# Patient Record
Sex: FEMALE | Race: OTHER | NOT HISPANIC OR LATINO | URBAN - METROPOLITAN AREA
[De-identification: names, ages, dates, MRNs, and addresses within clinical notes are randomized per-mention and may not be internally consistent; named-entity substitution may affect disease eponyms.]

---

## 2017-02-12 ENCOUNTER — INPATIENT (INPATIENT)
Facility: HOSPITAL | Age: 77
LOS: 5 days | Discharge: HOME | DRG: 74 | End: 2017-02-18
Attending: GENERAL PRACTICE | Admitting: GENERAL PRACTICE
Payer: COMMERCIAL

## 2017-02-12 VITALS
DIASTOLIC BLOOD PRESSURE: 67 MMHG | OXYGEN SATURATION: 100 % | TEMPERATURE: 98 F | SYSTOLIC BLOOD PRESSURE: 132 MMHG | RESPIRATION RATE: 18 BRPM | HEART RATE: 86 BPM

## 2017-02-12 DIAGNOSIS — Z98.891 HISTORY OF UTERINE SCAR FROM PREVIOUS SURGERY: Chronic | ICD-10-CM

## 2017-02-12 DIAGNOSIS — R27.0 ATAXIA, UNSPECIFIED: ICD-10-CM

## 2017-02-12 LAB
ACETONE SERPL-MCNC: NEGATIVE — SIGNIFICANT CHANGE UP
ALBUMIN SERPL ELPH-MCNC: 4.2 G/DL — SIGNIFICANT CHANGE UP (ref 3.3–5)
ALP SERPL-CCNC: 120 U/L — SIGNIFICANT CHANGE UP (ref 40–120)
ALT FLD-CCNC: 29 U/L RC — SIGNIFICANT CHANGE UP (ref 10–45)
ANION GAP SERPL CALC-SCNC: 16 MMOL/L — SIGNIFICANT CHANGE UP (ref 5–17)
APPEARANCE UR: CLEAR — SIGNIFICANT CHANGE UP
AST SERPL-CCNC: 30 U/L — SIGNIFICANT CHANGE UP (ref 10–40)
BASOPHILS # BLD AUTO: 0 K/UL — SIGNIFICANT CHANGE UP (ref 0–0.2)
BASOPHILS NFR BLD AUTO: 0.5 % — SIGNIFICANT CHANGE UP (ref 0–2)
BILIRUB SERPL-MCNC: 0.2 MG/DL — SIGNIFICANT CHANGE UP (ref 0.2–1.2)
BILIRUB UR-MCNC: NEGATIVE — SIGNIFICANT CHANGE UP
BUN SERPL-MCNC: 39 MG/DL — HIGH (ref 7–23)
CALCIUM SERPL-MCNC: 10.1 MG/DL — SIGNIFICANT CHANGE UP (ref 8.4–10.5)
CHLORIDE SERPL-SCNC: 94 MMOL/L — LOW (ref 96–108)
CO2 SERPL-SCNC: 27 MMOL/L — SIGNIFICANT CHANGE UP (ref 22–31)
COLOR SPEC: YELLOW — SIGNIFICANT CHANGE UP
CREAT SERPL-MCNC: 1.37 MG/DL — HIGH (ref 0.5–1.3)
DIFF PNL FLD: NEGATIVE — SIGNIFICANT CHANGE UP
EOSINOPHIL # BLD AUTO: 0.1 K/UL — SIGNIFICANT CHANGE UP (ref 0–0.5)
EOSINOPHIL NFR BLD AUTO: 1.2 % — SIGNIFICANT CHANGE UP (ref 0–6)
GAS PNL BLDV: SIGNIFICANT CHANGE UP
GLUCOSE SERPL-MCNC: 349 MG/DL — HIGH (ref 70–99)
GLUCOSE UR QL: >1000
HCT VFR BLD CALC: 38.1 % — SIGNIFICANT CHANGE UP (ref 34.5–45)
HGB BLD-MCNC: 12.5 G/DL — SIGNIFICANT CHANGE UP (ref 11.5–15.5)
KETONES UR-MCNC: NEGATIVE — SIGNIFICANT CHANGE UP
LEUKOCYTE ESTERASE UR-ACNC: ABNORMAL
LYMPHOCYTES # BLD AUTO: 2.1 K/UL — SIGNIFICANT CHANGE UP (ref 1–3.3)
LYMPHOCYTES # BLD AUTO: 35.7 % — SIGNIFICANT CHANGE UP (ref 13–44)
MCHC RBC-ENTMCNC: 24.3 PG — LOW (ref 27–34)
MCHC RBC-ENTMCNC: 32.8 GM/DL — SIGNIFICANT CHANGE UP (ref 32–36)
MCV RBC AUTO: 74 FL — LOW (ref 80–100)
MONOCYTES # BLD AUTO: 0.3 K/UL — SIGNIFICANT CHANGE UP (ref 0–0.9)
MONOCYTES NFR BLD AUTO: 5.4 % — SIGNIFICANT CHANGE UP (ref 2–14)
NEUTROPHILS # BLD AUTO: 3.4 K/UL — SIGNIFICANT CHANGE UP (ref 1.8–7.4)
NEUTROPHILS NFR BLD AUTO: 57.3 % — SIGNIFICANT CHANGE UP (ref 43–77)
NITRITE UR-MCNC: NEGATIVE — SIGNIFICANT CHANGE UP
PH UR: 6.5 — SIGNIFICANT CHANGE UP (ref 4.8–8)
PLATELET # BLD AUTO: 169 K/UL — SIGNIFICANT CHANGE UP (ref 150–400)
POTASSIUM SERPL-MCNC: 4.5 MMOL/L — SIGNIFICANT CHANGE UP (ref 3.5–5.3)
POTASSIUM SERPL-SCNC: 4.5 MMOL/L — SIGNIFICANT CHANGE UP (ref 3.5–5.3)
PROT SERPL-MCNC: 7.3 G/DL — SIGNIFICANT CHANGE UP (ref 6–8.3)
PROT UR-MCNC: NEGATIVE — SIGNIFICANT CHANGE UP
RBC # BLD: 5.15 M/UL — SIGNIFICANT CHANGE UP (ref 3.8–5.2)
RBC # FLD: 12.7 % — SIGNIFICANT CHANGE UP (ref 10.3–14.5)
SODIUM SERPL-SCNC: 137 MMOL/L — SIGNIFICANT CHANGE UP (ref 135–145)
SP GR SPEC: 1.03 — HIGH (ref 1.01–1.02)
UROBILINOGEN FLD QL: NEGATIVE — SIGNIFICANT CHANGE UP
WBC # BLD: 6 K/UL — SIGNIFICANT CHANGE UP (ref 3.8–10.5)
WBC # FLD AUTO: 6 K/UL — SIGNIFICANT CHANGE UP (ref 3.8–10.5)

## 2017-02-12 PROCEDURE — 99285 EMERGENCY DEPT VISIT HI MDM: CPT | Mod: 25

## 2017-02-12 PROCEDURE — 70450 CT HEAD/BRAIN W/O DYE: CPT | Mod: 26

## 2017-02-12 PROCEDURE — 93010 ELECTROCARDIOGRAM REPORT: CPT

## 2017-02-12 PROCEDURE — 71010: CPT | Mod: 26

## 2017-02-12 RX ORDER — INSULIN LISPRO 100/ML
VIAL (ML) SUBCUTANEOUS
Qty: 0 | Refills: 0 | Status: DISCONTINUED | OUTPATIENT
Start: 2017-02-12 | End: 2017-02-18

## 2017-02-12 RX ORDER — SODIUM CHLORIDE 9 MG/ML
1000 INJECTION, SOLUTION INTRAVENOUS
Qty: 0 | Refills: 0 | Status: DISCONTINUED | OUTPATIENT
Start: 2017-02-12 | End: 2017-02-18

## 2017-02-12 RX ORDER — DEXTROSE 50 % IN WATER 50 %
25 SYRINGE (ML) INTRAVENOUS ONCE
Qty: 0 | Refills: 0 | Status: DISCONTINUED | OUTPATIENT
Start: 2017-02-12 | End: 2017-02-18

## 2017-02-12 RX ORDER — INSULIN LISPRO 100/ML
VIAL (ML) SUBCUTANEOUS AT BEDTIME
Qty: 0 | Refills: 0 | Status: DISCONTINUED | OUTPATIENT
Start: 2017-02-12 | End: 2017-02-18

## 2017-02-12 RX ORDER — INSULIN GLARGINE 100 [IU]/ML
20 INJECTION, SOLUTION SUBCUTANEOUS AT BEDTIME
Qty: 0 | Refills: 0 | Status: DISCONTINUED | OUTPATIENT
Start: 2017-02-12 | End: 2017-02-13

## 2017-02-12 RX ORDER — GLUCAGON INJECTION, SOLUTION 0.5 MG/.1ML
1 INJECTION, SOLUTION SUBCUTANEOUS ONCE
Qty: 0 | Refills: 0 | Status: DISCONTINUED | OUTPATIENT
Start: 2017-02-12 | End: 2017-02-18

## 2017-02-12 RX ORDER — ASPIRIN/CALCIUM CARB/MAGNESIUM 324 MG
81 TABLET ORAL DAILY
Qty: 0 | Refills: 0 | Status: DISCONTINUED | OUTPATIENT
Start: 2017-02-12 | End: 2017-02-18

## 2017-02-12 RX ORDER — DEXTROSE 50 % IN WATER 50 %
12.5 SYRINGE (ML) INTRAVENOUS ONCE
Qty: 0 | Refills: 0 | Status: DISCONTINUED | OUTPATIENT
Start: 2017-02-12 | End: 2017-02-18

## 2017-02-12 RX ORDER — ATORVASTATIN CALCIUM 80 MG/1
80 TABLET, FILM COATED ORAL AT BEDTIME
Qty: 0 | Refills: 0 | Status: DISCONTINUED | OUTPATIENT
Start: 2017-02-12 | End: 2017-02-18

## 2017-02-12 RX ORDER — DEXTROSE 50 % IN WATER 50 %
1 SYRINGE (ML) INTRAVENOUS ONCE
Qty: 0 | Refills: 0 | Status: DISCONTINUED | OUTPATIENT
Start: 2017-02-12 | End: 2017-02-18

## 2017-02-12 RX ADMIN — Medication 3: at 21:20

## 2017-02-12 RX ADMIN — ATORVASTATIN CALCIUM 80 MILLIGRAM(S): 80 TABLET, FILM COATED ORAL at 21:19

## 2017-02-12 RX ADMIN — INSULIN GLARGINE 20 UNIT(S): 100 INJECTION, SOLUTION SUBCUTANEOUS at 22:00

## 2017-02-12 NOTE — ED ADULT NURSE NOTE - OBJECTIVE STATEMENT
pt states new onset of unsteady gait and loss of balance, worsening for 3 days. pt states independent and lives alone. pt takes this morning attempted to go to bathroom and felt unsteady. Pt denies any worsening of her level of sight, headache, fever, chills, nausea, vomiting, falls, head trauma.

## 2017-02-12 NOTE — ED PROVIDER NOTE - OBJECTIVE STATEMENT
77 y/o F legally blind, DM, presenting with new onset of unsteady gait and loss of balance, worsening for 3 days. Pt states that prior to these symptoms starting, she was independent, living alone and taking the bus daily to do errands. She reports she woke up in the morning 3 days ago with loss of balance when trying to walk throughout her house, having the hold onto things as she walked so she would not fall. She states that her gait is not normal for her either. Pt states that she told her daughter about this today, after it did not resolve and has gotten worse 75 y/o F legally blind, DM, presenting with new onset of unsteady gait and loss of balance, worsening for 3 days. Pt states that prior to these symptoms starting, she was independent, living alone and taking the bus daily to do errands. She reports she woke up in the morning 3 days ago with loss of balance when trying to walk throughout her house, having the hold onto things as she walked so she would not fall. She states that her gait is not normal for her either. Pt states that she told her daughter about this today, after it did not resolve and has gotten worse. Pt denies any worsening of her level of sight, headache, fever, chills, nausea, vomiting, falls, head trauma.

## 2017-02-12 NOTE — PATIENT PROFILE ADULT. - VISION (WITH CORRECTIVE LENSES IF THE PATIENT USUALLY WEARS THEM):
Partially impaired: cannot see medication labels or newsprint, but can see obstacles in path, and the surrounding layout; can count fingers at arm's length/Legally blind, glasses

## 2017-02-12 NOTE — ED PROVIDER NOTE - DECREASED ABILITY TO STAND/WALK
OFF BALANCE/walking with unsteady gait and needing to hold on to things while walking. has not fallen.

## 2017-02-12 NOTE — ED PROVIDER NOTE - ATTENDING CONTRIBUTION TO CARE
increasing difficulty with gait over several days, encouraged to come to ED by family, patient states needs to  hold on to things to walk, able to ambulate independently but holds on to objects, normal rhomberg, 5/5 strength. Matt Ch MD (attending)

## 2017-02-12 NOTE — ED PROVIDER NOTE - MEDICAL DECISION MAKING DETAILS
77 y/o F pmhx DM, legally blind presenting with new onset of ataxia, unsteady gait and concern for worsening loss of balance for 3 days. PE remarkable for aforementioned, ataxic on exam. Will obtain CT head, labs, EKG, CXR, neuro consult and reassess.

## 2017-02-13 ENCOUNTER — TRANSCRIPTION ENCOUNTER (OUTPATIENT)
Age: 77
End: 2017-02-13

## 2017-02-13 DIAGNOSIS — R27.0 ATAXIA, UNSPECIFIED: ICD-10-CM

## 2017-02-13 DIAGNOSIS — H40.9 UNSPECIFIED GLAUCOMA: ICD-10-CM

## 2017-02-13 DIAGNOSIS — E11.3493 TYPE 2 DIABETES MELLITUS WITH SEVERE NONPROLIFERATIVE DIABETIC RETINOPATHY WITHOUT MACULAR EDEMA, BILATERAL: ICD-10-CM

## 2017-02-13 LAB
ALBUMIN SERPL ELPH-MCNC: 3.6 G/DL — SIGNIFICANT CHANGE UP (ref 3.3–5)
ALP SERPL-CCNC: 97 U/L — SIGNIFICANT CHANGE UP (ref 40–120)
ALT FLD-CCNC: 23 U/L — SIGNIFICANT CHANGE UP (ref 10–45)
ANION GAP SERPL CALC-SCNC: 14 MMOL/L — SIGNIFICANT CHANGE UP (ref 5–17)
ANISOCYTOSIS BLD QL: SLIGHT — SIGNIFICANT CHANGE UP
AST SERPL-CCNC: 31 U/L — SIGNIFICANT CHANGE UP (ref 10–40)
BASOPHILS # BLD AUTO: 0.05 K/UL — SIGNIFICANT CHANGE UP (ref 0–0.2)
BASOPHILS NFR BLD AUTO: 1 % — SIGNIFICANT CHANGE UP (ref 0–2)
BILIRUB SERPL-MCNC: 0.2 MG/DL — SIGNIFICANT CHANGE UP (ref 0.2–1.2)
BUN SERPL-MCNC: 31 MG/DL — HIGH (ref 7–23)
CALCIUM SERPL-MCNC: 9.6 MG/DL — SIGNIFICANT CHANGE UP (ref 8.4–10.5)
CHLORIDE SERPL-SCNC: 102 MMOL/L — SIGNIFICANT CHANGE UP (ref 96–108)
CHOLEST SERPL-MCNC: 143 MG/DL — SIGNIFICANT CHANGE UP (ref 10–199)
CO2 SERPL-SCNC: 24 MMOL/L — SIGNIFICANT CHANGE UP (ref 22–31)
CREAT SERPL-MCNC: 1.21 MG/DL — SIGNIFICANT CHANGE UP (ref 0.5–1.3)
CULTURE RESULTS: SIGNIFICANT CHANGE UP
EOSINOPHIL # BLD AUTO: 0 K/UL — SIGNIFICANT CHANGE UP (ref 0–0.5)
EOSINOPHIL NFR BLD AUTO: 0 % — SIGNIFICANT CHANGE UP (ref 0–6)
FOLATE SERPL-MCNC: >20 NG/ML — SIGNIFICANT CHANGE UP (ref 4.8–24.2)
GLUCOSE SERPL-MCNC: 154 MG/DL — HIGH (ref 70–99)
HCT VFR BLD CALC: 35.4 % — SIGNIFICANT CHANGE UP (ref 34.5–45)
HDLC SERPL-MCNC: 37 MG/DL — LOW (ref 40–125)
HGB BLD-MCNC: 11.3 G/DL — LOW (ref 11.5–15.5)
LACTATE SERPL-SCNC: 1.1 MMOL/L — SIGNIFICANT CHANGE UP (ref 0.7–2)
LIPID PNL WITH DIRECT LDL SERPL: 90 MG/DL — SIGNIFICANT CHANGE UP
LYMPHOCYTES # BLD AUTO: 2.82 K/UL — SIGNIFICANT CHANGE UP (ref 1–3.3)
LYMPHOCYTES # BLD AUTO: 53 % — HIGH (ref 13–44)
MAGNESIUM SERPL-MCNC: 2.3 MG/DL — SIGNIFICANT CHANGE UP (ref 1.6–2.6)
MANUAL SMEAR VERIFICATION: SIGNIFICANT CHANGE UP
MCHC RBC-ENTMCNC: 23.7 PG — LOW (ref 27–34)
MCHC RBC-ENTMCNC: 31.9 GM/DL — LOW (ref 32–36)
MCV RBC AUTO: 74.2 FL — LOW (ref 80–100)
MICROCYTES BLD QL: SLIGHT — SIGNIFICANT CHANGE UP
MONOCYTES # BLD AUTO: 0.21 K/UL — SIGNIFICANT CHANGE UP (ref 0–0.9)
MONOCYTES NFR BLD AUTO: 4 % — SIGNIFICANT CHANGE UP (ref 2–14)
NEUTROPHILS # BLD AUTO: 2.23 K/UL — SIGNIFICANT CHANGE UP (ref 1.8–7.4)
NEUTROPHILS NFR BLD AUTO: 42 % — LOW (ref 43–77)
PLAT MORPH BLD: NORMAL — SIGNIFICANT CHANGE UP
PLATELET # BLD AUTO: 180 K/UL — SIGNIFICANT CHANGE UP (ref 150–400)
POTASSIUM SERPL-MCNC: 4.4 MMOL/L — SIGNIFICANT CHANGE UP (ref 3.5–5.3)
POTASSIUM SERPL-SCNC: 4.4 MMOL/L — SIGNIFICANT CHANGE UP (ref 3.5–5.3)
PREALB SERPL-MCNC: 26 MG/DL — SIGNIFICANT CHANGE UP (ref 18–45)
PROT SERPL-MCNC: 6.5 G/DL — SIGNIFICANT CHANGE UP (ref 6–8.3)
RBC # BLD: 4.77 M/UL — SIGNIFICANT CHANGE UP (ref 3.8–5.2)
RBC # FLD: 13.7 % — SIGNIFICANT CHANGE UP (ref 10.3–14.5)
RBC BLD AUTO: ABNORMAL
SODIUM SERPL-SCNC: 140 MMOL/L — SIGNIFICANT CHANGE UP (ref 135–145)
SPECIMEN SOURCE: SIGNIFICANT CHANGE UP
T3 SERPL-MCNC: 88 NG/DL — SIGNIFICANT CHANGE UP (ref 80–200)
T4 AB SER-ACNC: 7.2 UG/DL — SIGNIFICANT CHANGE UP (ref 4.6–12)
TOTAL CHOLESTEROL/HDL RATIO MEASUREMENT: 3.9 RATIO — SIGNIFICANT CHANGE UP (ref 3.3–7.1)
TRIGL SERPL-MCNC: 78 MG/DL — SIGNIFICANT CHANGE UP (ref 10–149)
TSH SERPL-MCNC: 2.1 UIU/ML — SIGNIFICANT CHANGE UP (ref 0.27–4.2)
VIT B12 SERPL-MCNC: >2000 PG/ML — HIGH (ref 243–894)
WBC # BLD: 5.32 K/UL — SIGNIFICANT CHANGE UP (ref 3.8–10.5)
WBC # FLD AUTO: 5.32 K/UL — SIGNIFICANT CHANGE UP (ref 3.8–10.5)

## 2017-02-13 PROCEDURE — 99223 1ST HOSP IP/OBS HIGH 75: CPT | Mod: GC

## 2017-02-13 PROCEDURE — 99223 1ST HOSP IP/OBS HIGH 75: CPT

## 2017-02-13 PROCEDURE — 70547 MR ANGIOGRAPHY NECK W/O DYE: CPT | Mod: 26

## 2017-02-13 PROCEDURE — 70551 MRI BRAIN STEM W/O DYE: CPT | Mod: 26

## 2017-02-13 RX ORDER — LISINOPRIL 2.5 MG/1
2.5 TABLET ORAL DAILY
Qty: 0 | Refills: 0 | Status: DISCONTINUED | OUTPATIENT
Start: 2017-02-13 | End: 2017-02-18

## 2017-02-13 RX ORDER — LISINOPRIL 2.5 MG/1
10 TABLET ORAL DAILY
Qty: 0 | Refills: 0 | Status: DISCONTINUED | OUTPATIENT
Start: 2017-02-13 | End: 2017-02-13

## 2017-02-13 RX ORDER — INSULIN LISPRO 100/ML
2 VIAL (ML) SUBCUTANEOUS
Qty: 0 | Refills: 0 | Status: DISCONTINUED | OUTPATIENT
Start: 2017-02-13 | End: 2017-02-16

## 2017-02-13 RX ORDER — INSULIN GLARGINE 100 [IU]/ML
10 INJECTION, SOLUTION SUBCUTANEOUS AT BEDTIME
Qty: 0 | Refills: 0 | Status: DISCONTINUED | OUTPATIENT
Start: 2017-02-13 | End: 2017-02-14

## 2017-02-13 RX ORDER — BRIMONIDINE TARTRATE 2 MG/MG
1 SOLUTION/ DROPS OPHTHALMIC EVERY 12 HOURS
Qty: 0 | Refills: 0 | Status: DISCONTINUED | OUTPATIENT
Start: 2017-02-13 | End: 2017-02-18

## 2017-02-13 RX ORDER — SODIUM CHLORIDE 9 MG/ML
1000 INJECTION INTRAMUSCULAR; INTRAVENOUS; SUBCUTANEOUS
Qty: 0 | Refills: 0 | Status: DISCONTINUED | OUTPATIENT
Start: 2017-02-13 | End: 2017-02-18

## 2017-02-13 RX ORDER — TIMOLOL 0.5 %
1 DROPS OPHTHALMIC (EYE) EVERY 12 HOURS
Qty: 0 | Refills: 0 | Status: DISCONTINUED | OUTPATIENT
Start: 2017-02-13 | End: 2017-02-18

## 2017-02-13 RX ORDER — HEPARIN SODIUM 5000 [USP'U]/ML
5000 INJECTION INTRAVENOUS; SUBCUTANEOUS EVERY 8 HOURS
Qty: 0 | Refills: 0 | Status: DISCONTINUED | OUTPATIENT
Start: 2017-02-13 | End: 2017-02-18

## 2017-02-13 RX ADMIN — Medication 2 UNIT(S): at 18:11

## 2017-02-13 RX ADMIN — SODIUM CHLORIDE 65 MILLILITER(S): 9 INJECTION INTRAMUSCULAR; INTRAVENOUS; SUBCUTANEOUS at 05:22

## 2017-02-13 RX ADMIN — BRIMONIDINE TARTRATE 1 DROP(S): 2 SOLUTION/ DROPS OPHTHALMIC at 05:21

## 2017-02-13 RX ADMIN — LISINOPRIL 10 MILLIGRAM(S): 2.5 TABLET ORAL at 05:20

## 2017-02-13 RX ADMIN — SODIUM CHLORIDE 65 MILLILITER(S): 9 INJECTION INTRAMUSCULAR; INTRAVENOUS; SUBCUTANEOUS at 01:28

## 2017-02-13 RX ADMIN — HEPARIN SODIUM 5000 UNIT(S): 5000 INJECTION INTRAVENOUS; SUBCUTANEOUS at 05:20

## 2017-02-13 RX ADMIN — Medication 1 TABLET(S): at 11:47

## 2017-02-13 RX ADMIN — Medication 1 DROP(S): at 05:21

## 2017-02-13 RX ADMIN — HEPARIN SODIUM 5000 UNIT(S): 5000 INJECTION INTRAVENOUS; SUBCUTANEOUS at 22:27

## 2017-02-13 RX ADMIN — HEPARIN SODIUM 5000 UNIT(S): 5000 INJECTION INTRAVENOUS; SUBCUTANEOUS at 15:38

## 2017-02-13 RX ADMIN — Medication 3: at 11:47

## 2017-02-13 RX ADMIN — ATORVASTATIN CALCIUM 80 MILLIGRAM(S): 80 TABLET, FILM COATED ORAL at 22:27

## 2017-02-13 RX ADMIN — BRIMONIDINE TARTRATE 1 DROP(S): 2 SOLUTION/ DROPS OPHTHALMIC at 18:11

## 2017-02-13 RX ADMIN — INSULIN GLARGINE 10 UNIT(S): 100 INJECTION, SOLUTION SUBCUTANEOUS at 22:27

## 2017-02-13 RX ADMIN — Medication 81 MILLIGRAM(S): at 11:46

## 2017-02-13 RX ADMIN — Medication 1 DROP(S): at 18:10

## 2017-02-13 NOTE — DISCHARGE NOTE ADULT - CARE PROVIDER_API CALL
Nichelle Garcia), Internal Medicine  [address] [place] [address]  Phone: [phone]  Fax: [phone] Nichelle Garcia), Internal Medicine  [address] [place] [address]  Phone: [phone]  Fax: [phone]    Dr. Jakub Dunham,   [place], [place]  Phone: (   )    -  Fax: (   )    -

## 2017-02-13 NOTE — DISCHARGE NOTE ADULT - PLAN OF CARE
HA1C<7 HA1C 12.3  Keep appointment with diabetic educator Sonja Amin for March 23 at 10:30 am  and endocrinologist Dr Garcia on May 26 at 11:30 am. SAME phone number and address Ataxia will continue to resolve. 1. Maintain tight blood sugar control.    2. Follow up with PMD, Dr. Jakub Dunham in 1 week.   3. Check blood sugars daily in am. Dx: Autonomic dysfunction from Diabetes. HA1C 12.3  Goal: HA1C<7  Keep appointment with diabetic educator Sonja Amin for March 23 at 10:30 am  and endocrinologist Dr Garcia on May 26 at 11:30 am. SAME phone number and address

## 2017-02-13 NOTE — DISCHARGE NOTE ADULT - PROVIDER TOKENS
TOKOG:'[id number]' TOKEN:'[id number]',FREE:[LAST:[Dr. Jakub Dunham],PHONE:[(   )    -],FAX:[(   )    -],ADDRESS:[[place], [place]]]

## 2017-02-13 NOTE — DISCHARGE NOTE ADULT - MEDICATION SUMMARY - MEDICATIONS TO STOP TAKING
I will STOP taking the medications listed below when I get home from the hospital:    glipiZIDE 10 mg oral tablet, extended release  -- 1 tab(s) by mouth 2 times a day

## 2017-02-13 NOTE — PROVIDER CONTACT NOTE (OTHER) - ACTION/TREATMENT ORDERED:
NP Igbide aware of BP. pt now on bedrest. will try to repeat orthostatic BP in morning. NP Igbide aware of BP. no bolus to be ordered. pt now on bedrest. will try to repeat orthostatic BP in morning.

## 2017-02-13 NOTE — DISCHARGE NOTE ADULT - HOME CARE AGENCY
[facility] [phone].  Referral has been made for a nurse to visit the day after discharge home, physical therapy and social work to follow.  Nurse will assess for aide services. [facility] for diabetic education, social work assessement,  and evaluation for a home health aide

## 2017-02-13 NOTE — DISCHARGE NOTE ADULT - ADDITIONAL INSTRUCTIONS
Follow up with Dr. Jakub Dunham in 1 week.   [facility] in [facility] for further education re: diabetes. Call [phone] for an appointment.   Meals on Wheels to be arranged.    Diabetes Nurse in the community to follow up with you in approximately 1 week.

## 2017-02-13 NOTE — DISCHARGE NOTE ADULT - PATIENT PORTAL LINK FT
“You can access the FollowHealth Patient Portal, offered by [facility], by registering with the following website: [email or web address]”

## 2017-02-13 NOTE — PROVIDER CONTACT NOTE (OTHER) - ASSESSMENT
pt BP 98/67, HR 70 when admitted to unit. orthostatic BP and HR ordered. when tried to repeat BP for orthostatics pts BP was 85/53 electronically repeated manually, 90/68. pt asymptomatic, not in any distres at this time. safety maintained. call bell in reach. pt BP 98/67, HR 70 when admitted to unit. orthostatic BP and HR ordered. when tried to repeat BP for orthostatics pts BP was 85/53 electronically repeated manually, 90/68. pt on NS at 65cc/hr. pt asymptomatic, not in any distres at this time. safety maintained. call bell in reach.

## 2017-02-13 NOTE — DISCHARGE NOTE ADULT - MEDICATION SUMMARY - MEDICATIONS TO TAKE
I will START or STAY ON the medications listed below when I get home from the hospital:    aspirin 81 mg oral delayed release tablet  -- 1 tab(s) by mouth once a day MDD:1  -- Indication: For CAD    ramipril 1.25 mg oral capsule  -- 1 cap(s) by mouth once a day  -- Indication: For CAD    Levemir FlexPen 100 units/mL subcutaneous solution  -- 8 unit(s) subcutaneous once a day  -- Indication: For Diabetes Type 2    Januvia 50 mg oral tablet  -- 1 tab(s) by mouth once a day MDD:1  -- Do not drink alcoholic beverages when taking this medication.    -- Indication: For Diabetes Type 2    atorvastatin 80 mg oral tablet  -- 1 tab(s) by mouth once a day (at bedtime) MDD:1  -- Indication: For CAD    Combigan 0.2%-0.5% ophthalmic solution  -- 1 drop(s) in each eye every 12 hours  -- Indication: For Glaucoma of both eyes, unspecified glaucoma    multivitamin  -- 1 tab(s) by mouth once a day  -- Indication: For Supplement

## 2017-02-13 NOTE — DISCHARGE NOTE ADULT - HOSPITAL COURSE
To be done by Attending. 77 y/o woman with  pmhx of  DM, legally blind presenting with new onset of unsteady gait, and misbalance, for 3 days.   Pt had negative workup in ED and neuro consult requested and admitted for further workup.    Summery of hospital course:  Patient was seen and evaluated and followed by multiple specialists throughout the stay and treated medically with improvement.  Patient was otherwise stable and had no other complications.  Patient was discharged to home in stable condition to follow up with primary doctor as outpatient for follow up and further care.

## 2017-02-13 NOTE — DISCHARGE NOTE ADULT - VISION (WITH CORRECTIVE LENSES IF THE PATIENT USUALLY WEARS THEM):
Legally blind, glasses/Partially impaired: cannot see medication labels or newsprint, but can see obstacles in path, and the surrounding layout; can count fingers at arm's length

## 2017-02-13 NOTE — DISCHARGE NOTE ADULT - CARE PLAN
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 SAME phone number and address Principal Discharge DX:	Ataxia  Goal:	Ataxia will continue to resolve.  Instructions for follow-up, activity and diet:	1. Maintain tight blood sugar control.    2. Follow up with PMD, Dr. Jakub Dunham in 1 week.   3. Check blood sugars daily in am.  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  Instructions for follow-up, activity and diet:	HA1C 12.3  Goal: HA1C<7  Keep appointment with diabetic educator Sonja Amin for March 23 at 10:30 am  and endocrinologist Dr Garcia on May 26 at 11:30 am. SAME phone number and address

## 2017-02-13 NOTE — DISCHARGE NOTE ADULT - MEDICATION SUMMARY - MEDICATIONS TO CHANGE
I will SWITCH the dose or number of times a day I take the medications listed below when I get home from the hospital:    Levemir FlexPen 100 units/mL subcutaneous solution  -- 20 unit(s) subcutaneous once a day (at bedtime)    simvastatin 40 mg oral tablet  -- 1 tab(s) by mouth once a day

## 2017-02-13 NOTE — H&P ADULT. - ATTENDING COMMENTS
Medical Attending Initial / Admission note  -------------------------------------------------------------------------------    CHIEF COMPLAINT & HISTORY OF PRESENT ILLNESS:      Pt is a 75 y/o woman with  pmhx of  DM, legally blind presenting with new onset of unsteady gait, and misbalance, for 3 days.   Pt had negative workup in ED and neuro consult requested and admitted for further workup.    --------------------------------------------------------------------------------  PAST MEDICAL HISTORY:  DM  Glaucoma  cataracts  legally blind    --------------------------------------------------------------------------------  PAST SURGICAL HISTORY:  glaucoma and cataract surgeries      --------------------------------------------------------------------------------  FAMILY HISTORY:    Non contributory  --------------------------------------------------------------------------------  SOCIAL HISTORY:  Alcohol: None  Smoking: None    --------------------------------------------------------------------------------  ALLERGIES:    [As above, reviewed]    --------------------------------------------------------------------------------  MEDICATIONS:   [As above, reviewed]    --------------------------------------------------------------------------------  REVIEW OF SYSTEM:    GEN: no fever, no chills, no pain  RESP: no SOB, no cough, no sputum  CVS: no chest pain, no palpitations, no edema  GI: no abdominal pain, no nausea, no vomiting, no constipation, no diarrhea  : no dysurea, no frequency, no hematurea  Neuro: no headache, no dizziness at this time  PSYCH: no anxiety, no depression  Derm : no itching, no rash    --------------------------------------------------------------------------------  VITAL SIGNS:   [As above, reviewed]    --------------------------------------------------------------------------------  PHYSICAL EXAM:    GEN: A&O X 3 , NAD , comfortable  HEENT: NCAT, PERRL, MMM, hearing intact  Neck: supple , no JVD  CVS: S1S2 , regular , No M/R/G appreciated  PULM: CTA B/L,  no W/R/R appreciated  ABD.: soft. non tender, non distended,  bowel sounds present  Extrem: intact pulses , no edema   Derm: No rash , no ecchymoses, + dry  PSYCH : normal mood,  no delusion not anxious    --------------------------------------------------------------------------------    LAB AND IMAGING:   [As above, reviewed]    --------------------------------------------------------------------------------    ASSESSMENT AND PLAN:     [As above]      --------------------  Case discussed with Pt, Rn, NP    ___________________________  VIJAY Huitron D.O.  Pager: [phone]

## 2017-02-13 NOTE — H&P ADULT. - PROBLEM SELECTOR PROBLEM 2
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

## 2017-02-13 NOTE — DISCHARGE NOTE ADULT - CARE PROVIDERS DIRECT ADDRESSES
[email or web address],DirectAddress_Unknown,DirectAddress_Unknown

## 2017-02-14 LAB
ANION GAP SERPL CALC-SCNC: 12 MMOL/L — SIGNIFICANT CHANGE UP (ref 5–17)
BASOPHILS # BLD AUTO: 0.01 K/UL — SIGNIFICANT CHANGE UP (ref 0–0.2)
BASOPHILS NFR BLD AUTO: 0.2 % — SIGNIFICANT CHANGE UP (ref 0–2)
BUN SERPL-MCNC: 28 MG/DL — HIGH (ref 7–23)
CALCIUM SERPL-MCNC: 8.7 MG/DL — SIGNIFICANT CHANGE UP (ref 8.4–10.5)
CHLORIDE SERPL-SCNC: 106 MMOL/L — SIGNIFICANT CHANGE UP (ref 96–108)
CO2 SERPL-SCNC: 26 MMOL/L — SIGNIFICANT CHANGE UP (ref 22–31)
CREAT SERPL-MCNC: 0.94 MG/DL — SIGNIFICANT CHANGE UP (ref 0.5–1.3)
EOSINOPHIL # BLD AUTO: 0.11 K/UL — SIGNIFICANT CHANGE UP (ref 0–0.5)
EOSINOPHIL NFR BLD AUTO: 2.3 % — SIGNIFICANT CHANGE UP (ref 0–6)
GLUCOSE SERPL-MCNC: 74 MG/DL — SIGNIFICANT CHANGE UP (ref 70–99)
HCT VFR BLD CALC: 32.6 % — LOW (ref 34.5–45)
HGB BLD-MCNC: 10.2 G/DL — LOW (ref 11.5–15.5)
IMM GRANULOCYTES NFR BLD AUTO: 0.2 % — SIGNIFICANT CHANGE UP (ref 0–1.5)
LYMPHOCYTES # BLD AUTO: 2.49 K/UL — SIGNIFICANT CHANGE UP (ref 1–3.3)
LYMPHOCYTES # BLD AUTO: 52.9 % — HIGH (ref 13–44)
MCHC RBC-ENTMCNC: 23.3 PG — LOW (ref 27–34)
MCHC RBC-ENTMCNC: 31.3 GM/DL — LOW (ref 32–36)
MCV RBC AUTO: 74.4 FL — LOW (ref 80–100)
MONOCYTES # BLD AUTO: 0.28 K/UL — SIGNIFICANT CHANGE UP (ref 0–0.9)
MONOCYTES NFR BLD AUTO: 5.9 % — SIGNIFICANT CHANGE UP (ref 2–14)
NEUTROPHILS # BLD AUTO: 1.81 K/UL — SIGNIFICANT CHANGE UP (ref 1.8–7.4)
NEUTROPHILS NFR BLD AUTO: 38.5 % — LOW (ref 43–77)
PLATELET # BLD AUTO: 172 K/UL — SIGNIFICANT CHANGE UP (ref 150–400)
POTASSIUM SERPL-MCNC: 3.6 MMOL/L — SIGNIFICANT CHANGE UP (ref 3.5–5.3)
POTASSIUM SERPL-SCNC: 3.6 MMOL/L — SIGNIFICANT CHANGE UP (ref 3.5–5.3)
RBC # BLD: 4.38 M/UL — SIGNIFICANT CHANGE UP (ref 3.8–5.2)
RBC # FLD: 13.9 % — SIGNIFICANT CHANGE UP (ref 10.3–14.5)
SODIUM SERPL-SCNC: 144 MMOL/L — SIGNIFICANT CHANGE UP (ref 135–145)
WBC # BLD: 4.71 K/UL — SIGNIFICANT CHANGE UP (ref 3.8–10.5)
WBC # FLD AUTO: 4.71 K/UL — SIGNIFICANT CHANGE UP (ref 3.8–10.5)

## 2017-02-14 PROCEDURE — 99232 SBSQ HOSP IP/OBS MODERATE 35: CPT

## 2017-02-14 RX ORDER — INSULIN GLARGINE 100 [IU]/ML
5 INJECTION, SOLUTION SUBCUTANEOUS AT BEDTIME
Qty: 0 | Refills: 0 | Status: DISCONTINUED | OUTPATIENT
Start: 2017-02-14 | End: 2017-02-17

## 2017-02-14 RX ADMIN — HEPARIN SODIUM 5000 UNIT(S): 5000 INJECTION INTRAVENOUS; SUBCUTANEOUS at 06:08

## 2017-02-14 RX ADMIN — ATORVASTATIN CALCIUM 80 MILLIGRAM(S): 80 TABLET, FILM COATED ORAL at 22:50

## 2017-02-14 RX ADMIN — Medication 2 UNIT(S): at 11:36

## 2017-02-14 RX ADMIN — Medication 1 DROP(S): at 17:42

## 2017-02-14 RX ADMIN — INSULIN GLARGINE 5 UNIT(S): 100 INJECTION, SOLUTION SUBCUTANEOUS at 22:50

## 2017-02-14 RX ADMIN — HEPARIN SODIUM 5000 UNIT(S): 5000 INJECTION INTRAVENOUS; SUBCUTANEOUS at 22:50

## 2017-02-14 RX ADMIN — Medication 2 UNIT(S): at 08:25

## 2017-02-14 RX ADMIN — BRIMONIDINE TARTRATE 1 DROP(S): 2 SOLUTION/ DROPS OPHTHALMIC at 17:42

## 2017-02-14 RX ADMIN — HEPARIN SODIUM 5000 UNIT(S): 5000 INJECTION INTRAVENOUS; SUBCUTANEOUS at 11:36

## 2017-02-14 RX ADMIN — Medication 1 DROP(S): at 06:08

## 2017-02-14 RX ADMIN — Medication 1: at 22:49

## 2017-02-14 RX ADMIN — BRIMONIDINE TARTRATE 1 DROP(S): 2 SOLUTION/ DROPS OPHTHALMIC at 06:08

## 2017-02-14 RX ADMIN — Medication 1 TABLET(S): at 11:36

## 2017-02-14 RX ADMIN — Medication 81 MILLIGRAM(S): at 11:36

## 2017-02-14 NOTE — DIETITIAN INITIAL EVALUATION ADULT. - ENERGY NEEDS
height: 5'0", weight: 120 pounds, BMI: 23.4, ideal body weight: 100 pounds (+/-10%), %IBW: 120%  Pertinent information: Per chart pt with unsteady gait, imbalance, uncontrolled DM. No pressure ulcers or edema noted.

## 2017-02-14 NOTE — DIETITIAN INITIAL EVALUATION ADULT. - ADHERENCE
Pt states that she will take the bus to go to the [employer] and take a cab back home. States sometimes she does not have the money to do so, so her son will assist her. Pt states that her son will come every night to check her fingersticks as she cannot see the machine and will help administer the insulin. Per further discussion with pt sometimes her son does not come, per pt, "he did not come for 2 days and my sugars went to the 400s." Made team aware. Pt states that she resides in [facility] home alone. States that she has coffee with 2 slices wheat bread, butter or ham and 1/2 banana for breakfast, states she cooks herself chicken with vegetables and brown rice for lunch and has oatmeal with crackers for dinner. Snacks on toasted bread with butter or fruit or crackers or peanuts. Case discussed in detail with case management and NP.

## 2017-02-14 NOTE — DIETITIAN INITIAL EVALUATION ADULT. - NS AS NUTRI INTERV ED CONTENT3
Discussed consistent carbohydrate diet. DIscussed foods that contain carbohydrates, consuming carbohydrates with protein, avoiding concentrated sweets, discussed plate method. Discussed importance of monitoring blood glucose levels. Pt able to teach back some points discussed. Unable to provide written materials as pt unable to see them.

## 2017-02-14 NOTE — DIETITIAN INITIAL EVALUATION ADULT. - NS AS NUTRI INTERV COLLABORAT
Referral to community agencies/programs (specify)/Case discussed in depth with NP and case management. Pt willing to try [employer]Wheels and other community programs - discussed with case management and informed social work. Pt also willing to monitor blood glucose levels using a glucometer that can read the numbers for her- discussed with team.

## 2017-02-14 NOTE — PHYSICAL THERAPY INITIAL EVALUATION ADULT - PERTINENT HX OF CURRENT PROBLEM, REHAB EVAL
Pt is a 77 y/o woman with  pmhx of  DM, legally blind presenting with new onset of unsteady gait, and misbalance, for 3 days. Pt had negative workup in ED and neuro consult requested and admitted for further workup. (cont below)

## 2017-02-14 NOTE — PHYSICAL THERAPY INITIAL EVALUATION ADULT - ADDITIONAL COMMENTS
(cont from above);CTHead: (-) BRAIN MRI: No acute intracranial hemorrhage, mass effect, or evidence of   acute territorial infarct. Minimal white matter microvascular ischemic disease. BRAIN MRA: No vascular aneurysm or flow-limiting stenosis. NECK MRA: No flow-limiting stenosis. Carotid bifurcations unremarkable. CXR (-) (cont from above);CTHead: (-) BRAIN MRI: No acute intracranial hemorrhage, mass effect, or evidence of   acute territorial infarct. Minimal white matter microvascular ischemic disease. BRAIN MRA: No vascular aneurysm or flow-limiting stenosis. NECK MRA: No flow-limiting stenosis. Carotid bifurcations unremarkable. CXR (-)    Pt lives in a studio apartment with no steps to enter on the first floor. Pt lives alone. Prior to admission pt was independent with all functional mobility and did not use an AD to ambulate.

## 2017-02-14 NOTE — DIETITIAN INITIAL EVALUATION ADULT. - OTHER INFO
Consult received for education. Pt states that she does not typically weigh herself and states that her clothes have gotten looser however unable to state amount of weight loss or time frame. Pt states she weighed about 150 pounds 5 years ago and states she got sick and she had lost her house back then and states her clothes got big. Noted admission weight 120 pounds. PTA reports taking a multivitamin, unsure if she takes any other vitamins. Currently pt states that her appetite is good. RN confirmed pt eats very well. Pt denies nausea/vomiting/diarrhea/constipation. Denies difficulty chewing/swallowing. NKFA. Pt states she is amenable to meals on wheels and other services- discussed with case management and informed social work. Pt receptive to verbal diet education, unable to provide written materials as pt legally blind.

## 2017-02-14 NOTE — DIETITIAN INITIAL EVALUATION ADULT. - ETIOLOGY
lack of social support, perception of lack of resources lack of prior exposure to nutrition related information

## 2017-02-14 NOTE — DIETITIAN INITIAL EVALUATION ADULT. - NS AS NUTRI INTERV MEALS SNACK
1) Consider liberalize diet to consistent carbohydrate diet. 2) Provide food preferences within diet restrictions when requested 3) Continue to provide assistance and encouragement at meal times.

## 2017-02-15 PROCEDURE — 99232 SBSQ HOSP IP/OBS MODERATE 35: CPT

## 2017-02-15 RX ADMIN — Medication 1 DROP(S): at 05:29

## 2017-02-15 RX ADMIN — Medication 1: at 11:47

## 2017-02-15 RX ADMIN — Medication 2 UNIT(S): at 11:47

## 2017-02-15 RX ADMIN — HEPARIN SODIUM 5000 UNIT(S): 5000 INJECTION INTRAVENOUS; SUBCUTANEOUS at 05:29

## 2017-02-15 RX ADMIN — Medication 81 MILLIGRAM(S): at 11:46

## 2017-02-15 RX ADMIN — BRIMONIDINE TARTRATE 1 DROP(S): 2 SOLUTION/ DROPS OPHTHALMIC at 17:40

## 2017-02-15 RX ADMIN — Medication 3: at 21:33

## 2017-02-15 RX ADMIN — BRIMONIDINE TARTRATE 1 DROP(S): 2 SOLUTION/ DROPS OPHTHALMIC at 05:29

## 2017-02-15 RX ADMIN — Medication 1: at 07:40

## 2017-02-15 RX ADMIN — HEPARIN SODIUM 5000 UNIT(S): 5000 INJECTION INTRAVENOUS; SUBCUTANEOUS at 11:47

## 2017-02-15 RX ADMIN — ATORVASTATIN CALCIUM 80 MILLIGRAM(S): 80 TABLET, FILM COATED ORAL at 21:33

## 2017-02-15 RX ADMIN — INSULIN GLARGINE 5 UNIT(S): 100 INJECTION, SOLUTION SUBCUTANEOUS at 21:33

## 2017-02-15 RX ADMIN — Medication 2 UNIT(S): at 07:40

## 2017-02-15 RX ADMIN — HEPARIN SODIUM 5000 UNIT(S): 5000 INJECTION INTRAVENOUS; SUBCUTANEOUS at 21:33

## 2017-02-15 RX ADMIN — Medication 1 TABLET(S): at 11:47

## 2017-02-15 RX ADMIN — Medication 1 DROP(S): at 17:40

## 2017-02-16 LAB
ACETONE SERPL-MCNC: NEGATIVE — SIGNIFICANT CHANGE UP
ANION GAP SERPL CALC-SCNC: 12 MMOL/L — SIGNIFICANT CHANGE UP (ref 5–17)
BUN SERPL-MCNC: 25 MG/DL — HIGH (ref 7–23)
CALCIUM SERPL-MCNC: 8.9 MG/DL — SIGNIFICANT CHANGE UP (ref 8.4–10.5)
CHLORIDE SERPL-SCNC: 106 MMOL/L — SIGNIFICANT CHANGE UP (ref 96–108)
CO2 SERPL-SCNC: 26 MMOL/L — SIGNIFICANT CHANGE UP (ref 22–31)
CREAT SERPL-MCNC: 1.1 MG/DL — SIGNIFICANT CHANGE UP (ref 0.5–1.3)
GLUCOSE SERPL-MCNC: 162 MG/DL — HIGH (ref 70–99)
HCT VFR BLD CALC: 33.7 % — LOW (ref 34.5–45)
HGB BLD-MCNC: 10.3 G/DL — LOW (ref 11.5–15.5)
MCHC RBC-ENTMCNC: 22.9 PG — LOW (ref 27–34)
MCHC RBC-ENTMCNC: 30.6 GM/DL — LOW (ref 32–36)
MCV RBC AUTO: 75.1 FL — LOW (ref 80–100)
PLATELET # BLD AUTO: 192 K/UL — SIGNIFICANT CHANGE UP (ref 150–400)
POTASSIUM SERPL-MCNC: 4.1 MMOL/L — SIGNIFICANT CHANGE UP (ref 3.5–5.3)
POTASSIUM SERPL-SCNC: 4.1 MMOL/L — SIGNIFICANT CHANGE UP (ref 3.5–5.3)
RBC # BLD: 4.49 M/UL — SIGNIFICANT CHANGE UP (ref 3.8–5.2)
RBC # FLD: 13.8 % — SIGNIFICANT CHANGE UP (ref 10.3–14.5)
SODIUM SERPL-SCNC: 144 MMOL/L — SIGNIFICANT CHANGE UP (ref 135–145)
WBC # BLD: 4.86 K/UL — SIGNIFICANT CHANGE UP (ref 3.8–10.5)
WBC # FLD AUTO: 4.86 K/UL — SIGNIFICANT CHANGE UP (ref 3.8–10.5)

## 2017-02-16 PROCEDURE — 99222 1ST HOSP IP/OBS MODERATE 55: CPT

## 2017-02-16 PROCEDURE — 99232 SBSQ HOSP IP/OBS MODERATE 35: CPT

## 2017-02-16 RX ORDER — INSULIN LISPRO 100/ML
2 VIAL (ML) SUBCUTANEOUS
Qty: 0 | Refills: 0 | Status: DISCONTINUED | OUTPATIENT
Start: 2017-02-16 | End: 2017-02-17

## 2017-02-16 RX ORDER — INSULIN LISPRO 100/ML
2 VIAL (ML) SUBCUTANEOUS
Qty: 0 | Refills: 0 | Status: DISCONTINUED | OUTPATIENT
Start: 2017-02-16 | End: 2017-02-18

## 2017-02-16 RX ORDER — INSULIN LISPRO 100/ML
4 VIAL (ML) SUBCUTANEOUS
Qty: 0 | Refills: 0 | Status: DISCONTINUED | OUTPATIENT
Start: 2017-02-16 | End: 2017-02-18

## 2017-02-16 RX ADMIN — BRIMONIDINE TARTRATE 1 DROP(S): 2 SOLUTION/ DROPS OPHTHALMIC at 17:23

## 2017-02-16 RX ADMIN — Medication 2 UNIT(S): at 17:23

## 2017-02-16 RX ADMIN — BRIMONIDINE TARTRATE 1 DROP(S): 2 SOLUTION/ DROPS OPHTHALMIC at 06:38

## 2017-02-16 RX ADMIN — Medication 2 UNIT(S): at 11:53

## 2017-02-16 RX ADMIN — Medication 2: at 11:52

## 2017-02-16 RX ADMIN — Medication 1 TABLET(S): at 11:52

## 2017-02-16 RX ADMIN — Medication 1 DROP(S): at 17:24

## 2017-02-16 RX ADMIN — Medication 1 DROP(S): at 06:38

## 2017-02-16 RX ADMIN — HEPARIN SODIUM 5000 UNIT(S): 5000 INJECTION INTRAVENOUS; SUBCUTANEOUS at 06:38

## 2017-02-16 RX ADMIN — Medication 1: at 17:23

## 2017-02-16 RX ADMIN — ATORVASTATIN CALCIUM 80 MILLIGRAM(S): 80 TABLET, FILM COATED ORAL at 21:34

## 2017-02-16 RX ADMIN — Medication 1: at 21:43

## 2017-02-16 RX ADMIN — INSULIN GLARGINE 5 UNIT(S): 100 INJECTION, SOLUTION SUBCUTANEOUS at 21:34

## 2017-02-16 RX ADMIN — Medication 81 MILLIGRAM(S): at 11:52

## 2017-02-16 RX ADMIN — Medication 1: at 07:43

## 2017-02-16 RX ADMIN — Medication 2 UNIT(S): at 07:43

## 2017-02-16 RX ADMIN — HEPARIN SODIUM 5000 UNIT(S): 5000 INJECTION INTRAVENOUS; SUBCUTANEOUS at 21:34

## 2017-02-16 RX ADMIN — HEPARIN SODIUM 5000 UNIT(S): 5000 INJECTION INTRAVENOUS; SUBCUTANEOUS at 13:39

## 2017-02-17 LAB
HCT VFR BLD CALC: 36.7 % — SIGNIFICANT CHANGE UP (ref 34.5–45)
HGB BLD-MCNC: 11.4 G/DL — LOW (ref 11.5–15.5)
INR BLD: 0.92 RATIO — SIGNIFICANT CHANGE UP (ref 0.88–1.16)
MCHC RBC-ENTMCNC: 23.4 PG — LOW (ref 27–34)
MCHC RBC-ENTMCNC: 31.1 GM/DL — LOW (ref 32–36)
MCV RBC AUTO: 75.2 FL — LOW (ref 80–100)
PLATELET # BLD AUTO: 208 K/UL — SIGNIFICANT CHANGE UP (ref 150–400)
PROTHROM AB SERPL-ACNC: 10.4 SEC — SIGNIFICANT CHANGE UP (ref 10–13.1)
RBC # BLD: 4.88 M/UL — SIGNIFICANT CHANGE UP (ref 3.8–5.2)
RBC # FLD: 14.1 % — SIGNIFICANT CHANGE UP (ref 10.3–14.5)
WBC # BLD: 5.89 K/UL — SIGNIFICANT CHANGE UP (ref 3.8–10.5)
WBC # FLD AUTO: 5.89 K/UL — SIGNIFICANT CHANGE UP (ref 3.8–10.5)

## 2017-02-17 PROCEDURE — 99233 SBSQ HOSP IP/OBS HIGH 50: CPT

## 2017-02-17 RX ORDER — SIMVASTATIN 20 MG/1
1 TABLET, FILM COATED ORAL
Qty: 0 | Refills: 0 | COMMUNITY

## 2017-02-17 RX ORDER — SITAGLIPTIN 50 MG/1
1 TABLET, FILM COATED ORAL
Qty: 30 | Refills: 0 | OUTPATIENT
Start: 2017-02-17 | End: 2017-03-19

## 2017-02-17 RX ORDER — INSULIN GLARGINE 100 [IU]/ML
5 INJECTION, SOLUTION SUBCUTANEOUS EVERY MORNING
Qty: 0 | Refills: 0 | Status: DISCONTINUED | OUTPATIENT
Start: 2017-02-18 | End: 2017-02-18

## 2017-02-17 RX ORDER — INSULIN DETEMIR 100/ML (3)
20 INSULIN PEN (ML) SUBCUTANEOUS
Qty: 0 | Refills: 0 | COMMUNITY

## 2017-02-17 RX ORDER — ATORVASTATIN CALCIUM 80 MG/1
1 TABLET, FILM COATED ORAL
Qty: 30 | Refills: 0 | OUTPATIENT
Start: 2017-02-17 | End: 2017-03-19

## 2017-02-17 RX ORDER — INSULIN LISPRO 100/ML
3 VIAL (ML) SUBCUTANEOUS
Qty: 0 | Refills: 0 | Status: DISCONTINUED | OUTPATIENT
Start: 2017-02-17 | End: 2017-02-18

## 2017-02-17 RX ORDER — ASPIRIN/CALCIUM CARB/MAGNESIUM 324 MG
1 TABLET ORAL
Qty: 30 | Refills: 0 | OUTPATIENT
Start: 2017-02-17 | End: 2017-03-19

## 2017-02-17 RX ADMIN — HEPARIN SODIUM 5000 UNIT(S): 5000 INJECTION INTRAVENOUS; SUBCUTANEOUS at 14:29

## 2017-02-17 RX ADMIN — Medication 2: at 22:04

## 2017-02-17 RX ADMIN — Medication 1 TABLET(S): at 12:05

## 2017-02-17 RX ADMIN — BRIMONIDINE TARTRATE 1 DROP(S): 2 SOLUTION/ DROPS OPHTHALMIC at 17:23

## 2017-02-17 RX ADMIN — HEPARIN SODIUM 5000 UNIT(S): 5000 INJECTION INTRAVENOUS; SUBCUTANEOUS at 06:43

## 2017-02-17 RX ADMIN — Medication 1 DROP(S): at 06:37

## 2017-02-17 RX ADMIN — ATORVASTATIN CALCIUM 80 MILLIGRAM(S): 80 TABLET, FILM COATED ORAL at 22:04

## 2017-02-17 RX ADMIN — Medication 81 MILLIGRAM(S): at 12:05

## 2017-02-17 RX ADMIN — Medication 4 UNIT(S): at 08:24

## 2017-02-17 RX ADMIN — Medication 3 UNIT(S): at 17:24

## 2017-02-17 RX ADMIN — Medication 1 DROP(S): at 17:23

## 2017-02-17 RX ADMIN — HEPARIN SODIUM 5000 UNIT(S): 5000 INJECTION INTRAVENOUS; SUBCUTANEOUS at 22:08

## 2017-02-17 RX ADMIN — Medication 2 UNIT(S): at 12:05

## 2017-02-17 RX ADMIN — BRIMONIDINE TARTRATE 1 DROP(S): 2 SOLUTION/ DROPS OPHTHALMIC at 06:37

## 2017-02-17 RX ADMIN — Medication 2: at 12:04

## 2017-02-18 VITALS
SYSTOLIC BLOOD PRESSURE: 119 MMHG | DIASTOLIC BLOOD PRESSURE: 64 MMHG | OXYGEN SATURATION: 100 % | TEMPERATURE: 98 F | RESPIRATION RATE: 16 BRPM | HEART RATE: 70 BPM

## 2017-02-18 RX ORDER — ASPIRIN/CALCIUM CARB/MAGNESIUM 324 MG
1 TABLET ORAL
Qty: 30 | Refills: 0 | OUTPATIENT
Start: 2017-02-18 | End: 2017-03-20

## 2017-02-18 RX ORDER — SITAGLIPTIN 50 MG/1
1 TABLET, FILM COATED ORAL
Qty: 30 | Refills: 0 | OUTPATIENT
Start: 2017-02-18 | End: 2017-03-20

## 2017-02-18 RX ORDER — INSULIN DETEMIR 100/ML (3)
8 INSULIN PEN (ML) SUBCUTANEOUS
Qty: 0 | Refills: 0 | COMMUNITY

## 2017-02-18 RX ORDER — INSULIN DETEMIR 100/ML (3)
8 INSULIN PEN (ML) SUBCUTANEOUS
Qty: 30 | Refills: 2 | OUTPATIENT
Start: 2017-02-18 | End: 2017-05-18

## 2017-02-18 RX ORDER — RAMIPRIL 5 MG
1 CAPSULE ORAL
Qty: 30 | Refills: 0 | OUTPATIENT
Start: 2017-02-18 | End: 2017-03-20

## 2017-02-18 RX ORDER — RAMIPRIL 5 MG
1 CAPSULE ORAL
Qty: 0 | Refills: 0 | COMMUNITY

## 2017-02-18 RX ORDER — ATORVASTATIN CALCIUM 80 MG/1
1 TABLET, FILM COATED ORAL
Qty: 30 | Refills: 0 | OUTPATIENT
Start: 2017-02-18 | End: 2017-03-20

## 2017-02-18 RX ORDER — INSULIN DETEMIR 100/ML (3)
16 INSULIN PEN (ML) SUBCUTANEOUS
Qty: 0 | Refills: 2 | COMMUNITY
Start: 2017-02-18 | End: 2017-05-18

## 2017-02-18 RX ORDER — INSULIN DETEMIR 100/ML (3)
20 INSULIN PEN (ML) SUBCUTANEOUS
Qty: 0 | Refills: 2 | COMMUNITY
Start: 2017-02-18 | End: 2017-05-18

## 2017-02-18 RX ADMIN — BRIMONIDINE TARTRATE 1 DROP(S): 2 SOLUTION/ DROPS OPHTHALMIC at 06:36

## 2017-02-18 RX ADMIN — Medication 81 MILLIGRAM(S): at 13:22

## 2017-02-18 RX ADMIN — HEPARIN SODIUM 5000 UNIT(S): 5000 INJECTION INTRAVENOUS; SUBCUTANEOUS at 13:22

## 2017-02-18 RX ADMIN — INSULIN GLARGINE 5 UNIT(S): 100 INJECTION, SOLUTION SUBCUTANEOUS at 10:05

## 2017-02-18 RX ADMIN — Medication 3 UNIT(S): at 18:40

## 2017-02-18 RX ADMIN — Medication 2: at 08:42

## 2017-02-18 RX ADMIN — Medication 4 UNIT(S): at 08:42

## 2017-02-18 RX ADMIN — Medication 1 TABLET(S): at 13:22

## 2017-02-18 RX ADMIN — Medication 2: at 18:40

## 2017-02-18 RX ADMIN — BRIMONIDINE TARTRATE 1 DROP(S): 2 SOLUTION/ DROPS OPHTHALMIC at 18:41

## 2017-02-18 RX ADMIN — HEPARIN SODIUM 5000 UNIT(S): 5000 INJECTION INTRAVENOUS; SUBCUTANEOUS at 06:37

## 2017-02-18 RX ADMIN — Medication 3: at 13:22

## 2017-02-18 RX ADMIN — Medication 1 DROP(S): at 18:41

## 2017-02-18 RX ADMIN — Medication 1 DROP(S): at 06:36

## 2017-02-18 RX ADMIN — Medication 2 UNIT(S): at 13:23

## 2017-03-23 ENCOUNTER — APPOINTMENT (OUTPATIENT)
Dept: ENDOCRINOLOGY | Facility: CLINIC | Age: 77
End: 2017-03-23

## 2017-04-13 PROCEDURE — 70450 CT HEAD/BRAIN W/O DYE: CPT

## 2017-04-13 PROCEDURE — 82746 ASSAY OF FOLIC ACID SERUM: CPT

## 2017-04-13 PROCEDURE — 84295 ASSAY OF SERUM SODIUM: CPT

## 2017-04-13 PROCEDURE — 70547 MR ANGIOGRAPHY NECK W/O DYE: CPT

## 2017-04-13 PROCEDURE — 84480 ASSAY TRIIODOTHYRONINE (T3): CPT

## 2017-04-13 PROCEDURE — 82330 ASSAY OF CALCIUM: CPT

## 2017-04-13 PROCEDURE — 81001 URINALYSIS AUTO W/SCOPE: CPT

## 2017-04-13 PROCEDURE — 85610 PROTHROMBIN TIME: CPT

## 2017-04-13 PROCEDURE — 84134 ASSAY OF PREALBUMIN: CPT

## 2017-04-13 PROCEDURE — 82435 ASSAY OF BLOOD CHLORIDE: CPT

## 2017-04-13 PROCEDURE — 97162 PT EVAL MOD COMPLEX 30 MIN: CPT

## 2017-04-13 PROCEDURE — 85014 HEMATOCRIT: CPT

## 2017-04-13 PROCEDURE — 70551 MRI BRAIN STEM W/O DYE: CPT

## 2017-04-13 PROCEDURE — 70544 MR ANGIOGRAPHY HEAD W/O DYE: CPT

## 2017-04-13 PROCEDURE — 85027 COMPLETE CBC AUTOMATED: CPT

## 2017-04-13 PROCEDURE — 84443 ASSAY THYROID STIM HORMONE: CPT

## 2017-04-13 PROCEDURE — 87086 URINE CULTURE/COLONY COUNT: CPT

## 2017-04-13 PROCEDURE — 80053 COMPREHEN METABOLIC PANEL: CPT

## 2017-04-13 PROCEDURE — 82607 VITAMIN B-12: CPT

## 2017-04-13 PROCEDURE — 83735 ASSAY OF MAGNESIUM: CPT

## 2017-04-13 PROCEDURE — 99285 EMERGENCY DEPT VISIT HI MDM: CPT | Mod: 25

## 2017-04-13 PROCEDURE — 93005 ELECTROCARDIOGRAM TRACING: CPT

## 2017-04-13 PROCEDURE — 82947 ASSAY GLUCOSE BLOOD QUANT: CPT

## 2017-04-13 PROCEDURE — 82009 KETONE BODYS QUAL: CPT

## 2017-04-13 PROCEDURE — 82803 BLOOD GASES ANY COMBINATION: CPT

## 2017-04-13 PROCEDURE — 83036 HEMOGLOBIN GLYCOSYLATED A1C: CPT

## 2017-04-13 PROCEDURE — 84436 ASSAY OF TOTAL THYROXINE: CPT

## 2017-04-13 PROCEDURE — 83605 ASSAY OF LACTIC ACID: CPT

## 2017-04-13 PROCEDURE — 80048 BASIC METABOLIC PNL TOTAL CA: CPT

## 2017-04-13 PROCEDURE — 71045 X-RAY EXAM CHEST 1 VIEW: CPT

## 2017-04-13 PROCEDURE — 80061 LIPID PANEL: CPT

## 2017-04-13 PROCEDURE — 84132 ASSAY OF SERUM POTASSIUM: CPT

## 2017-05-26 ENCOUNTER — APPOINTMENT (OUTPATIENT)
Dept: ENDOCRINOLOGY | Facility: CLINIC | Age: 77
End: 2017-05-26

## 2017-07-20 NOTE — DIETITIAN INITIAL EVALUATION ADULT. - SIGNS/SYMPTOMS
Pt is here for   Chief Complaint   Patient presents with   [facility] Follow-up     for dizziness [facility]    Diabetes     follow up     Pt denies pain at this time     1. Have you been to the ER, urgent care clinic since your last visit? Hospitalized since your last visit? Yes When: 7/19/17 [facility] for dizziness    2. Have you seen or consulted any other health care providers outside of the [address] [place] [address] since your last visit? Include any pap smears or colon screening.  No pt visually impaired, pt verbalized that sometimes does not have resources to go food shopping. pt verbalizes inaccurate information.

## 2017-09-13 ENCOUNTER — INPATIENT (INPATIENT)
Facility: HOSPITAL | Age: 77
LOS: 1 days | Discharge: HOME | DRG: 639 | End: 2017-09-15
Attending: INTERNAL MEDICINE | Admitting: INTERNAL MEDICINE
Payer: COMMERCIAL

## 2017-09-13 VITALS
HEART RATE: 78 BPM | SYSTOLIC BLOOD PRESSURE: 159 MMHG | DIASTOLIC BLOOD PRESSURE: 61 MMHG | OXYGEN SATURATION: 100 % | RESPIRATION RATE: 18 BRPM | TEMPERATURE: 99 F

## 2017-09-13 DIAGNOSIS — Z98.891 HISTORY OF UTERINE SCAR FROM PREVIOUS SURGERY: Chronic | ICD-10-CM

## 2017-09-13 PROCEDURE — 99285 EMERGENCY DEPT VISIT HI MDM: CPT | Mod: 25,GC

## 2017-09-13 NOTE — ED ADULT NURSE NOTE - OBJECTIVE STATEMENT
78 y/o F, A&Ox3, hard of hearing, enters ED w/ c/o hyperglycemia and chronic back/knee pain. As per son, pt. fell last year and has since had back pain and bilateral knee pain. Pt. reports pain in right knee more than left and pain from neck down to lower back. Denies recent trauma to the area. No bruising/redness. Son also reports pt.'s blood sugar has been in the 400s for the past couple of days. Pt. uses novolog 20 units 2x/day and took it earlier today. Pt.'s finger sticks in  and 265. Denies dizziness/lightheadedness/headaches. No fever/chills/n/v/diarrhea. Skin warm, dry and intact. Family at bedside.

## 2017-09-13 NOTE — ED ADULT NURSE NOTE - ED STAT RN HANDOFF DETAILS
handoff given to AIXA Sarabia in CDU handoff given to AIXA Sarabia in CDU  report given to AIXA El in CDU

## 2017-09-14 DIAGNOSIS — R73.9 HYPERGLYCEMIA, UNSPECIFIED: ICD-10-CM

## 2017-09-14 DIAGNOSIS — E11.319 TYPE 2 DIABETES MELLITUS WITH UNSPECIFIED DIABETIC RETINOPATHY WITHOUT MACULAR EDEMA: ICD-10-CM

## 2017-09-14 LAB
ALBUMIN SERPL ELPH-MCNC: 4 G/DL — SIGNIFICANT CHANGE UP (ref 3.3–5)
ALP SERPL-CCNC: 134 U/L — HIGH (ref 40–120)
ALT FLD-CCNC: 14 U/L RC — SIGNIFICANT CHANGE UP (ref 10–45)
ANION GAP SERPL CALC-SCNC: 13 MMOL/L — SIGNIFICANT CHANGE UP (ref 5–17)
APPEARANCE UR: CLEAR — SIGNIFICANT CHANGE UP
AST SERPL-CCNC: 18 U/L — SIGNIFICANT CHANGE UP (ref 10–40)
BASOPHILS # BLD AUTO: 0.1 K/UL — SIGNIFICANT CHANGE UP (ref 0–0.2)
BASOPHILS NFR BLD AUTO: 1.2 % — SIGNIFICANT CHANGE UP (ref 0–2)
BILIRUB SERPL-MCNC: <0.1 MG/DL — LOW (ref 0.2–1.2)
BILIRUB UR-MCNC: NEGATIVE — SIGNIFICANT CHANGE UP
BUN SERPL-MCNC: 29 MG/DL — HIGH (ref 7–23)
CALCIUM SERPL-MCNC: 9.3 MG/DL — SIGNIFICANT CHANGE UP (ref 8.4–10.5)
CHLORIDE SERPL-SCNC: 99 MMOL/L — SIGNIFICANT CHANGE UP (ref 96–108)
CO2 SERPL-SCNC: 27 MMOL/L — SIGNIFICANT CHANGE UP (ref 22–31)
COLOR SPEC: SIGNIFICANT CHANGE UP
CREAT SERPL-MCNC: 1.2 MG/DL — SIGNIFICANT CHANGE UP (ref 0.5–1.3)
DIFF PNL FLD: NEGATIVE — SIGNIFICANT CHANGE UP
EOSINOPHIL # BLD AUTO: 0.1 K/UL — SIGNIFICANT CHANGE UP (ref 0–0.5)
EOSINOPHIL NFR BLD AUTO: 1.9 % — SIGNIFICANT CHANGE UP (ref 0–6)
EPI CELLS # UR: SIGNIFICANT CHANGE UP /HPF
GAS PNL BLDV: SIGNIFICANT CHANGE UP
GLUCOSE SERPL-MCNC: 296 MG/DL — HIGH (ref 70–99)
GLUCOSE UR QL: 100
HBA1C BLD-MCNC: 13.4 % — HIGH (ref 4–5.6)
HCT VFR BLD CALC: 34.7 % — SIGNIFICANT CHANGE UP (ref 34.5–45)
HGB BLD-MCNC: 11.3 G/DL — LOW (ref 11.5–15.5)
KETONES UR-MCNC: NEGATIVE — SIGNIFICANT CHANGE UP
LEUKOCYTE ESTERASE UR-ACNC: ABNORMAL
LYMPHOCYTES # BLD AUTO: 2.1 K/UL — SIGNIFICANT CHANGE UP (ref 1–3.3)
LYMPHOCYTES # BLD AUTO: 39 % — SIGNIFICANT CHANGE UP (ref 13–44)
MCHC RBC-ENTMCNC: 24.7 PG — LOW (ref 27–34)
MCHC RBC-ENTMCNC: 32.5 GM/DL — SIGNIFICANT CHANGE UP (ref 32–36)
MCV RBC AUTO: 76.2 FL — LOW (ref 80–100)
MONOCYTES # BLD AUTO: 0.4 K/UL — SIGNIFICANT CHANGE UP (ref 0–0.9)
MONOCYTES NFR BLD AUTO: 8 % — SIGNIFICANT CHANGE UP (ref 2–14)
NEUTROPHILS # BLD AUTO: 2.7 K/UL — SIGNIFICANT CHANGE UP (ref 1.8–7.4)
NEUTROPHILS NFR BLD AUTO: 49.8 % — SIGNIFICANT CHANGE UP (ref 43–77)
NITRITE UR-MCNC: NEGATIVE — SIGNIFICANT CHANGE UP
PH UR: 6 — SIGNIFICANT CHANGE UP (ref 5–8)
PLATELET # BLD AUTO: 175 K/UL — SIGNIFICANT CHANGE UP (ref 150–400)
POTASSIUM SERPL-MCNC: 4.2 MMOL/L — SIGNIFICANT CHANGE UP (ref 3.5–5.3)
POTASSIUM SERPL-SCNC: 4.2 MMOL/L — SIGNIFICANT CHANGE UP (ref 3.5–5.3)
PROT SERPL-MCNC: 7 G/DL — SIGNIFICANT CHANGE UP (ref 6–8.3)
PROT UR-MCNC: NEGATIVE — SIGNIFICANT CHANGE UP
RBC # BLD: 4.56 M/UL — SIGNIFICANT CHANGE UP (ref 3.8–5.2)
RBC # FLD: 12.2 % — SIGNIFICANT CHANGE UP (ref 10.3–14.5)
RBC CASTS # UR COMP ASSIST: SIGNIFICANT CHANGE UP /HPF (ref 0–2)
SODIUM SERPL-SCNC: 139 MMOL/L — SIGNIFICANT CHANGE UP (ref 135–145)
SP GR SPEC: 1.01 — SIGNIFICANT CHANGE UP (ref 1.01–1.02)
UROBILINOGEN FLD QL: NEGATIVE — SIGNIFICANT CHANGE UP
WBC # BLD: 5.4 K/UL — SIGNIFICANT CHANGE UP (ref 3.8–10.5)
WBC # FLD AUTO: 5.4 K/UL — SIGNIFICANT CHANGE UP (ref 3.8–10.5)
WBC UR QL: SIGNIFICANT CHANGE UP /HPF (ref 0–5)

## 2017-09-14 PROCEDURE — 72100 X-RAY EXAM L-S SPINE 2/3 VWS: CPT | Mod: 26

## 2017-09-14 PROCEDURE — 73502 X-RAY EXAM HIP UNI 2-3 VIEWS: CPT | Mod: 26,RT

## 2017-09-14 PROCEDURE — 99236 HOSP IP/OBS SAME DATE HI 85: CPT

## 2017-09-14 PROCEDURE — 73562 X-RAY EXAM OF KNEE 3: CPT | Mod: 26,RT

## 2017-09-14 RX ORDER — DEXTROSE 50 % IN WATER 50 %
12.5 SYRINGE (ML) INTRAVENOUS ONCE
Qty: 0 | Refills: 0 | Status: DISCONTINUED | OUTPATIENT
Start: 2017-09-14 | End: 2017-09-15

## 2017-09-14 RX ORDER — GLUCAGON INJECTION, SOLUTION 0.5 MG/.1ML
1 INJECTION, SOLUTION SUBCUTANEOUS ONCE
Qty: 0 | Refills: 0 | Status: DISCONTINUED | OUTPATIENT
Start: 2017-09-14 | End: 2017-09-15

## 2017-09-14 RX ORDER — DEXTROSE 50 % IN WATER 50 %
25 SYRINGE (ML) INTRAVENOUS ONCE
Qty: 0 | Refills: 0 | Status: DISCONTINUED | OUTPATIENT
Start: 2017-09-14 | End: 2017-09-15

## 2017-09-14 RX ORDER — ACETAMINOPHEN 500 MG
975 TABLET ORAL ONCE
Qty: 0 | Refills: 0 | Status: COMPLETED | OUTPATIENT
Start: 2017-09-14 | End: 2017-09-14

## 2017-09-14 RX ORDER — SODIUM CHLORIDE 9 MG/ML
500 INJECTION INTRAMUSCULAR; INTRAVENOUS; SUBCUTANEOUS ONCE
Qty: 0 | Refills: 0 | Status: COMPLETED | OUTPATIENT
Start: 2017-09-14 | End: 2017-09-14

## 2017-09-14 RX ORDER — INSULIN LISPRO 100/ML
3 VIAL (ML) SUBCUTANEOUS
Qty: 0 | Refills: 0 | Status: DISCONTINUED | OUTPATIENT
Start: 2017-09-14 | End: 2017-09-15

## 2017-09-14 RX ORDER — HEPARIN SODIUM 5000 [USP'U]/ML
5000 INJECTION INTRAVENOUS; SUBCUTANEOUS EVERY 12 HOURS
Qty: 0 | Refills: 0 | Status: DISCONTINUED | OUTPATIENT
Start: 2017-09-14 | End: 2017-09-15

## 2017-09-14 RX ORDER — INSULIN GLARGINE 100 [IU]/ML
10 INJECTION, SOLUTION SUBCUTANEOUS AT BEDTIME
Qty: 0 | Refills: 0 | Status: DISCONTINUED | OUTPATIENT
Start: 2017-09-14 | End: 2017-09-15

## 2017-09-14 RX ORDER — INSULIN LISPRO 100/ML
VIAL (ML) SUBCUTANEOUS
Qty: 0 | Refills: 0 | Status: DISCONTINUED | OUTPATIENT
Start: 2017-09-14 | End: 2017-09-15

## 2017-09-14 RX ORDER — DEXTROSE 50 % IN WATER 50 %
1 SYRINGE (ML) INTRAVENOUS ONCE
Qty: 0 | Refills: 0 | Status: DISCONTINUED | OUTPATIENT
Start: 2017-09-14 | End: 2017-09-15

## 2017-09-14 RX ORDER — SODIUM CHLORIDE 9 MG/ML
1000 INJECTION, SOLUTION INTRAVENOUS
Qty: 0 | Refills: 0 | Status: DISCONTINUED | OUTPATIENT
Start: 2017-09-14 | End: 2017-09-15

## 2017-09-14 RX ADMIN — Medication 1: at 09:32

## 2017-09-14 RX ADMIN — Medication 975 MILLIGRAM(S): at 05:46

## 2017-09-14 RX ADMIN — SODIUM CHLORIDE 500 MILLILITER(S): 9 INJECTION INTRAMUSCULAR; INTRAVENOUS; SUBCUTANEOUS at 04:58

## 2017-09-14 RX ADMIN — Medication 3 UNIT(S): at 13:28

## 2017-09-14 RX ADMIN — Medication 2: at 13:27

## 2017-09-14 RX ADMIN — Medication 975 MILLIGRAM(S): at 04:55

## 2017-09-14 RX ADMIN — INSULIN GLARGINE 10 UNIT(S): 100 INJECTION, SOLUTION SUBCUTANEOUS at 23:42

## 2017-09-14 RX ADMIN — Medication 1: at 18:31

## 2017-09-14 RX ADMIN — Medication 3 UNIT(S): at 18:32

## 2017-09-14 RX ADMIN — Medication 3 UNIT(S): at 09:34

## 2017-09-14 NOTE — ED PROVIDER NOTE - MEDICAL DECISION MAKING DETAILS
76yo F with h/o DM2, c/o acute on chronic back pain and hyperglycemia; only on short acting insulin.  Plan for pain control as needed, check labs including cbc cmp ua, vbg acetone.  FSG elevated, consider additional insulin as requred; plan for either admission if in DKA or observation overnight in CDU for glycemia control and endocrine consult in morning for recommendations on diabetic medication regimen.

## 2017-09-14 NOTE — ED ADULT NURSE REASSESSMENT NOTE - NS ED NURSE REASSESS COMMENT FT1
14.45 Pt is here for final eval from endocrinology. Pt lives alone  & she is unable to follow alone with insulin treatment .social work is involved in the care to arrange for a proper diabetic care at home . Decision still pending . pt looks comfortable has no hypoglycemic  symptoms continue to monitor
8 Am Pt is awake  walking around the unit hard of hearing . Pt denies pain N/V/D afebrile here  Pt medicated as per the order pt not in any distress continue to monitor   14.OO pt is A&Ox 3 Pt ambulates well  Pt getting evaluated by the endo team . Pt denies pain N/V/D continue to monitor
pt. reports she is legally blind and has glaucoma in the right eye.
Received pt from AIXA Gallardo, received pt alert and responsive, oriented x4, denies any respiratory distress, SOB, or difficulty breathing. Pt transferred to CDU for observation for hyperglycemia, per order FS to be completed before meals and at bedtime.  IV in place, patent and free of signs of infiltration, pt denies pain/ discomfort. V/S stable, pt afebrile, Pt educated on unit and unit rules, instructed patient to notify RN of any needed assistance, Pt verbalizes understanding, Call bell placed within reach. Safety maintained. Will continue to monitor.
Pt received from AIXA Gross. Pt oriented to CDU & plan of care was discussed. Pt denies any symptoms. Safety & comfort measures maintained. Call bell in reach. Will continue to monitor.

## 2017-09-14 NOTE — ED CDU PROVIDER NOTE - MEDICAL DECISION MAKING DETAILS
76yo F with h/o DM2, c/o acute on chronic back pain and hyperglycemia; only on short acting insulin.  Pt's pain improving; however has some hypergylcemia without DKA.  Concerning given lack of basal insulin regimen; will observe in CDU for continued glycemia control and endocrinology consultation.

## 2017-09-14 NOTE — CONSULT NOTE ADULT - ATTENDING COMMENTS
Agree with assessment and plan as above by Dr. Prescott. Reviewed all pertinent labs, glucose values, and imaging studies. Modifications made as indicated above.

## 2017-09-14 NOTE — ED CDU PROVIDER NOTE - PROGRESS NOTE DETAILS
CDU NOTE DALY Serrano: son called with medications: Glipizide 10mg po daily and Levemir 20mg po daily. CDU NOTE DALY Rey: VSS NAD. Patient is resting comfortably and is without any complaints. CDU NOTE DALY Rey: VSS NAD. Patient is resting comfortably and is without any complaints. Pending endocrine consult CDU NOTE DALY Rey: Endocrine paged second time regarding time of consult CDU NOTE DALY Rey: VSS NAD. Patient is resting comfortably and is without any complaints. Endocrine consulted again at 1530 regarding plan, still no call back. Social work has been paged regarding patient need for diabetes education and transport home. CDU NOTE DALY Rey: spoke with Endo attending who states she is aware dispo is pending their recommendations, currently rounding will see pt soon CDU NOTE DALY Rey: Endocrine has seen the patient. They are concerned that patient needs medication adjustment and would benefit from admission with medication adjustments and diabetes education.  Discussed admission w/ Dr. Carcamo, will admit to unattached. Attending Note (Dona): I have reviewed the testing and notes of this patient placed in the Clinical Decision Unit.  I agree with the notes of the advanced practice clinician. This patient has been observed for a period of time.  Endocrine consult appreciated. will admit for continued glucose monitoring and control

## 2017-09-14 NOTE — H&P ADULT - HISTORY OF PRESENT ILLNESS
77yof w/ DM2 p/w low back pain, bilateral knee pain, bilateral shoulder pain off and on for a few months but getting worse this week. Diffuse aching low back pain, radiating down the right leg. No new trauma. Also c/o labile blood sugars, running in the 400s for the past two days. Uses novolog 20u twice daily. No fevers, chills, cough, nausea, vomiting, diarrhea. No saddle anesthesia, paresthesias, weakness, bowel or bladder

## 2017-09-14 NOTE — ED CDU PROVIDER NOTE - ATTENDING CONTRIBUTION TO CARE
I have examined and evaluated this patient with the above resident or PA, and agree with the documented clinical history, exam and plan.   Briefly: 78yo F with h/o DM2, c/o acute on chronic back pain and hyperglycemia; only on short acting insulin.  Pt's pain improving; however has some hypergylcemia without DKA.  Concerning given lack of basal insulin regimen; will observe in CDU for continued glycemia control and endocrinology consultation.

## 2017-09-14 NOTE — PATIENT PROFILE ADULT. - TEACHING/LEARNING LEARNING PREFERENCES
audio/verbal instruction/written material/skill demonstration/individual instruction/computer/internet/pictorial

## 2017-09-14 NOTE — ED PROVIDER NOTE - FAMILY HISTORY
Father  Still living? Unknown  Family history of diabetes mellitus type II, Age at diagnosis: Age Unknown     Mother  Still living? Unknown  Family history of diabetes mellitus type II, Age at diagnosis: Age Unknown     Grandparent  Still living? Unknown  Family history of diabetes mellitus type II, Age at diagnosis: Age Unknown

## 2017-09-14 NOTE — CONSULT NOTE ADULT - PROBLEM SELECTOR RECOMMENDATION 9
Poorly controlled, A1c 13.4% this admission.  Patient poor historian, need to verify with son or daughter her home insulin regimen.  Started on lantus 10U with premeal 3U humalog.  Continue to monitor fingersticks, will need to adjust accordingly. Poorly controlled, A1c 13.4% this admission.  FS on presentation 250's-290's.  this AM.   Patient poor historian, hyperglycemia may be 2/2 noncompliance with insulin as she lives by herself with poor sight, need to verify with son or daughter her home insulin regimen.  Started on lantus 10U with premeal 3U humalog.  Continue to monitor fingersticks, will need to adjust accordingly.  Carb controlled (diabetic) diet.   Patient up to date with ophthalmology, saw last month. Poorly controlled, A1c 13.4% this admission.  FS on presentation 250's-290's.  this AM.   Patient poor historian, hyperglycemia may be 2/2 noncompliance with insulin as she lives by herself with poor sight, and says she has missed a few doses of levemir this week as her son could not visit.   Started on lantus 10U with premeal 3U humalog.  Continue to monitor fingersticks, will need to adjust accordingly.  - f/u AM C-peptide.   Carb controlled (diabetic) diet.   - Social Work for diabetes insulin education to patient and children so that she can have a safe discharge.   - Patient up to date with ophthalmology, saw last month.

## 2017-09-14 NOTE — CONSULT NOTE ADULT - ASSESSMENT
77F with DMII, poorly controlled, A1c 13.4% this admission,  coming to hospital for diffuse musculosekeltal pains, endocrinology consulted for poorly controlled blood glucose.

## 2017-09-14 NOTE — ED CDU PROVIDER NOTE - PLAN OF CARE
1. Stay hydrated. Follow low carb diet.   2. Check finger sticks glucose levels before meals and at bedtime. Follow the following diabetic medication regimen:    3. Follow up with your PCP or Medicine clinic #[phone] in 2 days. Follow up with Endocrine clinic [phone] in 2-3 days. Bring Printed Results.  4. Return if symptoms worsen, fever, weakness, dizziness, and all other concerns. 1. Stay hydrated. Follow low carb diet.   2. Check finger sticks glucose levels before meals and at bedtime. Stop current Diabetic Regimen and start the following diabetic medication regimen:    3. Follow up with your PCP or Medicine clinic #[phone] in 2 days. Follow up with Endocrine clinic [phone] in 2-3 days. Bring Printed Results.  4. Return if symptoms worsen, fever, weakness, dizziness, and all other concerns.

## 2017-09-14 NOTE — CONSULT NOTE ADULT - SUBJECTIVE AND OBJECTIVE BOX
Pt is a 77 year old female, legally blind, with uncontrolled type 2 DM (diagnosed more than 30 years ago, A1c on this visit is 13.4%), complicated by severe nonproliferative retinopathy  cataracts and glaucoma. She initially presented to the hospital with back, knee and leg pains bilaterally. She states that her blood glucose has been uncontrolled, especially in the past couple of days. Her FS was 426 yesterday evening. She does not keep a log of her fingersticks and is a limited historian. She states she takes 12U lantus every evening, and does not take premeal innsulin because she has no help with administering it. She lives in a senior citizen home in [place]. Her son comes almost every day after work to withdraw the insulin, after which she injects herself. Patient used to follow with Dr. Amisha Fraga (general medicine physician), but he is no longer seeing patients so she has not followed up with a doctor in several months since then.     The patient denies any shakiness/dizziness from low blood sugars. She does not have many hypoglycemic episodes. She has noticed reent onset burning sensation at the bottom of the feet bilaterally, which is intermittent.     Pt denies other medical problems. She states that she has no cardiac history.     Patient saw ophthalmology last month. She has not seen podiatry. She denies history of foot ulcers.   FS was 296 at 1:16 am today. 156 at 8:30 am. 219 in afternoon (12 pm).     Family History: notable for DM2 in both mother and father. Mother  of pancreatic complications (patient unable to specify).   No hx of thyroid problems.     Surgical History: C- section (distant history). HPI: Pt is a 77 year old female, legally blind, with uncontrolled type 2 DM (diagnosed more than 30 years ago, A1c on this visit is 13.4%), complicated by severe nonproliferative retinopathy  cataracts and glaucoma. She initially presented to the hospital with back, knee and leg pains bilaterally. She states that her blood glucose has been uncontrolled, especially in the past couple of days. Her FS was 426 yesterday evening. She does not keep a log of her fingersticks and is a limited historian. She states she takes 12U lantus every evening, and does not take premeal innsulin because she has no help with administering it. She lives in a senior citizen home in [place]. Her son comes almost every day after work to withdraw the insulin, after which she injects herself. Patient used to follow with Dr. Amisha Fraga (general medicine physician), but he is no longer seeing patients so she has not followed up with a doctor in several months since then.     The patient denies any shakiness/dizziness from low blood sugars. She does not have many hypoglycemic episodes. She has noticed reent onset burning sensation at the bottom of the feet bilaterally, which is intermittent.     Pt denies other medical problems. She states that she has no cardiac history.     Patient saw ophthalmology last month. She has not seen podiatry. She denies history of foot ulcers.   FS was 296 at 1:16 am today. 156 at 8:30 am. 219 in afternoon (12 pm).         PAST MEDICAL & SURGICAL HISTORY:  Diabetes  Cataracts  Glaucoma  H/O  section      FAMILY HISTORY:  Family history of diabetes mellitus type II (Father, Mother, Grandparent)  Mother  of unknown pancreatic complications (pt. unable to specify)  No known history of thyroid problems in family.    Social History: No tobacco or alcohol use.     Outpatient Medications:    MEDICATIONS  (STANDING):  insulin glargine Injectable (LANTUS) 10 Unit(s) SubCutaneous at bedtime  insulin lispro Injectable (HumaLOG) 3 Unit(s) SubCutaneous three times a day before meals  insulin lispro (HumaLOG) corrective regimen sliding scale   SubCutaneous three times a day before meals  dextrose 5%. 1000 milliLiter(s) (50 mL/Hr) IV Continuous <Continuous>  dextrose 50% Injectable 12.5 Gram(s) IV Push once  dextrose 50% Injectable 25 Gram(s) IV Push once  dextrose 50% Injectable 25 Gram(s) IV Push once    MEDICATIONS  (PRN):  dextrose Gel 1 Dose(s) Oral once PRN Blood Glucose LESS THAN 70 milliGRAM(s)/deciliter  glucagon  Injectable 1 milliGRAM(s) IntraMuscular once PRN Glucose LESS THAN 70 milligrams/deciliter      Allergies    No Known Allergies    Intolerances      Review of Systems:  Constitutional: No fever or chills  Eyes: + blindness, +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 + new burning sensation at bottom of feet b/l  HEENT: + hearing difficulty  Cardiovascular: No chest pain, palpitations  Respiratory: No SOB, no cough  GI: No nausea, vomiting, abdominal pain  : No dysuria  Skin: no rash  Endocrine: + polyuria, polydipsia      PHYSICAL EXAM:  VITALS: T(C): 37.1 (17 @ 12:07)  T(F): 98.7 (17 @ 12:07), Max: 98.7 (17 @ 12:07)  HR: 72 (17 @ 12:07) (70 - 78)  BP: 123/79 (17 @ 12:07) (110/71 - 159/61)  RR:  (16 - 18)  SpO2:  (98% - 100%)  Wt(kg): --    GENERAL: NAD  EYES: No proptosis, no lid lag, anicteric  HEENT:  Atraumatic, Normocephalic, moist mucous membranes, limited hearing  THYROID: Normal size, no palpable nodules  RESPIRATORY: Clear to auscultation bilaterally; No rales, rhonchi, wheezing, or rubs  CARDIOVASCULAR: Regular rate and rhythm; No murmurs; no peripheral edema  GI: Soft, nontender, non distended, normal bowel sounds  SKIN: Dry, intact, No rashes or lesions  NEURO: sensation intact in extremities, no tremor  PSYCH: Alert and oriented x 3, reactive affect, euthymic mood      CAPILLARY BLOOD GLUCOSE  219 ( @ 12:07)  156 ( @ 08:24)  265 ( @ 01:21)  256 ( @ 00:05)                            11.3   5.4   )-----------( 175      ( 14 Sep 2017 01:16 )             34.7           139  |  99  |  29<H>  ----------------------------<  296<H>  4.2   |  27  |  1.20    EGFR if : 51<L>  EGFR if non : 44<L>    Ca    9.3          TPro  7.0  /  Alb  4.0  /  TBili  <0.1<L>  /  DBili  x   /  AST  18  /  ALT  14  /  AlkPhos  134<H>        Thyroid Function Tests:      Hemoglobin A1C, Whole Blood: 13.4 % <H> [4.0 - 5.6] (17 @ 05:33)          Radiology: HPI: Pt is a 77 year old female, legally blind, with uncontrolled type 2 DM (diagnosed more than 30 years ago, A1c on this visit is 13.4%), complicated by severe nonproliferative retinopathy  cataracts and glaucoma. She initially presented to the hospital with back, knee and leg pains bilaterally. She states that her blood glucose has been uncontrolled, especially in the past couple of days. Her FS was 426 yesterday evening. She does not keep a log of her fingersticks and is a limited historian. She states she takes 12U lantus every evening, and does not take premeal innsulin because she has no help with administering it. She lives in a senior citizen home in [place]. Her son comes almost every day after work to withdraw the insulin, after which she injects herself. Patient used to follow with Dr. Amisha Fraga (general medicine physician), but he is no longer seeing patients so she has not followed up with a doctor in several months since then.     The patient denies any shakiness/dizziness from low blood sugars. She does not have many hypoglycemic episodes. She has noticed recent onset burning sensation at the bottom of the feet bilaterally, which is intermittent.     Pt denies other medical problems. She states that she has no cardiac history.     Patient saw ophthalmology last month. She has not seen podiatry. She denies history of foot ulcers.   FS was 296 at 1:16 am today. 156 at 8:30 am. 219 in afternoon (12 pm).         PAST MEDICAL & SURGICAL HISTORY:  Diabetes  Cataracts  Glaucoma  H/O  section      FAMILY HISTORY:  Family history of diabetes mellitus type II (Father, Mother, Grandparent)  Mother  of unknown pancreatic complications (pt. unable to specify)  No known history of thyroid problems in family.    Social History: No tobacco or alcohol use.     Outpatient Medications:    MEDICATIONS  (STANDING):  insulin glargine Injectable (LANTUS) 10 Unit(s) SubCutaneous at bedtime  insulin lispro Injectable (HumaLOG) 3 Unit(s) SubCutaneous three times a day before meals  insulin lispro (HumaLOG) corrective regimen sliding scale   SubCutaneous three times a day before meals  dextrose 5%. 1000 milliLiter(s) (50 mL/Hr) IV Continuous <Continuous>  dextrose 50% Injectable 12.5 Gram(s) IV Push once  dextrose 50% Injectable 25 Gram(s) IV Push once  dextrose 50% Injectable 25 Gram(s) IV Push once    MEDICATIONS  (PRN):  dextrose Gel 1 Dose(s) Oral once PRN Blood Glucose LESS THAN 70 milliGRAM(s)/deciliter  glucagon  Injectable 1 milliGRAM(s) IntraMuscular once PRN Glucose LESS THAN 70 milligrams/deciliter      Allergies    No Known Allergies    Intolerances      Review of Systems:  Constitutional: No fever or chills  Eyes: + blindness, +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 + new burning sensation at bottom of feet b/l  HEENT: + hearing difficulty  Cardiovascular: No chest pain, palpitations  Respiratory: No SOB, no cough  GI: No nausea, vomiting, abdominal pain  : No dysuria  Skin: no rash  Endocrine: + polyuria, polydipsia      PHYSICAL EXAM:  VITALS: T(C): 37.1 (17 @ 12:07)  T(F): 98.7 (17 @ 12:07), Max: 98.7 (17 @ 12:07)  HR: 72 (17 @ 12:07) (70 - 78)  BP: 123/79 (17 @ 12:07) (110/71 - 159/61)  RR:  (16 - 18)  SpO2:  (98% - 100%)  Wt(kg): --    GENERAL: NAD  EYES: No proptosis, no lid lag, anicteric  HEENT:  Atraumatic, Normocephalic, moist mucous membranes, limited hearing  THYROID: Normal size, no palpable nodules  RESPIRATORY: Clear to auscultation bilaterally; No rales, rhonchi, wheezing, or rubs  CARDIOVASCULAR: Regular rate and rhythm; No murmurs; no peripheral edema  GI: Soft, nontender, non distended, normal bowel sounds  SKIN: Dry, intact, No rashes or lesions  NEURO: sensation intact in extremities, no tremor  PSYCH: Alert and oriented x 3, reactive affect, euthymic mood      CAPILLARY BLOOD GLUCOSE  219 ( @ 12:07)  156 ( @ 08:24)  265 ( @ 01:21)  256 ( @ 00:05)                            11.3   5.4   )-----------( 175      ( 14 Sep 2017 01:16 )             34.7           139  |  99  |  29<H>  ----------------------------<  296<H>  4.2   |  27  |  1.20    EGFR if : 51<L>  EGFR if non : 44<L>    Ca    9.3          TPro  7.0  /  Alb  4.0  /  TBili  <0.1<L>  /  DBili  x   /  AST  18  /  ALT  14  /  AlkPhos  134<H>        Thyroid Function Tests:      Hemoglobin A1C, Whole Blood: 13.4 % <H> [4.0 - 5.6] (17 @ 05:33)          Radiology: HPI: Pt is a 77 year old female, legally blind, with uncontrolled type 2 DM (diagnosed more than 30 years ago, A1c on this visit is 13.4%), complicated by severe nonproliferative retinopathy  cataracts and glaucoma. She initially presented to the hospital with back, knee and leg pains bilaterally. She states that her blood glucose has been uncontrolled, especially in the past couple of days. Her FS was 426 yesterday evening. She does not keep a log of her fingersticks and is a limited historian. She states she takes insulin but does not know the dose. She does not take premeal insulin because she has no help with administering it. Her son called hospital and clarified later that she takes glipizide 10 mg daily with levemir 20U daily. She lives in a senior citizen home in [place]. Her son comes almost every day after work to withdraw the insulin, after which she injects herself. Patient used to follow with Dr. Amisha Fraga (general medicine physician), but he is no longer seeing patients so she has not followed up with a doctor in several months since then.     The patient denies any shakiness/dizziness from low blood sugars. She does not have many hypoglycemic episodes. She has noticed recent onset burning sensation at the bottom of the feet bilaterally, which is intermittent.     Pt denies other medical problems. She states that she has no cardiac history.     Patient saw ophthalmology last month. She has not seen podiatry. She denies history of foot ulcers.   FS was 296 at 1:16 am today. 156 at 8:30 am. 219 in afternoon (12 pm).         PAST MEDICAL & SURGICAL HISTORY:  Diabetes  Cataracts  Glaucoma  H/O  section      FAMILY HISTORY:  Family history of diabetes mellitus type II (Father, Mother, Grandparent)  Mother  of unknown pancreatic complications (pt. unable to specify)  No known history of thyroid problems in family.    Social History: No tobacco or alcohol use.     Outpatient Medications:    MEDICATIONS  (STANDING):  insulin glargine Injectable (LANTUS) 10 Unit(s) SubCutaneous at bedtime  insulin lispro Injectable (HumaLOG) 3 Unit(s) SubCutaneous three times a day before meals  insulin lispro (HumaLOG) corrective regimen sliding scale   SubCutaneous three times a day before meals  dextrose 5%. 1000 milliLiter(s) (50 mL/Hr) IV Continuous <Continuous>  dextrose 50% Injectable 12.5 Gram(s) IV Push once  dextrose 50% Injectable 25 Gram(s) IV Push once  dextrose 50% Injectable 25 Gram(s) IV Push once    MEDICATIONS  (PRN):  dextrose Gel 1 Dose(s) Oral once PRN Blood Glucose LESS THAN 70 milliGRAM(s)/deciliter  glucagon  Injectable 1 milliGRAM(s) IntraMuscular once PRN Glucose LESS THAN 70 milligrams/deciliter      Allergies    No Known Allergies    Intolerances      Review of Systems:  Constitutional: No fever or chills  Eyes: + blindness, +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 + new burning sensation at bottom of feet b/l  HEENT: + hearing difficulty  Cardiovascular: No chest pain, palpitations  Respiratory: No SOB, no cough  GI: No nausea, vomiting, abdominal pain  : No dysuria  Skin: no rash  Endocrine: + polyuria, polydipsia      PHYSICAL EXAM:  VITALS: T(C): 37.1 (17 @ 12:07)  T(F): 98.7 (17 @ 12:07), Max: 98.7 (17 @ 12:07)  HR: 72 (17 @ 12:07) (70 - 78)  BP: 123/79 (17 @ 12:07) (110/71 - 159/61)  RR:  (16 - 18)  SpO2:  (98% - 100%)  Wt(kg): --    GENERAL: NAD  EYES: No proptosis, no lid lag, anicteric  HEENT:  Atraumatic, Normocephalic, moist mucous membranes, limited hearing  THYROID: Normal size, no palpable nodules  RESPIRATORY: Clear to auscultation bilaterally; No rales, rhonchi, wheezing, or rubs  CARDIOVASCULAR: Regular rate and rhythm; No murmurs; no peripheral edema  GI: Soft, nontender, non distended, normal bowel sounds  SKIN: Dry, intact, No rashes or lesions  NEURO: sensation intact in extremities, no tremor  PSYCH: Alert and oriented x 3, reactive affect, euthymic mood      CAPILLARY BLOOD GLUCOSE  219 ( @ 12:07)  156 ( @ 08:24)  265 ( @ 01:21)  256 ( @ 00:05)                            11.3   5.4   )-----------( 175      ( 14 Sep 2017 01:16 )             34.7           139  |  99  |  29<H>  ----------------------------<  296<H>  4.2   |  27  |  1.20    EGFR if : 51<L>  EGFR if non : 44<L>    Ca    9.3          TPro  7.0  /  Alb  4.0  /  TBili  <0.1<L>  /  DBili  x   /  AST  18  /  ALT  14  /  AlkPhos  134<H>        Thyroid Function Tests:      Hemoglobin A1C, Whole Blood: 13.4 % <H> [4.0 - 5.6] (17 @ 05:33)          Radiology:

## 2017-09-14 NOTE — ED CDU PROVIDER NOTE - OBJECTIVE STATEMENT
76yo f w/ DM2 p/w multiple musculoskeletal pains- low back pain, bilateral knee pain, bilateral shoulder pain off and on for a few months s/p fall but getting worse this week. pt had xrays done outpatient after fall and was told nothing fractured. No new trauma. Also c/o labile blood sugars, running in the 400s for the past two days. poor compliance with diabetic medications, does not know dosage, son will go home and check dosage of medications and call here.   No fevers, chills, cough, nausea, vomiting, diarrhea. No saddle anesthesia, paresthesias, weakness, bowel or bladder dysfunction.

## 2017-09-14 NOTE — ED ADULT NURSE REASSESSMENT NOTE - COMFORT CARE
darkened lights/warm blanket provided/plan of care explained/repositioned
meal provided/po fluids offered/plan of care explained

## 2017-09-14 NOTE — H&P ADULT - FAMILY HISTORY
Grandparent  Still living? Unknown  Family history of diabetes mellitus type II, Age at diagnosis: Age Unknown

## 2017-09-14 NOTE — ED PROVIDER NOTE - ATTENDING CONTRIBUTION TO CARE
I have examined and evaluated this patient with the above resident or PA, and agree with the documented clinical history, exam and plan.   Briefly: 76yo F with h/o DM2, c/o acute on chronic back pain and hyperglycemia; only on short acting insulin.  Pt's pain improving; however has some hypergylcemia without DKA.  Concerning given lack of basal insulin regimen; will observe in CDU for continued glycemia control and endocrinology consultation.

## 2017-09-14 NOTE — ED PROVIDER NOTE - OBJECTIVE STATEMENT
77yof w/ DM2 p/w low back pain, bilateral knee pain, bilateral shoulder pain off and on for a few months but getting worse this week. Diffuse aching low back pain, radiating down the right leg. No new trauma. Also c/o labile blood sugars, running in the 400s for the past two days. Uses novolog 20u twice daily. No fevers, chills, cough, nausea, vomiting, diarrhea. No saddle anesthesia, paresthesias, weakness, bowel or bladder dysfunction. 77yof w/ DM2 p/w low back pain, bilateral knee pain, bilateral shoulder pain off and on for a few months but getting worse this week. Diffuse aching low back pain, radiating down the right leg. No new trauma. Also c/o labile blood sugars, running in the 400s for the past two days. Uses novolog 20u twice daily.  States she does not use long acting insulin like lantus/levemir.  No fevers, chills, cough, nausea, vomiting, diarrhea. No saddle anesthesia, paresthesias, weakness, bowel or bladder dysfunction.

## 2017-09-14 NOTE — ED CDU PROVIDER NOTE - DETAILS
frequent reeval, vitals q 4hrs, FS before meals and bedtime, Diabetic Education, ISS    d/w attending

## 2017-09-15 ENCOUNTER — TRANSCRIPTION ENCOUNTER (OUTPATIENT)
Age: 77
End: 2017-09-15

## 2017-09-15 VITALS
OXYGEN SATURATION: 100 % | TEMPERATURE: 98 F | SYSTOLIC BLOOD PRESSURE: 114 MMHG | RESPIRATION RATE: 18 BRPM | DIASTOLIC BLOOD PRESSURE: 62 MMHG | HEART RATE: 82 BPM

## 2017-09-15 DIAGNOSIS — E11.9 TYPE 2 DIABETES MELLITUS WITHOUT COMPLICATIONS: ICD-10-CM

## 2017-09-15 LAB
ANION GAP SERPL CALC-SCNC: 14 MMOL/L — SIGNIFICANT CHANGE UP (ref 5–17)
BUN SERPL-MCNC: 20 MG/DL — SIGNIFICANT CHANGE UP (ref 7–23)
C PEPTIDE SERPL-MCNC: 1.6 NG/ML — SIGNIFICANT CHANGE UP (ref 0.9–7.1)
CALCIUM SERPL-MCNC: 9.7 MG/DL — SIGNIFICANT CHANGE UP (ref 8.4–10.5)
CHLORIDE SERPL-SCNC: 102 MMOL/L — SIGNIFICANT CHANGE UP (ref 96–108)
CO2 SERPL-SCNC: 24 MMOL/L — SIGNIFICANT CHANGE UP (ref 22–31)
CREAT SERPL-MCNC: 0.97 MG/DL — SIGNIFICANT CHANGE UP (ref 0.5–1.3)
CULTURE RESULTS: SIGNIFICANT CHANGE UP
GLUCOSE SERPL-MCNC: 273 MG/DL — HIGH (ref 70–99)
HCT VFR BLD CALC: 36.3 % — SIGNIFICANT CHANGE UP (ref 34.5–45)
HGB BLD-MCNC: 11.3 G/DL — LOW (ref 11.5–15.5)
MCHC RBC-ENTMCNC: 22.7 PG — LOW (ref 27–34)
MCHC RBC-ENTMCNC: 31.1 GM/DL — LOW (ref 32–36)
MCV RBC AUTO: 72.9 FL — LOW (ref 80–100)
PLATELET # BLD AUTO: 200 K/UL — SIGNIFICANT CHANGE UP (ref 150–400)
POTASSIUM SERPL-MCNC: 4.3 MMOL/L — SIGNIFICANT CHANGE UP (ref 3.5–5.3)
POTASSIUM SERPL-SCNC: 4.3 MMOL/L — SIGNIFICANT CHANGE UP (ref 3.5–5.3)
RBC # BLD: 4.98 M/UL — SIGNIFICANT CHANGE UP (ref 3.8–5.2)
RBC # FLD: 13.8 % — SIGNIFICANT CHANGE UP (ref 10.3–14.5)
SODIUM SERPL-SCNC: 140 MMOL/L — SIGNIFICANT CHANGE UP (ref 135–145)
SPECIMEN SOURCE: SIGNIFICANT CHANGE UP
WBC # BLD: 4.89 K/UL — SIGNIFICANT CHANGE UP (ref 3.8–10.5)
WBC # FLD AUTO: 4.89 K/UL — SIGNIFICANT CHANGE UP (ref 3.8–10.5)

## 2017-09-15 PROCEDURE — 99232 SBSQ HOSP IP/OBS MODERATE 35: CPT | Mod: GC

## 2017-09-15 RX ORDER — REPAGLINIDE 1 MG/1
1 TABLET ORAL
Qty: 90 | Refills: 0 | OUTPATIENT
Start: 2017-09-15 | End: 2017-10-15

## 2017-09-15 RX ADMIN — Medication 3: at 09:19

## 2017-09-15 RX ADMIN — INSULIN GLARGINE 10 UNIT(S): 100 INJECTION, SOLUTION SUBCUTANEOUS at 22:27

## 2017-09-15 RX ADMIN — HEPARIN SODIUM 5000 UNIT(S): 5000 INJECTION INTRAVENOUS; SUBCUTANEOUS at 18:17

## 2017-09-15 RX ADMIN — Medication 3 UNIT(S): at 09:20

## 2017-09-15 RX ADMIN — Medication 3 UNIT(S): at 19:10

## 2017-09-15 RX ADMIN — HEPARIN SODIUM 5000 UNIT(S): 5000 INJECTION INTRAVENOUS; SUBCUTANEOUS at 06:49

## 2017-09-15 RX ADMIN — Medication 3 UNIT(S): at 13:33

## 2017-09-15 RX ADMIN — Medication 3: at 13:33

## 2017-09-15 NOTE — DISCHARGE NOTE ADULT - ADDITIONAL INSTRUCTIONS
Please follow up with your Primary care doctor within one week- take discharge papers with you to appointment  As discussed with your son([place]), please teach another family member how to administer your Insulin, in the event your son is unable  to go to your home,- as preventative from missing your insulin.

## 2017-09-15 NOTE — PROGRESS NOTE ADULT - SUBJECTIVE AND OBJECTIVE BOX
INTERVAL HPI/OVERNIGHT EVENTS: No acute events overnight. Pt not complaining of anything. Having lunch, good appetite. FS this morning remained elevated to 274 after initiating 10U lantus and 3U premeal insulin yesterday. It became 265 after 6U humalog (3U premeal + 3U sliding scale).      MEDICATIONS  (STANDING):  insulin glargine Injectable (LANTUS) 10 Unit(s) SubCutaneous at bedtime  insulin lispro Injectable (HumaLOG) 3 Unit(s) SubCutaneous three times a day before meals  insulin lispro (HumaLOG) corrective regimen sliding scale   SubCutaneous three times a day before meals  dextrose 5%. 1000 milliLiter(s) (50 mL/Hr) IV Continuous <Continuous>  dextrose 50% Injectable 12.5 Gram(s) IV Push once  dextrose 50% Injectable 25 Gram(s) IV Push once  dextrose 50% Injectable 25 Gram(s) IV Push once  heparin  Injectable 5000 Unit(s) SubCutaneous every 12 hours    MEDICATIONS  (PRN):  dextrose Gel 1 Dose(s) Oral once PRN Blood Glucose LESS THAN 70 milliGRAM(s)/deciliter  glucagon  Injectable 1 milliGRAM(s) IntraMuscular once PRN Glucose LESS THAN 70 milligrams/deciliter      Allergies    No Known Allergies    Intolerances        REVIEW OF SYSTEMS      General:	    Skin/Breast:  	  Ophthalmologic:  	  ENMT:	    Respiratory and Thorax:  	  Cardiovascular:	    Gastrointestinal:	    Genitourinary:	    Musculoskeletal:	    Neurological:	    Psychiatric:	    Hematology/Lymphatics:	    Endocrine:	    Allergic/Immunologic:	    Vital Signs Last 24 Hrs  T(C): 37 (15 Sep 2017 08:56), Max: 37.3 (14 Sep 2017 20:12)  T(F): 98.6 (15 Sep 2017 08:56), Max: 99.1 (14 Sep 2017 20:12)  HR: 78 (15 Sep 2017 08:56) (74 - 78)  BP: 120/69 (15 Sep 2017 08:56) (106/61 - 157/75)  BP(mean): --  RR: 16 (15 Sep 2017 08:56) (16 - 18)  SpO2: 99% (15 Sep 2017 08:56) (97% - 100%)    PHYSICAL EXAM:      Constitutional:    Eyes:    ENMT:    Neck:    Breasts:    Back:    Respiratory:    Cardiovascular:    Gastrointestinal:    Genitourinary:    Rectal:    Extremities:    Vascular:    Neurological:    Skin:    Lymph Nodes:    Musculoskeletal:    Psychiatric:        LABS:                        11.3   4.89  )-----------( 200      ( 15 Sep 2017 07:22 )             36.3     09-15    140  |  102  |  20  ----------------------------<  273<H>  4.3   |  24  |  0.97    Ca    9.7      15 Sep 2017 09:03    TPro  7.0  /  Alb  4.0  /  TBili  <0.1<L>  /  DBili  x   /  AST  18  /  ALT  14  /  AlkPhos  134<H>      CAPILLARY BLOOD GLUCOSE  265 (15 Sep 2017 11:22)  274 (15 Sep 2017 07:37)  287 (15 Sep 2017 00:53)  180 (14 Sep 2017 18:00)          Urinalysis Basic - ( 14 Sep 2017 10:06 )    Color: x / Appearance: Clear / S.011 / pH: x  Gluc: x / Ketone: Negative  / Bili: Negative / Urobili: Negative   Blood: x / Protein: Negative / Nitrite: Negative   Leuk Esterase: Moderate / RBC: 0-2 /HPF / WBC 3-5 /HPF   Sq Epi: x / Non Sq Epi: OCC /HPF / Bacteria: x        RADIOLOGY & ADDITIONAL TESTS: INTERVAL HPI/OVERNIGHT EVENTS: No acute events overnight. Pt not complaining of anything. Having lunch, good appetite. FS this morning remained elevated to 274 after initiating 10U lantus and 3U premeal insulin yesterday. It became 265 after 6U humalog (3U premeal + 3U sliding scale).      MEDICATIONS  (STANDING):  insulin glargine Injectable (LANTUS) 10 Unit(s) SubCutaneous at bedtime  insulin lispro Injectable (HumaLOG) 3 Unit(s) SubCutaneous three times a day before meals  insulin lispro (HumaLOG) corrective regimen sliding scale   SubCutaneous three times a day before meals  dextrose 5%. 1000 milliLiter(s) (50 mL/Hr) IV Continuous <Continuous>  dextrose 50% Injectable 12.5 Gram(s) IV Push once  dextrose 50% Injectable 25 Gram(s) IV Push once  dextrose 50% Injectable 25 Gram(s) IV Push once  heparin  Injectable 5000 Unit(s) SubCutaneous every 12 hours    MEDICATIONS  (PRN):  dextrose Gel 1 Dose(s) Oral once PRN Blood Glucose LESS THAN 70 milliGRAM(s)/deciliter  glucagon  Injectable 1 milliGRAM(s) IntraMuscular once PRN Glucose LESS THAN 70 milligrams/deciliter      Allergies    No Known Allergies    Intolerances        REVIEW OF SYSTEMS      Constitutional: No fever or chills  Eyes: + blindness, +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 + new burning sensation at bottom of feet b/l  HEENT: + hearing difficulty  Cardiovascular: No chest pain, palpitations  Respiratory: No SOB, no cough  GI: No nausea, vomiting, abdominal pain  : No dysuria  Skin: no rash  Endocrine: + polyuria, polydipsia    Vital Signs Last 24 Hrs  T(C): 37 (15 Sep 2017 08:56), Max: 37.3 (14 Sep 2017 20:12)  T(F): 98.6 (15 Sep 2017 08:56), Max: 99.1 (14 Sep 2017 20:12)  HR: 78 (15 Sep 2017 08:56) (74 - 78)  BP: 120/69 (15 Sep 2017 08:56) (106/61 - 157/75)  BP(mean): --  RR: 16 (15 Sep 2017 08:56) (16 - 18)  SpO2: 99% (15 Sep 2017 08:56) (97% - 100%)    PHYSICAL EXAM:      GENERAL: NAD  EYES: No proptosis, no lid lag, anicteric  HEENT:  Atraumatic, Normocephalic, moist mucous membranes, limited hearing  THYROID: Normal size, no palpable nodules  RESPIRATORY: Clear to auscultation bilaterally; No rales, rhonchi, wheezing, or rubs  CARDIOVASCULAR: Regular rate and rhythm; No murmurs; no peripheral edema  GI: Soft, nontender, non distended, normal bowel sounds  SKIN: Dry, intact, No rashes or lesions  NEURO: sensation intact in extremities, no tremor  PSYCH: Alert and oriented x 3, reactive affect, euthymic mood    LABS:                        11.3   4.89  )-----------( 200      ( 15 Sep 2017 07:22 )             36.3     09-15    140  |  102  |  20  ----------------------------<  273<H>  4.3   |  24  |  0.97    Ca    9.7      15 Sep 2017 09:03    TPro  7.0  /  Alb  4.0  /  TBili  <0.1<L>  /  DBili  x   /  AST  18  /  ALT  14  /  AlkPhos  134<H>      CAPILLARY BLOOD GLUCOSE  265 (15 Sep 2017 11:22)  274 (15 Sep 2017 07:37)  287 (15 Sep 2017 00:53)  180 (14 Sep 2017 18:00)          Urinalysis Basic - ( 14 Sep 2017 10:06 )    Color: x / Appearance: Clear / S.011 / pH: x  Gluc: x / Ketone: Negative  / Bili: Negative / Urobili: Negative   Blood: x / Protein: Negative / Nitrite: Negative   Leuk Esterase: Moderate / RBC: 0-2 /HPF / WBC 3-5 /HPF   Sq Epi: x / Non Sq Epi: OCC /HPF / Bacteria: x        RADIOLOGY & ADDITIONAL TESTS: INTERVAL HPI/OVERNIGHT EVENTS: No acute events overnight. Pt not complaining of anything. Having lunch, good appetite. FS this morning remained elevated to 274 after initiating 10U lantus and 3U premeal insulin yesterday. It became 265 after 6U humalog (3U premeal + 3U sliding scale).      MEDICATIONS  (STANDING):  insulin glargine Injectable (LANTUS) 10 Unit(s) SubCutaneous at bedtime  insulin lispro Injectable (HumaLOG) 3 Unit(s) SubCutaneous three times a day before meals  insulin lispro (HumaLOG) corrective regimen sliding scale   SubCutaneous three times a day before meals  dextrose 5%. 1000 milliLiter(s) (50 mL/Hr) IV Continuous <Continuous>  dextrose 50% Injectable 12.5 Gram(s) IV Push once  dextrose 50% Injectable 25 Gram(s) IV Push once  dextrose 50% Injectable 25 Gram(s) IV Push once  heparin  Injectable 5000 Unit(s) SubCutaneous every 12 hours    MEDICATIONS  (PRN):  dextrose Gel 1 Dose(s) Oral once PRN Blood Glucose LESS THAN 70 milliGRAM(s)/deciliter  glucagon  Injectable 1 milliGRAM(s) IntraMuscular once PRN Glucose LESS THAN 70 milligrams/deciliter      Allergies    No Known Allergies    Intolerances        REVIEW OF SYSTEMS  Constitutional: No fever or chills  Eyes: + blindness, +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 + new burning sensation at bottom of feet b/l  HEENT: + hearing difficulty  Cardiovascular: No chest pain, palpitations  Respiratory: No SOB, no cough  GI: No nausea, vomiting, abdominal pain  : No dysuria  Skin: no rash  Endocrine: + polyuria, polydipsia    Vital Signs Last 24 Hrs  T(C): 37 (15 Sep 2017 08:56), Max: 37.3 (14 Sep 2017 20:12)  T(F): 98.6 (15 Sep 2017 08:56), Max: 99.1 (14 Sep 2017 20:12)  HR: 78 (15 Sep 2017 08:56) (74 - 78)  BP: 120/69 (15 Sep 2017 08:56) (106/61 - 157/75)  BP(mean): --  RR: 16 (15 Sep 2017 08:56) (16 - 18)  SpO2: 99% (15 Sep 2017 08:56) (97% - 100%)    PHYSICAL EXAM:  GENERAL: NAD  EYES: No proptosis, no lid lag, anicteric  HEENT:  Atraumatic, Normocephalic, moist mucous membranes, limited hearing  THYROID: Normal size, no palpable nodules  RESPIRATORY: Clear to auscultation bilaterally; No rales, rhonchi, wheezing, or rubs  CARDIOVASCULAR: Regular rate and rhythm; No murmurs; no peripheral edema  GI: Soft, nontender, non distended, normal bowel sounds  SKIN: Dry, intact, No rashes or lesions  NEURO: sensation intact in extremities, no tremor  PSYCH: Alert and oriented x 3, reactive affect, euthymic mood    LABS:                        11.3   4.89  )-----------( 200      ( 15 Sep 2017 07:22 )             36.3     09-15    140  |  102  |  20  ----------------------------<  273<H>  4.3   |  24  |  0.97    Ca    9.7      15 Sep 2017 09:03    TPro  7.0  /  Alb  4.0  /  TBili  <0.1<L>  /  DBili  x   /  AST  18  /  ALT  14  /  AlkPhos  134<H>      CAPILLARY BLOOD GLUCOSE  265 (15 Sep 2017 11:22)  274 (15 Sep 2017 07:37)  287 (15 Sep 2017 00:53)  180 (14 Sep 2017 18:00)          Urinalysis Basic - ( 14 Sep 2017 10:06 )    Color: x / Appearance: Clear / S.011 / pH: x  Gluc: x / Ketone: Negative  / Bili: Negative / Urobili: Negative   Blood: x / Protein: Negative / Nitrite: Negative   Leuk Esterase: Moderate / RBC: 0-2 /HPF / WBC 3-5 /HPF   Sq Epi: x / Non Sq Epi: OCC /HPF / Bacteria: x        RADIOLOGY & ADDITIONAL TESTS:

## 2017-09-15 NOTE — DISCHARGE NOTE ADULT - MEDICATION SUMMARY - MEDICATIONS TO STOP TAKING
I will STOP taking the medications listed below when I get home from the hospital:    glipiZIDE 10 mg oral tablet  -- 1 tab(s) by mouth once a day

## 2017-09-15 NOTE — DISCHARGE NOTE ADULT - HOME CARE AGENCY
Your home care services has been referred to [facility] ([phone]).  Please call them to inquire about  time of Registered Nurse visit.

## 2017-09-15 NOTE — DISCHARGE NOTE ADULT - HOSPITAL COURSE
77F with DMII, poorly controlled, A1c 13.4% this admission,  coming to hospital for diffuse musculoskeletal pains, endocrinology consulted for poorly controlled blood glucose.   Diabetic medications changed by endocrine. Fingersticks have improved.  Patient and family educated on importance of Pt not skipping her medications. Pt's son told to educate other family members on how to administer insulin and oral hypoglycemics.   Pt told to follow up with her Primary care doctor next week. 77F with DMII, poorly controlled, A1c 13.4% this admission,  coming to hospital for diffuse musculoskeletal pains, endocrinology consulted for poorly controlled blood glucose.   Diabetic medications changed by endocrine. Fingersticks have improved.  Patient and family educated on importance of Pt not skipping her medications. Pt's son told to educate other family members on how to administer insulin and oral hypoglycemics.   Home-care nurse to visit pt for assessment and teaching.  Pt told to follow up with her Primary care doctor next week.

## 2017-09-15 NOTE — DISCHARGE NOTE ADULT - MEDICATION SUMMARY - MEDICATIONS TO CHANGE
I will SWITCH the dose or number of times a day I take the medications listed below when I get home from the hospital:    Levemir FlexPen 100 units/mL subcutaneous solution  -- 20 unit(s) subcutaneous once a day

## 2017-09-15 NOTE — DISCHARGE NOTE ADULT - PATIENT PORTAL LINK FT
“You can access the [employer] Patient Portal, offered by [facility], by registering with the following website: [email or web address]”

## 2017-09-15 NOTE — PROGRESS NOTE ADULT - PROBLEM SELECTOR PLAN 1
Poorly controlled, A1c 13.4% this admission.  FS on presentation 250's-290's. FS remains elevated to 274 this AM after starting 10U insulin and 3U premeal insulin.   Hyperglycemia 2/2 noncompliance with insulin as she missed a few doses due to her son being unable to visit.   Continue to monitor fingersticks, will need to adjust accordingly.  - diabetes insulin education to patient and children so that she can have a safe discharge.   Recommendations: On discharge, adjust long acting insulin to levemir 16U, with oral Prandin before each meal, 2 mg. This will be easiest for patient as she can only get injections once daily due to family availability. Poorly controlled, A1c 13.4% this admission.  FS on presentation 250's-290's. FS remains elevated to 274 this AM after starting 10U insulin and 3U premeal insulin.   Hyperglycemia 2/2 noncompliance with insulin as she missed a few doses due to her son being unable to visit.   - diabetes insulin education to patient and children so that she can have a safe discharge.   Recommendations: On discharge, adjust long acting insulin to levemir 16U, with oral Prandin before each meal, 2 mg. This will be easiest for patient as she can only get injections once daily due to family availability.

## 2017-09-15 NOTE — DISCHARGE NOTE ADULT - PLAN OF CARE
HgbA1c to be within acceptable range HgA1C this admission - 13.4  Make sure you get your HgA1c checked every three months.  If you take oral diabetes medications, check your blood glucose two times a day.  If you take insulin, check your blood glucose before meals and at bedtime.  It's important not to skip any meals.  Keep a log of your blood glucose results and always take it with you to your doctor appointments.  Keep a list of your current medications including injectables and over the counter medications and bring this medication list with you to all your doctor appointments.  If you have not seen your ophthalmologist this year call for appointment.  Check your feet daily for redness, sores, or openings. Do not self treat. If no improvement in two days call your primary care physician for an appointment.  Low blood sugar (hypoglycemia) is a blood sugar below 70mg/dl. Check your blood sugar if you feel signs/symptoms of hypoglycemia. If your blood sugar is below 70 take 15 grams of carbohydrates (ex 4 oz of apple juice, 3-4 glucose tablets, or 4-6 oz of regular soda) wait 15 minutes and repeat blood sugar to make sure it comes up above 70.  If your blood sugar is above 70 and you are due for a meal, have a meal.  If you are not due for a meal have a snack.  This snack helps keeps your blood sugar at a safe range.

## 2017-09-15 NOTE — PROGRESS NOTE ADULT - SUBJECTIVE AND OBJECTIVE BOX
INTERVAL HPI/OVERNIGHT EVENTS: feel fine and want to go home.   Vital Signs Last 24 Hrs  T(C): 37 (15 Sep 2017 08:56), Max: 37.3 (14 Sep 2017 20:12)  T(F): 98.6 (15 Sep 2017 08:56), Max: 99.1 (14 Sep 2017 20:12)  HR: 78 (15 Sep 2017 08:56) (74 - 78)  BP: 120/69 (15 Sep 2017 08:56) (106/61 - 157/75)  BP(mean): --  RR: 16 (15 Sep 2017 08:56) (16 - 18)  SpO2: 99% (15 Sep 2017 08:56) (97% - 100%)  I&O's Summary    14 Sep 2017 07:01  -  15 Sep 2017 07:00  --------------------------------------------------------  IN: 240 mL / OUT: 0 mL / NET: 240 mL    15 Sep 2017 07:01  -  15 Sep 2017 13:54  --------------------------------------------------------  IN: 240 mL / OUT: 0 mL / NET: 240 mL      MEDICATIONS  (STANDING):  insulin glargine Injectable (LANTUS) 10 Unit(s) SubCutaneous at bedtime  insulin lispro Injectable (HumaLOG) 3 Unit(s) SubCutaneous three times a day before meals  insulin lispro (HumaLOG) corrective regimen sliding scale   SubCutaneous three times a day before meals  dextrose 5%. 1000 milliLiter(s) (50 mL/Hr) IV Continuous <Continuous>  dextrose 50% Injectable 12.5 Gram(s) IV Push once  dextrose 50% Injectable 25 Gram(s) IV Push once  dextrose 50% Injectable 25 Gram(s) IV Push once  heparin  Injectable 5000 Unit(s) SubCutaneous every 12 hours    MEDICATIONS  (PRN):  dextrose Gel 1 Dose(s) Oral once PRN Blood Glucose LESS THAN 70 milliGRAM(s)/deciliter  glucagon  Injectable 1 milliGRAM(s) IntraMuscular once PRN Glucose LESS THAN 70 milligrams/deciliter    LABS:                        11.3   4.89  )-----------( 200      ( 15 Sep 2017 07:22 )             36.3     09-15    140  |  102  |  20  ----------------------------<  273<H>  4.3   |  24  |  0.97    Ca    9.7      15 Sep 2017 09:03    TPro  7.0  /  Alb  4.0  /  TBili  <0.1<L>  /  DBili  x   /  AST  18  /  ALT  14  /  AlkPhos  134<H>        Urinalysis Basic - ( 14 Sep 2017 10:06 )    Color: x / Appearance: Clear / S.011 / pH: x  Gluc: x / Ketone: Negative  / Bili: Negative / Urobili: Negative   Blood: x / Protein: Negative / Nitrite: Negative   Leuk Esterase: Moderate / RBC: 0-2 /HPF / WBC 3-5 /HPF   Sq Epi: x / Non Sq Epi: OCC /HPF / Bacteria: x      CAPILLARY BLOOD GLUCOSE  265 (15 Sep 2017 11:22)  274 (15 Sep 2017 07:37)  287 (15 Sep 2017 00:53)  180 (14 Sep 2017 18:00)            Urinalysis Basic - ( 14 Sep 2017 10:06 )    Color: x / Appearance: Clear / S.011 / pH: x  Gluc: x / Ketone: Negative  / Bili: Negative / Urobili: Negative   Blood: x / Protein: Negative / Nitrite: Negative   Leuk Esterase: Moderate / RBC: 0-2 /HPF / WBC 3-5 /HPF   Sq Epi: x / Non Sq Epi: OCC /HPF / Bacteria: x      REVIEW OF SYSTEMS:  CONSTITUTIONAL: No fever, weight loss, or fatigue  ENMT:  No difficulty hearing, tinnitus, vertigo; No sinus or throat pain  NECK: No pain or stiffness  BREASTS: No pain, masses, or nipple discharge  RESPIRATORY: No cough, wheezing, chills or hemoptysis; No shortness of breath  CARDIOVASCULAR: No chest pain, palpitations, dizziness, or leg swelling  GASTROINTESTINAL: No abdominal or epigastric pain. No nausea, vomiting, or hematemesis; No diarrhea or constipation. No melena or hematochezia.  GENITOURINARY: No dysuria, frequency, hematuria, or incontinence  NEUROLOGICAL: No headaches, memory loss, loss of strength, numbness, or tremors  SKIN: No itching, burning, rashes, or lesions   LYMPH NODES: No enlarged glands  ENDOCRINE: No heat or cold intolerance; No hair loss  MUSCULOSKELETAL: No joint pain or swelling; No muscle, back, or extremity pain  PSYCHIATRIC: No depression, anxiety, mood swings, or difficulty sleeping  HEME/LYMPH: No easy bruising, or bleeding gums  ALLERY AND IMMUNOLOGIC: No hives or eczema    RADIOLOGY & ADDITIONAL TESTS:    Consultant(s) Notes Reviewed:  [x ] YES  [ ] NO    PHYSICAL EXAM:  GENERAL: NAD, well-groomed, well-developed,not in any distress ,  HEAD:  Atraumatic, Normocephalic  ENMT: No tonsillar erythema, exudates, or enlargement; Moist mucous membranes, Good dentition, No lesions  NECK: Supple, No JVD, Normal thyroid  NERVOUS SYSTEM:  Alert & Oriented X3, No focal deficit   CHEST/LUNG: Good air entry bilateral with no  rales, rhonchi, wheezing, or rubs  HEART: Regular rate and rhythm; No murmurs, rubs, or gallops  ABDOMEN: Soft, Nontender, Nondistended; Bowel sounds present  EXTREMITIES:  2+ Peripheral Pulses, No clubbing, cyanosis, or edema  SKIN: No rashes or lesions    Care Discussed with Consultants/Other Providers [ x] YES  [ ] NO

## 2017-09-15 NOTE — DISCHARGE NOTE ADULT - CARE PLAN
Principal Discharge DX:	Uncontrolled diabetes mellitus due to underlying condition with diabetic retinopathy  Goal:	HgbA1c to be within acceptable range  Instructions for follow-up, activity and diet:	HgA1C this admission - 13.4  Make sure you get your HgA1c checked every three months.  If you take oral diabetes medications, check your blood glucose two times a day.  If you take insulin, check your blood glucose before meals and at bedtime.  It's important not to skip any meals.  Keep a log of your blood glucose results and always take it with you to your doctor appointments.  Keep a list of your current medications including injectables and over the counter medications and bring this medication list with you to all your doctor appointments.  If you have not seen your ophthalmologist this year call for appointment.  Check your feet daily for redness, sores, or openings. Do not self treat. If no improvement in two days call your primary care physician for an appointment.  Low blood sugar (hypoglycemia) is a blood sugar below 70mg/dl. Check your blood sugar if you feel signs/symptoms of hypoglycemia. If your blood sugar is below 70 take 15 grams of carbohydrates (ex 4 oz of apple juice, 3-4 glucose tablets, or 4-6 oz of regular soda) wait 15 minutes and repeat blood sugar to make sure it comes up above 70.  If your blood sugar is above 70 and you are due for a meal, have a meal.  If you are not due for a meal have a snack.  This snack helps keeps your blood sugar at a safe range.

## 2017-09-15 NOTE — DISCHARGE NOTE ADULT - CARE PROVIDER_API CALL
Johan Sykes (DO), Internal Medicine  [address] [place] [address]  Phone: [phone]  Fax: [phone]

## 2017-09-15 NOTE — DISCHARGE NOTE ADULT - MEDICATION SUMMARY - MEDICATIONS TO TAKE
I will START or STAY ON the medications listed below when I get home from the hospital:    Levemir FlexPen 100 units/mL subcutaneous solution  -- 16 unit(s) subcutaneous once a day  -- Indication: For Diabetes    Prandin 2 mg oral tablet  -- 1 tab(s) by mouth 3 times a day (before meals)  -- Indication: For Diabetes    Combigan 0.2%-0.5% ophthalmic solution  -- 1 drop(s) in each eye every 12 hours  -- Indication: For eye drops    multivitamin  -- 1 tab(s) by mouth once a day  -- Indication: For vitamin supplement

## 2017-10-19 PROCEDURE — 84681 ASSAY OF C-PEPTIDE: CPT

## 2017-10-19 PROCEDURE — 73562 X-RAY EXAM OF KNEE 3: CPT

## 2017-10-19 PROCEDURE — 84132 ASSAY OF SERUM POTASSIUM: CPT

## 2017-10-19 PROCEDURE — 96372 THER/PROPH/DIAG INJ SC/IM: CPT

## 2017-10-19 PROCEDURE — 82330 ASSAY OF CALCIUM: CPT

## 2017-10-19 PROCEDURE — 99285 EMERGENCY DEPT VISIT HI MDM: CPT | Mod: 25

## 2017-10-19 PROCEDURE — 82947 ASSAY GLUCOSE BLOOD QUANT: CPT

## 2017-10-19 PROCEDURE — 82435 ASSAY OF BLOOD CHLORIDE: CPT

## 2017-10-19 PROCEDURE — 83605 ASSAY OF LACTIC ACID: CPT

## 2017-10-19 PROCEDURE — 73502 X-RAY EXAM HIP UNI 2-3 VIEWS: CPT

## 2017-10-19 PROCEDURE — 80048 BASIC METABOLIC PNL TOTAL CA: CPT

## 2017-10-19 PROCEDURE — 87086 URINE CULTURE/COLONY COUNT: CPT

## 2017-10-19 PROCEDURE — 85014 HEMATOCRIT: CPT

## 2017-10-19 PROCEDURE — G0378: CPT

## 2017-10-19 PROCEDURE — 80053 COMPREHEN METABOLIC PANEL: CPT

## 2017-10-19 PROCEDURE — 85027 COMPLETE CBC AUTOMATED: CPT

## 2017-10-19 PROCEDURE — 83036 HEMOGLOBIN GLYCOSYLATED A1C: CPT

## 2017-10-19 PROCEDURE — 82803 BLOOD GASES ANY COMBINATION: CPT

## 2017-10-19 PROCEDURE — 81001 URINALYSIS AUTO W/SCOPE: CPT

## 2017-10-19 PROCEDURE — 82962 GLUCOSE BLOOD TEST: CPT

## 2017-10-19 PROCEDURE — 72100 X-RAY EXAM L-S SPINE 2/3 VWS: CPT

## 2017-10-19 PROCEDURE — 84295 ASSAY OF SERUM SODIUM: CPT

## 2017-10-19 PROCEDURE — 82010 KETONE BODYS QUAN: CPT

## 2018-06-18 ENCOUNTER — INPATIENT (INPATIENT)
Facility: HOSPITAL | Age: 78
LOS: 8 days | Discharge: HOME | DRG: 638 | End: 2018-06-27
Attending: INTERNAL MEDICINE | Admitting: INTERNAL MEDICINE
Payer: COMMERCIAL

## 2018-06-18 VITALS
HEART RATE: 83 BPM | SYSTOLIC BLOOD PRESSURE: 144 MMHG | DIASTOLIC BLOOD PRESSURE: 80 MMHG | OXYGEN SATURATION: 99 % | RESPIRATION RATE: 20 BRPM | TEMPERATURE: 98 F

## 2018-06-18 DIAGNOSIS — Z98.891 HISTORY OF UTERINE SCAR FROM PREVIOUS SURGERY: Chronic | ICD-10-CM

## 2018-06-18 LAB
APTT BLD: 28.8 SEC — SIGNIFICANT CHANGE UP (ref 27.5–37.4)
BASE EXCESS BLDV CALC-SCNC: 5.1 MMOL/L — HIGH (ref -2–2)
BASOPHILS # BLD AUTO: 0.1 K/UL — SIGNIFICANT CHANGE UP (ref 0–0.2)
BASOPHILS NFR BLD AUTO: 1.1 % — SIGNIFICANT CHANGE UP (ref 0–2)
CA-I SERPL-SCNC: 1.25 MMOL/L — SIGNIFICANT CHANGE UP (ref 1.12–1.3)
CHLORIDE BLDV-SCNC: 97 MMOL/L — SIGNIFICANT CHANGE UP (ref 96–108)
CO2 BLDV-SCNC: 32 MMOL/L — HIGH (ref 22–30)
EOSINOPHIL # BLD AUTO: 0.1 K/UL — SIGNIFICANT CHANGE UP (ref 0–0.5)
EOSINOPHIL NFR BLD AUTO: 1.6 % — SIGNIFICANT CHANGE UP (ref 0–6)
GAS PNL BLDV: 133 MMOL/L — LOW (ref 136–145)
GAS PNL BLDV: SIGNIFICANT CHANGE UP
GAS PNL BLDV: SIGNIFICANT CHANGE UP
GLUCOSE BLDV-MCNC: 440 MG/DL — HIGH (ref 70–99)
HCO3 BLDV-SCNC: 31 MMOL/L — HIGH (ref 21–29)
HCT VFR BLD CALC: 33.9 % — LOW (ref 34.5–45)
HCT VFR BLDA CALC: 33 % — LOW (ref 39–50)
HGB BLD CALC-MCNC: 10.8 G/DL — LOW (ref 11.5–15.5)
HGB BLD-MCNC: 11 G/DL — LOW (ref 11.5–15.5)
INR BLD: 0.87 RATIO — LOW (ref 0.88–1.16)
LACTATE BLDV-MCNC: 1.6 MMOL/L — SIGNIFICANT CHANGE UP (ref 0.7–2)
LYMPHOCYTES # BLD AUTO: 2.5 K/UL — SIGNIFICANT CHANGE UP (ref 1–3.3)
LYMPHOCYTES # BLD AUTO: 48.8 % — HIGH (ref 13–44)
MCHC RBC-ENTMCNC: 24.2 PG — LOW (ref 27–34)
MCHC RBC-ENTMCNC: 32.3 GM/DL — SIGNIFICANT CHANGE UP (ref 32–36)
MCV RBC AUTO: 75 FL — LOW (ref 80–100)
MONOCYTES # BLD AUTO: 0.4 K/UL — SIGNIFICANT CHANGE UP (ref 0–0.9)
MONOCYTES NFR BLD AUTO: 7.1 % — SIGNIFICANT CHANGE UP (ref 2–14)
NEUTROPHILS # BLD AUTO: 2.1 K/UL — SIGNIFICANT CHANGE UP (ref 1.8–7.4)
NEUTROPHILS NFR BLD AUTO: 41.4 % — LOW (ref 43–77)
PCO2 BLDV: 54 MMHG — HIGH (ref 35–50)
PH BLDV: 7.37 — SIGNIFICANT CHANGE UP (ref 7.35–7.45)
PLATELET # BLD AUTO: 164 K/UL — SIGNIFICANT CHANGE UP (ref 150–400)
PO2 BLDV: 28 MMHG — SIGNIFICANT CHANGE UP (ref 25–45)
POTASSIUM BLDV-SCNC: 4.2 MMOL/L — SIGNIFICANT CHANGE UP (ref 3.5–5.3)
PROTHROM AB SERPL-ACNC: 9.5 SEC — LOW (ref 9.8–12.7)
RBC # BLD: 4.52 M/UL — SIGNIFICANT CHANGE UP (ref 3.8–5.2)
RBC # FLD: 12.2 % — SIGNIFICANT CHANGE UP (ref 10.3–14.5)
SAO2 % BLDV: 48 % — LOW (ref 67–88)
WBC # BLD: 5.2 K/UL — SIGNIFICANT CHANGE UP (ref 3.8–10.5)
WBC # FLD AUTO: 5.2 K/UL — SIGNIFICANT CHANGE UP (ref 3.8–10.5)

## 2018-06-18 PROCEDURE — 70450 CT HEAD/BRAIN W/O DYE: CPT | Mod: 26

## 2018-06-18 PROCEDURE — 93010 ELECTROCARDIOGRAM REPORT: CPT

## 2018-06-18 PROCEDURE — 99285 EMERGENCY DEPT VISIT HI MDM: CPT | Mod: 25

## 2018-06-18 RX ORDER — SODIUM CHLORIDE 9 MG/ML
2000 INJECTION, SOLUTION INTRAVENOUS ONCE
Qty: 0 | Refills: 0 | Status: COMPLETED | OUTPATIENT
Start: 2018-06-18 | End: 2018-06-18

## 2018-06-18 RX ADMIN — SODIUM CHLORIDE 2000 MILLILITER(S): 9 INJECTION, SOLUTION INTRAVENOUS at 23:27

## 2018-06-18 NOTE — ED PROVIDER NOTE - MEDICAL DECISION MAKING DETAILS
76y/o F with h/o IDDM, presenting with confusion, not taking care of herself, medication noncomplaince; FSG ~500.  Concern for DKA vs HHS; will check labs, iv fluids, insulin pending initial lab assessment; consider ct head, consider sepsis w/u if febrile rectally.  Will need admission as lives alone, and is clearly unable to manage diabetes at home.

## 2018-06-18 NOTE — ED ADULT TRIAGE NOTE - CHIEF COMPLAINT QUOTE
Patient lives at home alone. A concerned neighbor checked on her and found the house to be very messy. Patient take medications for DM, but ran out of medications and is unsure when the last time she took medication is.

## 2018-06-19 DIAGNOSIS — R73.9 HYPERGLYCEMIA, UNSPECIFIED: ICD-10-CM

## 2018-06-19 DIAGNOSIS — E11.9 TYPE 2 DIABETES MELLITUS WITHOUT COMPLICATIONS: ICD-10-CM

## 2018-06-19 DIAGNOSIS — N39.0 URINARY TRACT INFECTION, SITE NOT SPECIFIED: ICD-10-CM

## 2018-06-19 LAB
ALBUMIN SERPL ELPH-MCNC: 4 G/DL — SIGNIFICANT CHANGE UP (ref 3.3–5)
ALP SERPL-CCNC: 127 U/L — HIGH (ref 40–120)
ALT FLD-CCNC: 21 U/L — SIGNIFICANT CHANGE UP (ref 10–45)
ANION GAP SERPL CALC-SCNC: 11 MMOL/L — SIGNIFICANT CHANGE UP (ref 5–17)
ANION GAP SERPL CALC-SCNC: 11 MMOL/L — SIGNIFICANT CHANGE UP (ref 5–17)
APPEARANCE UR: CLEAR — SIGNIFICANT CHANGE UP
AST SERPL-CCNC: 18 U/L — SIGNIFICANT CHANGE UP (ref 10–40)
B-OH-BUTYR SERPL-SCNC: 0.1 MMOL/L — SIGNIFICANT CHANGE UP
BACTERIA # UR AUTO: ABNORMAL /HPF
BILIRUB SERPL-MCNC: 0.2 MG/DL — SIGNIFICANT CHANGE UP (ref 0.2–1.2)
BILIRUB UR-MCNC: NEGATIVE — SIGNIFICANT CHANGE UP
BUN SERPL-MCNC: 22 MG/DL — SIGNIFICANT CHANGE UP (ref 7–23)
BUN SERPL-MCNC: 31 MG/DL — HIGH (ref 7–23)
CALCIUM SERPL-MCNC: 9 MG/DL — SIGNIFICANT CHANGE UP (ref 8.4–10.5)
CALCIUM SERPL-MCNC: 9.7 MG/DL — SIGNIFICANT CHANGE UP (ref 8.4–10.5)
CHLORIDE SERPL-SCNC: 102 MMOL/L — SIGNIFICANT CHANGE UP (ref 96–108)
CHLORIDE SERPL-SCNC: 95 MMOL/L — LOW (ref 96–108)
CO2 SERPL-SCNC: 28 MMOL/L — SIGNIFICANT CHANGE UP (ref 22–31)
CO2 SERPL-SCNC: 29 MMOL/L — SIGNIFICANT CHANGE UP (ref 22–31)
COLOR SPEC: SIGNIFICANT CHANGE UP
CREAT SERPL-MCNC: 0.94 MG/DL — SIGNIFICANT CHANGE UP (ref 0.5–1.3)
CREAT SERPL-MCNC: 1.22 MG/DL — SIGNIFICANT CHANGE UP (ref 0.5–1.3)
DIFF PNL FLD: NEGATIVE — SIGNIFICANT CHANGE UP
EPI CELLS # UR: SIGNIFICANT CHANGE UP /HPF
GLUCOSE BLDC GLUCOMTR-MCNC: 105 MG/DL — HIGH (ref 70–99)
GLUCOSE BLDC GLUCOMTR-MCNC: 126 MG/DL — HIGH (ref 70–99)
GLUCOSE BLDC GLUCOMTR-MCNC: 245 MG/DL — HIGH (ref 70–99)
GLUCOSE BLDC GLUCOMTR-MCNC: 366 MG/DL — HIGH (ref 70–99)
GLUCOSE SERPL-MCNC: 438 MG/DL — HIGH (ref 70–99)
GLUCOSE SERPL-MCNC: 95 MG/DL — SIGNIFICANT CHANGE UP (ref 70–99)
GLUCOSE UR QL: >1000 MG/DL
HCT VFR BLD CALC: 32.4 % — LOW (ref 34.5–45)
HGB BLD-MCNC: 10.5 G/DL — LOW (ref 11.5–15.5)
HYALINE CASTS # UR AUTO: ABNORMAL
KETONES UR-MCNC: NEGATIVE — SIGNIFICANT CHANGE UP
LEUKOCYTE ESTERASE UR-ACNC: ABNORMAL
LIDOCAIN IGE QN: 132 U/L — HIGH (ref 7–60)
MAGNESIUM SERPL-MCNC: 1.8 MG/DL — SIGNIFICANT CHANGE UP (ref 1.6–2.6)
MCHC RBC-ENTMCNC: 23.8 PG — LOW (ref 27–34)
MCHC RBC-ENTMCNC: 32.4 GM/DL — SIGNIFICANT CHANGE UP (ref 32–36)
MCV RBC AUTO: 73.3 FL — LOW (ref 80–100)
NITRITE UR-MCNC: NEGATIVE — SIGNIFICANT CHANGE UP
PH UR: 6.5 — SIGNIFICANT CHANGE UP (ref 5–8)
PHOSPHATE SERPL-MCNC: 2.7 MG/DL — SIGNIFICANT CHANGE UP (ref 2.5–4.5)
PLATELET # BLD AUTO: 163 K/UL — SIGNIFICANT CHANGE UP (ref 150–400)
POTASSIUM SERPL-MCNC: 3.6 MMOL/L — SIGNIFICANT CHANGE UP (ref 3.5–5.3)
POTASSIUM SERPL-MCNC: 4.5 MMOL/L — SIGNIFICANT CHANGE UP (ref 3.5–5.3)
POTASSIUM SERPL-SCNC: 3.6 MMOL/L — SIGNIFICANT CHANGE UP (ref 3.5–5.3)
POTASSIUM SERPL-SCNC: 4.5 MMOL/L — SIGNIFICANT CHANGE UP (ref 3.5–5.3)
PROT SERPL-MCNC: 7.1 G/DL — SIGNIFICANT CHANGE UP (ref 6–8.3)
PROT UR-MCNC: SIGNIFICANT CHANGE UP
RBC # BLD: 4.42 M/UL — SIGNIFICANT CHANGE UP (ref 3.8–5.2)
RBC # FLD: 14 % — SIGNIFICANT CHANGE UP (ref 10.3–14.5)
RBC CASTS # UR COMP ASSIST: SIGNIFICANT CHANGE UP /HPF (ref 0–2)
SODIUM SERPL-SCNC: 135 MMOL/L — SIGNIFICANT CHANGE UP (ref 135–145)
SODIUM SERPL-SCNC: 141 MMOL/L — SIGNIFICANT CHANGE UP (ref 135–145)
SP GR SPEC: 1.02 — SIGNIFICANT CHANGE UP (ref 1.01–1.02)
TSH SERPL-MCNC: 2.04 UIU/ML — SIGNIFICANT CHANGE UP (ref 0.27–4.2)
UROBILINOGEN FLD QL: NEGATIVE — SIGNIFICANT CHANGE UP
WBC # BLD: 4.21 K/UL — SIGNIFICANT CHANGE UP (ref 3.8–10.5)
WBC # FLD AUTO: 4.21 K/UL — SIGNIFICANT CHANGE UP (ref 3.8–10.5)
WBC UR QL: SIGNIFICANT CHANGE UP /HPF (ref 0–5)

## 2018-06-19 PROCEDURE — 71045 X-RAY EXAM CHEST 1 VIEW: CPT | Mod: 26

## 2018-06-19 PROCEDURE — 99223 1ST HOSP IP/OBS HIGH 75: CPT

## 2018-06-19 RX ORDER — INSULIN LISPRO 100/ML
3 VIAL (ML) SUBCUTANEOUS
Qty: 0 | Refills: 0 | Status: DISCONTINUED | OUTPATIENT
Start: 2018-06-19 | End: 2018-06-19

## 2018-06-19 RX ORDER — DEXTROSE 50 % IN WATER 50 %
12.5 SYRINGE (ML) INTRAVENOUS ONCE
Qty: 0 | Refills: 0 | Status: DISCONTINUED | OUTPATIENT
Start: 2018-06-19 | End: 2018-06-27

## 2018-06-19 RX ORDER — ACETAMINOPHEN 500 MG
325 TABLET ORAL EVERY 6 HOURS
Qty: 0 | Refills: 0 | Status: DISCONTINUED | OUTPATIENT
Start: 2018-06-19 | End: 2018-06-27

## 2018-06-19 RX ORDER — BRIMONIDINE TARTRATE, TIMOLOL MALEATE 2; 5 MG/ML; MG/ML
1 SOLUTION/ DROPS OPHTHALMIC
Qty: 0 | Refills: 0 | COMMUNITY

## 2018-06-19 RX ORDER — SODIUM CHLORIDE 9 MG/ML
1000 INJECTION, SOLUTION INTRAVENOUS
Qty: 0 | Refills: 0 | Status: DISCONTINUED | OUTPATIENT
Start: 2018-06-19 | End: 2018-06-27

## 2018-06-19 RX ORDER — DEXTROSE 50 % IN WATER 50 %
15 SYRINGE (ML) INTRAVENOUS ONCE
Qty: 0 | Refills: 0 | Status: DISCONTINUED | OUTPATIENT
Start: 2018-06-19 | End: 2018-06-27

## 2018-06-19 RX ORDER — GLUCAGON INJECTION, SOLUTION 0.5 MG/.1ML
1 INJECTION, SOLUTION SUBCUTANEOUS ONCE
Qty: 0 | Refills: 0 | Status: DISCONTINUED | OUTPATIENT
Start: 2018-06-19 | End: 2018-06-27

## 2018-06-19 RX ORDER — CEFTRIAXONE 500 MG/1
1 INJECTION, POWDER, FOR SOLUTION INTRAMUSCULAR; INTRAVENOUS ONCE
Qty: 0 | Refills: 0 | Status: COMPLETED | OUTPATIENT
Start: 2018-06-19 | End: 2018-06-19

## 2018-06-19 RX ORDER — INSULIN LISPRO 100/ML
VIAL (ML) SUBCUTANEOUS
Qty: 0 | Refills: 0 | Status: DISCONTINUED | OUTPATIENT
Start: 2018-06-19 | End: 2018-06-21

## 2018-06-19 RX ORDER — INSULIN GLARGINE 100 [IU]/ML
12 INJECTION, SOLUTION SUBCUTANEOUS AT BEDTIME
Qty: 0 | Refills: 0 | Status: DISCONTINUED | OUTPATIENT
Start: 2018-06-19 | End: 2018-06-21

## 2018-06-19 RX ORDER — INSULIN LISPRO 100/ML
2 VIAL (ML) SUBCUTANEOUS ONCE
Qty: 0 | Refills: 0 | Status: COMPLETED | OUTPATIENT
Start: 2018-06-19 | End: 2018-06-19

## 2018-06-19 RX ORDER — ENOXAPARIN SODIUM 100 MG/ML
40 INJECTION SUBCUTANEOUS EVERY 24 HOURS
Qty: 0 | Refills: 0 | Status: DISCONTINUED | OUTPATIENT
Start: 2018-06-19 | End: 2018-06-27

## 2018-06-19 RX ADMIN — Medication 4: at 13:38

## 2018-06-19 RX ADMIN — ENOXAPARIN SODIUM 40 MILLIGRAM(S): 100 INJECTION SUBCUTANEOUS at 13:39

## 2018-06-19 RX ADMIN — Medication 2 UNIT(S): at 02:28

## 2018-06-19 RX ADMIN — INSULIN GLARGINE 12 UNIT(S): 100 INJECTION, SOLUTION SUBCUTANEOUS at 21:59

## 2018-06-19 RX ADMIN — Medication 10: at 16:53

## 2018-06-19 RX ADMIN — CEFTRIAXONE 100 GRAM(S): 500 INJECTION, POWDER, FOR SOLUTION INTRAMUSCULAR; INTRAVENOUS at 03:40

## 2018-06-19 NOTE — H&P ADULT - PROBLEM SELECTOR PLAN 2
ua abnormal however pt denies symptoms  s/p 1 g ctx, will monitor for now off abx  IVFs gentle hydration

## 2018-06-19 NOTE — H&P ADULT - NSHPREVIEWOFSYSTEMS_GEN_ALL_CORE
Review of Systems:   CONSTITUTIONAL: No fever, chills, weight loss, or fatigue  EYES: No eye pain, visual disturbances, or discharge  ENMT:  No difficulty hearing, tinnitus, vertigo; No sinus or throat pain  NECK: No pain or stiffness  RESPIRATORY: No cough, wheezing, or shortness of breath  CARDIOVASCULAR: No chest pain, palpitations, dizziness, or leg swelling  GASTROINTESTINAL: No abdominal or epigastric pain. No nausea, vomiting, diarrhea. No melena or hematochezia.  GENITOURINARY: No dysuria, no hematuria  NEUROLOGICAL: No changes in strength/sensation   SKIN: No itching, burning, rashes, or lesions   LYMPH NODES: No enlarged glands  ENDOCRINE: No heat or cold intolerance; No hair loss  MUSCULOSKELETAL: per hpi   PSYCHIATRIC: No depression, anxiety, mood swings  HEME/LYMPH: No easy bruising, or bleeding gums  ALLERGY AND IMMUNOLOGIC: No hives or eczema

## 2018-06-19 NOTE — H&P ADULT - NSHPLABSRESULTS_GEN_ALL_CORE
Vital Signs Last 24 Hrs  T(C): 36.5 (2018 08:51), Max: 36.8 (2018 21:16)  T(F): 97.7 (2018 08:51), Max: 98.3 (2018 21:16)  HR: 67 (2018 08:51) (67 - 83)  BP: 109/65 (2018 08:51) (100/60 - 144/80)  BP(mean): --  RR: 16 (2018 08:51) (16 - 20)  SpO2: 100% (2018 08:51) (97% - 100%)  I&O's Summary    CAPILLARY BLOOD GLUCOSE      POCT Blood Glucose.: 126 mg/dL (2018 08:55)  POCT Blood Glucose.: 133 mg/dL (2018 04:54)  POCT Blood Glucose.: 279 mg/dL (2018 01:47)  POCT Blood Glucose.: 485 mg/dL (2018 21:17)                            10.5   4.21  )-----------( 163      ( 2018 08:11 )             32.4     06-19    141  |  102  |  22  ----------------------------<  95  3.6   |  28  |  0.94    Ca    9.0      2018 06:46  Phos  2.7     06-19  Mg     1.8     -    TPro  7.1  /  Alb  4.0  /  TBili  0.2  /  DBili  x   /  AST  18  /  ALT  21  /  AlkPhos  127<H>  06-18        LIVER FUNCTIONS - ( 2018 23:30 )  Alb: 4.0 g/dL / Pro: 7.1 g/dL / ALK PHOS: 127 U/L / ALT: 21 U/L / AST: 18 U/L / GGT: x           PT/INR - ( 2018 23:30 )   PT: 9.5 sec;   INR: 0.87 ratio         PTT - ( 2018 23:30 )  PTT:28.8 sec  Urinalysis Basic - ( 2018 02:18 )    Color: PL Yellow / Appearance: Clear / S.018 / pH: x  Gluc: x / Ketone: Negative  / Bili: Negative / Urobili: Negative   Blood: x / Protein: Trace / Nitrite: Negative   Leuk Esterase: Large / RBC: 3-5 /HPF / WBC 11-25 /HPF   Sq Epi: x / Non Sq Epi: OCC /HPF / Bacteria: Few /HPF        Labs/imaging personally reviewed: Vital Signs Last 24 Hrs  T(C): 36.5 (2018 08:51), Max: 36.8 (2018 21:16)  T(F): 97.7 (2018 08:51), Max: 98.3 (2018 21:16)  HR: 67 (2018 08:51) (67 - 83)  BP: 109/65 (2018 08:51) (100/60 - 144/80)  BP(mean): --  RR: 16 (2018 08:51) (16 - 20)  SpO2: 100% (2018 08:51) (97% - 100%)  I&O's Summary    CAPILLARY BLOOD GLUCOSE      POCT Blood Glucose.: 126 mg/dL (2018 08:55)  POCT Blood Glucose.: 133 mg/dL (2018 04:54)  POCT Blood Glucose.: 279 mg/dL (2018 01:47)  POCT Blood Glucose.: 485 mg/dL (2018 21:17)                            10.5   4.21  )-----------( 163      ( 2018 08:11 )             32.4     06-19    141  |  102  |  22  ----------------------------<  95  3.6   |  28  |  0.94    Ca    9.0      2018 06:46  Phos  2.7     06-19  Mg     1.8     -    TPro  7.1  /  Alb  4.0  /  TBili  0.2  /  DBili  x   /  AST  18  /  ALT  21  /  AlkPhos  127<H>  06-18        LIVER FUNCTIONS - ( 2018 23:30 )  Alb: 4.0 g/dL / Pro: 7.1 g/dL / ALK PHOS: 127 U/L / ALT: 21 U/L / AST: 18 U/L / GGT: x           PT/INR - ( 2018 23:30 )   PT: 9.5 sec;   INR: 0.87 ratio         PTT - ( 2018 23:30 )  PTT:28.8 sec  Urinalysis Basic - ( 2018 02:18 )    Color: PL Yellow / Appearance: Clear / S.018 / pH: x  Gluc: x / Ketone: Negative  / Bili: Negative / Urobili: Negative   Blood: x / Protein: Trace / Nitrite: Negative   Leuk Esterase: Large / RBC: 3-5 /HPF / WBC 11-25 /HPF   Sq Epi: x / Non Sq Epi: OCC /HPF / Bacteria: Few /HPF        Labs/imaging personally reviewed: u/a

## 2018-06-19 NOTE — H&P ADULT - NSHPPHYSICALEXAM_GEN_ALL_CORE
PHYSICAL EXAM:  GENERAL: NAD  HEAD:  Atraumatic, Normocephalic  EYES: EOMI, PERRLA, conjunctiva and sclera clear  ENMT: Supple, No JVD  RESPIRATORY: Clear to auscultation bilaterally; No wheeze  CARDIOVASCULAR: Regular rate and rhythm; No murmurs, rubs, or gallops  GI: Soft, Nontender, Nondistended; Bowel sounds present  EXTREMITIES:  2+ Peripheral Pulses, No clubbing, cyanosis, or edema  PSYCH: AAOx3  NEUROLOGY: non-focal  SKIN: No rashes or lesions

## 2018-06-19 NOTE — H&P ADULT - PROBLEM SELECTOR PLAN 1
type 2 dm w/ hyperglycemia, poor compliance, poor social support  will get endocrine consult, hgb a1c (last 13.4 in 9/2017), diabetes education  will give lantus 12 units qhs for weight based dosing, poc / ssi

## 2018-06-19 NOTE — ED ADULT NURSE NOTE - OBJECTIVE STATEMENT
77 y.o female w. PMH of diabetes came to the ER w. c/o hyperglycemia, poor historian w. possible dementia, pt was brought to the ER by a friend, states pt lives alone, house is a mess, pt states she has not been taking her insulin for the past few days because she ran out of the medication. Pt denies any CP, SOB, n/v, fever, chills, h/a, dizziness, weakness. Pt is axox2, lungs CTA, +bs abdomen soft non-distended, + central pulses felt b/l, safety and comfort maintained, no acute distress noted at this time.

## 2018-06-19 NOTE — H&P ADULT - HISTORY OF PRESENT ILLNESS
77f legally blind, dm2 c/b retinopathy and glaucoma who p/w hyperglycemia. Pt hospitalized 7/2017 for extremity pains and hyperglycemia. Per ER records, brought in overnight by family friend as she has not been taking care of herself. Per patient, she has not given herself insulin in roughly 2 weeks as she ran out of meds. Pt states she lives alone and her son stops by every day to help set up her meds. She does not see a primary doctor, nor an endocrinologist. Pt was unable to tell me who usually fills her prescriptions. Pt denies cp/sob/n/v/diarrhea/dysuria/urinary frequency/f/c. Notes some chronic knee pain.     In ED initial , given 2L LR, 1g ctx and 2 humalog. Most recent

## 2018-06-19 NOTE — CHART NOTE - NSCHARTNOTEFT_GEN_A_CORE
EMERGENCY ROOM : LMSW received referral from  to explore discharge planning. Chart reviewed. Patient is a 76 y/o [place], female, with PMH of DM Type 2, legally blind, BIB a family friend due to inability to care for self. Per family friend, patient lives alone and is noncompliant with diabetic medication. Family friend states patient's son visits daily to administer insulin. Patient admitted for hyperglycemia. LMSW attempted to meet patient at bedside, no family present. Patient A&O x2, very confused and a poor historian. Patient unable to provide demographic information. LMSW contacted listed emergency contact sonAustin (ph. [phone]) and left voicemail void of PHI requesting call back to discuss patient's discharge planning. Social Work remains available for further assessment of discharge needs.

## 2018-06-20 ENCOUNTER — TRANSCRIPTION ENCOUNTER (OUTPATIENT)
Age: 78
End: 2018-06-20

## 2018-06-20 DIAGNOSIS — E11.319 TYPE 2 DIABETES MELLITUS WITH UNSPECIFIED DIABETIC RETINOPATHY WITHOUT MACULAR EDEMA: ICD-10-CM

## 2018-06-20 DIAGNOSIS — Z29.9 ENCOUNTER FOR PROPHYLACTIC MEASURES, UNSPECIFIED: ICD-10-CM

## 2018-06-20 DIAGNOSIS — E78.5 HYPERLIPIDEMIA, UNSPECIFIED: ICD-10-CM

## 2018-06-20 DIAGNOSIS — I10 ESSENTIAL (PRIMARY) HYPERTENSION: ICD-10-CM

## 2018-06-20 LAB
ANION GAP SERPL CALC-SCNC: 11 MMOL/L — SIGNIFICANT CHANGE UP (ref 5–17)
BASOPHILS # BLD AUTO: 0 K/UL — SIGNIFICANT CHANGE UP (ref 0–0.2)
BASOPHILS NFR BLD AUTO: 0 % — SIGNIFICANT CHANGE UP (ref 0–2)
BUN SERPL-MCNC: 30 MG/DL — HIGH (ref 7–23)
CALCIUM SERPL-MCNC: 8.9 MG/DL — SIGNIFICANT CHANGE UP (ref 8.4–10.5)
CHLORIDE SERPL-SCNC: 99 MMOL/L — SIGNIFICANT CHANGE UP (ref 96–108)
CO2 SERPL-SCNC: 28 MMOL/L — SIGNIFICANT CHANGE UP (ref 22–31)
CREAT SERPL-MCNC: 0.97 MG/DL — SIGNIFICANT CHANGE UP (ref 0.5–1.3)
CULTURE RESULTS: SIGNIFICANT CHANGE UP
EOSINOPHIL # BLD AUTO: 0.26 K/UL — SIGNIFICANT CHANGE UP (ref 0–0.5)
EOSINOPHIL NFR BLD AUTO: 5.7 % — SIGNIFICANT CHANGE UP (ref 0–6)
GLUCOSE BLDC GLUCOMTR-MCNC: 170 MG/DL — HIGH (ref 70–99)
GLUCOSE BLDC GLUCOMTR-MCNC: 214 MG/DL — HIGH (ref 70–99)
GLUCOSE BLDC GLUCOMTR-MCNC: 311 MG/DL — HIGH (ref 70–99)
GLUCOSE BLDC GLUCOMTR-MCNC: 350 MG/DL — HIGH (ref 70–99)
GLUCOSE SERPL-MCNC: 209 MG/DL — HIGH (ref 70–99)
HBA1C BLD-MCNC: 14.1 % — HIGH (ref 4–5.6)
HCT VFR BLD CALC: 32 % — LOW (ref 34.5–45)
HGB BLD-MCNC: 10.2 G/DL — LOW (ref 11.5–15.5)
LYMPHOCYTES # BLD AUTO: 2.04 K/UL — SIGNIFICANT CHANGE UP (ref 1–3.3)
LYMPHOCYTES # BLD AUTO: 45.3 % — HIGH (ref 13–44)
MCHC RBC-ENTMCNC: 23.4 PG — LOW (ref 27–34)
MCHC RBC-ENTMCNC: 31.9 GM/DL — LOW (ref 32–36)
MCV RBC AUTO: 73.6 FL — LOW (ref 80–100)
MONOCYTES # BLD AUTO: 0.34 K/UL — SIGNIFICANT CHANGE UP (ref 0–0.9)
MONOCYTES NFR BLD AUTO: 7.5 % — SIGNIFICANT CHANGE UP (ref 2–14)
NEUTROPHILS # BLD AUTO: 1.87 K/UL — SIGNIFICANT CHANGE UP (ref 1.8–7.4)
NEUTROPHILS NFR BLD AUTO: 41.5 % — LOW (ref 43–77)
PLATELET # BLD AUTO: 167 K/UL — SIGNIFICANT CHANGE UP (ref 150–400)
POTASSIUM SERPL-MCNC: 4.5 MMOL/L — SIGNIFICANT CHANGE UP (ref 3.5–5.3)
POTASSIUM SERPL-SCNC: 4.5 MMOL/L — SIGNIFICANT CHANGE UP (ref 3.5–5.3)
RBC # BLD: 4.35 M/UL — SIGNIFICANT CHANGE UP (ref 3.8–5.2)
RBC # FLD: 14.1 % — SIGNIFICANT CHANGE UP (ref 10.3–14.5)
SODIUM SERPL-SCNC: 138 MMOL/L — SIGNIFICANT CHANGE UP (ref 135–145)
SPECIMEN SOURCE: SIGNIFICANT CHANGE UP
WBC # BLD: 4.51 K/UL — SIGNIFICANT CHANGE UP (ref 3.8–10.5)
WBC # FLD AUTO: 4.51 K/UL — SIGNIFICANT CHANGE UP (ref 3.8–10.5)

## 2018-06-20 PROCEDURE — 99233 SBSQ HOSP IP/OBS HIGH 50: CPT

## 2018-06-20 PROCEDURE — 99223 1ST HOSP IP/OBS HIGH 75: CPT | Mod: GC

## 2018-06-20 RX ORDER — INSULIN LISPRO 100/ML
5 VIAL (ML) SUBCUTANEOUS
Qty: 0 | Refills: 0 | Status: DISCONTINUED | OUTPATIENT
Start: 2018-06-20 | End: 2018-06-27

## 2018-06-20 RX ORDER — INSULIN LISPRO 100/ML
VIAL (ML) SUBCUTANEOUS AT BEDTIME
Qty: 0 | Refills: 0 | Status: DISCONTINUED | OUTPATIENT
Start: 2018-06-20 | End: 2018-06-21

## 2018-06-20 RX ORDER — ATORVASTATIN CALCIUM 80 MG/1
20 TABLET, FILM COATED ORAL AT BEDTIME
Qty: 0 | Refills: 0 | Status: DISCONTINUED | OUTPATIENT
Start: 2018-06-20 | End: 2018-06-27

## 2018-06-20 RX ADMIN — Medication 4: at 22:03

## 2018-06-20 RX ADMIN — ENOXAPARIN SODIUM 40 MILLIGRAM(S): 100 INJECTION SUBCUTANEOUS at 15:37

## 2018-06-20 RX ADMIN — Medication 2: at 08:20

## 2018-06-20 RX ADMIN — INSULIN GLARGINE 12 UNIT(S): 100 INJECTION, SOLUTION SUBCUTANEOUS at 22:03

## 2018-06-20 RX ADMIN — Medication 4: at 17:13

## 2018-06-20 RX ADMIN — Medication 8: at 12:30

## 2018-06-20 RX ADMIN — ATORVASTATIN CALCIUM 20 MILLIGRAM(S): 80 TABLET, FILM COATED ORAL at 22:03

## 2018-06-20 NOTE — CONSULT NOTE ADULT - SUBJECTIVE AND OBJECTIVE BOX
HPI:  76 y/o F PMH uncontrolled DM2 c/b retinopathy, legally blind, presents with hyperglycemia from home. Patient was brought to hospital by a family friend because she has not been taking care of herself. Consult called for management of uncontrolled DM2. HbA1c is 14.1. She was seen by our service on a previous admission. States that she used to take Glipizide and Januvia (but hasn't recently because she ran out) and takes insulin 1-2 times a day at home based on her Fs. Cannot tell me the name of the insulin or how much she takes. States that she does not check her Fs at home. Son sometimes comes and prepares her insulin for her and checks her FS at bedtime. Reports that she gives herself the insulin. Does not remember the last time she saw optho. No symptoms of neuropathy. Eats three meals a day.    PAST MEDICAL & SURGICAL HISTORY:  Diabetes  H/O  section      FAMILY HISTORY:  Family history of diabetes mellitus type II - mother, father, sister       Social History:  No cigarette or alcohol use  Lives alone    Outpatient Medications:  · 	Prandin 2 mg oral tablet: 1 tab(s) orally 3 times a day (before meals)  · 	Levemir FlexPen 100 units/mL subcutaneous solution: 16 unit(s) subcutaneous once a day  · 	multivitamin: 1 tab(s) orally once a day  · 	Combigan 0.2%-0.5% ophthalmic solution: 1 drop(s) in each eye every 12 hours    MEDICATIONS  (STANDING):  dextrose 5%. 1000 milliLiter(s) (50 mL/Hr) IV Continuous <Continuous>  dextrose 50% Injectable 12.5 Gram(s) IV Push once  enoxaparin Injectable 40 milliGRAM(s) SubCutaneous every 24 hours  insulin glargine Injectable (LANTUS) 12 Unit(s) SubCutaneous at bedtime  insulin lispro (HumaLOG) corrective regimen sliding scale   SubCutaneous three times a day before meals    MEDICATIONS  (PRN):  acetaminophen   Tablet. 325 milliGRAM(s) Oral every 6 hours PRN Mild Pain (1 - 3)  dextrose 40% Gel 15 Gram(s) Oral once PRN Blood Glucose LESS THAN 70 milliGRAM(s)/deciliter  glucagon  Injectable 1 milliGRAM(s) IntraMuscular once PRN Glucose LESS THAN 70 milligrams/deciliter      Allergies  No Known Allergies    Review of Systems:  Constitutional: No fever  Eyes: + blurry vision  Neuro: No tremors  HEENT: No pain  Cardiovascular: No chest pain, palpitations  Respiratory: No SOB, no cough  GI: No nausea, vomiting, abdominal pain  : No dysuria  Skin: no rash  Endocrine: no polyuria, + polydipsia    ALL OTHER SYSTEMS REVIEWED AND NEGATIVE      PHYSICAL EXAM:  VITALS: T(C): 36.8 (18 @ 14:57)  T(F): 98.2 (18 @ 14:57), Max: 98.9 (18 @ 16:00)  HR: 81 (18 @ 14:57) (69 - 81)  BP: 106/61 (18 @ 14:57) (106/61 - 130/75)  RR:  (16 - 17)  SpO2:  (97% - 99%)  Wt(kg): --  GENERAL: NAD, well-developed  EYES: No proptosis, anicteric  HEENT:  Atraumatic, Normocephalic  RESPIRATORY: Clear to auscultation bilaterally; No rales, rhonchi, wheezing  CARDIOVASCULAR: Regular rate and rhythm; No murmurs; no peripheral edema  GI: Soft, nontender, non distended, normal bowel sounds  SKIN: Dry, intact, No ulcers or wounds on feet  PSYCH: Alert and oriented x 3, reactive affect      POCT Blood Glucose.: 311 mg/dL (18 @ 11:47) H 8  POCT Blood Glucose.: 170 mg/dL (18 @ 07:40) H 2  POCT Blood Glucose.: 105 mg/dL (18 @ 21:43) L 12  POCT Blood Glucose.: 366 mg/dL (18 @ 16:38) H 10  POCT Blood Glucose.: 245 mg/dL (18 @ 13:27) H 4  POCT Blood Glucose.: 126 mg/dL (18 @ 08:55)  POCT Blood Glucose.: 133 mg/dL (18 @ 04:54)  POCT Blood Glucose.: 279 mg/dL (18 @ 01:47)  POCT Blood Glucose.: 485 mg/dL (18 @ 21:17)                          10.2   4.51  )-----------( 167       08:14 )             32.0           138  |  99  |  30<H>  ----------------------------<  209<H>  4.5   |  28  |  0.97    EGFR if : 66  EGFR if non : 57<L>    Ca    8.9        Mg     1.8       Phos  2.7         TPro  7.1  /  Alb  4.0  /  TBili  0.2  /  DBili  x   /  AST  18  /  ALT  21  /  AlkPhos  127<H>        Thyroid Function Tests:   @ 03:41 TSH 2.04 FreeT4 -- T3 -- Anti TPO -- Anti Thyroglobulin Ab -- TSI --      Hemoglobin A1C, Whole Blood: 14.1 % <H> [4.0 - 5.6] (18 @ 08:14)

## 2018-06-20 NOTE — CONSULT NOTE ADULT - ASSESSMENT
76 y/o F with HTN, HLD, uncontrolled DM2, here with hyperglycemia from home in setting of uncontrolled DM2 76 y/o F with HTN, HLD, uncontrolled DM2, here with hyperglycemia from home in setting of uncontrolled DM2n A1c 14% (high risk patient with high level decision-making).

## 2018-06-20 NOTE — DISCHARGE NOTE ADULT - HOSPITAL COURSE
.. 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 T2DM now better controlled on basal/bolus insulin , followed by Endo. Hospital course complicated by hypoclycemia, asymptomatic, basal/bolus adjusted. Neuropathy controlled with gabapentin and tylenol as needed.  Pyuria:afebrile , asymptomatic.UCx growing many organisms, likely contaminated.  Hyperlipidemia, unspecified hyperlipidemia type.  : continue with atorvastatin at bedtime per endocrine recommendations. PT alejandro PURVIS. SW follow-up for placement. Concerns about safety at home.

## 2018-06-20 NOTE — DISCHARGE NOTE ADULT - CARE PROVIDERS DIRECT ADDRESSES
,DirectAddress_Unknown ,DirectAddress_Unknown,[email or web address]

## 2018-06-20 NOTE — DISCHARGE NOTE ADULT - MEDICATION SUMMARY - MEDICATIONS TO CHANGE
I will SWITCH the dose or number of times a day I take the medications listed below when I get home from the hospital:    Levemir 100 units/mL subcutaneous solution  -- 12 - 15 unit(s) subcutaneous once a day (at bedtime)    Note: last dispensed by pharmacy December 2017    Levemir 100 units/mL subcutaneous solution  -- 20 unit(s) subcutaneous once a day (at bedtime)     Note: last dispensed by pharmacy December 2017

## 2018-06-20 NOTE — DISCHARGE NOTE ADULT - PATIENT PORTAL LINK FT
You can access the [facility] Portal, offered by [facility], by registering with the following website: [email or web address]

## 2018-06-20 NOTE — DISCHARGE NOTE ADULT - MEDICATION SUMMARY - MEDICATIONS TO TAKE
I will START or STAY ON the medications listed below when I get home from the hospital:    acetaminophen 325 mg oral tablet  -- 1 tab(s) by mouth every 6 hours, As needed, Mild Pain (1 - 3)  -- Indication: For mild pain    gabapentin 100 mg oral capsule  -- 1 cap(s) by mouth 3 times a day    Note: last dispensed by pharmacy February 2018    -- Indication: For nueropathic pain    insulin lispro  -- 3 unit(s) subcutaneous 3 times a day with meals. 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, with long-term current use of insulin, macular edema presence unspecified, unspecified retinopathy severity    insulin lispro  -- once a day (at bedtime):  0 Unit(s) if Glucose 0 - 250  1 Unit(s) if Glucose 251 - 300  2 Unit(s) if Glucose 301 - 350  3 Unit(s) if Glucose 351 - 400  4 Unit(s) if Glucose GREATER THAN 400  -- Indication: For 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, with long-term current use of insulin, macular edema presence unspecified, unspecified retinopathy severity    insulin glargine  -- 6 unit(s) subcutaneous once a day (at bedtime)  -- Indication: For 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 I will START or STAY ON the medications listed below when I get home from the hospital:    acetaminophen 325 mg oral tablet  -- 1 tab(s) by mouth every 6 hours, As needed, Mild Pain (1 - 3)  -- Indication: For mild pain    gabapentin 100 mg oral capsule  -- 1 cap(s) by mouth 3 times a day    Note: last dispensed by pharmacy February 2018    -- Indication: For neuropathic pain    insulin lispro  -- 3 unit(s) subcutaneous 3 times a day with meals. 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, with long-term current use of insulin, macular edema presence unspecified, unspecified retinopathy severity    insulin lispro  -- once a day (at bedtime):  0 Unit(s) if Glucose 0 - 250  1 Unit(s) if Glucose 251 - 300  2 Unit(s) if Glucose 301 - 350  3 Unit(s) if Glucose 351 - 400  4 Unit(s) if Glucose GREATER THAN 400  -- Indication: For 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, with long-term current use of insulin, macular edema presence unspecified, unspecified retinopathy severity    insulin glargine  -- 6 unit(s) subcutaneous once a day (at bedtime)  -- Indication: For 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

## 2018-06-20 NOTE — PROGRESS NOTE ADULT - PROBLEM SELECTOR PLAN 1
-Type 2 DM with hyperglycemia, poor compliance, and poor social support.  -Endocrine recs appreciated.  -HbA1c now 14.1.  -Diabetes education and nutrition evals ordered.   -C/w lantus 12 units at bedtime and lispro 5 units with meals per endo recs.   -C/w ELVIA QAC/HS.   -Diabetic diet.

## 2018-06-20 NOTE — DISCHARGE NOTE ADULT - CARE PROVIDER_API CALL
Dr. Amisha Fraga, PMD  Phone: [phone]  Fax: (       - Dr. Amisha Fraga, PMD  Phone: [phone]  Fax: (   )    -    Nichelle Garcia), EndocrinologyMetabDiabetes; Internal Medicine  [address] [place] [address]  Phone: [phone]  Fax: [phone]

## 2018-06-20 NOTE — DISCHARGE NOTE ADULT - PRINCIPAL DIAGNOSIS
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

## 2018-06-20 NOTE — CONSULT NOTE ADULT - PROBLEM SELECTOR RECOMMENDATION 9
- patient with poorly controlled DM2. She is unable to see and cannot take care of herself. Will require social work involvement.  - recommend start basal bolus insulin - c/w Lantus 12 units qhs, start Humalog 5 units qac, change correction scale to low correction scale  - consistent carb diet   - for discharge: basal bolus insulin, doses to be determined. She will likely need to be discharged to a long term care facility as patient is unable to care for herself and has limited social support

## 2018-06-20 NOTE — DISCHARGE NOTE ADULT - PLAN OF CARE
Improved HgA1C this admission 14.1  Make sure you get your HgA1c checked every three months.  If you take oral diabetes medications, check your blood glucose two times a day.  If you take insulin, check your blood glucose before meals and at bedtime.  It's important not to skip any meals.  Keep a log of your blood glucose results and always take it with you to your doctor appointments.  Keep a list of your current medications including injectables and over the counter medications and bring this medication list with you to all your doctor appointments.  If you have not seen your ophthalmologist this year call for appointment.  Check your feet daily for redness, sores, or openings. Do not self treat. If no improvement in two days call your primary care physician for an appointment.  Low blood sugar (hypoglycemia) is a blood sugar below 70mg/dl. Check your blood sugar if you feel signs/symptoms of hypoglycemia. If your blood sugar is below 70 take 15 grams of carbohydrates (ex 4 oz of apple juice, 3-4 glucose tablets, or 4-6 oz of regular soda) wait 15 minutes and repeat blood sugar to make sure it comes up above 70.  If your blood sugar is above 70 and you are due for a meal, have a meal.  If you are not due for a meal have a snack.  This snack helps keeps your blood sugar at a safe range. Low salt diet  Activity as tolerated.  Take all medication as prescribed.  Follow up with your medical doctor for routine blood pressure monitoring at your next visit.  Notify your doctor if you have any of the following symptoms:   Dizziness, Lightheadedness, Blurry vision, Headache, Chest pain, Shortness of breath Continue with your cholesterol medications. Eat a heart healthy diet that is low in saturated fats and salt, and includes whole grains, fruits, vegetables and lean protein; exercise regularly (consult with your physician or cardiologist first); maintain a heart healthy weight; if you smoke - quit (A resource to help you stop smoking is the [facility] – phone number [phone].). Continue to follow with your primary physician or cardiologist.

## 2018-06-20 NOTE — CONSULT NOTE ADULT - ATTENDING COMMENTS
Agree with assessment and plan as above by Dr. Wilkins. Reviewed all pertinent labs, glucose values, and imaging studies. Modifications made as indicated above.     Johan Sykes D.O  [phone]

## 2018-06-20 NOTE — DISCHARGE NOTE ADULT - PROVIDER TOKENS
FREE:[LAST:[Dr. Amisha Fraga],FIRST:[PMD],PHONE:[phone]],FAX:[(   )    -]] FREE:[LAST:[Dr. Amisha Fraga],FIRST:[PMD],PHONE:[phone]],FAX:[(   )    -]],TOKEN:'[id number]'

## 2018-06-20 NOTE — DISCHARGE NOTE ADULT - ADDITIONAL INSTRUCTIONS
Follow-up with your primary care physician within 1 week after discharge from rehab. Call for appointment. Follow-up with your primary care physician within 1 week after discharge from rehab. Call for appointment.  Follow-up oupatient 1 week after discharge from rehab with Endocrinologist Dr Garcia [phone] at [address] [place] [address] [address] . Call for appointment.

## 2018-06-20 NOTE — PROGRESS NOTE ADULT - SUBJECTIVE AND OBJECTIVE BOX
Patient is a 77y old  Female who presents with a chief complaint of hyperglycemia (2018 11:14)        SUBJECTIVE / OVERNIGHT EVENTS: Patient reports mild intermittent LE pains. She denies CP or SOB or N/V.       MEDICATIONS  (STANDING):  atorvastatin 20 milliGRAM(s) Oral at bedtime  dextrose 5%. 1000 milliLiter(s) (50 mL/Hr) IV Continuous <Continuous>  dextrose 50% Injectable 12.5 Gram(s) IV Push once  enoxaparin Injectable 40 milliGRAM(s) SubCutaneous every 24 hours  insulin glargine Injectable (LANTUS) 12 Unit(s) SubCutaneous at bedtime  insulin lispro (HumaLOG) corrective regimen sliding scale   SubCutaneous three times a day before meals  insulin lispro Injectable (HumaLOG) 5 Unit(s) SubCutaneous three times a day before meals    MEDICATIONS  (PRN):  acetaminophen   Tablet. 325 milliGRAM(s) Oral every 6 hours PRN Mild Pain (1 - 3)  dextrose 40% Gel 15 Gram(s) Oral once PRN Blood Glucose LESS THAN 70 milliGRAM(s)/deciliter  glucagon  Injectable 1 milliGRAM(s) IntraMuscular once PRN Glucose LESS THAN 70 milligrams/deciliter      Vital Signs Last 24 Hrs  T(C): 36.8 (2018 14:57), Max: 36.8 (2018 21:10)  T(F): 98.2 (2018 14:57), Max: 98.3 (2018 21:10)  HR: 81 (2018 14:57) (69 - 81)  BP: 106/61 (2018 14:57) (106/61 - 121/72)  BP(mean): --  RR: 17 (2018 14:57) (17 - 17)  SpO2: 98% (2018 14:57) (98% - 99%)  CAPILLARY BLOOD GLUCOSE      POCT Blood Glucose.: 214 mg/dL (2018 17:11)  POCT Blood Glucose.: 311 mg/dL (2018 11:47)  POCT Blood Glucose.: 170 mg/dL (2018 07:40)  POCT Blood Glucose.: 105 mg/dL (2018 21:43)    I&O's Summary    2018 07:01  -  2018 07:00  --------------------------------------------------------  IN: 240 mL / OUT: 0 mL / NET: 240 mL    2018 07:01  -  2018 21:04  --------------------------------------------------------  IN: 480 mL / OUT: 0 mL / NET: 480 mL          PHYSICAL EXAM:  GENERAL: NAD, well-developed  HEAD:  Atraumatic, Normocephalic  EYES: Retinopathy   NECK: Supple  CHEST/LUNG: Clear to auscultation bilaterally; No wheeze  HEART: Regular rate and rhythm; No murmurs, rubs, or gallops  ABDOMEN: Soft, Nontender, Nondistended; Bowel sounds present  EXTREMITIES:  No clubbing, cyanosis, or edema  PSYCH/Neuro: Awake and answers questions and follows basic commands.   SKIN: No rashes or lesions      LABS:                        10.2   4.51  )-----------( 167      ( 2018 08:14 )             32.0     06-20    138  |  99  |  30<H>  ----------------------------<  209<H>  4.5   |  28  |  0.97    Ca    8.9      2018 07:11  Phos  2.7     06-19  Mg     1.8     06-19    TPro  7.1  /  Alb  4.0  /  TBili  0.2  /  DBili  x   /  AST  18  /  ALT  21  /  AlkPhos  127<H>  06-18    PT/INR - ( 2018 23:30 )   PT: 9.5 sec;   INR: 0.87 ratio         PTT - ( 2018 23:30 )  PTT:28.8 sec      Urinalysis Basic - ( 2018 02:18 )    Color: PL Yellow / Appearance: Clear / S.018 / pH: x  Gluc: x / Ketone: Negative  / Bili: Negative / Urobili: Negative   Blood: x / Protein: Trace / Nitrite: Negative   Leuk Esterase: Large / RBC: 3-5 /HPF / WBC 11-25 /HPF   Sq Epi: x / Non Sq Epi: OCC /HPF / Bacteria: Few /HPF        RADIOLOGY & ADDITIONAL TESTS:    Imaging Personally Reviewed:   Consultant(s) Notes Reviewed: Endocrine.    Care Discussed with Consultants/Other Providers:

## 2018-06-20 NOTE — DISCHARGE NOTE ADULT - CARE PLAN
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  If you take oral diabetes medications, check your blood glucose two times a day.  If you take insulin, check your blood glucose before meals and at bedtime.  It's important not to skip any meals.  Keep a log of your blood glucose results and always take it with you to your doctor appointments.  Keep a list of your current medications including injectables and over the counter medications and bring this medication list with you to all your doctor appointments.  If you have not seen your ophthalmologist this year call for appointment.  Check your feet daily for redness, sores, or openings. Do not self treat. If no improvement in two days call your primary care physician for an appointment.  Low blood sugar (hypoglycemia) is a blood sugar below 70mg/dl. Check your blood sugar if you feel signs/symptoms of hypoglycemia. If your blood sugar is below 70 take 15 grams of carbohydrates (ex 4 oz of apple juice, 3-4 glucose tablets, or 4-6 oz of regular soda) wait 15 minutes and repeat blood sugar to make sure it comes up above 70.  If your blood sugar is above 70 and you are due for a meal, have a meal.  If you are not due for a meal have a snack.  This snack helps keeps your blood sugar at a safe range.  Secondary Diagnosis:	Essential hypertension  Assessment and plan of treatment:	Low salt diet  Activity as tolerated.  Take all medication as prescribed.  Follow up with your medical doctor for routine blood pressure monitoring at your next visit.  Notify your doctor if you have any of the following symptoms:   Dizziness, Lightheadedness, Blurry vision, Headache, Chest pain, Shortness of breath  Secondary Diagnosis:	Hyperlipidemia, unspecified hyperlipidemia type  Assessment and plan of treatment:	Continue with your cholesterol medications. Eat a heart healthy diet that is low in saturated fats and salt, and includes whole grains, fruits, vegetables and lean protein; exercise regularly (consult with your physician or cardiologist first); maintain a heart healthy weight; if you smoke - quit (A resource to help you stop smoking is the [facility] – phone number [phone].). Continue to follow with your primary physician or cardiologist.

## 2018-06-21 LAB
GLUCOSE BLDC GLUCOMTR-MCNC: 153 MG/DL — HIGH (ref 70–99)
GLUCOSE BLDC GLUCOMTR-MCNC: 192 MG/DL — HIGH (ref 70–99)
GLUCOSE BLDC GLUCOMTR-MCNC: 91 MG/DL — SIGNIFICANT CHANGE UP (ref 70–99)
GLUCOSE BLDC GLUCOMTR-MCNC: 93 MG/DL — SIGNIFICANT CHANGE UP (ref 70–99)

## 2018-06-21 PROCEDURE — 99232 SBSQ HOSP IP/OBS MODERATE 35: CPT | Mod: GC

## 2018-06-21 PROCEDURE — 99233 SBSQ HOSP IP/OBS HIGH 50: CPT

## 2018-06-21 RX ORDER — LATANOPROST 0.05 MG/ML
1 SOLUTION/ DROPS OPHTHALMIC; TOPICAL AT BEDTIME
Qty: 0 | Refills: 0 | Status: DISCONTINUED | OUTPATIENT
Start: 2018-06-21 | End: 2018-06-27

## 2018-06-21 RX ORDER — DORZOLAMIDE HYDROCHLORIDE 20 MG/ML
1 SOLUTION/ DROPS OPHTHALMIC THREE TIMES A DAY
Qty: 0 | Refills: 0 | Status: DISCONTINUED | OUTPATIENT
Start: 2018-06-21 | End: 2018-06-27

## 2018-06-21 RX ORDER — INSULIN GLARGINE 100 [IU]/ML
11 INJECTION, SOLUTION SUBCUTANEOUS AT BEDTIME
Qty: 0 | Refills: 0 | Status: DISCONTINUED | OUTPATIENT
Start: 2018-06-21 | End: 2018-06-23

## 2018-06-21 RX ORDER — INSULIN LISPRO 100/ML
VIAL (ML) SUBCUTANEOUS
Qty: 0 | Refills: 0 | Status: DISCONTINUED | OUTPATIENT
Start: 2018-06-21 | End: 2018-06-27

## 2018-06-21 RX ORDER — INSULIN LISPRO 100/ML
VIAL (ML) SUBCUTANEOUS AT BEDTIME
Qty: 0 | Refills: 0 | Status: DISCONTINUED | OUTPATIENT
Start: 2018-06-21 | End: 2018-06-27

## 2018-06-21 RX ORDER — GABAPENTIN 400 MG/1
100 CAPSULE ORAL THREE TIMES A DAY
Qty: 0 | Refills: 0 | Status: DISCONTINUED | OUTPATIENT
Start: 2018-06-21 | End: 2018-06-27

## 2018-06-21 RX ADMIN — Medication 2: at 12:19

## 2018-06-21 RX ADMIN — ENOXAPARIN SODIUM 40 MILLIGRAM(S): 100 INJECTION SUBCUTANEOUS at 17:15

## 2018-06-21 RX ADMIN — Medication 5 UNIT(S): at 12:19

## 2018-06-21 RX ADMIN — DORZOLAMIDE HYDROCHLORIDE 1 DROP(S): 20 SOLUTION/ DROPS OPHTHALMIC at 22:06

## 2018-06-21 RX ADMIN — Medication 5 UNIT(S): at 17:15

## 2018-06-21 RX ADMIN — GABAPENTIN 100 MILLIGRAM(S): 400 CAPSULE ORAL at 22:08

## 2018-06-21 RX ADMIN — INSULIN GLARGINE 11 UNIT(S): 100 INJECTION, SOLUTION SUBCUTANEOUS at 22:06

## 2018-06-21 RX ADMIN — ATORVASTATIN CALCIUM 20 MILLIGRAM(S): 80 TABLET, FILM COATED ORAL at 22:05

## 2018-06-21 RX ADMIN — LATANOPROST 1 DROP(S): 0.05 SOLUTION/ DROPS OPHTHALMIC; TOPICAL at 22:06

## 2018-06-21 RX ADMIN — Medication 5 UNIT(S): at 08:38

## 2018-06-21 NOTE — PROGRESS NOTE ADULT - SUBJECTIVE AND OBJECTIVE BOX
Patient is a 77y old  Female who presents with a chief complaint of hyperglycemia (20 Jun 2018 11:14)        SUBJECTIVE / OVERNIGHT EVENTS: Patient says she feels well. Denies any pain. Denies SOB or N/V.       MEDICATIONS  (STANDING):  atorvastatin 20 milliGRAM(s) Oral at bedtime  dextrose 5%. 1000 milliLiter(s) (50 mL/Hr) IV Continuous <Continuous>  dextrose 50% Injectable 12.5 Gram(s) IV Push once  dorzolamide 2% Ophthalmic Solution 1 Drop(s) Both EYES three times a day  enoxaparin Injectable 40 milliGRAM(s) SubCutaneous every 24 hours  gabapentin 100 milliGRAM(s) Oral three times a day  insulin glargine Injectable (LANTUS) 11 Unit(s) SubCutaneous at bedtime  insulin lispro (HumaLOG) corrective regimen sliding scale   SubCutaneous three times a day before meals  insulin lispro (HumaLOG) corrective regimen sliding scale   SubCutaneous at bedtime  insulin lispro Injectable (HumaLOG) 5 Unit(s) SubCutaneous three times a day before meals  latanoprost 0.005% Ophthalmic Solution 1 Drop(s) Both EYES at bedtime    MEDICATIONS  (PRN):  acetaminophen   Tablet. 325 milliGRAM(s) Oral every 6 hours PRN Mild Pain (1 - 3)  dextrose 40% Gel 15 Gram(s) Oral once PRN Blood Glucose LESS THAN 70 milliGRAM(s)/deciliter  glucagon  Injectable 1 milliGRAM(s) IntraMuscular once PRN Glucose LESS THAN 70 milligrams/deciliter      Vital Signs Last 24 Hrs  T(C): 36.8 (21 Jun 2018 14:48), Max: 36.8 (21 Jun 2018 14:48)  T(F): 98.3 (21 Jun 2018 14:48), Max: 98.3 (21 Jun 2018 14:48)  HR: 67 (21 Jun 2018 14:48) (67 - 80)  BP: 125/67 (21 Jun 2018 14:48) (97/59 - 139/70)  BP(mean): --  RR: 18 (21 Jun 2018 14:48) (18 - 18)  SpO2: 99% (21 Jun 2018 14:48) (98% - 100%)  CAPILLARY BLOOD GLUCOSE      POCT Blood Glucose.: 91 mg/dL (21 Jun 2018 16:52)  POCT Blood Glucose.: 153 mg/dL (21 Jun 2018 11:55)  POCT Blood Glucose.: 93 mg/dL (21 Jun 2018 07:57)  POCT Blood Glucose.: 350 mg/dL (20 Jun 2018 21:49)    I&O's Summary    20 Jun 2018 07:01  -  21 Jun 2018 07:00  --------------------------------------------------------  IN: 600 mL / OUT: 0 mL / NET: 600 mL    21 Jun 2018 07:01  -  21 Jun 2018 18:56  --------------------------------------------------------  IN: 940 mL / OUT: 0 mL / NET: 940 mL          PHYSICAL EXAM:  GENERAL: NAD, well-developed  HEAD: Atraumatic, Normocephalic  EYES: Retinopathy.   NECK: Supple  CHEST/LUNG: Clear to auscultation bilaterally; No wheeze  HEART: Regular rate and rhythm; No murmurs, rubs, or gallops  ABDOMEN: Soft, Nontender, Nondistended; Bowel sounds present  EXTREMITIES: No clubbing, cyanosis, or edema  PSYCH/Neuro: Awake and answers questions and follows basic commands. Retinopathy present.   SKIN: No rashes or lesions      LABS:                        10.2   4.51  )-----------( 167      ( 20 Jun 2018 08:14 )             32.0     06-20    138  |  99  |  30<H>  ----------------------------<  209<H>  4.5   |  28  |  0.97    Ca    8.9      20 Jun 2018 07:11        RADIOLOGY & ADDITIONAL TESTS:    Imaging Personally Reviewed:   Consultant(s) Notes Reviewed: Endocrinology.   Care Discussed with Consultants/Other Providers:

## 2018-06-21 NOTE — PROGRESS NOTE ADULT - SUBJECTIVE AND OBJECTIVE BOX
Chief Complaint: f/u DM2    History:  Patient appears confused at bedside. Feels cold. Tolerating po. Does not have abdominal pain, nausea, vomiting.    MEDICATIONS  (STANDING):  atorvastatin 20 milliGRAM(s) Oral at bedtime  dextrose 5%. 1000 milliLiter(s) (50 mL/Hr) IV Continuous <Continuous>  dextrose 50% Injectable 12.5 Gram(s) IV Push once  enoxaparin Injectable 40 milliGRAM(s) SubCutaneous every 24 hours  insulin glargine Injectable (LANTUS) 12 Unit(s) SubCutaneous at bedtime  insulin lispro (HumaLOG) corrective regimen sliding scale   SubCutaneous at bedtime  insulin lispro (HumaLOG) corrective regimen sliding scale   SubCutaneous three times a day before meals  insulin lispro Injectable (HumaLOG) 5 Unit(s) SubCutaneous three times a day before meals    MEDICATIONS  (PRN):  acetaminophen   Tablet. 325 milliGRAM(s) Oral every 6 hours PRN Mild Pain (1 - 3)  dextrose 40% Gel 15 Gram(s) Oral once PRN Blood Glucose LESS THAN 70 milliGRAM(s)/deciliter  glucagon  Injectable 1 milliGRAM(s) IntraMuscular once PRN Glucose LESS THAN 70 milligrams/deciliter      Allergies  No Known Allergies    Review of Systems:  Constitutional: No fever  Cardiovascular: No chest pain, palpitations  Respiratory: No SOB, no cough  GI: No nausea, vomiting, abdominal pain    ALL OTHER SYSTEMS REVIEWED AND NEGATIVE      PHYSICAL EXAM:  VITALS: T(C): 36.6 (06-21-18 @ 04:32)  T(F): 97.9 (06-21-18 @ 04:32), Max: 98 (06-20-18 @ 21:07)  HR: 79 (06-21-18 @ 04:32) (79 - 80)  BP: 97/59 (06-21-18 @ 04:32) (97/59 - 139/70)  RR:  (18 - 18)  SpO2:  (98% - 100%)  Wt(kg): --  GENERAL: NAD, well-developed  RESPIRATORY: Clear to auscultation bilaterally; No rales, rhonchi, wheezing, or rubs  CARDIOVASCULAR: Regular rate and rhythm; No murmurs; no peripheral edema  GI: Soft, nontender, non distended  SKIN: Dry, intact    POCT Blood Glucose.: 153 mg/dL (06-21-18 @ 11:55) H 5, 2  POCT Blood Glucose.: 93 mg/dL (06-21-18 @ 07:57) H 5  POCT Blood Glucose.: 350 mg/dL (06-20-18 @ 21:49) L 12, 4  POCT Blood Glucose.: 214 mg/dL (06-20-18 @ 17:11) H 4  POCT Blood Glucose.: 311 mg/dL (06-20-18 @ 11:47) H 8  POCT Blood Glucose.: 170 mg/dL (06-20-18 @ 07:40) H 2  POCT Blood Glucose.: 105 mg/dL (06-19-18 @ 21:43)  POCT Blood Glucose.: 366 mg/dL (06-19-18 @ 16:38)  POCT Blood Glucose.: 245 mg/dL (06-19-18 @ 13:27)  POCT Blood Glucose.: 126 mg/dL (06-19-18 @ 08:55)  POCT Blood Glucose.: 133 mg/dL (06-19-18 @ 04:54)  POCT Blood Glucose.: 279 mg/dL (06-19-18 @ 01:47)  POCT Blood Glucose.: 485 mg/dL (06-18-18 @ 21:17)      06-20    138  |  99  |  30<H>  ----------------------------<  209<H>  4.5   |  28  |  0.97    EGFR if : 66  EGFR if non : 57<L>    Ca    8.9      06-20  Mg     1.8     06-19  Phos  2.7     06-19    TPro  7.1  /  Alb  4.0  /  TBili  0.2  /  DBili  x   /  AST  18  /  ALT  21  /  AlkPhos  127<H>  06-18          Thyroid Function Tests:  06-19 @ 03:41 TSH 2.04 FreeT4 -- T3 -- Anti TPO -- Anti Thyroglobulin Ab -- TSI --      Hemoglobin A1C, Whole Blood: 14.1 % <H> [4.0 - 5.6] (06-20-18 @ 08:14)

## 2018-06-21 NOTE — PROGRESS NOTE ADULT - PROBLEM SELECTOR PLAN 1
-Type 2 DM with hyperglycemia, poor compliance, and poor social support.  -Endocrine recs appreciated.  -HbA1c now 14.1.  -Diabetes education and nutrition evals ordered.   -C/w lantus 11 units at bedtime and lispro 5 units with meals, per endo recs.   -C/w ELVIA QAC/HS.   -Diabetic diet.  -Gabapentin for neuropathy symptoms.  -Discussed with patient the importance of compliance with her medications and her outpatient MD follow up at home.

## 2018-06-21 NOTE — PROGRESS NOTE ADULT - PROBLEM SELECTOR PLAN 1
- recommend decrease Lantus to 11 units qhs  - c/w Humalog 5 units qac  - change correction scale to low correction qac and qhs  - consistent carb diet  - will follow  - for discharge: recommend basal bolus insulin, doses to be determined - recommend decrease Lantus to 11 units qhs  - Continue Humalog 5 units qac  - change correction scale to low correction qac and qhs  - consistent carb diet  - will follow  - for discharge: recommend basal bolus insulin, doses to be determined

## 2018-06-22 LAB
ANION GAP SERPL CALC-SCNC: 12 MMOL/L — SIGNIFICANT CHANGE UP (ref 5–17)
BUN SERPL-MCNC: 34 MG/DL — HIGH (ref 7–23)
CALCIUM SERPL-MCNC: 9 MG/DL — SIGNIFICANT CHANGE UP (ref 8.4–10.5)
CHLORIDE SERPL-SCNC: 102 MMOL/L — SIGNIFICANT CHANGE UP (ref 96–108)
CO2 SERPL-SCNC: 24 MMOL/L — SIGNIFICANT CHANGE UP (ref 22–31)
CREAT SERPL-MCNC: 1.09 MG/DL — SIGNIFICANT CHANGE UP (ref 0.5–1.3)
GLUCOSE BLDC GLUCOMTR-MCNC: 116 MG/DL — HIGH (ref 70–99)
GLUCOSE BLDC GLUCOMTR-MCNC: 148 MG/DL — HIGH (ref 70–99)
GLUCOSE BLDC GLUCOMTR-MCNC: 174 MG/DL — HIGH (ref 70–99)
GLUCOSE BLDC GLUCOMTR-MCNC: 92 MG/DL — SIGNIFICANT CHANGE UP (ref 70–99)
GLUCOSE SERPL-MCNC: 172 MG/DL — HIGH (ref 70–99)
POTASSIUM SERPL-MCNC: 4.1 MMOL/L — SIGNIFICANT CHANGE UP (ref 3.5–5.3)
POTASSIUM SERPL-SCNC: 4.1 MMOL/L — SIGNIFICANT CHANGE UP (ref 3.5–5.3)
SODIUM SERPL-SCNC: 138 MMOL/L — SIGNIFICANT CHANGE UP (ref 135–145)

## 2018-06-22 PROCEDURE — 99232 SBSQ HOSP IP/OBS MODERATE 35: CPT

## 2018-06-22 RX ORDER — DORZOLAMIDE HYDROCHLORIDE 20 MG/ML
1 SOLUTION/ DROPS OPHTHALMIC
Qty: 0 | Refills: 0 | COMMUNITY

## 2018-06-22 RX ORDER — INSULIN DETEMIR 100/ML (3)
12 INSULIN PEN (ML) SUBCUTANEOUS
Qty: 0 | Refills: 0 | COMMUNITY

## 2018-06-22 RX ADMIN — DORZOLAMIDE HYDROCHLORIDE 1 DROP(S): 20 SOLUTION/ DROPS OPHTHALMIC at 22:08

## 2018-06-22 RX ADMIN — GABAPENTIN 100 MILLIGRAM(S): 400 CAPSULE ORAL at 22:08

## 2018-06-22 RX ADMIN — Medication 1: at 12:14

## 2018-06-22 RX ADMIN — ATORVASTATIN CALCIUM 20 MILLIGRAM(S): 80 TABLET, FILM COATED ORAL at 22:08

## 2018-06-22 RX ADMIN — GABAPENTIN 100 MILLIGRAM(S): 400 CAPSULE ORAL at 06:50

## 2018-06-22 RX ADMIN — ENOXAPARIN SODIUM 40 MILLIGRAM(S): 100 INJECTION SUBCUTANEOUS at 14:15

## 2018-06-22 RX ADMIN — DORZOLAMIDE HYDROCHLORIDE 1 DROP(S): 20 SOLUTION/ DROPS OPHTHALMIC at 14:14

## 2018-06-22 RX ADMIN — Medication 5 UNIT(S): at 12:14

## 2018-06-22 RX ADMIN — LATANOPROST 1 DROP(S): 0.05 SOLUTION/ DROPS OPHTHALMIC; TOPICAL at 22:08

## 2018-06-22 RX ADMIN — INSULIN GLARGINE 11 UNIT(S): 100 INJECTION, SOLUTION SUBCUTANEOUS at 22:08

## 2018-06-22 RX ADMIN — Medication 5 UNIT(S): at 17:49

## 2018-06-22 RX ADMIN — Medication 5 UNIT(S): at 08:46

## 2018-06-22 RX ADMIN — GABAPENTIN 100 MILLIGRAM(S): 400 CAPSULE ORAL at 14:14

## 2018-06-22 RX ADMIN — DORZOLAMIDE HYDROCHLORIDE 1 DROP(S): 20 SOLUTION/ DROPS OPHTHALMIC at 06:50

## 2018-06-22 NOTE — DIETITIAN INITIAL EVALUATION ADULT. - NS AS NUTRI INTERV ED CONTENT
Discussed Consistent Carbohydrate diet - identified food sources of carbohydrates, monitoring carbohydrate portion sizes and avoiding concentrated sweets for glycemic control, importance of protein/carbohydrate pairing, well balanced diet consisting of whole grain products, not skipping meals especially while taking insulin to avoid hypoglycemia.

## 2018-06-22 NOTE — DIETITIAN INITIAL EVALUATION ADULT. - ENERGY NEEDS
Height: 60 inches, Weight: 115 pounds  BMI: 22.5 kg/m2 IBW: 100 pounds (+/-10%), %IBW: 115%  Pertinent Info: Per chart, 76 y/o female with uncontrolled T2DM admitted with hyperglycemia, inability to care for self and poor social support, endocrine and social work following (Meals on Wheels, insurance and private hire). No edema, no pressure ulcers noted at this time.

## 2018-06-22 NOTE — PHYSICAL THERAPY INITIAL EVALUATION ADULT - ADDITIONAL COMMENTS
Pt lives at home alone in apt, +1st floor, no stairs to negotiate, sons/family check in, pt is legally blind, amb no device ind.

## 2018-06-22 NOTE — PROGRESS NOTE ADULT - PROBLEM SELECTOR PLAN 1
-Type 2 DM with hyperglycemia, poor compliance, and poor social support.  -Endocrine recs appreciated.  -HbA1c now 14.1.  -Diabetes education and nutrition evals appreciated.   -C/w lantus 11 units at bedtime and lispro 5 units with meals, per endo recs.   -C/w ELVIA QAC/HS.   -Diabetic diet.  -Gabapentin for neuropathy symptoms. Tylenol as needed as well.   -Discussed with patient the importance of compliance with her medications and her outpatient MD follow up at home.

## 2018-06-22 NOTE — DIETITIAN INITIAL EVALUATION ADULT. - SOURCE
Medical records, previous RD note (2/14/2017), pt discussed during multidisciplinary rounds this morning/patient/other (specify)

## 2018-06-22 NOTE — PROGRESS NOTE ADULT - SUBJECTIVE AND OBJECTIVE BOX
Patient is a 77y old  Female who presents with a chief complaint of hyperglycemia (20 Jun 2018 11:14)        SUBJECTIVE / OVERNIGHT EVENTS:      MEDICATIONS  (STANDING):  atorvastatin 20 milliGRAM(s) Oral at bedtime  dextrose 5%. 1000 milliLiter(s) (50 mL/Hr) IV Continuous <Continuous>  dextrose 50% Injectable 12.5 Gram(s) IV Push once  dorzolamide 2% Ophthalmic Solution 1 Drop(s) Both EYES three times a day  enoxaparin Injectable 40 milliGRAM(s) SubCutaneous every 24 hours  gabapentin 100 milliGRAM(s) Oral three times a day  insulin glargine Injectable (LANTUS) 11 Unit(s) SubCutaneous at bedtime  insulin lispro (HumaLOG) corrective regimen sliding scale   SubCutaneous three times a day before meals  insulin lispro (HumaLOG) corrective regimen sliding scale   SubCutaneous at bedtime  insulin lispro Injectable (HumaLOG) 5 Unit(s) SubCutaneous three times a day before meals  latanoprost 0.005% Ophthalmic Solution 1 Drop(s) Both EYES at bedtime    MEDICATIONS  (PRN):  acetaminophen   Tablet. 325 milliGRAM(s) Oral every 6 hours PRN Mild Pain (1 - 3)  dextrose 40% Gel 15 Gram(s) Oral once PRN Blood Glucose LESS THAN 70 milliGRAM(s)/deciliter  glucagon  Injectable 1 milliGRAM(s) IntraMuscular once PRN Glucose LESS THAN 70 milligrams/deciliter      Vital Signs Last 24 Hrs  T(C): 36.9 (22 Jun 2018 13:49), Max: 37.3 (21 Jun 2018 20:40)  T(F): 98.5 (22 Jun 2018 13:49), Max: 99.2 (21 Jun 2018 20:40)  HR: 75 (22 Jun 2018 13:49) (67 - 79)  BP: 107/58 (22 Jun 2018 13:49) (101/64 - 125/67)  BP(mean): --  RR: 18 (22 Jun 2018 13:49) (18 - 18)  SpO2: 98% (22 Jun 2018 13:49) (97% - 99%)  CAPILLARY BLOOD GLUCOSE      POCT Blood Glucose.: 174 mg/dL (22 Jun 2018 11:52)  POCT Blood Glucose.: 148 mg/dL (22 Jun 2018 08:10)  POCT Blood Glucose.: 192 mg/dL (21 Jun 2018 21:35)  POCT Blood Glucose.: 91 mg/dL (21 Jun 2018 16:52)    I&O's Summary    21 Jun 2018 07:01  -  22 Jun 2018 07:00  --------------------------------------------------------  IN: 940 mL / OUT: 0 mL / NET: 940 mL    22 Jun 2018 07:01  -  22 Jun 2018 14:26  --------------------------------------------------------  IN: 480 mL / OUT: 0 mL / NET: 480 mL          PHYSICAL EXAM  GENERAL: NAD, well-developed  HEAD:  Atraumatic, Normocephalic  EYES: EOMI, PERRLA, conjunctiva and sclera clear  NECK: Supple, No JVD  CHEST/LUNG: Clear to auscultation bilaterally; No wheeze  HEART: Regular rate and rhythm; No murmurs, rubs, or gallops  ABDOMEN: Soft, Nontender, Nondistended; Bowel sounds present  EXTREMITIES:  2+ Peripheral Pulses, No clubbing, cyanosis, or edema  PSYCH: AAOx3  SKIN: No rashes or lesions    LABS:    06-22    138  |  102  |  34<H>  ----------------------------<  172<H>  4.1   |  24  |  1.09    Ca    9.0      22 Jun 2018 07:25                RADIOLOGY & ADDITIONAL TESTS:    Imaging Personally Reviewed:  Consultant(s) Notes Reviewed:    Care Discussed with Consultants/Other Providers: Patient is a 77y old  Female who presents with a chief complaint of hyperglycemia (20 Jun 2018 11:14)        SUBJECTIVE / OVERNIGHT EVENTS: Patient says she has some pain in her legs. She denies any CP or SOB or N/V.       MEDICATIONS  (STANDING):  atorvastatin 20 milliGRAM(s) Oral at bedtime  dextrose 5%. 1000 milliLiter(s) (50 mL/Hr) IV Continuous <Continuous>  dextrose 50% Injectable 12.5 Gram(s) IV Push once  dorzolamide 2% Ophthalmic Solution 1 Drop(s) Both EYES three times a day  enoxaparin Injectable 40 milliGRAM(s) SubCutaneous every 24 hours  gabapentin 100 milliGRAM(s) Oral three times a day  insulin glargine Injectable (LANTUS) 11 Unit(s) SubCutaneous at bedtime  insulin lispro (HumaLOG) corrective regimen sliding scale   SubCutaneous three times a day before meals  insulin lispro (HumaLOG) corrective regimen sliding scale   SubCutaneous at bedtime  insulin lispro Injectable (HumaLOG) 5 Unit(s) SubCutaneous three times a day before meals  latanoprost 0.005% Ophthalmic Solution 1 Drop(s) Both EYES at bedtime    MEDICATIONS  (PRN):  acetaminophen   Tablet. 325 milliGRAM(s) Oral every 6 hours PRN Mild Pain (1 - 3)  dextrose 40% Gel 15 Gram(s) Oral once PRN Blood Glucose LESS THAN 70 milliGRAM(s)/deciliter  glucagon  Injectable 1 milliGRAM(s) IntraMuscular once PRN Glucose LESS THAN 70 milligrams/deciliter      Vital Signs Last 24 Hrs  T(C): 36.9 (22 Jun 2018 13:49), Max: 37.3 (21 Jun 2018 20:40)  T(F): 98.5 (22 Jun 2018 13:49), Max: 99.2 (21 Jun 2018 20:40)  HR: 75 (22 Jun 2018 13:49) (67 - 79)  BP: 107/58 (22 Jun 2018 13:49) (101/64 - 125/67)  BP(mean): --  RR: 18 (22 Jun 2018 13:49) (18 - 18)  SpO2: 98% (22 Jun 2018 13:49) (97% - 99%)  CAPILLARY BLOOD GLUCOSE      POCT Blood Glucose.: 174 mg/dL (22 Jun 2018 11:52)  POCT Blood Glucose.: 148 mg/dL (22 Jun 2018 08:10)  POCT Blood Glucose.: 192 mg/dL (21 Jun 2018 21:35)  POCT Blood Glucose.: 91 mg/dL (21 Jun 2018 16:52)    I&O's Summary    21 Jun 2018 07:01  -  22 Jun 2018 07:00  --------------------------------------------------------  IN: 940 mL / OUT: 0 mL / NET: 940 mL    22 Jun 2018 07:01  -  22 Jun 2018 14:26  --------------------------------------------------------  IN: 480 mL / OUT: 0 mL / NET: 480 mL          PHYSICAL EXAM:  GENERAL: NAD, well-developed  HEAD:  Atraumatic, Normocephalic  EYES: Retinopathy present.   NECK: Supple  CHEST/LUNG: Clear to auscultation bilaterally; No wheeze  HEART: Regular rate and rhythm; No murmurs, rubs, or gallops  ABDOMEN: Soft, Nontender, Nondistended; Bowel sounds present  EXTREMITIES: No clubbing, cyanosis, or edema  PSYCH/Neuro: Awake and answers questions and follows commands.   SKIN: No rashes or lesions      LABS:    06-22    138  |  102  |  34<H>  ----------------------------<  172<H>  4.1   |  24  |  1.09    Ca    9.0      22 Jun 2018 07:25        RADIOLOGY & ADDITIONAL TESTS:    Imaging Personally Reviewed:   Consultant(s) Notes Reviewed: PT eval  Care Discussed with Consultants/Other Providers:

## 2018-06-22 NOTE — DIETITIAN INITIAL EVALUATION ADULT. - OTHER INFO
Nutrition consult received for HgbA1C 14/1%. Pt unable to recall UBW at this time, but does not think she lost wt recently. Per previous RD note, pt wt was documented as 120 pounds, current wt is 115 pounds. Pt with good appetite, has been finishing her meals, noted 100% po intakes per flowsheet. Pt denies chewing/swallowing difficulties. No GI distress, +BM yesterday. Pt denies taking supplements at home. Per chart, pt lives alone and son stops by every day to administer insulin for pt. Pt has not been receiving insulin > 2 weeks 2/2 insulin coverage issue?, social work following. Pt does not check fingersticks at home.

## 2018-06-22 NOTE — DIETITIAN INITIAL EVALUATION ADULT. - PT NOT SOURCE
Per chart, pt is confused and forgetful, AAO x 2, able to provide simple information; Note pt is [place] and legally blind

## 2018-06-22 NOTE — DIETITIAN INITIAL EVALUATION ADULT. - PERTINENT LABORATORY DATA
Na 138 [135 - 145], K+ 4.1 [3.5 - 5.3], BUN 34 [7 - 23], Cr 1.09 [0.50 - 1.30],  [70 - 99], Ca 9.0 [8.4 - 10.5], HbA1c 14.1%; Fingersticks (6/20-22)

## 2018-06-22 NOTE — PHYSICAL THERAPY INITIAL EVALUATION ADULT - PHYSICAL ASSIST/NONPHYSICAL ASSIST: GAIT, REHAB EVAL
nonverbal cues (demo/gestures)/supervision/verbal cues nonverbal cues (demo/gestures)/1 person assist/supervision/verbal cues

## 2018-06-22 NOTE — PROGRESS NOTE ADULT - SUBJECTIVE AND OBJECTIVE BOX
Chief Complaint: f/u DM2    History:  Patient feels well. Tolerating po. Does not have abdominal pain, nausea, vomiting.     MEDICATIONS  (STANDING):  atorvastatin 20 milliGRAM(s) Oral at bedtime  dextrose 5%. 1000 milliLiter(s) (50 mL/Hr) IV Continuous <Continuous>  dextrose 50% Injectable 12.5 Gram(s) IV Push once  dorzolamide 2% Ophthalmic Solution 1 Drop(s) Both EYES three times a day  enoxaparin Injectable 40 milliGRAM(s) SubCutaneous every 24 hours  gabapentin 100 milliGRAM(s) Oral three times a day  insulin glargine Injectable (LANTUS) 11 Unit(s) SubCutaneous at bedtime  insulin lispro (HumaLOG) corrective regimen sliding scale   SubCutaneous three times a day before meals  insulin lispro (HumaLOG) corrective regimen sliding scale   SubCutaneous at bedtime  insulin lispro Injectable (HumaLOG) 5 Unit(s) SubCutaneous three times a day before meals  latanoprost 0.005% Ophthalmic Solution 1 Drop(s) Both EYES at bedtime    MEDICATIONS  (PRN):  acetaminophen   Tablet. 325 milliGRAM(s) Oral every 6 hours PRN Mild Pain (1 - 3)  dextrose 40% Gel 15 Gram(s) Oral once PRN Blood Glucose LESS THAN 70 milliGRAM(s)/deciliter  glucagon  Injectable 1 milliGRAM(s) IntraMuscular once PRN Glucose LESS THAN 70 milligrams/deciliter      Allergies  No Known Allergies    Review of Systems:  Constitutional: No fever  Cardiovascular: No chest pain, palpitations  Respiratory: No SOB, no cough  GI: No nausea, vomiting, abdominal pain    ALL OTHER SYSTEMS REVIEWED AND NEGATIVE    PHYSICAL EXAM:  VITALS: T(C): 36.9 (06-22-18 @ 13:49)  T(F): 98.5 (06-22-18 @ 13:49), Max: 99.2 (06-21-18 @ 20:40)  HR: 75 (06-22-18 @ 13:49) (67 - 79)  BP: 107/58 (06-22-18 @ 13:49) (101/64 - 118/67)  RR:  (18 - 18)  SpO2:  (97% - 99%)  Wt(kg): --  GENERAL: NAD, well-developed  RESPIRATORY: Clear to auscultation bilaterally; No rales, rhonchi, wheezing, or rubs  CARDIOVASCULAR: Regular rate and rhythm; No murmurs  GI: Soft, nontender, non distended    POCT Blood Glucose.: 174 mg/dL (06-22-18 @ 11:52)  POCT Blood Glucose.: 148 mg/dL (06-22-18 @ 08:10)  POCT Blood Glucose.: 192 mg/dL (06-21-18 @ 21:35)  POCT Blood Glucose.: 91 mg/dL (06-21-18 @ 16:52)  POCT Blood Glucose.: 153 mg/dL (06-21-18 @ 11:55)  POCT Blood Glucose.: 93 mg/dL (06-21-18 @ 07:57)  POCT Blood Glucose.: 350 mg/dL (06-20-18 @ 21:49)  POCT Blood Glucose.: 214 mg/dL (06-20-18 @ 17:11)  POCT Blood Glucose.: 311 mg/dL (06-20-18 @ 11:47)  POCT Blood Glucose.: 170 mg/dL (06-20-18 @ 07:40)  POCT Blood Glucose.: 105 mg/dL (06-19-18 @ 21:43)  POCT Blood Glucose.: 366 mg/dL (06-19-18 @ 16:38)      06-22    138  |  102  |  34<H>  ----------------------------<  172<H>  4.1   |  24  |  1.09    EGFR if : 58<L>  EGFR if non : 50<L>    Ca    9.0      06-22            Thyroid Function Tests:  06-19 @ 03:41 TSH 2.04 FreeT4 -- T3 -- Anti TPO -- Anti Thyroglobulin Ab -- TSI --      Hemoglobin A1C, Whole Blood: 14.1 % <H> [4.0 - 5.6] (06-20-18 @ 08:14)

## 2018-06-22 NOTE — PHYSICAL THERAPY INITIAL EVALUATION ADULT - PERTINENT HX OF CURRENT PROBLEM, REHAB EVAL
Pt is a 77f admitted to [facility] on 6/19/18  for hyperglycemia. Per ER records, brought in overnight by family friend as she has not been taking care of herself. Per patient, she has not given herself insulin in roughly 2 weeks as she ran out of meds. Pt states she lives alone and her son stops by every day to help set up her meds. She does not see a primary doctor, nor an endocrinologist. Pt wtih hx legally blind, DM2 c/b retinopathy and glaucoma.

## 2018-06-22 NOTE — PHYSICAL THERAPY INITIAL EVALUATION ADULT - DISCHARGE DISPOSITION, PT EVAL
DC home with home PT services for general strengthening, to increase endurance, address fall prevention, perform balance training, safety assessment of home environment, and to restore pt's prior level of function with SUPERVISION for ALL functional mobility/ADLs, PRINCE shook. Attempted to call family, no answer ([phone]) pt is not safe to live alone at this time, recommending subacute rehab for balance and strengthening, PRINCE Bermudez aware, NP Ronna aware., attempted to call son ([phone]) with no answer./rehabilitation facility

## 2018-06-22 NOTE — PHYSICAL THERAPY INITIAL EVALUATION ADULT - PRECAUTIONS/LIMITATIONS, REHAB EVAL
CTH: No acute intracranial hemorrhage, mass effect, or CT evidence of a large acute or transcortical infarct. CTH: No acute intracranial hemorrhage, mass effect, or CT evidence of a large acute or transcortical infarct./fall precautions

## 2018-06-22 NOTE — PROGRESS NOTE ADULT - PROBLEM SELECTOR PLAN 1
- BG at goal (100- 180 mg dL)  - c/w Lantus 11 units qhs  - c/w Humalog 5 units qac  - c/w correction scale to low correction qac and qhs  - consistent carb diet  - will follow  - for discharge: recommend basal bolus insulin versus basal insulin + Prandin, doses to be determined

## 2018-06-22 NOTE — DIETITIAN INITIAL EVALUATION ADULT. - NS AS NUTRI INTERV COLLABORAT
Social work and  following as pt may need placement assistance as living at home may not be optimal for pt at this time. Pending PT evaluation for disposition. Per social work, pt was provided with information on Meals on Wheels assistance, home health aid and insurance coverage for insulin if pt to return back home.

## 2018-06-22 NOTE — DIETITIAN INITIAL EVALUATION ADULT. - ADHERENCE
Pt admitted with poor living condition and unable to care for self, HgbA1c 14.1%. However, pt reports avoiding concentrated sweets, states 'I stopped eating rice, corn meal and plantains long time ago'. Suspect uncontrolled T2DM 2/2 lack of insulin administration > 2 weeks plus pt did not follow strict diet modification

## 2018-06-23 LAB
ANION GAP SERPL CALC-SCNC: 24 MMOL/L — HIGH (ref 5–17)
BUN SERPL-MCNC: 35 MG/DL — HIGH (ref 7–23)
CALCIUM SERPL-MCNC: 9 MG/DL — SIGNIFICANT CHANGE UP (ref 8.4–10.5)
CHLORIDE SERPL-SCNC: 101 MMOL/L — SIGNIFICANT CHANGE UP (ref 96–108)
CO2 SERPL-SCNC: 21 MMOL/L — LOW (ref 22–31)
CREAT SERPL-MCNC: 0.94 MG/DL — SIGNIFICANT CHANGE UP (ref 0.5–1.3)
GLUCOSE BLDC GLUCOMTR-MCNC: 176 MG/DL — HIGH (ref 70–99)
GLUCOSE BLDC GLUCOMTR-MCNC: 201 MG/DL — HIGH (ref 70–99)
GLUCOSE BLDC GLUCOMTR-MCNC: 327 MG/DL — HIGH (ref 70–99)
GLUCOSE BLDC GLUCOMTR-MCNC: 83 MG/DL — SIGNIFICANT CHANGE UP (ref 70–99)
GLUCOSE SERPL-MCNC: 62 MG/DL — LOW (ref 70–99)
POTASSIUM SERPL-MCNC: 4.2 MMOL/L — SIGNIFICANT CHANGE UP (ref 3.5–5.3)
POTASSIUM SERPL-SCNC: 4.2 MMOL/L — SIGNIFICANT CHANGE UP (ref 3.5–5.3)
SODIUM SERPL-SCNC: 146 MMOL/L — HIGH (ref 135–145)

## 2018-06-23 PROCEDURE — 99232 SBSQ HOSP IP/OBS MODERATE 35: CPT

## 2018-06-23 RX ORDER — INSULIN GLARGINE 100 [IU]/ML
9 INJECTION, SOLUTION SUBCUTANEOUS AT BEDTIME
Qty: 0 | Refills: 0 | Status: DISCONTINUED | OUTPATIENT
Start: 2018-06-23 | End: 2018-06-27

## 2018-06-23 RX ADMIN — DORZOLAMIDE HYDROCHLORIDE 1 DROP(S): 20 SOLUTION/ DROPS OPHTHALMIC at 21:29

## 2018-06-23 RX ADMIN — LATANOPROST 1 DROP(S): 0.05 SOLUTION/ DROPS OPHTHALMIC; TOPICAL at 21:29

## 2018-06-23 RX ADMIN — DORZOLAMIDE HYDROCHLORIDE 1 DROP(S): 20 SOLUTION/ DROPS OPHTHALMIC at 13:03

## 2018-06-23 RX ADMIN — GABAPENTIN 100 MILLIGRAM(S): 400 CAPSULE ORAL at 21:29

## 2018-06-23 RX ADMIN — GABAPENTIN 100 MILLIGRAM(S): 400 CAPSULE ORAL at 05:20

## 2018-06-23 RX ADMIN — ENOXAPARIN SODIUM 40 MILLIGRAM(S): 100 INJECTION SUBCUTANEOUS at 16:51

## 2018-06-23 RX ADMIN — Medication 5 UNIT(S): at 13:02

## 2018-06-23 RX ADMIN — ATORVASTATIN CALCIUM 20 MILLIGRAM(S): 80 TABLET, FILM COATED ORAL at 21:29

## 2018-06-23 RX ADMIN — INSULIN GLARGINE 9 UNIT(S): 100 INJECTION, SOLUTION SUBCUTANEOUS at 22:08

## 2018-06-23 RX ADMIN — Medication 4: at 16:52

## 2018-06-23 RX ADMIN — Medication 2: at 13:02

## 2018-06-23 RX ADMIN — Medication 5 UNIT(S): at 08:32

## 2018-06-23 RX ADMIN — Medication 5 UNIT(S): at 16:51

## 2018-06-23 RX ADMIN — DORZOLAMIDE HYDROCHLORIDE 1 DROP(S): 20 SOLUTION/ DROPS OPHTHALMIC at 05:20

## 2018-06-23 RX ADMIN — GABAPENTIN 100 MILLIGRAM(S): 400 CAPSULE ORAL at 13:03

## 2018-06-23 NOTE — PROGRESS NOTE ADULT - PROBLEM SELECTOR PLAN 1
-Type 2 DM with hyperglycemia, poor compliance, and poor social support.  -Endocrine recs appreciated.  -HbA1c 14.1.  -Diabetes education and nutrition evals appreciated.   -C/w lantus 11 units at bedtime and lispro 5 units with meals, per endo recs.   -C/w ELVIA QAC/HS.   -Diabetic diet.  -FSG is better under control   -Gabapentin for neuropathy symptoms. Tylenol as needed as well.   -Discussed with patient the importance of compliance with her medications and her outpatient MD follow up at home.

## 2018-06-23 NOTE — PROGRESS NOTE ADULT - SUBJECTIVE AND OBJECTIVE BOX
Patient is a 77y old  Female who presents with a chief complaint of hyperglycemia (20 Jun 2018 11:14)      SUBJECTIVE / OVERNIGHT EVENTS: patient seen and examined afebrile No events     MEDICATIONS  (STANDING):  atorvastatin 20 milliGRAM(s) Oral at bedtime  dextrose 5%. 1000 milliLiter(s) (50 mL/Hr) IV Continuous <Continuous>  dextrose 50% Injectable 12.5 Gram(s) IV Push once  dorzolamide 2% Ophthalmic Solution 1 Drop(s) Both EYES three times a day  enoxaparin Injectable 40 milliGRAM(s) SubCutaneous every 24 hours  gabapentin 100 milliGRAM(s) Oral three times a day  insulin glargine Injectable (LANTUS) 9 Unit(s) SubCutaneous at bedtime  insulin lispro (HumaLOG) corrective regimen sliding scale   SubCutaneous three times a day before meals  insulin lispro (HumaLOG) corrective regimen sliding scale   SubCutaneous at bedtime  insulin lispro Injectable (HumaLOG) 5 Unit(s) SubCutaneous three times a day before meals  latanoprost 0.005% Ophthalmic Solution 1 Drop(s) Both EYES at bedtime    MEDICATIONS  (PRN):  acetaminophen   Tablet. 325 milliGRAM(s) Oral every 6 hours PRN Mild Pain (1 - 3)  dextrose 40% Gel 15 Gram(s) Oral once PRN Blood Glucose LESS THAN 70 milliGRAM(s)/deciliter  glucagon  Injectable 1 milliGRAM(s) IntraMuscular once PRN Glucose LESS THAN 70 milligrams/deciliter      Vital Signs Last 24 Hrs  T(C): 36.4 (23 Jun 2018 04:30), Max: 36.9 (22 Jun 2018 13:49)  T(F): 97.5 (23 Jun 2018 04:30), Max: 98.5 (22 Jun 2018 13:49)  HR: 68 (23 Jun 2018 04:30) (68 - 80)  BP: 95/61 (23 Jun 2018 04:30) (95/61 - 110/69)  BP(mean): --  RR: 18 (23 Jun 2018 04:30) (18 - 18)  SpO2: 99% (23 Jun 2018 04:30) (98% - 99%)  CAPILLARY BLOOD GLUCOSE      POCT Blood Glucose.: 83 mg/dL (23 Jun 2018 07:40)  POCT Blood Glucose.: 92 mg/dL (22 Jun 2018 21:55)  POCT Blood Glucose.: 116 mg/dL (22 Jun 2018 16:56)    I&O's Summary    22 Jun 2018 07:01  -  23 Jun 2018 07:00  --------------------------------------------------------  IN: 480 mL / OUT: 0 mL / NET: 480 mL        PHYSICAL EXAM:  GENERAL: NAD, well-developed  HEAD:  Atraumatic, Normocephalic  EYES: Retinopathy present.   NECK: Supple  CHEST/LUNG: Clear to auscultation bilaterally; No wheeze  HEART: Regular rate and rhythm; No murmurs, rubs, or gallops  ABDOMEN: Soft, Nontender, Nondistended; Bowel sounds present  EXTREMITIES: No clubbing, cyanosis, or edema  PSYCH/Neuro: Awake and answers questions and follows commands.   SKIN: No rashes or lesions      LABS:    06-23    146<H>  |  101  |  35<H>  ----------------------------<  62<L>  4.2   |  21<L>  |  0.94    Ca    9.0      23 Jun 2018 06:23                RADIOLOGY & ADDITIONAL TESTS:    Imaging Personally Reviewed:    Consultant(s) Notes Reviewed:    Care Discussed with Consultants/Other Providers:

## 2018-06-23 NOTE — PROGRESS NOTE ADULT - PROBLEM SELECTOR PLAN 1
-Given fasting hypoglycemia, will reduce Lantus to 9 units at bedtime  -c/w with Humalog 5 units tid with meals (hold if not eating)  -FSG goal 100-180mg/dl  -C/w LDSS and low does sliding scale at bedtime  -given elevated A1C of >14%, if patient going to long term care facility, recommend basal bolus insulin regimen. Does to be determined.   -Please page [phone] with 10 digit call back number if any questions.    ON SERVICE 6/23-6/24  For after hours or weekends please call [phone] or page fellow on call.

## 2018-06-23 NOTE — PROGRESS NOTE ADULT - SUBJECTIVE AND OBJECTIVE BOX
Chief Complaint:     History:    MEDICATIONS  (STANDING):  atorvastatin 20 milliGRAM(s) Oral at bedtime  dextrose 5%. 1000 milliLiter(s) (50 mL/Hr) IV Continuous <Continuous>  dextrose 50% Injectable 12.5 Gram(s) IV Push once  dorzolamide 2% Ophthalmic Solution 1 Drop(s) Both EYES three times a day  enoxaparin Injectable 40 milliGRAM(s) SubCutaneous every 24 hours  gabapentin 100 milliGRAM(s) Oral three times a day  insulin glargine Injectable (LANTUS) 9 Unit(s) SubCutaneous at bedtime  insulin lispro (HumaLOG) corrective regimen sliding scale   SubCutaneous three times a day before meals  insulin lispro (HumaLOG) corrective regimen sliding scale   SubCutaneous at bedtime  insulin lispro Injectable (HumaLOG) 5 Unit(s) SubCutaneous three times a day before meals  latanoprost 0.005% Ophthalmic Solution 1 Drop(s) Both EYES at bedtime    MEDICATIONS  (PRN):  acetaminophen   Tablet. 325 milliGRAM(s) Oral every 6 hours PRN Mild Pain (1 - 3)  dextrose 40% Gel 15 Gram(s) Oral once PRN Blood Glucose LESS THAN 70 milliGRAM(s)/deciliter  glucagon  Injectable 1 milliGRAM(s) IntraMuscular once PRN Glucose LESS THAN 70 milligrams/deciliter      Allergies    No Known Allergies    Intolerances      Review of Systems:  Constitutional: No fever  Eyes: No blurry vision  Neuro: No tremors  HEENT: No pain  Cardiovascular: No chest pain, palpitations  Respiratory: No SOB, no cough  GI: No nausea, vomiting, abdominal pain  : No dysuria  Skin: no rash  Psych: no depression  Endocrine: no polyuria, polydipsia  Hem/lymph: no swelling  Osteoporosis: no fractures    ALL OTHER SYSTEMS REVIEWED AND NEGATIVE    UNABLE TO OBTAIN    PHYSICAL EXAM:  VITALS: T(C): 36.4 (06-23-18 @ 04:30)  T(F): 97.5 (06-23-18 @ 04:30), Max: 98.5 (06-22-18 @ 13:49)  HR: 68 (06-23-18 @ 04:30) (68 - 80)  BP: 95/61 (06-23-18 @ 04:30) (95/61 - 110/69)  RR:  (18 - 18)  SpO2:  (98% - 99%)  Wt(kg): --  GENERAL: NAD, well-groomed, well-developed  EYES: No proptosis, no lid lag, anicteric  HEENT:  Atraumatic, Normocephalic, moist mucous membranes  THYROID: Normal size, no palpable nodules  RESPIRATORY: Clear to auscultation bilaterally; No rales, rhonchi, wheezing, or rubs  CARDIOVASCULAR: Regular rate and rhythm; No murmurs; no peripheral edema  GI: Soft, nontender, non distended, normal bowel sounds  SKIN: Dry, intact, No rashes or lesions  MUSCULOSKELETAL: Full range of motion, normal strength  NEURO: sensation intact, extraocular movements intact, no tremor, normal reflexes  PSYCH: Alert and oriented x 3, normal affect, normal mood  CUSHING'S SIGNS: no striae    POCT Blood Glucose.: 83 mg/dL (06-23-18 @ 07:40)  POCT Blood Glucose.: 92 mg/dL (06-22-18 @ 21:55)  POCT Blood Glucose.: 116 mg/dL (06-22-18 @ 16:56)  POCT Blood Glucose.: 174 mg/dL (06-22-18 @ 11:52)  POCT Blood Glucose.: 148 mg/dL (06-22-18 @ 08:10)  POCT Blood Glucose.: 192 mg/dL (06-21-18 @ 21:35)  POCT Blood Glucose.: 91 mg/dL (06-21-18 @ 16:52)  POCT Blood Glucose.: 153 mg/dL (06-21-18 @ 11:55)  POCT Blood Glucose.: 93 mg/dL (06-21-18 @ 07:57)  POCT Blood Glucose.: 350 mg/dL (06-20-18 @ 21:49)  POCT Blood Glucose.: 214 mg/dL (06-20-18 @ 17:11)  POCT Blood Glucose.: 311 mg/dL (06-20-18 @ 11:47)      06-23    146<H>  |  101  |  35<H>  ----------------------------<  62<L>  4.2   |  21<L>  |  0.94    EGFR if : 69  EGFR if non : 59<L>    Ca    9.0      06-23            Thyroid Function Tests:  06-19 @ 03:41 TSH 2.04 FreeT4 -- T3 -- Anti TPO -- Anti Thyroglobulin Ab -- TSI --      Hemoglobin A1C, Whole Blood: 14.1 % <H> [4.0 - 5.6] (06-20-18 @ 08:14) Chief Complaint: TYPE 2 DM    History: Note to have hypoglycemia on BMP this morning, FSg this am at goal but on the lower side.  Otherwise, no acute event overnight.      MEDICATIONS  (STANDING):  atorvastatin 20 milliGRAM(s) Oral at bedtime  dextrose 5%. 1000 milliLiter(s) (50 mL/Hr) IV Continuous <Continuous>  dextrose 50% Injectable 12.5 Gram(s) IV Push once  dorzolamide 2% Ophthalmic Solution 1 Drop(s) Both EYES three times a day  enoxaparin Injectable 40 milliGRAM(s) SubCutaneous every 24 hours  gabapentin 100 milliGRAM(s) Oral three times a day  insulin glargine Injectable (LANTUS) 9 Unit(s) SubCutaneous at bedtime  insulin lispro (HumaLOG) corrective regimen sliding scale   SubCutaneous three times a day before meals  insulin lispro (HumaLOG) corrective regimen sliding scale   SubCutaneous at bedtime  insulin lispro Injectable (HumaLOG) 5 Unit(s) SubCutaneous three times a day before meals  latanoprost 0.005% Ophthalmic Solution 1 Drop(s) Both EYES at bedtime    MEDICATIONS  (PRN):  acetaminophen   Tablet. 325 milliGRAM(s) Oral every 6 hours PRN Mild Pain (1 - 3)  dextrose 40% Gel 15 Gram(s) Oral once PRN Blood Glucose LESS THAN 70 milliGRAM(s)/deciliter  glucagon  Injectable 1 milliGRAM(s) IntraMuscular once PRN Glucose LESS THAN 70 milligrams/deciliter      Allergies    No Known Allergies      Review of Systems:    UNABLE TO OBTAIN    PHYSICAL EXAM:  VITALS: T(C): 36.4 (06-23-18 @ 04:30)  T(F): 97.5 (06-23-18 @ 04:30), Max: 98.5 (06-22-18 @ 13:49)  HR: 68 (06-23-18 @ 04:30) (68 - 80)  BP: 95/61 (06-23-18 @ 04:30) (95/61 - 110/69)  RR:  (18 - 18)  SpO2:  (98% - 99%)  Wt(kg): --  GENERAL: NAD, well-groomed, well-developed  THYROID: Normal size, no palpable nodules  RESPIRATORY: Clear to auscultation bilaterally; No rales, rhonchi, wheezing, or rubs  CARDIOVASCULAR: Regular rate and rhythm; No murmurs; no peripheral edema  GI: Soft, nontender, non distended, normal bowel sounds  SKIN: Dry, intact, No rashes or lesions  NEURO: sensation intact, extraocular movements intact, no tremor, normal reflexes  PSYCH: Alert and oriented x 1    POCT Blood Glucose.: 83 mg/dL (06-23-18 @ 07:40)  POCT Blood Glucose.: 92 mg/dL (06-22-18 @ 21:55)  POCT Blood Glucose.: 116 mg/dL (06-22-18 @ 16:56)  POCT Blood Glucose.: 174 mg/dL (06-22-18 @ 11:52)  POCT Blood Glucose.: 148 mg/dL (06-22-18 @ 08:10)  POCT Blood Glucose.: 192 mg/dL (06-21-18 @ 21:35)  POCT Blood Glucose.: 91 mg/dL (06-21-18 @ 16:52)  POCT Blood Glucose.: 153 mg/dL (06-21-18 @ 11:55)  POCT Blood Glucose.: 93 mg/dL (06-21-18 @ 07:57)  POCT Blood Glucose.: 350 mg/dL (06-20-18 @ 21:49)  POCT Blood Glucose.: 214 mg/dL (06-20-18 @ 17:11)  POCT Blood Glucose.: 311 mg/dL (06-20-18 @ 11:47)      06-23    146<H>  |  101  |  35<H>  ----------------------------<  62<L>  4.2   |  21<L>  |  0.94    EGFR if : 69  EGFR if non : 59<L>    Ca    9.0      06-23      Thyroid Function Tests:  06-19 @ 03:41 TSH 2.04 FreeT4 -- T3 -- Anti TPO -- Anti Thyroglobulin Ab -- TSI --  Hemoglobin A1C, Whole Blood: 14.1 % <H> [4.0 - 5.6] (06-20-18 @ 08:14)

## 2018-06-24 LAB
ANION GAP SERPL CALC-SCNC: 10 MMOL/L — SIGNIFICANT CHANGE UP (ref 5–17)
BUN SERPL-MCNC: 30 MG/DL — HIGH (ref 7–23)
CALCIUM SERPL-MCNC: 9 MG/DL — SIGNIFICANT CHANGE UP (ref 8.4–10.5)
CHLORIDE SERPL-SCNC: 105 MMOL/L — SIGNIFICANT CHANGE UP (ref 96–108)
CO2 SERPL-SCNC: 26 MMOL/L — SIGNIFICANT CHANGE UP (ref 22–31)
CREAT SERPL-MCNC: 0.89 MG/DL — SIGNIFICANT CHANGE UP (ref 0.5–1.3)
GLUCOSE BLDC GLUCOMTR-MCNC: 116 MG/DL — HIGH (ref 70–99)
GLUCOSE BLDC GLUCOMTR-MCNC: 147 MG/DL — HIGH (ref 70–99)
GLUCOSE BLDC GLUCOMTR-MCNC: 162 MG/DL — HIGH (ref 70–99)
GLUCOSE BLDC GLUCOMTR-MCNC: 203 MG/DL — HIGH (ref 70–99)
GLUCOSE SERPL-MCNC: 120 MG/DL — HIGH (ref 70–99)
POTASSIUM SERPL-MCNC: 4 MMOL/L — SIGNIFICANT CHANGE UP (ref 3.5–5.3)
POTASSIUM SERPL-SCNC: 4 MMOL/L — SIGNIFICANT CHANGE UP (ref 3.5–5.3)
SODIUM SERPL-SCNC: 141 MMOL/L — SIGNIFICANT CHANGE UP (ref 135–145)

## 2018-06-24 PROCEDURE — 99232 SBSQ HOSP IP/OBS MODERATE 35: CPT

## 2018-06-24 RX ADMIN — Medication 5 UNIT(S): at 12:22

## 2018-06-24 RX ADMIN — ENOXAPARIN SODIUM 40 MILLIGRAM(S): 100 INJECTION SUBCUTANEOUS at 14:49

## 2018-06-24 RX ADMIN — ATORVASTATIN CALCIUM 20 MILLIGRAM(S): 80 TABLET, FILM COATED ORAL at 22:00

## 2018-06-24 RX ADMIN — DORZOLAMIDE HYDROCHLORIDE 1 DROP(S): 20 SOLUTION/ DROPS OPHTHALMIC at 14:49

## 2018-06-24 RX ADMIN — GABAPENTIN 100 MILLIGRAM(S): 400 CAPSULE ORAL at 14:49

## 2018-06-24 RX ADMIN — Medication 5 UNIT(S): at 17:24

## 2018-06-24 RX ADMIN — GABAPENTIN 100 MILLIGRAM(S): 400 CAPSULE ORAL at 08:00

## 2018-06-24 RX ADMIN — GABAPENTIN 100 MILLIGRAM(S): 400 CAPSULE ORAL at 22:00

## 2018-06-24 RX ADMIN — DORZOLAMIDE HYDROCHLORIDE 1 DROP(S): 20 SOLUTION/ DROPS OPHTHALMIC at 22:00

## 2018-06-24 RX ADMIN — DORZOLAMIDE HYDROCHLORIDE 1 DROP(S): 20 SOLUTION/ DROPS OPHTHALMIC at 08:01

## 2018-06-24 RX ADMIN — Medication 5 UNIT(S): at 08:06

## 2018-06-24 RX ADMIN — INSULIN GLARGINE 9 UNIT(S): 100 INJECTION, SOLUTION SUBCUTANEOUS at 22:00

## 2018-06-24 RX ADMIN — LATANOPROST 1 DROP(S): 0.05 SOLUTION/ DROPS OPHTHALMIC; TOPICAL at 22:00

## 2018-06-24 RX ADMIN — Medication 2: at 17:23

## 2018-06-24 NOTE — PROGRESS NOTE ADULT - ATTENDING COMMENTS
Chris Selby MD  Division of [facility]  Cell: [phone]  Pager: [phone]  Office: [phone]
Chris Selby MD  Division of [facility]  Cell: [phone]  Pager: [phone]  Office: [phone]
Chris Selby MD  Division of [facility]  Cell: [phone]  Pager: [phone]  Office: [phone]
Likely d/c to [facility]  d/w floor NP
Agree with assessment and plan as above by Dr. Wilkins. Reviewed all pertinent labs, glucose values, and imaging studies. Modifications made as indicated above.     Johan Sykes D.O  [phone]
Patent is stable  likely d/c to [facility] next week

## 2018-06-24 NOTE — PROGRESS NOTE ADULT - SUBJECTIVE AND OBJECTIVE BOX
Patient is a 77y old  Female who presents with a chief complaint of hyperglycemia (20 Jun 2018 11:14)      SUBJECTIVE / OVERNIGHT EVENTS: no new complain. no event     MEDICATIONS  (STANDING):  atorvastatin 20 milliGRAM(s) Oral at bedtime  dextrose 5%. 1000 milliLiter(s) (50 mL/Hr) IV Continuous <Continuous>  dextrose 50% Injectable 12.5 Gram(s) IV Push once  dorzolamide 2% Ophthalmic Solution 1 Drop(s) Both EYES three times a day  enoxaparin Injectable 40 milliGRAM(s) SubCutaneous every 24 hours  gabapentin 100 milliGRAM(s) Oral three times a day  insulin glargine Injectable (LANTUS) 9 Unit(s) SubCutaneous at bedtime  insulin lispro (HumaLOG) corrective regimen sliding scale   SubCutaneous three times a day before meals  insulin lispro (HumaLOG) corrective regimen sliding scale   SubCutaneous at bedtime  insulin lispro Injectable (HumaLOG) 5 Unit(s) SubCutaneous three times a day before meals  latanoprost 0.005% Ophthalmic Solution 1 Drop(s) Both EYES at bedtime    MEDICATIONS  (PRN):  acetaminophen   Tablet. 325 milliGRAM(s) Oral every 6 hours PRN Mild Pain (1 - 3)  dextrose 40% Gel 15 Gram(s) Oral once PRN Blood Glucose LESS THAN 70 milliGRAM(s)/deciliter  glucagon  Injectable 1 milliGRAM(s) IntraMuscular once PRN Glucose LESS THAN 70 milligrams/deciliter      Vital Signs Last 24 Hrs  T(C): 36.7 (24 Jun 2018 05:30), Max: 36.9 (23 Jun 2018 21:16)  T(F): 98 (24 Jun 2018 05:30), Max: 98.5 (23 Jun 2018 21:16)  HR: 67 (24 Jun 2018 05:30) (67 - 72)  BP: 121/68 (24 Jun 2018 05:30) (121/68 - 128/74)  BP(mean): --  RR: 18 (24 Jun 2018 05:30) (18 - 18)  SpO2: 99% (24 Jun 2018 05:30) (99% - 99%)  CAPILLARY BLOOD GLUCOSE      POCT Blood Glucose.: 147 mg/dL (24 Jun 2018 12:16)  POCT Blood Glucose.: 116 mg/dL (24 Jun 2018 08:06)  POCT Blood Glucose.: 176 mg/dL (23 Jun 2018 21:44)  POCT Blood Glucose.: 327 mg/dL (23 Jun 2018 16:40)    I&O's Summary    23 Jun 2018 07:01  -  24 Jun 2018 07:00  --------------------------------------------------------  IN: 600 mL / OUT: 0 mL / NET: 600 mL        PHYSICAL EXAM:  GENERAL: NAD, well-developed  CHEST/LUNG: Clear to auscultation bilaterally; No wheeze  HEART: Regular rate and rhythm; No murmurs, rubs, or gallops  ABDOMEN: Soft, Nontender, Nondistended; Bowel sounds present  EXTREMITIES: No clubbing, cyanosis, or edema  PSYCH/Neuro: Awake and answers questions and follows commands.   SKIN: No rashes or lesions    LABS:    06-24    141  |  105  |  30<H>  ----------------------------<  120<H>  4.0   |  26  |  0.89    Ca    9.0      24 Jun 2018 07:11                RADIOLOGY & ADDITIONAL TESTS:    Imaging Personally Reviewed:    Consultant(s) Notes Reviewed:      Care Discussed with Consultants/Other Providers:

## 2018-06-24 NOTE — PROGRESS NOTE ADULT - PROBLEM SELECTOR PLAN 1
-Type 2 DM with hyperglycemia, poor compliance, and poor social support.  -HbA1c 14.1.  -Diabetes education and nutrition evals appreciated.   -C/w lantus 9 units at bedtime and lispro 5 units with meals, per endo recs.   -C/w ELVIA QAC/HS.   -Diabetic diet.  -FSG is better under control   -Gabapentin for neuropathy symptoms. Tylenol as needed as well.   .

## 2018-06-25 LAB
ANION GAP SERPL CALC-SCNC: 11 MMOL/L — SIGNIFICANT CHANGE UP (ref 5–17)
BUN SERPL-MCNC: 31 MG/DL — HIGH (ref 7–23)
CALCIUM SERPL-MCNC: 9.2 MG/DL — SIGNIFICANT CHANGE UP (ref 8.4–10.5)
CHLORIDE SERPL-SCNC: 102 MMOL/L — SIGNIFICANT CHANGE UP (ref 96–108)
CO2 SERPL-SCNC: 26 MMOL/L — SIGNIFICANT CHANGE UP (ref 22–31)
CREAT SERPL-MCNC: 0.94 MG/DL — SIGNIFICANT CHANGE UP (ref 0.5–1.3)
GLUCOSE BLDC GLUCOMTR-MCNC: 152 MG/DL — HIGH (ref 70–99)
GLUCOSE BLDC GLUCOMTR-MCNC: 155 MG/DL — HIGH (ref 70–99)
GLUCOSE BLDC GLUCOMTR-MCNC: 204 MG/DL — HIGH (ref 70–99)
GLUCOSE BLDC GLUCOMTR-MCNC: 243 MG/DL — HIGH (ref 70–99)
GLUCOSE SERPL-MCNC: 179 MG/DL — HIGH (ref 70–99)
POTASSIUM SERPL-MCNC: 4 MMOL/L — SIGNIFICANT CHANGE UP (ref 3.5–5.3)
POTASSIUM SERPL-SCNC: 4 MMOL/L — SIGNIFICANT CHANGE UP (ref 3.5–5.3)
SODIUM SERPL-SCNC: 139 MMOL/L — SIGNIFICANT CHANGE UP (ref 135–145)

## 2018-06-25 PROCEDURE — 99232 SBSQ HOSP IP/OBS MODERATE 35: CPT

## 2018-06-25 PROCEDURE — 99233 SBSQ HOSP IP/OBS HIGH 50: CPT

## 2018-06-25 RX ADMIN — INSULIN GLARGINE 9 UNIT(S): 100 INJECTION, SOLUTION SUBCUTANEOUS at 22:32

## 2018-06-25 RX ADMIN — GABAPENTIN 100 MILLIGRAM(S): 400 CAPSULE ORAL at 05:52

## 2018-06-25 RX ADMIN — GABAPENTIN 100 MILLIGRAM(S): 400 CAPSULE ORAL at 22:31

## 2018-06-25 RX ADMIN — DORZOLAMIDE HYDROCHLORIDE 1 DROP(S): 20 SOLUTION/ DROPS OPHTHALMIC at 22:31

## 2018-06-25 RX ADMIN — Medication 2: at 12:05

## 2018-06-25 RX ADMIN — Medication 1: at 08:25

## 2018-06-25 RX ADMIN — Medication 2: at 17:10

## 2018-06-25 RX ADMIN — LATANOPROST 1 DROP(S): 0.05 SOLUTION/ DROPS OPHTHALMIC; TOPICAL at 22:32

## 2018-06-25 RX ADMIN — DORZOLAMIDE HYDROCHLORIDE 1 DROP(S): 20 SOLUTION/ DROPS OPHTHALMIC at 05:52

## 2018-06-25 RX ADMIN — GABAPENTIN 100 MILLIGRAM(S): 400 CAPSULE ORAL at 13:33

## 2018-06-25 RX ADMIN — Medication 5 UNIT(S): at 12:05

## 2018-06-25 RX ADMIN — ATORVASTATIN CALCIUM 20 MILLIGRAM(S): 80 TABLET, FILM COATED ORAL at 22:31

## 2018-06-25 RX ADMIN — Medication 5 UNIT(S): at 08:26

## 2018-06-25 RX ADMIN — DORZOLAMIDE HYDROCHLORIDE 1 DROP(S): 20 SOLUTION/ DROPS OPHTHALMIC at 13:33

## 2018-06-25 RX ADMIN — ENOXAPARIN SODIUM 40 MILLIGRAM(S): 100 INJECTION SUBCUTANEOUS at 17:10

## 2018-06-25 RX ADMIN — Medication 5 UNIT(S): at 17:10

## 2018-06-25 NOTE — PROGRESS NOTE ADULT - SUBJECTIVE AND OBJECTIVE BOX
Diabetes Follow up note:  Interval Hx:  77 year old female w/uncontrolled T2DM w/retinopathy in the setting of non-adherence at home a/w hyperglycemia. Discharge planning to rehab underway. BG values improved on basal/bolus regimen.     Review of Systems:  General: "I'm going to a rehab somewhere else"  GI: Tolerating POs without any N/V/D/ABD PAIN.  CV: No CP/SOB  ENDO: No S&Sx of hypoglycemia  MEDS:  atorvastatin 20 milliGRAM(s) Oral at bedtime    insulin glargine Injectable (LANTUS) 9 Unit(s) SubCutaneous at bedtime  insulin lispro (HumaLOG) corrective regimen sliding scale   SubCutaneous three times a day before meals  insulin lispro (HumaLOG) corrective regimen sliding scale   SubCutaneous at bedtime  insulin lispro Injectable (HumaLOG) 5 Unit(s) SubCutaneous three times a day before meals      Allergies    No Known Allergies        PE:  General: Female lying in bed. NAD.   Vital Signs Last 24 Hrs  T(C): 36.9 (25 Jun 2018 12:07), Max: 36.9 (25 Jun 2018 12:07)  T(F): 98.4 (25 Jun 2018 12:07), Max: 98.4 (25 Jun 2018 12:07)  HR: 69 (25 Jun 2018 12:07) (64 - 76)  BP: 111/67 (25 Jun 2018 12:07) (103/59 - 114/65)  BP(mean): --  RR: 18 (25 Jun 2018 12:07) (18 - 18)  SpO2: 98% (25 Jun 2018 12:07) (98% - 100%)  Abd: Soft, NT,ND,   Extremities: Warm. no edema  Neuro: Alert. Appropriate to situation.     LABS:    POCT Blood Glucose.: 204 mg/dL (06-25-18 @ 11:54)  POCT Blood Glucose.: 152 mg/dL (06-25-18 @ 08:25)  POCT Blood Glucose.: 162 mg/dL (06-24-18 @ 21:50)  POCT Blood Glucose.: 203 mg/dL (06-24-18 @ 16:38)  POCT Blood Glucose.: 147 mg/dL (06-24-18 @ 12:16)  POCT Blood Glucose.: 116 mg/dL (06-24-18 @ 08:06)  POCT Blood Glucose.: 176 mg/dL (06-23-18 @ 21:44)  POCT Blood Glucose.: 327 mg/dL (06-23-18 @ 16:40)  POCT Blood Glucose.: 201 mg/dL (06-23-18 @ 12:13)  POCT Blood Glucose.: 83 mg/dL (06-23-18 @ 07:40)  POCT Blood Glucose.: 92 mg/dL (06-22-18 @ 21:55)  POCT Blood Glucose.: 116 mg/dL (06-22-18 @ 16:56)          06-25    139  |  102  |  31<H>  ----------------------------<  179<H>  4.0   |  26  |  0.94    Ca    9.2      25 Jun 2018 07:00        Thyroid Function Tests:  06-19 @ 03:41 TSH 2.04 FreeT4 -- T3 -- Anti TPO -- Anti Thyroglobulin Ab -- TSI --      Hemoglobin A1C, Whole Blood: 14.1 % <H> [4.0 - 5.6] (06-20-18 @ 08:14)            Contact number: dg [phone] or [phone]

## 2018-06-25 NOTE — PROGRESS NOTE ADULT - SUBJECTIVE AND OBJECTIVE BOX
Patient is a 77y old  Female who presents with a chief complaint of hyperglycemia (20 Jun 2018 11:14)      SUBJECTIVE / OVERNIGHT EVENTS:    patient seen and examined at bedside.  no overnight events.  feels well.     MEDICATIONS  (STANDING):  atorvastatin 20 milliGRAM(s) Oral at bedtime  dextrose 5%. 1000 milliLiter(s) (50 mL/Hr) IV Continuous <Continuous>  dextrose 50% Injectable 12.5 Gram(s) IV Push once  dorzolamide 2% Ophthalmic Solution 1 Drop(s) Both EYES three times a day  enoxaparin Injectable 40 milliGRAM(s) SubCutaneous every 24 hours  gabapentin 100 milliGRAM(s) Oral three times a day  insulin glargine Injectable (LANTUS) 9 Unit(s) SubCutaneous at bedtime  insulin lispro (HumaLOG) corrective regimen sliding scale   SubCutaneous three times a day before meals  insulin lispro (HumaLOG) corrective regimen sliding scale   SubCutaneous at bedtime  insulin lispro Injectable (HumaLOG) 5 Unit(s) SubCutaneous three times a day before meals  latanoprost 0.005% Ophthalmic Solution 1 Drop(s) Both EYES at bedtime    MEDICATIONS  (PRN):  acetaminophen   Tablet. 325 milliGRAM(s) Oral every 6 hours PRN Mild Pain (1 - 3)  dextrose 40% Gel 15 Gram(s) Oral once PRN Blood Glucose LESS THAN 70 milliGRAM(s)/deciliter  glucagon  Injectable 1 milliGRAM(s) IntraMuscular once PRN Glucose LESS THAN 70 milligrams/deciliter        CAPILLARY BLOOD GLUCOSE      POCT Blood Glucose.: 204 mg/dL (25 Jun 2018 11:54)  POCT Blood Glucose.: 152 mg/dL (25 Jun 2018 08:25)  POCT Blood Glucose.: 162 mg/dL (24 Jun 2018 21:50)  POCT Blood Glucose.: 203 mg/dL (24 Jun 2018 16:38)    I&O's Summary    24 Jun 2018 07:01  -  25 Jun 2018 07:00  --------------------------------------------------------  IN: 720 mL / OUT: 0 mL / NET: 720 mL          LABS:    06-25    139  |  102  |  31<H>  ----------------------------<  179<H>  4.0   |  26  |  0.94    Ca    9.2      25 Jun 2018 07:00                RADIOLOGY & ADDITIONAL TESTS:    Imaging Personally Reviewed:    Consultant(s) Notes Reviewed:      Care Discussed with Consultants/Other Providers:

## 2018-06-25 NOTE — PROGRESS NOTE ADULT - PROBLEM SELECTOR PLAN 1
-Type 2 DM with hyperglycemia, poor compliance, and poor social support.  -HbA1c 14.1.  -Diabetes education and nutrition evals appreciated.   -C/w lantus 9 units at bedtime and lispro 5 units with meals, per endo recs.   -C/w ELVIA QAC/HS.   -Diabetic diet.  -FSG is better under control   -Gabapentin for neuropathy symptoms. Tylenol as needed as well.

## 2018-06-25 NOTE — PROGRESS NOTE ADULT - PROBLEM SELECTOR PLAN 1
-test BG AC/HS  -c/w Lantus 9 units QHS  -c/w Humalog 5 units AC meals  -c/w Humalog low correction scale AC and Low HS scale  given elevated A1C of >14%, if patient going to long term care facility, recommend basal bolus insulin regimen. Continue current hospital doses.  -discussed w/pt and medicine NP  pager: [phone]

## 2018-06-26 LAB
ANION GAP SERPL CALC-SCNC: 12 MMOL/L — SIGNIFICANT CHANGE UP (ref 5–17)
BUN SERPL-MCNC: 30 MG/DL — HIGH (ref 7–23)
CALCIUM SERPL-MCNC: 9.2 MG/DL — SIGNIFICANT CHANGE UP (ref 8.4–10.5)
CHLORIDE SERPL-SCNC: 104 MMOL/L — SIGNIFICANT CHANGE UP (ref 96–108)
CO2 SERPL-SCNC: 25 MMOL/L — SIGNIFICANT CHANGE UP (ref 22–31)
CREAT SERPL-MCNC: 0.96 MG/DL — SIGNIFICANT CHANGE UP (ref 0.5–1.3)
GLUCOSE BLDC GLUCOMTR-MCNC: 121 MG/DL — HIGH (ref 70–99)
GLUCOSE BLDC GLUCOMTR-MCNC: 134 MG/DL — HIGH (ref 70–99)
GLUCOSE BLDC GLUCOMTR-MCNC: 147 MG/DL — HIGH (ref 70–99)
GLUCOSE BLDC GLUCOMTR-MCNC: 182 MG/DL — HIGH (ref 70–99)
GLUCOSE BLDC GLUCOMTR-MCNC: 194 MG/DL — HIGH (ref 70–99)
GLUCOSE SERPL-MCNC: 203 MG/DL — HIGH (ref 70–99)
POTASSIUM SERPL-MCNC: 4.1 MMOL/L — SIGNIFICANT CHANGE UP (ref 3.5–5.3)
POTASSIUM SERPL-SCNC: 4.1 MMOL/L — SIGNIFICANT CHANGE UP (ref 3.5–5.3)
SODIUM SERPL-SCNC: 141 MMOL/L — SIGNIFICANT CHANGE UP (ref 135–145)

## 2018-06-26 PROCEDURE — 99233 SBSQ HOSP IP/OBS HIGH 50: CPT

## 2018-06-26 RX ADMIN — Medication 5 UNIT(S): at 08:33

## 2018-06-26 RX ADMIN — LATANOPROST 1 DROP(S): 0.05 SOLUTION/ DROPS OPHTHALMIC; TOPICAL at 22:09

## 2018-06-26 RX ADMIN — DORZOLAMIDE HYDROCHLORIDE 1 DROP(S): 20 SOLUTION/ DROPS OPHTHALMIC at 22:09

## 2018-06-26 RX ADMIN — DORZOLAMIDE HYDROCHLORIDE 1 DROP(S): 20 SOLUTION/ DROPS OPHTHALMIC at 17:01

## 2018-06-26 RX ADMIN — Medication 5 UNIT(S): at 17:00

## 2018-06-26 RX ADMIN — GABAPENTIN 100 MILLIGRAM(S): 400 CAPSULE ORAL at 22:09

## 2018-06-26 RX ADMIN — Medication 5 UNIT(S): at 12:59

## 2018-06-26 RX ADMIN — ATORVASTATIN CALCIUM 20 MILLIGRAM(S): 80 TABLET, FILM COATED ORAL at 22:09

## 2018-06-26 RX ADMIN — DORZOLAMIDE HYDROCHLORIDE 1 DROP(S): 20 SOLUTION/ DROPS OPHTHALMIC at 05:23

## 2018-06-26 RX ADMIN — GABAPENTIN 100 MILLIGRAM(S): 400 CAPSULE ORAL at 05:23

## 2018-06-26 RX ADMIN — ENOXAPARIN SODIUM 40 MILLIGRAM(S): 100 INJECTION SUBCUTANEOUS at 17:01

## 2018-06-26 RX ADMIN — GABAPENTIN 100 MILLIGRAM(S): 400 CAPSULE ORAL at 17:01

## 2018-06-26 RX ADMIN — INSULIN GLARGINE 9 UNIT(S): 100 INJECTION, SOLUTION SUBCUTANEOUS at 22:17

## 2018-06-26 NOTE — PROGRESS NOTE ADULT - PROBLEM SELECTOR PLAN 4
-Lovenox SQ for DVT PPx.     5. Dispo: -PT eval recs YANIV. SW f/u for placement. Concerns about safety at home.
-Lovenox SQ for DVT PPx.     5. Dispo: -PT eval and SW for help with placement.
-Lovenox SQ for DVT PPx.     5. Dispo: -PT eval and SW for help with placement/discharge.
-Lovenox SQ for DVT PPx.     5. Dispo: -PT eval recs YANIV. SW f/u for placement. Concerns about safety at home.

## 2018-06-26 NOTE — PROGRESS NOTE ADULT - PROBLEM SELECTOR PLAN 3
-C/w atorvastatin at bedtime per endocrine recs.

## 2018-06-26 NOTE — PROGRESS NOTE ADULT - SUBJECTIVE AND OBJECTIVE BOX
Patient is a 77y old  Female who presents with a chief complaint of hyperglycemia (20 Jun 2018 11:14)      SUBJECTIVE / OVERNIGHT EVENTS:    patient seen and examined. feels well. wants to leave the hospital.     MEDICATIONS  (STANDING):  atorvastatin 20 milliGRAM(s) Oral at bedtime  dextrose 5%. 1000 milliLiter(s) (50 mL/Hr) IV Continuous <Continuous>  dextrose 50% Injectable 12.5 Gram(s) IV Push once  dorzolamide 2% Ophthalmic Solution 1 Drop(s) Both EYES three times a day  enoxaparin Injectable 40 milliGRAM(s) SubCutaneous every 24 hours  gabapentin 100 milliGRAM(s) Oral three times a day  insulin glargine Injectable (LANTUS) 9 Unit(s) SubCutaneous at bedtime  insulin lispro (HumaLOG) corrective regimen sliding scale   SubCutaneous three times a day before meals  insulin lispro (HumaLOG) corrective regimen sliding scale   SubCutaneous at bedtime  insulin lispro Injectable (HumaLOG) 5 Unit(s) SubCutaneous three times a day before meals  latanoprost 0.005% Ophthalmic Solution 1 Drop(s) Both EYES at bedtime    MEDICATIONS  (PRN):  acetaminophen   Tablet. 325 milliGRAM(s) Oral every 6 hours PRN Mild Pain (1 - 3)  dextrose 40% Gel 15 Gram(s) Oral once PRN Blood Glucose LESS THAN 70 milliGRAM(s)/deciliter  glucagon  Injectable 1 milliGRAM(s) IntraMuscular once PRN Glucose LESS THAN 70 milligrams/deciliter          PHYSICAL EXAM:  GENERAL: NAD, well-developed  CHEST/LUNG: Clear to auscultation bilaterally; No wheeze  HEART: Regular rate and rhythm; No murmurs, rubs, or gallops  ABDOMEN: Soft, Nontender, Nondistended; Bowel sounds present  EXTREMITIES: No clubbing, cyanosis, or edema  PSYCH/Neuro: Awake and answers questions and follows commands.   SKIN: No rashes or lesions      POCT Blood Glucose.: 194 mg/dL (26 Jun 2018 12:58)  POCT Blood Glucose.: 121 mg/dL (26 Jun 2018 11:47)  POCT Blood Glucose.: 147 mg/dL (26 Jun 2018 07:46)  POCT Blood Glucose.: 155 mg/dL (25 Jun 2018 21:50)  POCT Blood Glucose.: 243 mg/dL (25 Jun 2018 16:37)    I&O's Summary    25 Jun 2018 07:01  -  26 Jun 2018 07:00  --------------------------------------------------------  IN: 600 mL / OUT: 0 mL / NET: 600 mL            LABS:    06-26    141  |  104  |  30<H>  ----------------------------<  203<H>  4.1   |  25  |  0.96    Ca    9.2      26 Jun 2018 06:35                RADIOLOGY & ADDITIONAL TESTS:    Imaging Personally Reviewed:    Consultant(s) Notes Reviewed:      Care Discussed with Consultants/Other Providers:  np

## 2018-06-26 NOTE — PROGRESS NOTE ADULT - PROBLEM SELECTOR PROBLEM 3
Hyperlipidemia, unspecified hyperlipidemia type

## 2018-06-27 VITALS
RESPIRATION RATE: 18 BRPM | DIASTOLIC BLOOD PRESSURE: 69 MMHG | TEMPERATURE: 98 F | HEART RATE: 68 BPM | SYSTOLIC BLOOD PRESSURE: 111 MMHG | OXYGEN SATURATION: 99 %

## 2018-06-27 DIAGNOSIS — E16.2 HYPOGLYCEMIA, UNSPECIFIED: ICD-10-CM

## 2018-06-27 LAB
GLUCOSE BLDC GLUCOMTR-MCNC: 128 MG/DL — HIGH (ref 70–99)
GLUCOSE BLDC GLUCOMTR-MCNC: 152 MG/DL — HIGH (ref 70–99)
GLUCOSE BLDC GLUCOMTR-MCNC: 206 MG/DL — HIGH (ref 70–99)
GLUCOSE BLDC GLUCOMTR-MCNC: 62 MG/DL — LOW (ref 70–99)
GLUCOSE BLDC GLUCOMTR-MCNC: 63 MG/DL — LOW (ref 70–99)
GLUCOSE BLDC GLUCOMTR-MCNC: 89 MG/DL — SIGNIFICANT CHANGE UP (ref 70–99)

## 2018-06-27 PROCEDURE — 82010 KETONE BODYS QUAN: CPT

## 2018-06-27 PROCEDURE — 84295 ASSAY OF SERUM SODIUM: CPT

## 2018-06-27 PROCEDURE — 84443 ASSAY THYROID STIM HORMONE: CPT

## 2018-06-27 PROCEDURE — 84132 ASSAY OF SERUM POTASSIUM: CPT

## 2018-06-27 PROCEDURE — 80053 COMPREHEN METABOLIC PANEL: CPT

## 2018-06-27 PROCEDURE — 87086 URINE CULTURE/COLONY COUNT: CPT

## 2018-06-27 PROCEDURE — 96372 THER/PROPH/DIAG INJ SC/IM: CPT | Mod: XU

## 2018-06-27 PROCEDURE — 83605 ASSAY OF LACTIC ACID: CPT

## 2018-06-27 PROCEDURE — 83690 ASSAY OF LIPASE: CPT

## 2018-06-27 PROCEDURE — 82330 ASSAY OF CALCIUM: CPT

## 2018-06-27 PROCEDURE — 99285 EMERGENCY DEPT VISIT HI MDM: CPT | Mod: 25

## 2018-06-27 PROCEDURE — 84100 ASSAY OF PHOSPHORUS: CPT

## 2018-06-27 PROCEDURE — 85027 COMPLETE CBC AUTOMATED: CPT

## 2018-06-27 PROCEDURE — 93005 ELECTROCARDIOGRAM TRACING: CPT

## 2018-06-27 PROCEDURE — 85014 HEMATOCRIT: CPT

## 2018-06-27 PROCEDURE — 83036 HEMOGLOBIN GLYCOSYLATED A1C: CPT

## 2018-06-27 PROCEDURE — 71045 X-RAY EXAM CHEST 1 VIEW: CPT

## 2018-06-27 PROCEDURE — 82435 ASSAY OF BLOOD CHLORIDE: CPT

## 2018-06-27 PROCEDURE — 82947 ASSAY GLUCOSE BLOOD QUANT: CPT

## 2018-06-27 PROCEDURE — 97161 PT EVAL LOW COMPLEX 20 MIN: CPT

## 2018-06-27 PROCEDURE — 85610 PROTHROMBIN TIME: CPT

## 2018-06-27 PROCEDURE — 99239 HOSP IP/OBS DSCHRG MGMT >30: CPT

## 2018-06-27 PROCEDURE — 99232 SBSQ HOSP IP/OBS MODERATE 35: CPT

## 2018-06-27 PROCEDURE — 82803 BLOOD GASES ANY COMBINATION: CPT

## 2018-06-27 PROCEDURE — 70450 CT HEAD/BRAIN W/O DYE: CPT

## 2018-06-27 PROCEDURE — 82962 GLUCOSE BLOOD TEST: CPT

## 2018-06-27 PROCEDURE — 83735 ASSAY OF MAGNESIUM: CPT

## 2018-06-27 PROCEDURE — 85730 THROMBOPLASTIN TIME PARTIAL: CPT

## 2018-06-27 PROCEDURE — 80048 BASIC METABOLIC PNL TOTAL CA: CPT

## 2018-06-27 PROCEDURE — 81001 URINALYSIS AUTO W/SCOPE: CPT

## 2018-06-27 PROCEDURE — 96374 THER/PROPH/DIAG INJ IV PUSH: CPT

## 2018-06-27 RX ORDER — ATORVASTATIN CALCIUM 80 MG/1
1 TABLET, FILM COATED ORAL
Qty: 0 | Refills: 0 | DISCHARGE
Start: 2018-06-27

## 2018-06-27 RX ORDER — INSULIN LISPRO 100/ML
3 VIAL (ML) SUBCUTANEOUS
Qty: 0 | Refills: 0 | DISCHARGE
Start: 2018-06-27

## 2018-06-27 RX ORDER — INSULIN GLARGINE 100 [IU]/ML
6 INJECTION, SOLUTION SUBCUTANEOUS AT BEDTIME
Qty: 0 | Refills: 0 | Status: DISCONTINUED | OUTPATIENT
Start: 2018-06-27 | End: 2018-06-27

## 2018-06-27 RX ORDER — INSULIN GLARGINE 100 [IU]/ML
6 INJECTION, SOLUTION SUBCUTANEOUS
Qty: 0 | Refills: 0 | DISCHARGE
Start: 2018-06-27

## 2018-06-27 RX ORDER — INSULIN DETEMIR 100/ML (3)
20 INSULIN PEN (ML) SUBCUTANEOUS
Qty: 0 | Refills: 0 | COMMUNITY

## 2018-06-27 RX ORDER — INSULIN LISPRO 100/ML
3 VIAL (ML) SUBCUTANEOUS
Qty: 0 | Refills: 0 | Status: DISCONTINUED | OUTPATIENT
Start: 2018-06-27 | End: 2018-06-27

## 2018-06-27 RX ADMIN — Medication 2: at 12:32

## 2018-06-27 RX ADMIN — Medication 3 UNIT(S): at 08:47

## 2018-06-27 RX ADMIN — GABAPENTIN 100 MILLIGRAM(S): 400 CAPSULE ORAL at 06:33

## 2018-06-27 RX ADMIN — Medication 1: at 08:47

## 2018-06-27 RX ADMIN — DORZOLAMIDE HYDROCHLORIDE 1 DROP(S): 20 SOLUTION/ DROPS OPHTHALMIC at 06:33

## 2018-06-27 RX ADMIN — Medication 3 UNIT(S): at 12:32

## 2018-06-27 NOTE — CHART NOTE - NSCHARTNOTEFT_GEN_A_CORE
D/w Endo NP re: hypoglycemic episodes this am and pt for discharge to [facility]. Per endo: discharge on current basal/bolus: Lantus 6 units, Humalog 3 units with meals.    Roxi Katz NP [facility] [id number]

## 2018-06-27 NOTE — PROGRESS NOTE ADULT - SUBJECTIVE AND OBJECTIVE BOX
Diabetes Follow up note:  Interval Hx:  77 year old female w/uncontrolled T2DM w/retinopathy in the setting of non-adherence at home a/w hyperglycemia. BG values mostly at goal while on basal/bolus regimen but with hypoglycemia this morning. Pt seen at bedside. Reports "I always feel it when my sugar is low". Plan for discharge to rehab today.       Review of Systems:  General: + appetite. Happy with care while in hospital   GI: Tolerating POs without any N/V/D/ABD PAIN.  CV: No CP/SOB  ENDO: + S&Sx of hypoglycemia  MEDS:  atorvastatin 20 milliGRAM(s) Oral at bedtime    insulin glargine Injectable (LANTUS) 6 Unit(s) SubCutaneous at bedtime  insulin lispro (HumaLOG) corrective regimen sliding scale   SubCutaneous three times a day before meals  insulin lispro (HumaLOG) corrective regimen sliding scale   SubCutaneous at bedtime  insulin lispro Injectable (HumaLOG) 3 Unit(s) SubCutaneous three times a day with meals      Allergies    No Known Allergies      PE:  General: Female lying in bed. NAD.   Vital Signs Last 24 Hrs  T(C): 36.5 (27 Jun 2018 06:20), Max: 36.9 (26 Jun 2018 21:33)  T(F): 97.7 (27 Jun 2018 06:20), Max: 98.4 (26 Jun 2018 21:33)  HR: 68 (27 Jun 2018 06:20) (67 - 78)  BP: 111/69 (27 Jun 2018 06:20) (111/69 - 131/68)  BP(mean): --  RR: 18 (27 Jun 2018 06:20) (16 - 18)  SpO2: 99% (27 Jun 2018 06:20) (99% - 100%)  Abd: Soft, NT,ND,   Extremities: Warm  Neuro: Alert. Poor historian. Appropriate to situation.    LABS:    POCT Blood Glucose.: 152 mg/dL (06-27-18 @ 08:45)  POCT Blood Glucose.: 128 mg/dL (06-27-18 @ 08:24)  POCT Blood Glucose.: 89 mg/dL (06-27-18 @ 08:07)  POCT Blood Glucose.: 62 mg/dL (06-27-18 @ 07:49)  POCT Blood Glucose.: 63 mg/dL (06-27-18 @ 07:47)  POCT Blood Glucose.: 182 mg/dL (06-26-18 @ 22:14)  POCT Blood Glucose.: 134 mg/dL (06-26-18 @ 16:52)  POCT Blood Glucose.: 194 mg/dL (06-26-18 @ 12:58)  POCT Blood Glucose.: 121 mg/dL (06-26-18 @ 11:47)  POCT Blood Glucose.: 147 mg/dL (06-26-18 @ 07:46)  POCT Blood Glucose.: 155 mg/dL (06-25-18 @ 21:50)  POCT Blood Glucose.: 243 mg/dL (06-25-18 @ 16:37)  POCT Blood Glucose.: 204 mg/dL (06-25-18 @ 11:54)  POCT Blood Glucose.: 152 mg/dL (06-25-18 @ 08:25)  POCT Blood Glucose.: 162 mg/dL (06-24-18 @ 21:50)  POCT Blood Glucose.: 203 mg/dL (06-24-18 @ 16:38)  POCT Blood Glucose.: 147 mg/dL (06-24-18 @ 12:16)          06-26    141  |  104  |  30<H>  ----------------------------<  203<H>  4.1   |  25  |  0.96    Ca    9.2      26 Jun 2018 06:35        Thyroid Function Tests:  06-19 @ 03:41 TSH 2.04 FreeT4 -- T3 -- Anti TPO -- Anti Thyroglobulin Ab -- TSI --      Hemoglobin A1C, Whole Blood: 14.1 % <H> [4.0 - 5.6] (06-20-18 @ 08:14)            Contact number: beeper [phone] or [phone]

## 2018-06-27 NOTE — PROGRESS NOTE ADULT - PROBLEM SELECTOR PROBLEM 1
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
Hypoglycemia
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

## 2018-06-27 NOTE — PROGRESS NOTE ADULT - ASSESSMENT
78 y/o F with HTN, HLD, uncontrolled DM2, here with hyperglycemia from home in setting of uncontrolled DM2n A1c 14% (high risk patient with high level decision-making).
76 y/o F with HTN, HLD, uncontrolled DM2, here with hyperglycemia from home in setting of uncontrolled DM2n A1c 14% (high risk patient with high level decision-making).
76 y/o F with HTN, HLD, uncontrolled DM2, here with hyperglycemia from home in setting of uncontrolled DM2n A1c 14%. Pt to be discharged to rehab. BG values improved while inpatient on present basal/bolus regimen w/out clear pattern to make further changes at this time. Pt likely insulin deficient and will require basal/bolus going forward. BG goal (100-180mg/dl).
76 y/o F with HTN, HLD, uncontrolled DM2, here with hyperglycemia from home in setting of uncontrolled DM2n A1c 14%. Pt w/fasting hypoglycemia this AM without clear reason as she was previously at goal on current basal insulin dose. Encouraged pt to eat a snack at bedtime when receiving Lantus. Team adjusted basal/bolus regimen. Pt long-term will need assistance w/insulin administration-recommend continue basal/bolus given high A1C, likely insulin deficiency. BG goal (100-200mg/dl).
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   FSG is better under control- 201, 83,92, 116
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   FSG is better under control- 201, 83,92, 116
76 y/o F with HTN, HLD, uncontrolled DM2, here with hyperglycemia from home in setting of uncontrolled DM2n A1c 14%.
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   FSG is better under control- 201, 83,92, 116

## 2018-06-27 NOTE — PROGRESS NOTE ADULT - PROVIDER SPECIALTY LIST ADULT
Endocrinology
Hospitalist
Endocrinology
Hospitalist

## 2018-06-27 NOTE — CHART NOTE - NSCHARTNOTEFT_GEN_A_CORE
patient seen. feels good. overnight had episode of asymptomatic hypoglycemia. regimen adjusted by me. sugars improved. stable for d/c to [facility] today. time spent coordinating d/c 45 minutes.

## 2018-06-27 NOTE — PROGRESS NOTE ADULT - PROBLEM SELECTOR PLAN 2
-UA abnormal however patient denies symptoms.  -S/p 1gm CTX, will continue to monitor off abx.  -UCx growing many organisms, likely contaminated.
-test BG AC/HS  -agree w/Lantus 6 units and Humalog 3 units w/meals for now. Previously at goal on humalog 5 units w/meals but given hypoglycemic event, agree with reduction for now  -c/w Humalog low correction scale AC and Low HS scale  -Can be discharged on present insulin regimen to rehab.   -Pt can f/u at [address] [facility] [address]. Has seen Dr Garcia in past [phone]   -discussed plan w/pt and medicine NP  pager: [phone]
Afebrile , asymptomatic  -UCx growing many organisms, likely contaminated.
Afebrile , asymptomatic  -UCx growing many organisms, likely contaminated.
-UA abnormal however patient denies symptoms.  -S/p 1gm CTX, will continue to monitor off abx.  -UCx growing many organisms, likely contaminated.
Afebrile , asymptomatic  -UCx growing many organisms, likely contaminated.

## 2018-06-27 NOTE — PROVIDER CONTACT NOTE (OTHER) - RECOMMENDATIONS
hypogylcemic protocol followed. hypogylcemic protocol followed. apple juice given will recheck fs in 15 mins

## 2018-06-27 NOTE — PROGRESS NOTE ADULT - PROBLEM SELECTOR PROBLEM 2
Pyuria
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
Pyuria

## 2018-07-20 NOTE — DISCHARGE NOTE ADULT - NS DC ANGIO PCI YN
Agree with triage note. Patient reports RUQ pain onset 3 days ago +N/V. States she thought her sleeping medication was causing symptoms so she stopped taking them. US and blood work completed 1 week ago that were negative. Patient reports no sleep for past 3 days. Denies urinary symptoms. Patient assessed via  on video.   
Pt presents with c/o right sided abdomen pain/flank pain. Pt's SO interpreting for triage. Pt's SO reports N/V. Pt has been evaluated by PMD per SO and had ultrasound and labs that were normal. Pt reports pain 9/10. Pt's SO informed RN pt worry about a reaction to medications that she has been taking for years and one that she has been taking for 3 months so she stopped taking the medications concerned that she is having a reaction.   
no

## 2019-05-05 ENCOUNTER — EMERGENCY (EMERGENCY)
Facility: HOSPITAL | Age: 79
LOS: 0 days | Discharge: HOME | End: 2019-05-06
Attending: STUDENT IN AN ORGANIZED HEALTH CARE EDUCATION/TRAINING PROGRAM
Payer: COMMERCIAL

## 2019-05-05 VITALS
SYSTOLIC BLOOD PRESSURE: 120 MMHG | OXYGEN SATURATION: 99 % | HEART RATE: 86 BPM | HEIGHT: 63 IN | DIASTOLIC BLOOD PRESSURE: 60 MMHG | WEIGHT: 119.93 LBS | TEMPERATURE: 98 F | RESPIRATION RATE: 17 BRPM

## 2019-05-05 DIAGNOSIS — E11.9 TYPE 2 DIABETES MELLITUS WITHOUT COMPLICATIONS: ICD-10-CM

## 2019-05-05 DIAGNOSIS — R00.2 PALPITATIONS: ICD-10-CM

## 2019-05-05 DIAGNOSIS — Z98.891 HISTORY OF UTERINE SCAR FROM PREVIOUS SURGERY: Chronic | ICD-10-CM

## 2019-05-05 DIAGNOSIS — Z79.4 LONG TERM (CURRENT) USE OF INSULIN: ICD-10-CM

## 2019-05-05 DIAGNOSIS — N39.0 URINARY TRACT INFECTION, SITE NOT SPECIFIED: ICD-10-CM

## 2019-05-05 DIAGNOSIS — F41.9 ANXIETY DISORDER, UNSPECIFIED: ICD-10-CM

## 2019-05-05 LAB
ALBUMIN SERPL ELPH-MCNC: 3.4 G/DL — SIGNIFICANT CHANGE UP (ref 3.3–5)
ALP SERPL-CCNC: 88 U/L — SIGNIFICANT CHANGE UP (ref 40–120)
ALT FLD-CCNC: 21 U/L — SIGNIFICANT CHANGE UP (ref 12–78)
ANION GAP SERPL CALC-SCNC: 8 MMOL/L — SIGNIFICANT CHANGE UP (ref 5–17)
APPEARANCE UR: CLEAR — SIGNIFICANT CHANGE UP
AST SERPL-CCNC: 19 U/L — SIGNIFICANT CHANGE UP (ref 15–37)
BACTERIA # UR AUTO: ABNORMAL
BASOPHILS # BLD AUTO: 0.02 K/UL — SIGNIFICANT CHANGE UP (ref 0–0.2)
BASOPHILS NFR BLD AUTO: 0.3 % — SIGNIFICANT CHANGE UP (ref 0–2)
BILIRUB SERPL-MCNC: 0.2 MG/DL — SIGNIFICANT CHANGE UP (ref 0.2–1.2)
BILIRUB UR-MCNC: NEGATIVE — SIGNIFICANT CHANGE UP
BUN SERPL-MCNC: 31 MG/DL — HIGH (ref 7–23)
CALCIUM SERPL-MCNC: 9 MG/DL — SIGNIFICANT CHANGE UP (ref 8.5–10.1)
CHLORIDE SERPL-SCNC: 107 MMOL/L — SIGNIFICANT CHANGE UP (ref 96–108)
CO2 SERPL-SCNC: 27 MMOL/L — SIGNIFICANT CHANGE UP (ref 22–31)
COLOR SPEC: YELLOW — SIGNIFICANT CHANGE UP
COMMENT - URINE: SIGNIFICANT CHANGE UP
CREAT SERPL-MCNC: 1.07 MG/DL — SIGNIFICANT CHANGE UP (ref 0.5–1.3)
DIFF PNL FLD: NEGATIVE — SIGNIFICANT CHANGE UP
EOSINOPHIL # BLD AUTO: 0.1 K/UL — SIGNIFICANT CHANGE UP (ref 0–0.5)
EOSINOPHIL NFR BLD AUTO: 1.7 % — SIGNIFICANT CHANGE UP (ref 0–6)
EPI CELLS # UR: SIGNIFICANT CHANGE UP
GLUCOSE SERPL-MCNC: 184 MG/DL — HIGH (ref 70–99)
GLUCOSE UR QL: 250 MG/DL
HCT VFR BLD CALC: 31.9 % — LOW (ref 34.5–45)
HGB BLD-MCNC: 9.8 G/DL — LOW (ref 11.5–15.5)
HYALINE CASTS # UR AUTO: ABNORMAL /LPF
IMM GRANULOCYTES NFR BLD AUTO: 0.2 % — SIGNIFICANT CHANGE UP (ref 0–1.5)
KETONES UR-MCNC: NEGATIVE — SIGNIFICANT CHANGE UP
LEUKOCYTE ESTERASE UR-ACNC: ABNORMAL
LYMPHOCYTES # BLD AUTO: 2.06 K/UL — SIGNIFICANT CHANGE UP (ref 1–3.3)
LYMPHOCYTES # BLD AUTO: 35.8 % — SIGNIFICANT CHANGE UP (ref 13–44)
MCHC RBC-ENTMCNC: 23.4 PG — LOW (ref 27–34)
MCHC RBC-ENTMCNC: 30.7 GM/DL — LOW (ref 32–36)
MCV RBC AUTO: 76.3 FL — LOW (ref 80–100)
MONOCYTES # BLD AUTO: 0.42 K/UL — SIGNIFICANT CHANGE UP (ref 0–0.9)
MONOCYTES NFR BLD AUTO: 7.3 % — SIGNIFICANT CHANGE UP (ref 2–14)
NEUTROPHILS # BLD AUTO: 3.15 K/UL — SIGNIFICANT CHANGE UP (ref 1.8–7.4)
NEUTROPHILS NFR BLD AUTO: 54.7 % — SIGNIFICANT CHANGE UP (ref 43–77)
NITRITE UR-MCNC: NEGATIVE — SIGNIFICANT CHANGE UP
NRBC # BLD: 0 /100 WBCS — SIGNIFICANT CHANGE UP (ref 0–0)
PH UR: 5 — SIGNIFICANT CHANGE UP (ref 5–8)
PLATELET # BLD AUTO: 208 K/UL — SIGNIFICANT CHANGE UP (ref 150–400)
POTASSIUM SERPL-MCNC: 4.2 MMOL/L — SIGNIFICANT CHANGE UP (ref 3.5–5.3)
POTASSIUM SERPL-SCNC: 4.2 MMOL/L — SIGNIFICANT CHANGE UP (ref 3.5–5.3)
PROT SERPL-MCNC: 6.9 GM/DL — SIGNIFICANT CHANGE UP (ref 6–8.3)
PROT UR-MCNC: 30 MG/DL
RBC # BLD: 4.18 M/UL — SIGNIFICANT CHANGE UP (ref 3.8–5.2)
RBC # FLD: 14.9 % — HIGH (ref 10.3–14.5)
RBC CASTS # UR COMP ASSIST: ABNORMAL /HPF (ref 0–4)
SODIUM SERPL-SCNC: 142 MMOL/L — SIGNIFICANT CHANGE UP (ref 135–145)
SP GR SPEC: 1.01 — SIGNIFICANT CHANGE UP (ref 1.01–1.02)
TROPONIN I SERPL-MCNC: <.015 NG/ML — SIGNIFICANT CHANGE UP (ref 0.01–0.04)
UROBILINOGEN FLD QL: NEGATIVE MG/DL — SIGNIFICANT CHANGE UP
WBC # BLD: 5.76 K/UL — SIGNIFICANT CHANGE UP (ref 3.8–10.5)
WBC # FLD AUTO: 5.76 K/UL — SIGNIFICANT CHANGE UP (ref 3.8–10.5)
WBC UR QL: ABNORMAL

## 2019-05-05 PROCEDURE — 71045 X-RAY EXAM CHEST 1 VIEW: CPT | Mod: 26

## 2019-05-05 PROCEDURE — 93010 ELECTROCARDIOGRAM REPORT: CPT

## 2019-05-05 PROCEDURE — 99285 EMERGENCY DEPT VISIT HI MDM: CPT

## 2019-05-05 RX ORDER — CEFPODOXIME PROXETIL 100 MG
200 TABLET ORAL ONCE
Qty: 0 | Refills: 0 | Status: COMPLETED | OUTPATIENT
Start: 2019-05-05 | End: 2019-05-05

## 2019-05-05 RX ADMIN — Medication 200 MILLIGRAM(S): at 23:35

## 2019-05-05 NOTE — ED PROVIDER NOTE - CARE PLAN
Principal Discharge DX:	Palpitations  Secondary Diagnosis:	Anxiety Principal Discharge DX:	Palpitations  Secondary Diagnosis:	Anxiety  Secondary Diagnosis:	UTI (urinary tract infection)

## 2019-05-05 NOTE — ED PROVIDER NOTE - CONSTITUTIONAL, MLM
normal... Well appearing, well nourished, awake, alert, oriented to person, place, time/situation, in no apparent distress

## 2019-05-05 NOTE — ED ADULT NURSE NOTE - MUSCULOSKELETAL WDL
PSR-patient needs follow-up appointment in 6 weeks Full range of motion of upper and lower extremities, no joint tenderness/swelling.

## 2019-05-05 NOTE — ED PROVIDER NOTE - PROGRESS NOTE DETAILS
pt is in nsr, able to eat, she did call her son who told her he would pick her up, she does not know what time, Pt endorsed by Dr. Dean present for eval palpitations.  VSS in NAD.  Labs neg for acute pathology.  Pt sleeping comfortably, will continue to monitor & observe until family  Pt observed in ED all night, no acute events.  Son did not arrive to  pt, attempted to contact son [phone], no answer.  Patient care transitioned to incoming team.  All decisions regarding the progression of care will be made at their discretion. Pt was seen and treated by Dr. Meadows singed out to me. Pt is speaking in clear full sentences sts she felt heart racing and thought her blood pressure was high called the ems. Pt sts her son supposed to come pick her up and she has a home attendant in AM. Pt denies headache, dizziness, focal/distal weakness or numbness, sob, chest pain, nausea, vomiting, abd pain. SW will be called. Daughter Dayna [phone] is called and no answer. both [phone] and [phone] are called and left message again no answer. pt has been alert and oriented and ambulating with normal gaits  was here eval and called pt's aid who is at house waiting for pt. Pt denies headache, dizziness, focal/distal weakness or numbness, cough, sob, chest pain, palpitations, nausea, vomiting, fever, chills, abd pain.

## 2019-05-05 NOTE — ED PROVIDER NOTE - OBJECTIVE STATEMENT
78 year old female presents today biba from home c/o palpitations, pt states that she lives alone and when 78 year old female presents today biba from home c/o palpitations, pt states that she lives alone and when she felt her palpitations she did call her son who told her he would pick her up after work, she states that she became nervous because she was by herself and afraid if anything happened she would not be able to help herself (-) chest pain (-) sob (-) dizzy or lightheaded (-) nausea or vomiting (-) generalized weakness

## 2019-05-06 VITALS
TEMPERATURE: 98 F | SYSTOLIC BLOOD PRESSURE: 125 MMHG | HEART RATE: 77 BPM | RESPIRATION RATE: 17 BRPM | DIASTOLIC BLOOD PRESSURE: 62 MMHG | OXYGEN SATURATION: 99 %

## 2019-05-06 RX ORDER — CEFPODOXIME PROXETIL 100 MG
1 TABLET ORAL
Qty: 20 | Refills: 0
Start: 2019-05-06 | End: 2019-05-15

## 2019-05-06 NOTE — ED ADULT NURSE REASSESSMENT NOTE - NS ED NURSE REASSESS COMMENT FT1
Pt a&o x3, extensive treatment for home medications, Iv removed pt dressed, ion wheelchair to lobby with ER debra Stewart for [employer]  set up marshall loja,

## 2019-05-06 NOTE — ED ADULT NURSE REASSESSMENT NOTE - NS ED NURSE REASSESS COMMENT FT1
pt d/c to home, as per social work, Tasha, pt live at home with an AID, and family comes by to check on patient . pt d/c to home by [facility]IX[facility] [place] while at lunch.

## 2019-05-07 LAB
CULTURE RESULTS: SIGNIFICANT CHANGE UP
SPECIMEN SOURCE: SIGNIFICANT CHANGE UP

## 2019-06-04 ENCOUNTER — EMERGENCY (EMERGENCY)
Facility: HOSPITAL | Age: 79
LOS: 1 days | Discharge: HOME | End: 2019-06-04
Attending: EMERGENCY MEDICINE
Payer: COMMERCIAL

## 2019-06-04 VITALS
DIASTOLIC BLOOD PRESSURE: 80 MMHG | HEART RATE: 86 BPM | TEMPERATURE: 99 F | RESPIRATION RATE: 16 BRPM | OXYGEN SATURATION: 100 % | SYSTOLIC BLOOD PRESSURE: 170 MMHG

## 2019-06-04 DIAGNOSIS — Z98.891 HISTORY OF UTERINE SCAR FROM PREVIOUS SURGERY: Chronic | ICD-10-CM

## 2019-06-04 PROCEDURE — 99284 EMERGENCY DEPT VISIT MOD MDM: CPT

## 2019-06-04 NOTE — ED ADULT NURSE REASSESSMENT NOTE - NS ED NURSE REASSESS COMMENT FT1
Pt received laying on stretcher awake and alert. Vitals taken. Pt able to answer orientation questions appropriately and states the same chief complaint as reported by EMS. Pt made aware of plan of care. Glucose taken, 177. Awaiting CT head scan. Pt laying comfortably with no c/o at this time across from [facility] nursing station.

## 2019-06-04 NOTE — ED ADULT NURSE NOTE - CINV DISCH MEDS REVIEWED YN
all discharge instructions given to patient's daughter by Dr. Benavidez over the telephone.  Daughter in ER to pick pt up./Yes

## 2019-06-04 NOTE — ED ADULT TRIAGE NOTE - CHIEF COMPLAINT QUOTE
pt biba with AMS.  as per ems, pt was found at the police station.  unknown baseline mental status.  unknown last known well.  no obvious injuries/trauma.   in the field. pt biba with AMS.  as per ems, pt was found at the police station. pt states she came to [place] from [place] to visit a friend but had the wrong address.  unknown baseline mental status.  no obvious injuries/trauma.   in the field.

## 2019-06-04 NOTE — ED PROVIDER NOTE - CLINICAL SUMMARY MEDICAL DECISION MAKING FREE TEXT BOX
79 y/o F with hx DM, HTN[place] [facility] [place], presented to [facility], was intending to see friend, plan to do CT head, labs, stabilize medically, possible DC in morning with son.

## 2019-06-04 NOTE — ED ADULT NURSE NOTE - NSIMPLEMENTINTERV_GEN_ALL_ED
Implemented All Fall Risk Interventions:  [place] to call system. Call bell, personal items and telephone within reach. Instruct patient to call for assistance. Room bathroom lighting operational. Non-slip footwear when patient is off stretcher. Physically safe environment: no spills, clutter or unnecessary equipment. Stretcher in lowest position, wheels locked, appropriate side rails in place. Provide visual cue, wrist band, yellow gown, etc. Monitor gait and stability. Monitor for mental status changes and reorient to person, place, and time. Review medications for side effects contributing to fall risk. Reinforce activity limits and safety measures with patient and family.

## 2019-06-04 NOTE — ED ADULT NURSE NOTE - CHIEF COMPLAINT QUOTE
pt biba with AMS.  as per ems, pt was found at the police station. pt states she came to [place] from [place] to visit a friend but had the wrong address.  unknown baseline mental status.  no obvious injuries/trauma.   in the field.

## 2019-06-04 NOTE — ED PROVIDER NOTE - PROGRESS NOTE DETAILS
patient monitored in ed, recd abox, plan to wait for son , for dc, signed out to am ed attedning. patient slept comfortable, stay was uneventful daughter cam eto , amox sent to pharmacy for uti, discharged home

## 2019-06-04 NOTE — CHART NOTE - NSCHARTNOTEFT_GEN_A_CORE
SW Note - Dr Goyal requested SW consult - pt with some AMS Lives at [address] [place] [address] [address] spoke to her son Phoenix Concepcion [phone] who lives in [place] and is unable to arrive to [facility] until the AM. pt Dtr Jaspreet (Jose) [phone] SW left msg - and other dtr Abigail's phone is not listed (one in chart is not correct) pt has dementia at baseline according to son - pt has HHA and is not supposed to be on Train/Bus etc. pt reports having a friend who lives on [address] but son states that he is not aware of any friend out in [place].

## 2019-06-04 NOTE — ED ADULT NURSE NOTE - OBJECTIVE STATEMENT
pt biba with AMS.  as per ems, pt was found at the police station. pt states she came to [place] from [place] to visit a friend but had the wrong address.  unknown baseline mental status.  no obvious injuries/trauma.   in the field.    Pt states she took a train and a cab to visit her friend but the  couldn't find her friends address so  brought pt to police station. Police station brought patient here. Pt states no complaints at this time. Pt is able to answer orientation questions appropriately. A/Ox4 but [place]. Only stated history is DM and left eye glaucoma.

## 2019-06-04 NOTE — ED PROVIDER NOTE - NOTES
discussed pt's hx with Social Work, Social Work saw pt in ED, was able to reach pt's son, pt was admitted to [facility] one month ago, medications reviewed.

## 2019-06-04 NOTE — ED PROVIDER NOTE - OBJECTIVE STATEMENT
Hx source: pt  79 y/o F pt with PMHx DM,  presents to the ED for AMS today.  Pt states she was going to her friend's house today, got lost, does not have friend's phone number, pt went to the police station to ask for directions, but was then told she was not allowed to leave on her own.  Pt was then brought into the ED.  Pt has no medical complaints at this time.  Pt lives by herself, has a home health aide.  States she is compliant with her medications, took insulin this morning.  Denies CP.  No further acute complaints at this time. Hx source: pt  77 y/o F pt with PMHx DM,  presents to the ED for AMS today.  Pt states she was going to her friend's house today, was taking the bus and then got lost.  She does not have friend's phone number, she went to the police station to ask for directions, but was then told she was not allowed to leave on her own.  Pt was then brought into the ED.  She has no medical complaints at this time.  Pt lives at home alone.  States she is compliant with her medications, took insulin this morning.  Denies CP.  No further acute complaints at this time. Hx source: pt  77 y/o F pt with PMHx DM,  presents to the ED for AMS today.  Pt states she was going to her friend's house today, was taking the bus and then got lost.  She does not have friend's phone number, she went to the police station to ask for directions, but was then told she was not allowed to leave on her own.  Pt was then brought into the ED.  She has no medical complaints at this time.  Pt lives at home alone, has a home health aide.  States she is compliant with her medications, took insulin this morning.  Denies CP.  No further acute complaints at this time.

## 2019-06-04 NOTE — ED PROVIDER NOTE - PHYSICAL EXAMINATION
Constitutional : Appears comfortably, talking in full sentences  Head :NC AT , no swelling  Eyes :eomi, no swelling  Mouth :mm moist,  Neck : supple, trachea in midline  Chest :Nilesh air entry, symm chest expansion, no distress  Heart :S1 S2 distant  Abdomen :abd soft, non tender  Musc/Skel :ext no swelling, no deformity, no spine tenderness, distal pulses present  Neuro  :AAO 3 no focal deficits Constitutional : Appears comfortably, talking in full sentences  Head :NC AT , no swelling  Eyes :eomi, no swelling  Mouth :mm moist,  Neck : supple, trachea in midline  Chest :Nilesh air entry, symm chest expansion, no distress  Heart :S1 S2 distant  Abdomen :abd soft, non tender  Musc/Skel :ext no swelling, no deformity, no spine tenderness, distal pulses present  Neuro  :AAO 1, follows commands  motor nilesh upper and lower ext 5/5  sensory symm and intact  CN 2-12 grossly intact  no ataxia, no nystagmus  finger to nose, symm nilesh, no pronator drift

## 2019-06-05 VITALS
OXYGEN SATURATION: 100 % | DIASTOLIC BLOOD PRESSURE: 74 MMHG | RESPIRATION RATE: 17 BRPM | HEART RATE: 65 BPM | SYSTOLIC BLOOD PRESSURE: 153 MMHG

## 2019-06-05 LAB
ALBUMIN SERPL ELPH-MCNC: 4.1 G/DL — SIGNIFICANT CHANGE UP (ref 3.3–5.2)
ALP SERPL-CCNC: 131 U/L — HIGH (ref 40–120)
ALT FLD-CCNC: 15 U/L — SIGNIFICANT CHANGE UP
ANION GAP SERPL CALC-SCNC: 12 MMOL/L — SIGNIFICANT CHANGE UP (ref 5–17)
APPEARANCE UR: CLEAR — SIGNIFICANT CHANGE UP
AST SERPL-CCNC: 21 U/L — SIGNIFICANT CHANGE UP
BASOPHILS # BLD AUTO: 0 K/UL — SIGNIFICANT CHANGE UP (ref 0–0.2)
BASOPHILS NFR BLD AUTO: 0.2 % — SIGNIFICANT CHANGE UP (ref 0–2)
BILIRUB SERPL-MCNC: <0.2 MG/DL — LOW (ref 0.4–2)
BILIRUB UR-MCNC: NEGATIVE — SIGNIFICANT CHANGE UP
BUN SERPL-MCNC: 19 MG/DL — SIGNIFICANT CHANGE UP (ref 8–20)
CALCIUM SERPL-MCNC: 9.4 MG/DL — SIGNIFICANT CHANGE UP (ref 8.6–10.2)
CHLORIDE SERPL-SCNC: 100 MMOL/L — SIGNIFICANT CHANGE UP (ref 98–107)
CO2 SERPL-SCNC: 26 MMOL/L — SIGNIFICANT CHANGE UP (ref 22–29)
COLOR SPEC: YELLOW — SIGNIFICANT CHANGE UP
CREAT SERPL-MCNC: 0.9 MG/DL — SIGNIFICANT CHANGE UP (ref 0.5–1.3)
DIFF PNL FLD: ABNORMAL
EOSINOPHIL # BLD AUTO: 0.1 K/UL — SIGNIFICANT CHANGE UP (ref 0–0.5)
EOSINOPHIL NFR BLD AUTO: 1.5 % — SIGNIFICANT CHANGE UP (ref 0–6)
EPI CELLS # UR: SIGNIFICANT CHANGE UP
ERYTHROCYTE [SEDIMENTATION RATE] IN BLOOD: 15 MM/HR — SIGNIFICANT CHANGE UP (ref 0–20)
GLUCOSE SERPL-MCNC: 195 MG/DL — HIGH (ref 70–115)
GLUCOSE UR QL: 100 MG/DL
HCT VFR BLD CALC: 33.7 % — LOW (ref 37–47)
HGB BLD-MCNC: 10.5 G/DL — LOW (ref 12–16)
KETONES UR-MCNC: NEGATIVE — SIGNIFICANT CHANGE UP
LEUKOCYTE ESTERASE UR-ACNC: ABNORMAL
LYMPHOCYTES # BLD AUTO: 2.4 K/UL — SIGNIFICANT CHANGE UP (ref 1–4.8)
LYMPHOCYTES # BLD AUTO: 52 % — SIGNIFICANT CHANGE UP (ref 20–55)
MAGNESIUM SERPL-MCNC: 1.7 MG/DL — SIGNIFICANT CHANGE UP (ref 1.6–2.6)
MCHC RBC-ENTMCNC: 23.4 PG — LOW (ref 27–31)
MCHC RBC-ENTMCNC: 31.2 G/DL — LOW (ref 32–36)
MCV RBC AUTO: 75.1 FL — LOW (ref 81–99)
MONOCYTES # BLD AUTO: 0.3 K/UL — SIGNIFICANT CHANGE UP (ref 0–0.8)
MONOCYTES NFR BLD AUTO: 6.5 % — SIGNIFICANT CHANGE UP (ref 3–10)
NEUTROPHILS # BLD AUTO: 1.8 K/UL — SIGNIFICANT CHANGE UP (ref 1.8–8)
NEUTROPHILS NFR BLD AUTO: 39.8 % — SIGNIFICANT CHANGE UP (ref 37–73)
NITRITE UR-MCNC: NEGATIVE — SIGNIFICANT CHANGE UP
PH UR: 6.5 — SIGNIFICANT CHANGE UP (ref 5–8)
PLATELET # BLD AUTO: 224 K/UL — SIGNIFICANT CHANGE UP (ref 150–400)
POTASSIUM SERPL-MCNC: 4.1 MMOL/L — SIGNIFICANT CHANGE UP (ref 3.5–5.3)
POTASSIUM SERPL-SCNC: 4.1 MMOL/L — SIGNIFICANT CHANGE UP (ref 3.5–5.3)
PROT SERPL-MCNC: 6.8 G/DL — SIGNIFICANT CHANGE UP (ref 6.6–8.7)
PROT UR-MCNC: 100 MG/DL
RBC # BLD: 4.49 M/UL — SIGNIFICANT CHANGE UP (ref 4.4–5.2)
RBC # FLD: 14.6 % — SIGNIFICANT CHANGE UP (ref 11–15.6)
RBC CASTS # UR COMP ASSIST: SIGNIFICANT CHANGE UP /HPF (ref 0–4)
SODIUM SERPL-SCNC: 138 MMOL/L — SIGNIFICANT CHANGE UP (ref 135–145)
SP GR SPEC: 1.01 — SIGNIFICANT CHANGE UP (ref 1.01–1.02)
TSH SERPL-MCNC: 1.97 UIU/ML — SIGNIFICANT CHANGE UP (ref 0.27–4.2)
UROBILINOGEN FLD QL: NEGATIVE MG/DL — SIGNIFICANT CHANGE UP
WBC # BLD: 4.6 K/UL — LOW (ref 4.8–10.8)
WBC # FLD AUTO: 4.6 K/UL — LOW (ref 4.8–10.8)
WBC UR QL: ABNORMAL

## 2019-06-05 PROCEDURE — 85027 COMPLETE CBC AUTOMATED: CPT

## 2019-06-05 PROCEDURE — 82962 GLUCOSE BLOOD TEST: CPT

## 2019-06-05 PROCEDURE — 36415 COLL VENOUS BLD VENIPUNCTURE: CPT

## 2019-06-05 PROCEDURE — 99284 EMERGENCY DEPT VISIT MOD MDM: CPT | Mod: 25

## 2019-06-05 PROCEDURE — 70450 CT HEAD/BRAIN W/O DYE: CPT | Mod: 26

## 2019-06-05 PROCEDURE — 70450 CT HEAD/BRAIN W/O DYE: CPT

## 2019-06-05 PROCEDURE — 86780 TREPONEMA PALLIDUM: CPT

## 2019-06-05 PROCEDURE — 85652 RBC SED RATE AUTOMATED: CPT

## 2019-06-05 PROCEDURE — 83735 ASSAY OF MAGNESIUM: CPT

## 2019-06-05 PROCEDURE — 81001 URINALYSIS AUTO W/SCOPE: CPT

## 2019-06-05 PROCEDURE — 80053 COMPREHEN METABOLIC PANEL: CPT

## 2019-06-05 PROCEDURE — 84443 ASSAY THYROID STIM HORMONE: CPT

## 2019-06-05 RX ORDER — AMOXICILLIN 250 MG/5ML
500 SUSPENSION, RECONSTITUTED, ORAL (ML) ORAL THREE TIMES A DAY
Refills: 0 | Status: DISCONTINUED | OUTPATIENT
Start: 2019-06-05 | End: 2019-06-09

## 2019-06-05 RX ORDER — AMOXICILLIN 250 MG/5ML
1 SUSPENSION, RECONSTITUTED, ORAL (ML) ORAL
Qty: 30 | Refills: 0
Start: 2019-06-05 | End: 2019-06-14

## 2019-06-05 RX ADMIN — Medication 500 MILLIGRAM(S): at 13:25

## 2019-06-05 NOTE — ED ADULT NURSE REASSESSMENT NOTE - NS ED NURSE REASSESS COMMENT FT1
Pt sleeping comfortably on stretcher in front of zone A nursing station. All orders complete. Awaiting further MD orders.

## 2019-06-05 NOTE — CHART NOTE - NSCHARTNOTEFT_GEN_A_CORE
SOCIAL WORK NOTE:  THIS WORKER MADE SEVERAL ATTEMPTS TO REACH OUT TO THE SON AT APPROXIMATELY 11 AM WHEN RN NOTIFIED THIS WORKER NO FAMILY HAS COME TO GET THE PATIENT.  PLACED CALL TO SON- ZEYAD # [phone] WHICH RESULTED IN SEVERAL FAILED ATTEMPT.  ATTEMPTED TO CALL THE KARENUTHER- BRITTANY # [phone] WHICH RESULTED IN SEVERAL FAILED ATTEMPTS.  PLACED CALL TO [place] [employer] FOR ASSIST # [phone].  THEY MADE SEVERAL ATTEMPTS TO GO TO HER APARTMENT AND THERE WAS NOONE THERE AND THEY ALSO VEIRFIED WITH 5 OTHER NEIGHBORS THAT SHE LIVES ALONE AND THEY DO NOT KNOW ENOUGH ABOUT PATIENT TO HELP.  PATIENT WAS UNABLE TO DISCLOSE TO ME WHERE HER CHILDREN LIVE IN ORDER FOR THIS WORKER TO TRY AND SEND POLICE. AT APPROXIMATELY 2 PM, THIS WORKER SAW PATIENT BEING ESCORTED OUT OF THE ED BY A FAMILY MEMBER AND WAS DISCHARGED.

## 2019-06-06 LAB — T PALLIDUM AB TITR SER: NEGATIVE — SIGNIFICANT CHANGE UP

## 2019-09-08 NOTE — ED ADULT NURSE NOTE - NS ED NOTE  TALK SOMEONE YN
"Urology consult note, follow up  Reason for ongoing consultation: left ureteral stone    Overnight Events: None    S: c/o of left flank pain  HPI: transferred with finding of obstructing left ureteral stone and infection. Denies other recent urologic history. Imaging with 4mm distal left ureteral stone.   O:   BP (!) 99/62   Pulse (!) 111   Temp 37.4 °C (99.4 °F) (Temporal)   Resp 16   Ht 1.727 m (5' 8\")   Wt 64.5 kg (142 lb 3.2 oz)   SpO2 99%   Recent Labs     09/07/19  2230   SODIUM 134*   POTASSIUM 3.2*   CHLORIDE 99   CO2 25   GLUCOSE 114*   BUN 12   CREATININE 0.7   CALCIUM 9.0     Recent Labs     09/07/19  2230   WBC 19.1*   RBC 4.81   HEMOGLOBIN 15.4   HEMATOCRIT 44.2   MCV 91.9   MCH 32.0   MCHC 34.8   RDW 12.1   PLATELETCT 383   MPV 9.3         Exam:    Lungs, clear  Abdomen soft, benign.  Extrem no edema  A/P:    Active Hospital Problems    Diagnosis   • Sepsis (HCC) [A41.9]     Priority: High   • Pyelonephritis [N12]     Priority: High   • Ureteral stone with hydronephrosis [N13.2]     Priority: High   • Gram-negative bacteremia [R78.81]     Priority: High   • Polysubstance abuse (HCC) [F19.10]     Priority: Medium   • Hypokalemia [E87.6]     Priority: Medium   • Alcohol use [Z78.9]     Priority: Medium   • Seizure (HCC) [R56.9]     Priority: Medium   • Tobacco use [Z72.0]     Priority: Low   • Psychiatric disorder [F99]     Priority: Low       Guarded. Stone. Urosepsis. To OR for ureteroscopy, laser lithotripsy, stent  New recommendations:       "
No

## 2019-09-30 NOTE — ED ADULT NURSE NOTE - NS ED NURSE LEVEL OF CONSCIOUSNESS ORIENTATION
Have You Had Microneedling Treatments Before?: has had a previous microneedling treatment
Oriented - self; Oriented - place; Oriented - time

## 2019-11-01 ENCOUNTER — EMERGENCY (EMERGENCY)
Facility: HOSPITAL | Age: 79
LOS: 1 days | Discharge: STILL ADMITTED OR ADMINISTRATIVE | End: 2019-11-01
Attending: EMERGENCY MEDICINE | Admitting: EMERGENCY MEDICINE
Payer: COMMERCIAL

## 2019-11-01 VITALS
WEIGHT: 98.11 LBS | HEART RATE: 76 BPM | HEIGHT: 61 IN | DIASTOLIC BLOOD PRESSURE: 61 MMHG | SYSTOLIC BLOOD PRESSURE: 113 MMHG | OXYGEN SATURATION: 100 % | TEMPERATURE: 98 F | RESPIRATION RATE: 18 BRPM

## 2019-11-01 DIAGNOSIS — Z98.891 HISTORY OF UTERINE SCAR FROM PREVIOUS SURGERY: Chronic | ICD-10-CM

## 2019-11-01 PROCEDURE — 99285 EMERGENCY DEPT VISIT HI MDM: CPT

## 2019-11-01 PROCEDURE — 93010 ELECTROCARDIOGRAM REPORT: CPT

## 2019-11-01 RX ORDER — SODIUM CHLORIDE 9 MG/ML
2000 INJECTION INTRAMUSCULAR; INTRAVENOUS; SUBCUTANEOUS ONCE
Refills: 0 | Status: COMPLETED | OUTPATIENT
Start: 2019-11-01 | End: 2019-11-01

## 2019-11-01 NOTE — ED PROVIDER NOTE - PHYSICAL EXAMINATION
Gen: NAD, AOx2- confused to date, speaking clearly in coherent sentences  Head: NCAT  HEENT: PERRL, EOMI, oral mucosa moist, normal conjunctiva, neck supple  Lung: CTAB, no respiratory distress  CV: rrr, no murmur, Normal perfusion  Abd: soft, NTND  MSK: No edema, no visible deformities  Neuro: No focal neurologic deficits, CN II-XII intact, 5/5 global strength, sensation intact  Skin: No rash   Psych: normal affect

## 2019-11-01 NOTE — ED PROVIDER NOTE - PATIENT PORTAL LINK FT
You can access the FollowMyHealth Patient Portal offered by [facility] by registering at the following website: [email or web address]. By joining [employer]’s FollowMyHealth portal, you will also be able to view your health information using other applications (apps) compatible with our system.

## 2019-11-01 NOTE — ED PROVIDER NOTE - PROGRESS NOTE DETAILS
spoke to daughter chandler, patient with dementia, uncontrolled dm, legally blind,   worsening dementia , family unable to managem plan a psych consult, and re evaluation, labs a ct review, signed out to am ed attending, spoke to daughter chandler, patient with dementia, uncontrolled dm, legally blind, no urinary ssx  worsening dementia , family unable to managem plan a psych consult, and re evaluation, labs a ct review, signed out to am ed attending, spoke to daughter chandler, patient with dementia, uncontrolled dm, legally blind, no urinary ssx  worsening dementia , family unable to managem plan a psych consult, and re evaluation, labs a ct review, signed out to dr yin Reema CONNOR- pt sen by psych and plan to discharge home with son As per sign-out from Dr. Benavidez, patient with hx of dementia and DM whom is awaiting her son to pick her up for over 34 hours. We discussed admission at discharge however, patient has PANCHITO performed and will hopefully be placed tommorow. If not patient will be admitted.

## 2019-11-01 NOTE — ED PROVIDER NOTE - CARE PLAN
Principal Discharge DX:	Agitation  Secondary Diagnosis:	Dementia Principal Discharge DX:	Agitation  Secondary Diagnosis:	Dementia  Secondary Diagnosis:	Asymptomatic bacteriuria

## 2019-11-01 NOTE — ED PROVIDER NOTE - CLINICAL SUMMARY MEDICAL DECISION MAKING FREE TEXT BOX
patient with AMS, improved but still not herself per ledy.r no known trauma. no infectious sx. lives alone. FS elevated. will r/o metabolic/infectious/traumatic etiology. likely admit, psych eval

## 2019-11-01 NOTE — ED ADULT TRIAGE NOTE - CHIEF COMPLAINT QUOTE
as per daughter pt was acting paranoid, pt was hiding thought ambulance was coming to get her, then she was hiding from her daughter because she thought her daughter was taking her to the hospital to be committed, pt has dementia and her vision got worse since last night

## 2019-11-01 NOTE — ED PROVIDER NOTE - OBJECTIVE STATEMENT
80yo F with AMS, per daughter find ~3 days ago, h/o mild dementia, DM, today she was saying she was hiding from the  who wanted to take her to the hospital to euthanize her, then she tried to run away from her daughter and hide. FS in 450s, patient states took her metformin but not her insulin, confused to date. no complaints. no CPSOB. no falls. no urinary sx. no other missed medications. no alcohol/drug use.

## 2019-11-02 DIAGNOSIS — F02.81 DEMENTIA IN OTHER DISEASES CLASSIFIED ELSEWHERE, UNSPECIFIED SEVERITY, WITH BEHAVIORAL DISTURBANCE: ICD-10-CM

## 2019-11-02 DIAGNOSIS — R69 ILLNESS, UNSPECIFIED: ICD-10-CM

## 2019-11-02 DIAGNOSIS — F01.51 VASCULAR DEMENTIA, UNSPECIFIED SEVERITY, WITH BEHAVIORAL DISTURBANCE: ICD-10-CM

## 2019-11-02 LAB
ALBUMIN SERPL ELPH-MCNC: 3.9 G/DL — SIGNIFICANT CHANGE UP (ref 3.3–5.2)
ALP SERPL-CCNC: 63 U/L — SIGNIFICANT CHANGE UP (ref 40–120)
ALT FLD-CCNC: 10 U/L — SIGNIFICANT CHANGE UP
ANION GAP SERPL CALC-SCNC: 12 MMOL/L — SIGNIFICANT CHANGE UP (ref 5–17)
APAP SERPL-MCNC: <7.5 UG/ML — LOW (ref 10–26)
APPEARANCE UR: CLEAR — SIGNIFICANT CHANGE UP
APTT BLD: 30.6 SEC — SIGNIFICANT CHANGE UP (ref 27.5–36.3)
AST SERPL-CCNC: 21 U/L — SIGNIFICANT CHANGE UP
BASE EXCESS BLDV CALC-SCNC: 0.2 MMOL/L — SIGNIFICANT CHANGE UP (ref -2–2)
BASOPHILS # BLD AUTO: 0.02 K/UL — SIGNIFICANT CHANGE UP (ref 0–0.2)
BASOPHILS NFR BLD AUTO: 0.4 % — SIGNIFICANT CHANGE UP (ref 0–2)
BILIRUB SERPL-MCNC: <0.2 MG/DL — LOW (ref 0.4–2)
BILIRUB UR-MCNC: NEGATIVE — SIGNIFICANT CHANGE UP
BUN SERPL-MCNC: 29 MG/DL — HIGH (ref 8–20)
CA-I SERPL-SCNC: 0.96 MMOL/L — LOW (ref 1.15–1.33)
CALCIUM SERPL-MCNC: 8.9 MG/DL — SIGNIFICANT CHANGE UP (ref 8.6–10.2)
CHLORIDE BLDV-SCNC: 104 MMOL/L — SIGNIFICANT CHANGE UP (ref 98–107)
CHLORIDE SERPL-SCNC: 105 MMOL/L — SIGNIFICANT CHANGE UP (ref 98–107)
CO2 SERPL-SCNC: 24 MMOL/L — SIGNIFICANT CHANGE UP (ref 22–29)
COLOR SPEC: YELLOW — SIGNIFICANT CHANGE UP
CREAT SERPL-MCNC: 1.06 MG/DL — SIGNIFICANT CHANGE UP (ref 0.5–1.3)
DIFF PNL FLD: ABNORMAL
EOSINOPHIL # BLD AUTO: 0.22 K/UL — SIGNIFICANT CHANGE UP (ref 0–0.5)
EOSINOPHIL NFR BLD AUTO: 4.8 % — SIGNIFICANT CHANGE UP (ref 0–6)
EPI CELLS # UR: SIGNIFICANT CHANGE UP
ETHANOL SERPL-MCNC: <10 MG/DL — SIGNIFICANT CHANGE UP
GAS PNL BLDV: 135 MMOL/L — SIGNIFICANT CHANGE UP (ref 135–145)
GAS PNL BLDV: SIGNIFICANT CHANGE UP
GAS PNL BLDV: SIGNIFICANT CHANGE UP
GLUCOSE BLDV-MCNC: 155 MG/DL — HIGH (ref 70–99)
GLUCOSE SERPL-MCNC: 144 MG/DL — HIGH (ref 70–115)
GLUCOSE UR QL: 50 MG/DL
HCO3 BLDV-SCNC: 25 MMOL/L — SIGNIFICANT CHANGE UP (ref 20–26)
HCT VFR BLD CALC: 36.3 % — SIGNIFICANT CHANGE UP (ref 34.5–45)
HCT VFR BLDA CALC: 35 — LOW (ref 39–50)
HGB BLD CALC-MCNC: 11.5 G/DL — SIGNIFICANT CHANGE UP (ref 11.5–15.5)
HGB BLD-MCNC: 11.2 G/DL — LOW (ref 11.5–15.5)
IMM GRANULOCYTES NFR BLD AUTO: 0.2 % — SIGNIFICANT CHANGE UP (ref 0–1.5)
INR BLD: 0.85 RATIO — LOW (ref 0.88–1.16)
KETONES UR-MCNC: NEGATIVE — SIGNIFICANT CHANGE UP
LACTATE BLDV-MCNC: 1.4 MMOL/L — SIGNIFICANT CHANGE UP (ref 0.5–2)
LEUKOCYTE ESTERASE UR-ACNC: ABNORMAL
LYMPHOCYTES # BLD AUTO: 2.2 K/UL — SIGNIFICANT CHANGE UP (ref 1–3.3)
LYMPHOCYTES # BLD AUTO: 47.7 % — HIGH (ref 13–44)
MCHC RBC-ENTMCNC: 23.5 PG — LOW (ref 27–34)
MCHC RBC-ENTMCNC: 30.9 GM/DL — LOW (ref 32–36)
MCV RBC AUTO: 76.3 FL — LOW (ref 80–100)
MONOCYTES # BLD AUTO: 0.33 K/UL — SIGNIFICANT CHANGE UP (ref 0–0.9)
MONOCYTES NFR BLD AUTO: 7.2 % — SIGNIFICANT CHANGE UP (ref 2–14)
NEUTROPHILS # BLD AUTO: 1.83 K/UL — SIGNIFICANT CHANGE UP (ref 1.8–7.4)
NEUTROPHILS NFR BLD AUTO: 39.7 % — LOW (ref 43–77)
NITRITE UR-MCNC: NEGATIVE — SIGNIFICANT CHANGE UP
OTHER CELLS CSF MANUAL: 15 ML/DL — LOW (ref 18–22)
PCO2 BLDV: 38 MMHG — SIGNIFICANT CHANGE UP (ref 35–50)
PH BLDV: 7.42 — SIGNIFICANT CHANGE UP (ref 7.32–7.43)
PH UR: 6 — SIGNIFICANT CHANGE UP (ref 5–8)
PLATELET # BLD AUTO: 227 K/UL — SIGNIFICANT CHANGE UP (ref 150–400)
PO2 BLDV: 100 MMHG — HIGH (ref 25–45)
POTASSIUM BLDV-SCNC: 6.9 MMOL/L — CRITICAL HIGH (ref 3.4–4.5)
POTASSIUM SERPL-MCNC: 4.3 MMOL/L — SIGNIFICANT CHANGE UP (ref 3.5–5.3)
POTASSIUM SERPL-SCNC: 4.3 MMOL/L — SIGNIFICANT CHANGE UP (ref 3.5–5.3)
PROT SERPL-MCNC: 6.4 G/DL — LOW (ref 6.6–8.7)
PROT UR-MCNC: 30 MG/DL
PROTHROM AB SERPL-ACNC: 9.7 SEC — LOW (ref 10–12.9)
RBC # BLD: 4.76 M/UL — SIGNIFICANT CHANGE UP (ref 3.8–5.2)
RBC # FLD: 14.6 % — HIGH (ref 10.3–14.5)
RBC CASTS # UR COMP ASSIST: SIGNIFICANT CHANGE UP /HPF (ref 0–4)
SALICYLATES SERPL-MCNC: <0.6 MG/DL — LOW (ref 10–20)
SAO2 % BLDV: 99 % — SIGNIFICANT CHANGE UP
SODIUM SERPL-SCNC: 141 MMOL/L — SIGNIFICANT CHANGE UP (ref 135–145)
SP GR SPEC: 1.01 — SIGNIFICANT CHANGE UP (ref 1.01–1.02)
UROBILINOGEN FLD QL: NEGATIVE MG/DL — SIGNIFICANT CHANGE UP
WBC # BLD: 4.61 K/UL — SIGNIFICANT CHANGE UP (ref 3.8–10.5)
WBC # FLD AUTO: 4.61 K/UL — SIGNIFICANT CHANGE UP (ref 3.8–10.5)
WBC UR QL: ABNORMAL

## 2019-11-02 PROCEDURE — 90792 PSYCH DIAG EVAL W/MED SRVCS: CPT

## 2019-11-02 PROCEDURE — 70450 CT HEAD/BRAIN W/O DYE: CPT | Mod: 26

## 2019-11-02 RX ORDER — INSULIN HUMAN 100 [IU]/ML
2 INJECTION, SOLUTION SUBCUTANEOUS ONCE
Refills: 0 | Status: COMPLETED | OUTPATIENT
Start: 2019-11-02 | End: 2019-11-02

## 2019-11-02 RX ADMIN — INSULIN HUMAN 2 UNIT(S): 100 INJECTION, SOLUTION SUBCUTANEOUS at 22:22

## 2019-11-02 RX ADMIN — SODIUM CHLORIDE 2000 MILLILITER(S): 9 INJECTION INTRAMUSCULAR; INTRAVENOUS; SUBCUTANEOUS at 01:11

## 2019-11-02 NOTE — ED BEHAVIORAL HEALTH ASSESSMENT NOTE - SUMMARY
Patient a 80 y/o  female, retired, domiciled alone, no past Psychiatric hx, hx of Dementia, DM; HTN: Glaucoma and left ear hearing loss was BIB/EMS activated by family due to increased Blood Glucose.     Patient lives alone, and on arrival as per triage nurse note has critically increased S. Potassium, so was treated on the medical side later transferred to Psych for ongoing confusion. She also had increased Blood Glucose as she endorsed that she has no insulin so she was not able to get her insulin. As per nursing note she tried to hide as she felt that the EMS came to hold her so she tried to hide and get away from her daughter. She is from [facility], works as LPN for years, retired, and now lives alone, she has no previous psychiatric issues, denied being depressed, denied S/H/I/P, denied A/V/H or paranoid beliefs, she is alert, some orientation, has memory loss knows her memory issues, she ambulates by herself, able to walk, and has no hx of fall, she knows her medical, issues, has hearing loss from her left ear, has limited vision from her left eye due to glaucoma. She added that her HHA left as she os unable to afford at this time.    As per daughter Abigail @ [phone], she informed that she is paranoid and suspicious, she had HHA , but she left due to her ongoing paranoia, and her daughter wanted her to stay with her for few days, and when she went she wanted to hide from her and when EMS came she also was trying to hide, She also started wandering and here son [place] bus [place]. as per daughter she was suggested to have some meds for her Paranoia Risperdal 0.5 mg HS for stability. Daughter informed that her brother to come and pick her up.     Plan: To be discharged and F/U with [facility] as per  referral          Risperdal 0.5 mg HS--14 days          Her son to pick her up later today.

## 2019-11-02 NOTE — ED BEHAVIORAL HEALTH ASSESSMENT NOTE - PRIMARY DX
Dementia associated with other underlying disease with behavioral disturbance Deferred condition on axis II

## 2019-11-02 NOTE — ED BEHAVIORAL HEALTH ASSESSMENT NOTE - DETAILS
No one lives with her, but all helps her None Left eye legal blindness; Left ear hearing loss; DM; Vertigo Dementia DM ED Attending aware

## 2019-11-02 NOTE — ED BEHAVIORAL HEALTH ASSESSMENT NOTE - KNOWN PSYCHIATRIC ADMISSION WITHIN THE PAST 30 DAYS
Arthropathy  left knee  Breast cancer  biopsy of b/l breasts approx Nov/Dec. 2017 -- diagnosed with b/l breast cancer  Ectopic pregnancy  1997  S/P mastectomy, bilateral  with MAGALYS flap 2017  Termination of pregnancy (fetus)
No

## 2019-11-02 NOTE — ED ADULT NURSE NOTE - OBJECTIVE STATEMENT
pt received A+Ox2, hx of dementia. pt in no apparent distress. as per daughter pt was acting paranoid, pt was hiding thought ambulance was coming to get her, then she was hiding from her daughter because she thought her daughter was taking her to the hospital to be committed, per daughter pt also has had difficulty seeing. pt able to ambulate with steady gait with 1 assist. pt denies any pain/discomfort. pt breathing even and unlabored. SOSA well x4. skin w/d/i. pt educated on POC, verbalized understanding. pt safety measures maintained.

## 2019-11-02 NOTE — ED BEHAVIORAL HEALTH NOTE - BEHAVIORAL HEALTH NOTE
SW Note: Per psych team, pt is cleared, pt to be picked up by her son. Son requesting documents pertaining to long term planning. SW provided pt's son with resources, no further SW services identified SW Note: Per psych team, pt is cleared, pt to be picked up by her son. Son requesting documents pertaining to long term planning. Resources left on clipboard for when son arrives from [place], no further SW services identified

## 2019-11-02 NOTE — ED BEHAVIORAL HEALTH ASSESSMENT NOTE - HPI (INCLUDE ILLNESS QUALITY, SEVERITY, DURATION, TIMING, CONTEXT, MODIFYING FACTORS, ASSOCIATED SIGNS AND SYMPTOMS)
Patient a 78 y/o  female, retired, domiciled alone, no past Psychiatric hx, hx of Dementia, DM; HTN: Glaucoma and left ear hearing loss was BIB/EMS activated by family due to increased Blood Glucose.     Patient lives alone, and on arrival as per triage nurse note has critically increased S. Potassium, so was treated on the medical side later transferred to Psych for ongoing confusion. She also had increased Blood Glucose as she endorsed that she has no insulin so she was not able to get her insulin. As per nursing note she tried to hide as she felt that the EMS came to hold her so she tried to hide and get away from her daughter. She is from [facility], works as LPN for years, retired, and now lives alone, she has no previous psychiatric issues, denied being depressed, denied S/H/I/P, denied A/V/H or paranoid beliefs, she is alert, some orientation, has memory loss knows her memory issues, she ambulates by herself, able to walk, and has no hx of fall, she knows her medical, issues, has hearing loss from her left ear, has limited vision from her left eye due to glaucoma. She added that her HHA left as she os unable to afford at this time.    As per daughter Abigail @ [phone], she informed that she is paranoid and suspicious, she had HHA , but she left due to her ongoing paranoia, and her daughter wanted her to stay with her for few days, and when she went she wanted to hide from her and when EMS came she also was trying to hide, She also started wandering and here son once found her in the bus station. as per daughter she was suggested to have some meds for her Paranoia Risperdal 0.5 mg HS for stability. Daughter informed that her brother to come and pick her up.     Plan: To be discharged and F/U with [facility] as per  referral          Risperdal 0.5 mg HS--14 days          Her son to pick her up later today.

## 2019-11-02 NOTE — ED BEHAVIORAL HEALTH NOTE - BEHAVIORAL HEALTH NOTE
Social work note:  attempted to reach pt's children at several numbers: [phone], [phone], [phone] - no response on all three numbers. Worker left voicemails on the numbers which allowed one. Pt's son is coming from [place] to  pt. Worker called to find ETA. Worker awaits a call back from family at this time.

## 2019-11-03 LAB
CULTURE RESULTS: NO GROWTH — SIGNIFICANT CHANGE UP
GLUCOSE BLDC GLUCOMTR-MCNC: 206 MG/DL — HIGH (ref 70–99)
GLUCOSE BLDC GLUCOMTR-MCNC: 339 MG/DL — HIGH (ref 70–99)
GLUCOSE BLDC GLUCOMTR-MCNC: 387 MG/DL — HIGH (ref 70–99)
SPECIMEN SOURCE: SIGNIFICANT CHANGE UP

## 2019-11-03 PROCEDURE — 99218: CPT

## 2019-11-03 RX ORDER — INSULIN GLARGINE 100 [IU]/ML
3 INJECTION, SOLUTION SUBCUTANEOUS ONCE
Refills: 0 | Status: DISCONTINUED | OUTPATIENT
Start: 2019-11-03 | End: 2019-11-03

## 2019-11-03 RX ORDER — GLUCAGON INJECTION, SOLUTION 0.5 MG/.1ML
1 INJECTION, SOLUTION SUBCUTANEOUS ONCE
Refills: 0 | Status: DISCONTINUED | OUTPATIENT
Start: 2019-11-03 | End: 2019-11-09

## 2019-11-03 RX ORDER — INSULIN GLARGINE 100 [IU]/ML
3 INJECTION, SOLUTION SUBCUTANEOUS EVERY MORNING
Refills: 0 | Status: DISCONTINUED | OUTPATIENT
Start: 2019-11-03 | End: 2019-11-03

## 2019-11-03 RX ORDER — DEXTROSE 50 % IN WATER 50 %
15 SYRINGE (ML) INTRAVENOUS ONCE
Refills: 0 | Status: DISCONTINUED | OUTPATIENT
Start: 2019-11-03 | End: 2019-11-09

## 2019-11-03 RX ORDER — INSULIN LISPRO 100/ML
3 VIAL (ML) SUBCUTANEOUS
Refills: 0 | Status: DISCONTINUED | OUTPATIENT
Start: 2019-11-03 | End: 2019-11-09

## 2019-11-03 RX ORDER — DORZOLAMIDE HYDROCHLORIDE 20 MG/ML
1 SOLUTION/ DROPS OPHTHALMIC EVERY 8 HOURS
Refills: 0 | Status: DISCONTINUED | OUTPATIENT
Start: 2019-11-03 | End: 2019-11-09

## 2019-11-03 RX ORDER — SODIUM CHLORIDE 9 MG/ML
1000 INJECTION, SOLUTION INTRAVENOUS
Refills: 0 | Status: DISCONTINUED | OUTPATIENT
Start: 2019-11-03 | End: 2019-11-09

## 2019-11-03 RX ORDER — INSULIN LISPRO 100/ML
3 VIAL (ML) SUBCUTANEOUS ONCE
Refills: 0 | Status: COMPLETED | OUTPATIENT
Start: 2019-11-03 | End: 2019-11-03

## 2019-11-03 RX ORDER — LATANOPROST 0.05 MG/ML
1 SOLUTION/ DROPS OPHTHALMIC; TOPICAL AT BEDTIME
Refills: 0 | Status: DISCONTINUED | OUTPATIENT
Start: 2019-11-03 | End: 2019-11-09

## 2019-11-03 RX ORDER — DEXTROSE 50 % IN WATER 50 %
25 SYRINGE (ML) INTRAVENOUS ONCE
Refills: 0 | Status: DISCONTINUED | OUTPATIENT
Start: 2019-11-03 | End: 2019-11-09

## 2019-11-03 RX ORDER — INSULIN GLARGINE 100 [IU]/ML
3 INJECTION, SOLUTION SUBCUTANEOUS AT BEDTIME
Refills: 0 | Status: DISCONTINUED | OUTPATIENT
Start: 2019-11-03 | End: 2019-11-03

## 2019-11-03 RX ORDER — GABAPENTIN 400 MG/1
100 CAPSULE ORAL ONCE
Refills: 0 | Status: COMPLETED | OUTPATIENT
Start: 2019-11-03 | End: 2019-11-03

## 2019-11-03 RX ORDER — INSULIN GLARGINE 100 [IU]/ML
9 INJECTION, SOLUTION SUBCUTANEOUS AT BEDTIME
Refills: 0 | Status: DISCONTINUED | OUTPATIENT
Start: 2019-11-03 | End: 2019-11-09

## 2019-11-03 RX ORDER — DEXTROSE 50 % IN WATER 50 %
12.5 SYRINGE (ML) INTRAVENOUS ONCE
Refills: 0 | Status: DISCONTINUED | OUTPATIENT
Start: 2019-11-03 | End: 2019-11-09

## 2019-11-03 RX ORDER — ACETAMINOPHEN 500 MG
650 TABLET ORAL EVERY 6 HOURS
Refills: 0 | Status: DISCONTINUED | OUTPATIENT
Start: 2019-11-03 | End: 2019-11-09

## 2019-11-03 RX ADMIN — DORZOLAMIDE HYDROCHLORIDE 1 DROP(S): 20 SOLUTION/ DROPS OPHTHALMIC at 21:48

## 2019-11-03 RX ADMIN — INSULIN GLARGINE 9 UNIT(S): 100 INJECTION, SOLUTION SUBCUTANEOUS at 21:48

## 2019-11-03 RX ADMIN — LATANOPROST 1 DROP(S): 0.05 SOLUTION/ DROPS OPHTHALMIC; TOPICAL at 21:48

## 2019-11-03 RX ADMIN — GABAPENTIN 100 MILLIGRAM(S): 400 CAPSULE ORAL at 10:25

## 2019-11-03 RX ADMIN — Medication 3 UNIT(S): at 10:28

## 2019-11-03 NOTE — ED ADULT NURSE REASSESSMENT NOTE - INTERVENTIONS DEFINITIONS
Non-slip footwear when patient is off stretcher/Provide visual cue, wrist band, yellow gown, etc./Monitor for mental status changes and reorient to person, place, and time/Reinforce activity limits and safety measures with patient and family/Physically safe environment: no spills, clutter or unnecessary equipment/Provide visual clues: red socks/Stretcher in lowest position, wheels locked, appropriate side rails in place/Monitor gait and stability

## 2019-11-03 NOTE — ED CDU PROVIDER INITIAL DAY NOTE - DETAILS
80 yo female with performed PANCHITO awaiting final placement given high likelihood of abandonment by family. Patient hopefully will be placed in the morning otherwise will need toto be admitted.  Currently patient is resting comfortably without any signs of distress.

## 2019-11-03 NOTE — ED BEHAVIORAL HEALTH NOTE - BEHAVIORAL HEALTH NOTE
SW Note: Pt has been medically and psychiatrically cleared for D/C since yesterday, 11/2. Numerous attempts have been made yesterday and today to reach family members to  pt, however unable to reach anyone at this time. CM Manager on call Corinne Aylward is aware of dispo issue. PANCHITO was completed in anticipation of potential longterm placement. SW will continue to follow for safe/appropriate D/C

## 2019-11-03 NOTE — CHART NOTE - NSCHARTNOTEFT_GEN_A_CORE
As per MD patient is medically stable. As per Dr. Raymond, patient is treat and release. SW placed call to patient's children ([phone], [phone], [phone]) to see if patient's children are able to provide transportation home. SW left messages for patient's children (unable to leave message for [phone]). SW also placed call to patient's granddaughter Ashlyn ([phone]) and left message. SW will continue to follow.

## 2019-11-03 NOTE — ED BEHAVIORAL HEALTH NOTE - BEHAVIORAL HEALTH NOTE
Social work:  Worker called pt's children in regards to safe d/c plan. Worker called [phone] and [phone]. No response. Worker left messages on both numbers.  PRINCE has been unsuccessful at reaching family since yesterday 11/2/19. PRINCE will continue to follow for safe d/c plan.

## 2019-11-03 NOTE — ED CDU PROVIDER INITIAL DAY NOTE - MEDICAL DECISION MAKING DETAILS
80 yo female in need of placement. She has remained stable. PANCHITO performed. Possible placement in the morning.

## 2019-11-04 LAB
GLUCOSE BLDC GLUCOMTR-MCNC: 163 MG/DL — HIGH (ref 70–99)
GLUCOSE BLDC GLUCOMTR-MCNC: 172 MG/DL — HIGH (ref 70–99)
GLUCOSE BLDC GLUCOMTR-MCNC: 267 MG/DL — HIGH (ref 70–99)

## 2019-11-04 PROCEDURE — 99226: CPT

## 2019-11-04 RX ORDER — CEPHALEXIN 500 MG
500 CAPSULE ORAL
Refills: 0 | Status: DISCONTINUED | OUTPATIENT
Start: 2019-11-04 | End: 2019-11-04

## 2019-11-04 RX ORDER — CEPHALEXIN 500 MG
1 CAPSULE ORAL
Qty: 40 | Refills: 0
Start: 2019-11-04 | End: 2019-11-13

## 2019-11-04 RX ORDER — CEPHALEXIN 500 MG
500 CAPSULE ORAL EVERY 12 HOURS
Refills: 0 | Status: DISCONTINUED | OUTPATIENT
Start: 2019-11-04 | End: 2019-11-05

## 2019-11-04 RX ADMIN — Medication 3 UNIT(S): at 08:34

## 2019-11-04 RX ADMIN — DORZOLAMIDE HYDROCHLORIDE 1 DROP(S): 20 SOLUTION/ DROPS OPHTHALMIC at 22:04

## 2019-11-04 RX ADMIN — DORZOLAMIDE HYDROCHLORIDE 1 DROP(S): 20 SOLUTION/ DROPS OPHTHALMIC at 05:19

## 2019-11-04 RX ADMIN — INSULIN GLARGINE 9 UNIT(S): 100 INJECTION, SOLUTION SUBCUTANEOUS at 22:03

## 2019-11-04 RX ADMIN — DORZOLAMIDE HYDROCHLORIDE 1 DROP(S): 20 SOLUTION/ DROPS OPHTHALMIC at 17:48

## 2019-11-04 RX ADMIN — Medication 500 MILLIGRAM(S): at 17:48

## 2019-11-04 RX ADMIN — Medication 3 UNIT(S): at 17:14

## 2019-11-04 RX ADMIN — Medication 3 UNIT(S): at 12:16

## 2019-11-04 RX ADMIN — LATANOPROST 1 DROP(S): 0.05 SOLUTION/ DROPS OPHTHALMIC; TOPICAL at 22:04

## 2019-11-04 NOTE — ED ADULT NURSE REASSESSMENT NOTE - COMFORT CARE
ambulated to bathroom/po fluids offered/warm blanket provided/plan of care explained
po fluids offered/assisted to bathroom
warm blanket provided/darkened lights/plan of care explained
assisted to bathroom
assisted to bathroom/wait time explained/meal provided/po fluids offered/plan of care explained
darkened lights/side rails up/warm blanket provided
assisted to bathroom/meal provided/wait time explained/po fluids offered/plan of care explained
darkened lights
meal provided/plan of care explained
meal provided/po fluids offered/plan of care explained
plan of care explained
plan of care explained/assisted to bathroom/meal provided/wait time explained/po fluids offered
warm blanket provided/po fluids offered/meal provided
plan of care explained/assisted to bathroom/po fluids offered/wait time explained/meal provided

## 2019-11-04 NOTE — PROVIDER CONTACT NOTE (OTHER) - SITUATION
Pt. independent in mobility, transfers and ambulation without assistive device; needs observation due to dementia; confusion.

## 2019-11-04 NOTE — CHART NOTE - NSCHARTNOTEFT_GEN_A_CORE
Case escalated to SW Manager due to patient's family being nonresponsive in safe discharge planning. Overnighted certified letter sent to patient's home where son frequents regularly to obtain contact. Primary SW aware of above. SW Manager to remain available as needed.

## 2019-11-04 NOTE — ED ADULT NURSE REASSESSMENT NOTE - NSIMPLEMENTINTERV_GEN_ALL_ED
Implemented All Universal Safety Interventions:  [place] to call system. Call bell, personal items and telephone within reach. Instruct patient to call for assistance. Room bathroom lighting operational. Non-slip footwear when patient is off stretcher. Physically safe environment: no spills, clutter or unnecessary equipment. Stretcher in lowest position, wheels locked, appropriate side rails in place.
Implemented All Fall with Harm Risk Interventions:  [place] to call system. Call bell, personal items and telephone within reach. Instruct patient to call for assistance. Room bathroom lighting operational. Non-slip footwear when patient is off stretcher. Physically safe environment: no spills, clutter or unnecessary equipment. Stretcher in lowest position, wheels locked, appropriate side rails in place. Provide visual cue, wrist band, yellow gown, etc. Monitor gait and stability. Monitor for mental status changes and reorient to person, place, and time. Review medications for side effects contributing to fall risk. Reinforce activity limits and safety measures with patient and family. Provide visual clues: red socks.
Skin normal color for race, warm, dry and intact. No evidence of rash.
Specific interventions were implemented:
Implemented All Fall Risk Interventions:  [place] to call system. Call bell, personal items and telephone within reach. Instruct patient to call for assistance. Room bathroom lighting operational. Non-slip footwear when patient is off stretcher. Physically safe environment: no spills, clutter or unnecessary equipment. Stretcher in lowest position, wheels locked, appropriate side rails in place. Provide visual cue, wrist band, yellow gown, etc. Monitor gait and stability. Monitor for mental status changes and reorient to person, place, and time. Review medications for side effects contributing to fall risk. Reinforce activity limits and safety measures with patient and family.

## 2019-11-04 NOTE — CHART NOTE - NSCHARTNOTEFT_GEN_A_CORE
SOCIAL WORK NOTE:  THIS WORKER REVIEWED CHART AND ALL CASE MANAGEMENT NOTES. CASE DISCUSSED WITH OBSERVATION TEAM AND ALSO SOCIAL WORK MANAGEMENT. PATIENT HAS BEEN IN ED X 60 HOURS AND OBSERVATION STATUS STARTED 13 HOURS AGO.  THIS WORKER HAS LEFT MESSAGES ON THE PHONE NUMBER FOR ZEYAD # [phone], BRITTANY # [phone] AND JACK # [phone]. NO RESPONSES.  MESSAGES LEFT WITH REGARD TO IMPORTANCE OF NEEDING FAMILY FOR ASSIST WITH TRANSITIONAL PLANNING AND ALSO MADE THEM AWARE THAT APS AND POLICE MAY BE CALLED FOR ADDITIONAL SUPPORT.  [facility] # [phone] CALLED. THEY REPORTED THAT PATIENT DOES NOT HAVE OR EVER HAD ANY ACTIVE APS CASES OR CALLS ON HER.  NO SUGGESTIONS PROVIDED BY THEM AS TO HOW TO PROCEED WITH NON RESPONSIVE FAMILIES OTHER THAN GUARDIANSHIP OR PLACEMENT.  MANAGEMENT AWARE REGARDING CASE. PATIENT WITH A NON MEDICAID PRODUCT HMO THAT DOES NOT COVER [facility]PC CARE AND NO SKILLED NEEDS NOTED AT THIS TIME. AWAITING PT EVALUATION TO DETERMINE IF PATIENT HAS ANY SKILLS. OBSERVATION TEAM AWARE TO ORDER PT EVALUATION. PANCHITO COMPLETED AND CIRCULATING  FACILITIES AT THIS TIME.

## 2019-11-04 NOTE — ED CDU PROVIDER SUBSEQUENT DAY NOTE - PROGRESS NOTE DETAILS
pt sleeping - no medical complaints - eval by PT discussed case with SW who will follow pt sleeping - no medical complaints - eval by PT discussed case with SW who will follow - pt has uti - will give keflex

## 2019-11-04 NOTE — PROVIDER CONTACT NOTE (OTHER) - ASSESSMENT
Pt. independent in mobility, transfers, and ambulation with steady gait without assistive device; 150 feet.  pt. needs supervision due to dementia; confusion.

## 2019-11-05 ENCOUNTER — INPATIENT (INPATIENT)
Facility: HOSPITAL | Age: 79
LOS: 12 days | Discharge: HOME HEALTH | DRG: 884 | End: 2019-11-18
Attending: HOSPITALIST | Admitting: INTERNAL MEDICINE
Payer: COMMERCIAL

## 2019-11-05 VITALS
TEMPERATURE: 98 F | OXYGEN SATURATION: 98 % | SYSTOLIC BLOOD PRESSURE: 136 MMHG | DIASTOLIC BLOOD PRESSURE: 72 MMHG | HEART RATE: 83 BPM | RESPIRATION RATE: 18 BRPM

## 2019-11-05 VITALS
DIASTOLIC BLOOD PRESSURE: 72 MMHG | OXYGEN SATURATION: 99 % | RESPIRATION RATE: 18 BRPM | HEART RATE: 78 BPM | TEMPERATURE: 98 F | SYSTOLIC BLOOD PRESSURE: 155 MMHG

## 2019-11-05 DIAGNOSIS — R41.0 DISORIENTATION, UNSPECIFIED: ICD-10-CM

## 2019-11-05 DIAGNOSIS — Z98.891 HISTORY OF UTERINE SCAR FROM PREVIOUS SURGERY: Chronic | ICD-10-CM

## 2019-11-05 LAB
GLUCOSE BLDC GLUCOMTR-MCNC: 158 MG/DL — HIGH (ref 70–99)
GLUCOSE BLDC GLUCOMTR-MCNC: 175 MG/DL — HIGH (ref 70–99)
GLUCOSE BLDC GLUCOMTR-MCNC: 196 MG/DL — HIGH (ref 70–99)
GLUCOSE BLDC GLUCOMTR-MCNC: 218 MG/DL — HIGH (ref 70–99)

## 2019-11-05 PROCEDURE — 99222 1ST HOSP IP/OBS MODERATE 55: CPT

## 2019-11-05 PROCEDURE — 99217: CPT

## 2019-11-05 RX ORDER — ENOXAPARIN SODIUM 100 MG/ML
40 INJECTION SUBCUTANEOUS DAILY
Refills: 0 | Status: DISCONTINUED | OUTPATIENT
Start: 2019-11-05 | End: 2019-11-09

## 2019-11-05 RX ADMIN — DORZOLAMIDE HYDROCHLORIDE 1 DROP(S): 20 SOLUTION/ DROPS OPHTHALMIC at 23:43

## 2019-11-05 RX ADMIN — Medication 3 UNIT(S): at 17:22

## 2019-11-05 RX ADMIN — DORZOLAMIDE HYDROCHLORIDE 1 DROP(S): 20 SOLUTION/ DROPS OPHTHALMIC at 13:33

## 2019-11-05 RX ADMIN — Medication 3 UNIT(S): at 12:22

## 2019-11-05 RX ADMIN — Medication 3 UNIT(S): at 08:31

## 2019-11-05 RX ADMIN — DORZOLAMIDE HYDROCHLORIDE 1 DROP(S): 20 SOLUTION/ DROPS OPHTHALMIC at 05:09

## 2019-11-05 RX ADMIN — ENOXAPARIN SODIUM 40 MILLIGRAM(S): 100 INJECTION SUBCUTANEOUS at 16:30

## 2019-11-05 RX ADMIN — Medication 500 MILLIGRAM(S): at 05:09

## 2019-11-05 RX ADMIN — LATANOPROST 1 DROP(S): 0.05 SOLUTION/ DROPS OPHTHALMIC; TOPICAL at 23:43

## 2019-11-05 RX ADMIN — INSULIN GLARGINE 9 UNIT(S): 100 INJECTION, SOLUTION SUBCUTANEOUS at 23:43

## 2019-11-05 NOTE — ED ADULT NURSE REASSESSMENT NOTE - GENITOURINARY WDL
Bladder non-tender and non-distended. Urine clear yellow.
Bladder non-tender and non-distended.

## 2019-11-05 NOTE — ED CDU PROVIDER SUBSEQUENT DAY NOTE - NS ED ROS FT
ROS: no CP/SOB. no cough. no fever. no n/v/d/c. no abd pain. no rash. no bleeding. no urinary complaints. no weakness. no vision changes. no HA. no neck/back pain. no extremity swelling/deformity. +change in mental status.
ROS: no CP/SOB. no cough. no fever. no n/v/d/c. no abd pain. no rash. no bleeding. no urinary complaints. no weakness. no vision changes. no HA. no neck/back pain. no extremity swelling/deformity. +change in mental status.

## 2019-11-05 NOTE — H&P ADULT - ASSESSMENT
78 yo F w/ hx dementia presents from home for paranoid behavior and increased confusion than baseline.  Monitored in ER and cleared for discharge by ER and psychiatry.     dementia with behavioral disturbance    hold off on sedatives/antipsychotics like risperidone as calm currently    check b12/tsh      glaucoma: home meds  Dm2: hga1c, raiss.    vte: lovenox.    dispo: per Sw/Cm once family reached

## 2019-11-05 NOTE — ED CDU PROVIDER SUBSEQUENT DAY NOTE - PHYSICAL EXAMINATION
Gen: NAD, AOx2- confused to date, speaking clearly in coherent sentences  Head: NCAT  HEENT: PERRL, EOMI, oral mucosa moist, normal conjunctiva, neck supple  Lung: CTAB, no respiratory distress  CV: rrr, no murmur, Normal perfusion  Abd: soft, NTND  MSK: No edema, no visible deformities  Neuro: No focal neurologic deficits, CN II-XII intact, 5/5 global strength, sensation intact  Skin: No rash   Psych: normal affect
Gen: NAD, AOx2- confused to date, speaking clearly in coherent sentences  Head: NCAT  HEENT: PERRL, EOMI, oral mucosa moist, normal conjunctiva, neck supple  Lung: CTAB, no respiratory distress  CV: rrr, no murmur, Normal perfusion  Abd: soft, NTND  MSK: No edema, no visible deformities  Neuro: No focal neurologic deficits, CN II-XII intact, 5/5 global strength, sensation intact  Skin: No rash   Psych: normal affect

## 2019-11-05 NOTE — ED ADULT NURSE REASSESSMENT NOTE - NURSING NEURO ORIENTATION
Pt oriented to person, place, day of the week, month, and year. Pt disoriented to day of the month.
Patient Alert and oriented to self and place/disoriented to time/situation

## 2019-11-05 NOTE — ED ADULT NURSE REASSESSMENT NOTE - STATUS
awaiting consult
awaiting consult
awaiting discharge, no change
awaiting discharge, no change
awaiting consult
awaiting consult/Scoial work/Case management for placement
awaiting discharge, no change
awaiting discharge, no change/unknown location of family members at this time
awaiting consult

## 2019-11-05 NOTE — ED CDU PROVIDER SUBSEQUENT DAY NOTE - ATTENDING CONTRIBUTION TO CARE
I, Mitchell Ch, performed the initial face to face bedside interview with this patient regarding history of present illness, review of symptoms and relevant past medical, social and family history.  I completed an independent physical examination.  I was the initial provider who evaluated this patient. I have signed out the follow up of any pending tests (i.e. labs, radiological studies) to the ACP.  I have communicated the patient’s plan of care and disposition with the ACP.
I, Francisco Sims, performed a face to face bedside interview with this patient regarding history of present illness, review of symptoms and relevant past medical, social and family history.  I completed an independent physical examination. I have communicated the patient’s plan of care and disposition with the ACP.  No acute events overnight, resting comfortably, awaiting Sw placement

## 2019-11-05 NOTE — ED CDU PROVIDER DISPOSITION NOTE - ATTENDING CONTRIBUTION TO CARE
I, Francisco Sims, performed a face to face bedside interview with this patient regarding history of present illness, review of symptoms and relevant past medical, social and family history.  I completed an independent physical examination. I have communicated the patient’s plan of care and disposition with the ACP.  Pt with progressing dementia, unsafe for dc at home, will admit for dispo mgt  Gen: NAD, well appearing  CV: RRR  Pul: CTA b/l  Abd: Soft, non-distended, non-tender  Neuro: no focal deficits

## 2019-11-05 NOTE — ED ADULT NURSE REASSESSMENT NOTE - CONDITION
Accucheck at HS is 308. Dr. Rmairez notified.  Awaiting coverage orders/unchanged
Pt continues to sleep on and off. Remains on 1:1 for safety due to vision difficulties. Pt ambulated to bathroom 1 x with assistance . no report of difficulties. Awaiting arrival of son to discharge pt home.  !;! to be maintained/unchanged
Pt remains on 1:1 for safety due to vision difficulty. Pt son is due to pick her up but is coming from [place] so time of pick of is delayed. Pt is pleasant. She ate lunch. offers no complaint. V/S improved. 114/65 P 81 R18 T 99.0 Po2 100%. no distress noted/unchanged
improved
unchanged
unchanged/Patient ate dinner, remains primarily in bed. Does at times with assistance of 1:1 get out of bed to walk to day room chair. SW has not been able to reach any family member regarding  of the patient. Pt will remain here until [facility] plans are made.  1:1 for safety maintained. Pt ate dinner. Offers no complaints
unchanged

## 2019-11-05 NOTE — ED ADULT NURSE REASSESSMENT NOTE - MUSCULOSKELETAL WDL
Full range of motion of upper and lower extremities, no joint tenderness/swelling.

## 2019-11-05 NOTE — ED CDU PROVIDER DISPOSITION NOTE - CLINICAL COURSE
80 yo F with AMS, placed in OBS for placement and tx UTI. Seen by Case Management/ Social Work, needs admission due to complex social issue with placement. Admitted to medicine for further management.

## 2019-11-05 NOTE — ED CDU PROVIDER SUBSEQUENT DAY NOTE - PROGRESS NOTE DETAILS
Pt received from DALY Salazar. Patient evaluated at bedside, resting comfortably. PE: NAD, lungs CTA, S1S2, peripheral pulse 2+ b/l, abd soft NTND. Awaiting placement by case management. Per case management/social work pt requires admission due to extensive social issues with placement, unable to reach family for placement. Admission accepted by medicine team, all further care transferred to medicine team.

## 2019-11-05 NOTE — ED ADULT NURSE REASSESSMENT NOTE - GENERAL PATIENT STATE
comfortable appearance
comfortable appearance/cooperative
resting/sleeping/comfortable appearance
resting/sleeping/comfortable appearance/cooperative
smiling/interactive/comfortable appearance/resting/sleeping/cooperative
smiling/interactive/resting/sleeping/comfortable appearance/cooperative
comfortable appearance/cooperative
comfortable appearance/resting/sleeping
comfortable appearance/resting/sleeping
comfortable appearance/smiling/interactive/no change observed/resting/sleeping/cooperative
cooperative/comfortable appearance
resting/sleeping/comfortable appearance
resting/sleeping/comfortable appearance/cooperative
resting/sleeping/smiling/interactive/no change observed/comfortable appearance/cooperative
no change observed/resting/sleeping/smiling/interactive/comfortable appearance/cooperative

## 2019-11-05 NOTE — CHART NOTE - NSCHARTNOTEFT_GEN_A_CORE
SOCIAL WORK NOTE:  THIS WORKER AND CCC MET WITH PATIENT THIS MORNING. 1:1 PRESENT AND AT BEDSIDE.  PATIENT WAS ALERT AND ORIENTED TO SELF, PLACE, LOCATION, DATE OF BIRTH.  PATIENT WAS ABLE TO VERBALIZE CORRECTLY THE PRESIDENT AND THE MONTH. PATIENT FALTERED ON THE YEAR STATING IT WAS 1986. PATIENT STATED IT WAS 1984 MULTIPLE TIMES.  PATIENT REPORTS THAT SHE DOES NOT KNOW WHY HER CHILDREN HAVE NOT CALLED BACK AND THAT SHE WAS SENT TO [place] FROM ANOTHER HOSPITAL.  PATIENT REPORTS THAT SHE WAS OUT HERE WITH DAUGHTER WHOM RESIDES ON [place]. PATIENT REPORTS THAT HER OTHER 2 CHILDREN RESIDE IN [place].  PATIENT CONFIRMED THAT SHE HAD ASSISTANCE FROM A LADY  WHOM WAS THE GIRLFRIEND TO HER EX  BUT SHE RECENTLY THERE HER OUT DUE TO EXCESSIVE GOSSIP.  UNABLE TO CONFIRM INFORMATION AS DESPITE EXCESSIVE ATTEMPTS TO CALL FAMILY OVER THE COURSE OF 3 DAYS, THERE HAS BEEN NO RESPONSE.  PATIENT IS PRESENTLY DENYING DEPRESSION, SUICIDAL THOUGHTS OR HOMICIDAL THOUGHTS AT THIS TIME AND PATIENT IS ALSO DENYING AND FEELINGS OF SUSPICIONS THAT PEOPLE WANT TO HURT HER.

## 2019-11-05 NOTE — H&P ADULT - HISTORY OF PRESENT ILLNESS
78 yo F w/ hx dementia presents from home for paranoid behavior and increased confusion than baseline.  Monitored in ER and cleared for discharge by ER and psychiatry.  However family became unreachable for 3 days and ER asking for admission for social work placement as unsafe discharge.  initially treated for UTI but urine culture negative.

## 2019-11-05 NOTE — ED CDU PROVIDER SUBSEQUENT DAY NOTE - MEDICAL DECISION MAKING DETAILS
78 yo female in need of placement. She has remained stable. PANCHITO performed. Pending CM and SW for placement.
PATIENT PENDING PLACEMENT IN THE AM

## 2019-11-05 NOTE — H&P ADULT - NSICDXFAMILYHX_GEN_ALL_CORE_FT
FAMILY HISTORY:  Grandparent  Still living? Unknown  Family history of diabetes mellitus type II, Age at diagnosis: Age Unknown

## 2019-11-05 NOTE — ED PROVIDER NOTE - CARE PLAN
Principal Discharge DX:	Other specified diabetes mellitus without complication, with long-term current use of insulin

## 2019-11-05 NOTE — ED ADULT NURSE REASSESSMENT NOTE - NS ED NURSE REASSESS COMMENT FT1
Care endorsed to AIXA Gant [facility]. Patient transferred to CDU 12L. VSS. 1:1 in place.
HS fingerstick 387. Pt received lantus with previous RN in . no orders for humalog in MAR before bedtime. ADLY Pearson made aware, no additional orders received.
Pt alert and oriented, no apparent distress noted at this time. Pt handed off to Hakeem KRUSE in stable condition.
Pt alert and oriented, no apparent distress noted at this time. Pt handed off to Laura KRUSE in stable condition.
Pt currently at radiology. Will re-assess upon return to unit.
assumed care of pt @ 1930, report received from Hakeem KRUSE, charting as noted. pt on 1:1 for pt safety. pt on observation pending placement. pt alert and oriented to self and place. pt is able to tell RN who the president is, but states it is year 2016. pt is currently calm and cooperative. water provided. pt denies any complaints at this time. Vital Signs Stable.    HR is WNL, lung sounds are clear b/l, abd is soft and nontender with positive bowel sounds in all four quadrants, skin is warm, dry and appropriate for age and race. pt educated on plan of care and observation stay. Plan of care taught back to RN. Proficiency determined from successful pt teach back. Pt oriented to unit, staff, and room. Pt reeducated on call bell use. Bed locked in lowest position, call bell within reach. All questions and concerns addressed.
assumed care of pt @ 2158, report received from Darrion KRUSE, charting as noted. pt transported to CDU from  in NAD. 1:1 SNA in place. pt AO to self only. pt is calm and cooperative at this time.     HR is WNL, lung sounds are clear b/l, abd is soft and nontender with positive bowel sounds in all four quadrants, skin is warm, dry and appropriate for age and race. pt educated on plan of care and observation stay. Pt reeducated on call bell use. Bed locked in lowest position, call bell within reach, yellow gown on, non skid socks on. All questions and concerns addressed.
pt in common area agreed to eat dinner with other pt at table. pt consumed meal 100% and engaged in conversation with other  pt. tolerated meal well.
pt remains in stable condition, in no apparent distress. pt breathing even and unlabored. SOSA well x4. pt brought to  at this time, able to ambulate with steady gait. 1:1 initiated for pt safety as pt is partially blind. IV catheter removed. pt educated on POC, verbalized understanding. pt safety measures maintained.
pt to be kept for observation in CDU, pt medicated and offered n o complaints at this time.
Assumed care of pt @2330. Pt assisted to bathroom and is currently in her room watching TV, in no apparent distress. VSS. pt awaiting discharge. Family has been unavailable. Will continue to monitor the pt.
Pt medically cleared by MD Goyal. Pt cooperative with security check, presents AOx1, confused with 1:1. As per pt, "I came to the hospital to check my blood sugar." POC explained to pt. Pt awaiting psych consult. Will monitor the pt for safety.
pt resting in stretcher in NAD. awaiting placement. Social work to follow up in AM.
Assumed care of patient at 0720.  Patient monitored constant observation.  No attempts to harm self or others.  Patient oriented to staff and educated about plan of care.  Patient questioning where she is.  No attempts to harm self or others and safety maintained.
Assumed care of patient at 0730.  Patient resting in bed with no distress.  No attempts to harm self or others and safety maintained.
Patient assisted with setup of showering and showered independently.  Patient provided assistance as needed with ADL's.  Patient ate 100% of her breakfast and accepted medications as ordered.  Patient attempted to call her children but was unable to reach them by telephone.  Patient educated about plan to discharge if family comes to pick her up.
Patient denies suicidal or homicidal ideation.  No attempts to harm self or others.  Patient attempting to contact family for a ride home and has left messages with no return call.  Patient provided verbal support and reassurance.
Patient remains on constant observation.  Patient safety maintained.  Patient is pending discharge.  Patient son will be here to pick her up as per daughter Abigail [phone].
Assumed care of the patient at 0730. Patient Alert and Oriented to self and place, confused, pleasant, calm. No s/s of distress or pain. Patient legally blind, hard of hearing. Ambulatory to Bathroom with assistance, steady gait. VSS. FS AC HS. Patient pending placement, awaiting consult from Social work for placement. 1:1 at the bedside for safety. Call bell within reach and encouraged to use when assistance needed. Patient with no further questions for the nurse. Will continue to monitor.

## 2019-11-06 LAB
ALBUMIN SERPL ELPH-MCNC: 4.2 G/DL — SIGNIFICANT CHANGE UP (ref 3.3–5.2)
ALP SERPL-CCNC: 105 U/L — SIGNIFICANT CHANGE UP (ref 40–120)
ALT FLD-CCNC: 33 U/L — HIGH
ANION GAP SERPL CALC-SCNC: 13 MMOL/L — SIGNIFICANT CHANGE UP (ref 5–17)
AST SERPL-CCNC: 43 U/L — HIGH
BILIRUB SERPL-MCNC: <0.2 MG/DL — LOW (ref 0.4–2)
BUN SERPL-MCNC: 28 MG/DL — HIGH (ref 8–20)
CALCIUM SERPL-MCNC: 10 MG/DL — SIGNIFICANT CHANGE UP (ref 8.6–10.2)
CHLORIDE SERPL-SCNC: 98 MMOL/L — SIGNIFICANT CHANGE UP (ref 98–107)
CO2 SERPL-SCNC: 25 MMOL/L — SIGNIFICANT CHANGE UP (ref 22–29)
CREAT SERPL-MCNC: 1.26 MG/DL — SIGNIFICANT CHANGE UP (ref 0.5–1.3)
GLUCOSE BLDC GLUCOMTR-MCNC: 112 MG/DL — HIGH (ref 70–99)
GLUCOSE BLDC GLUCOMTR-MCNC: 135 MG/DL — HIGH (ref 70–99)
GLUCOSE BLDC GLUCOMTR-MCNC: 186 MG/DL — HIGH (ref 70–99)
GLUCOSE BLDC GLUCOMTR-MCNC: 224 MG/DL — HIGH (ref 70–99)
GLUCOSE SERPL-MCNC: 266 MG/DL — HIGH (ref 70–115)
HBA1C BLD-MCNC: 9.6 % — HIGH (ref 4–5.6)
HCT VFR BLD CALC: 37.4 % — SIGNIFICANT CHANGE UP (ref 34.5–45)
HGB BLD-MCNC: 11.7 G/DL — SIGNIFICANT CHANGE UP (ref 11.5–15.5)
MCHC RBC-ENTMCNC: 23.5 PG — LOW (ref 27–34)
MCHC RBC-ENTMCNC: 31.3 GM/DL — LOW (ref 32–36)
MCV RBC AUTO: 75.1 FL — LOW (ref 80–100)
PLATELET # BLD AUTO: 130 K/UL — LOW (ref 150–400)
POTASSIUM SERPL-MCNC: 4.8 MMOL/L — SIGNIFICANT CHANGE UP (ref 3.5–5.3)
POTASSIUM SERPL-SCNC: 4.8 MMOL/L — SIGNIFICANT CHANGE UP (ref 3.5–5.3)
PROT SERPL-MCNC: 7.5 G/DL — SIGNIFICANT CHANGE UP (ref 6.6–8.7)
RBC # BLD: 4.98 M/UL — SIGNIFICANT CHANGE UP (ref 3.8–5.2)
RBC # FLD: 13.8 % — SIGNIFICANT CHANGE UP (ref 10.3–14.5)
SODIUM SERPL-SCNC: 136 MMOL/L — SIGNIFICANT CHANGE UP (ref 135–145)
TSH SERPL-MCNC: 1.22 UIU/ML — SIGNIFICANT CHANGE UP (ref 0.27–4.2)
VIT B12 SERPL-MCNC: 841 PG/ML — SIGNIFICANT CHANGE UP (ref 232–1245)
WBC # BLD: 5.56 K/UL — SIGNIFICANT CHANGE UP (ref 3.8–10.5)
WBC # FLD AUTO: 5.56 K/UL — SIGNIFICANT CHANGE UP (ref 3.8–10.5)

## 2019-11-06 PROCEDURE — 82009 KETONE BODYS QUAL: CPT

## 2019-11-06 PROCEDURE — 97163 PT EVAL HIGH COMPLEX 45 MIN: CPT

## 2019-11-06 PROCEDURE — 85014 HEMATOCRIT: CPT

## 2019-11-06 PROCEDURE — 85730 THROMBOPLASTIN TIME PARTIAL: CPT

## 2019-11-06 PROCEDURE — 84443 ASSAY THYROID STIM HORMONE: CPT

## 2019-11-06 PROCEDURE — 87086 URINE CULTURE/COLONY COUNT: CPT

## 2019-11-06 PROCEDURE — 36415 COLL VENOUS BLD VENIPUNCTURE: CPT

## 2019-11-06 PROCEDURE — 82962 GLUCOSE BLOOD TEST: CPT

## 2019-11-06 PROCEDURE — 85610 PROTHROMBIN TIME: CPT

## 2019-11-06 PROCEDURE — 93005 ELECTROCARDIOGRAM TRACING: CPT

## 2019-11-06 PROCEDURE — 99285 EMERGENCY DEPT VISIT HI MDM: CPT | Mod: 25

## 2019-11-06 PROCEDURE — 83605 ASSAY OF LACTIC ACID: CPT

## 2019-11-06 PROCEDURE — 84132 ASSAY OF SERUM POTASSIUM: CPT

## 2019-11-06 PROCEDURE — 70450 CT HEAD/BRAIN W/O DYE: CPT

## 2019-11-06 PROCEDURE — 82330 ASSAY OF CALCIUM: CPT

## 2019-11-06 PROCEDURE — 86780 TREPONEMA PALLIDUM: CPT

## 2019-11-06 PROCEDURE — 81001 URINALYSIS AUTO W/SCOPE: CPT

## 2019-11-06 PROCEDURE — 82947 ASSAY GLUCOSE BLOOD QUANT: CPT

## 2019-11-06 PROCEDURE — 82435 ASSAY OF BLOOD CHLORIDE: CPT

## 2019-11-06 PROCEDURE — 80307 DRUG TEST PRSMV CHEM ANLYZR: CPT

## 2019-11-06 PROCEDURE — 84295 ASSAY OF SERUM SODIUM: CPT

## 2019-11-06 PROCEDURE — 99232 SBSQ HOSP IP/OBS MODERATE 35: CPT

## 2019-11-06 PROCEDURE — G0378: CPT

## 2019-11-06 PROCEDURE — 82803 BLOOD GASES ANY COMBINATION: CPT

## 2019-11-06 PROCEDURE — 85027 COMPLETE CBC AUTOMATED: CPT

## 2019-11-06 PROCEDURE — 80053 COMPREHEN METABOLIC PANEL: CPT

## 2019-11-06 PROCEDURE — 82607 VITAMIN B-12: CPT

## 2019-11-06 PROCEDURE — 96372 THER/PROPH/DIAG INJ SC/IM: CPT

## 2019-11-06 RX ORDER — DEXTROSE 50 % IN WATER 50 %
12.5 SYRINGE (ML) INTRAVENOUS ONCE
Refills: 0 | Status: DISCONTINUED | OUTPATIENT
Start: 2019-11-06 | End: 2019-11-18

## 2019-11-06 RX ORDER — SODIUM CHLORIDE 9 MG/ML
1000 INJECTION, SOLUTION INTRAVENOUS
Refills: 0 | Status: DISCONTINUED | OUTPATIENT
Start: 2019-11-06 | End: 2019-11-18

## 2019-11-06 RX ORDER — GLUCAGON INJECTION, SOLUTION 0.5 MG/.1ML
1 INJECTION, SOLUTION SUBCUTANEOUS ONCE
Refills: 0 | Status: DISCONTINUED | OUTPATIENT
Start: 2019-11-06 | End: 2019-11-18

## 2019-11-06 RX ORDER — LATANOPROST 0.05 MG/ML
1 SOLUTION/ DROPS OPHTHALMIC; TOPICAL AT BEDTIME
Refills: 0 | Status: DISCONTINUED | OUTPATIENT
Start: 2019-11-06 | End: 2019-11-18

## 2019-11-06 RX ORDER — DORZOLAMIDE HYDROCHLORIDE 20 MG/ML
1 SOLUTION/ DROPS OPHTHALMIC THREE TIMES A DAY
Refills: 0 | Status: DISCONTINUED | OUTPATIENT
Start: 2019-11-06 | End: 2019-11-18

## 2019-11-06 RX ORDER — INSULIN LISPRO 100/ML
VIAL (ML) SUBCUTANEOUS
Refills: 0 | Status: DISCONTINUED | OUTPATIENT
Start: 2019-11-06 | End: 2019-11-08

## 2019-11-06 RX ORDER — DEXTROSE 50 % IN WATER 50 %
25 SYRINGE (ML) INTRAVENOUS ONCE
Refills: 0 | Status: DISCONTINUED | OUTPATIENT
Start: 2019-11-06 | End: 2019-11-18

## 2019-11-06 RX ORDER — HEPARIN SODIUM 5000 [USP'U]/ML
5000 INJECTION INTRAVENOUS; SUBCUTANEOUS EVERY 8 HOURS
Refills: 0 | Status: DISCONTINUED | OUTPATIENT
Start: 2019-11-06 | End: 2019-11-18

## 2019-11-06 RX ORDER — DEXTROSE 50 % IN WATER 50 %
15 SYRINGE (ML) INTRAVENOUS ONCE
Refills: 0 | Status: DISCONTINUED | OUTPATIENT
Start: 2019-11-06 | End: 2019-11-18

## 2019-11-06 RX ORDER — INSULIN GLARGINE 100 [IU]/ML
8 INJECTION, SOLUTION SUBCUTANEOUS AT BEDTIME
Refills: 0 | Status: DISCONTINUED | OUTPATIENT
Start: 2019-11-06 | End: 2019-11-07

## 2019-11-06 RX ADMIN — Medication 2: at 17:47

## 2019-11-06 RX ADMIN — DORZOLAMIDE HYDROCHLORIDE 1 DROP(S): 20 SOLUTION/ DROPS OPHTHALMIC at 21:00

## 2019-11-06 RX ADMIN — DORZOLAMIDE HYDROCHLORIDE 1 DROP(S): 20 SOLUTION/ DROPS OPHTHALMIC at 17:48

## 2019-11-06 RX ADMIN — DORZOLAMIDE HYDROCHLORIDE 1 DROP(S): 20 SOLUTION/ DROPS OPHTHALMIC at 05:56

## 2019-11-06 RX ADMIN — HEPARIN SODIUM 5000 UNIT(S): 5000 INJECTION INTRAVENOUS; SUBCUTANEOUS at 21:01

## 2019-11-06 RX ADMIN — INSULIN GLARGINE 8 UNIT(S): 100 INJECTION, SOLUTION SUBCUTANEOUS at 21:00

## 2019-11-06 RX ADMIN — LATANOPROST 1 DROP(S): 0.05 SOLUTION/ DROPS OPHTHALMIC; TOPICAL at 21:00

## 2019-11-06 NOTE — PROGRESS NOTE ADULT - ASSESSMENT
78 yo F w/ hx dementia presents from home for paranoid behavior and increased confusion than baseline.  Monitored in ER and cleared for discharge by ER and psychiatry.     dementia with behavioral disturbance    hold off on sedatives/antipsychotics like risperidone     check b12/tsh    enhanced supervision      glaucoma: home meds  Dm2: hga1c, raiss.    vte: heparin.    dispo: per Sw/Cm once family reached     d/w SW/CM

## 2019-11-06 NOTE — CHART NOTE - NSCHARTNOTEFT_GEN_A_CORE
H&P done in prior chart as there is a chart mix up.  please refer to prior H&P......copy attached    H&P Adult [Charted Location: [facility] CDU1 03B] [Authored: 2019 15:58]- for Visit: [id number], Complete, Entered, Signed in Full, General       History of Present Illness:  Reason for Admission: confusion  History of Present Illness:   80 yo F w/ hx dementia presents from home for paranoid behavior and increased confusion than baseline.  Monitored in ER and cleared for discharge by ER and psychiatry.  However family became unreachable for 3 days and ER asking for admission for social work placement as unsafe discharge.  initially treated for UTI but urine culture negative.         Review of Systems:  Review of Systems: currently denies any complaints. seen with 1:1 at bedside  Other Review of Systems: All other review of systems negative, except as noted in HPI      Allergies and Intolerances:        Allergies:  	No Known Allergies:     Home Medications:   * Patient Currently Takes Medications as of 2019 19:28 documented in Structured Notes  · 	cephalexin 500 mg oral tablet: 1 tab(s) orally 4 times a day   · 	amoxicillin 500 mg oral capsule: 1 cap(s) orally 3 times a day   · 	cefpodoxime 200 mg oral tablet: 1 tab(s) orally every 12 hours   · 	cefpodoxime 200 mg oral tablet: 1 tab(s) orally every 12 hours   · 	insulin lispro: once a day (at bedtime):  	0 Unit(s) if Glucose 0 - 250  	1 Unit(s) if Glucose 251 - 300  	2 Unit(s) if Glucose 301 - 350  	3 Unit(s) if Glucose 351 - 400  	4 Unit(s) if Glucose GREATER THAN 400  · 	insulin lispro: 3 times a day (before meals):  	1 Unit(s) if Glucose 151 - 200  	2 Unit(s) if Glucose 201 - 250  	3 Unit(s) if Glucose 251 - 300  	4 Unit(s) if Glucose 301 - 350  	5 Unit(s) if Glucose 351 - 400  	6 Unit(s) if Glucose GREATER THAN 400  · 	insulin lispro: 3 unit(s) subcutaneous 3 times a day with meals. Hold if NPO or poor appetite  · 	dorzolamide 2% ophthalmic solution: 1 drop(s) to each affected eye 3 times a day  · 	atorvastatin 20 mg oral tablet: 1 tab(s) orally once a day (at bedtime)  · 	insulin glargine: 6 unit(s) subcutaneous once a day (at bedtime)  · 	acetaminophen 325 mg oral tablet: 1 tab(s) orally every 6 hours, As needed, Mild Pain (1 - 3)  · 	gabapentin 100 mg oral capsule: 1 cap(s) orally 3 times a day  	  	Note: last dispensed by pharmacy 2018  	  · 	latanoprost 0.005% ophthalmic solution: 1 drop(s) to each affected eye once a day (in the evening)  	  	Note: last dispensed 2018  	    .    Patient History:    Past Medical, Past Surgical, and Family History:  PAST MEDICAL HISTORY:  Diabetes     Glaucoma Left eye.     PAST SURGICAL HISTORY:  H/O  section.     FAMILY HISTORY:  Grandparent  Still living? Unknown  Family history of diabetes mellitus type II, Age at diagnosis: Age Unknown.     Social History:  Social History (marital status, living situation, occupation, tobacco use, alcohol and drug use, and sexual history): no smoking     Tobacco Screening:  · Core Measure Site	No    Risk Assessment:    Present on Admission:  Deep Venous Thrombosis	no  Pulmonary Embolus	no     Heart Failure:  Does this patient have a history of or has been diagnosed with heart failure? no.       Physical Exam:  · Constitutional	Well-developed, well nourished  · Back	No deformity or limitation of movement  · Respiratory	Breath Sounds equal & clear to percussion & auscultation, no accessory muscle use  · Cardiovascular	Regular rate & rhythm, normal S1, S2; no murmurs, gallops or rubs; no S3, S4  · Gastrointestinal	Soft, non-tender, no hepatosplenomegaly, normal bowel sounds  · Genitourinary	not examined  · Rectal	not examined  · Extremities	No cyanosis, clubbing or edema  · Vascular	Equal and normal pulses (carotid, femoral, dorsalis pedis)  · Neurological	detailed exam  · Mental Status	aaox2 (self/place)  mildly confused  · Cranial Nerve	intact  · Motor	intact  · Musculoskeletal	No joint pain, swelling or deformity; no limitation of movement      Radiology:   CT:    2019 01:59, CT Head No Cont  CT Head No Cont: EXAM:  CT BRAIN                        	  	PROCEDURE DATE:  2019    	  	  	  	INTERPRETATION:  CLINICAL INFORMATION: Altered mental status.  	  	TECHNIQUE:  	Axial CT images were acquired through the head.  	Intravenous contrast: None  	Two-dimensionalreformats were generated.  	  	COMPARISON STUDY: 2019.  	  	FINDINGS:   	There is no CT evidence of acute intracranial hemorrhage, mass effect,   	midline shift or acute territorial infarct. The basal cisterns are patent.  	  	Moderate generalized cerebral volume loss and mild periventricular white   	matter hypoattenuation about chronic microvascular ischemic disease   	appear grossly stable from 2019.  	  	Small amount of secretions within the right frontal sinus and right   	sphenoid sinus.  Moderate mucosal thickening in the left sphenoid sinus.  	  	The mastoid air cells and middle ear cavities are clear.  	  	The calvarium, skull base, and extracranial soft tissues are unremarkable.  	  	IMPRESSION:   	No CT evidence of acute intracranial hemorrhage, mass effect or acute   	territorial infarct.  	  	Secretions in the right frontal sinus and right sphenoid sinus may   	represent trapped secretions versus sinusitis.  	  	  	  	  	  	  	  	CHRISTOPHER FREDERICK M.D.,ATTENDING RADIOLOGIST  	This document has been electronically signed. 2019  2:37AM  X-Ray, Fluoroscopy:  PACS Image: Image(s) Available    Assessment and Plan:    Assessment:  · Assessment	  80 yo F w/ hx dementia presents from home for paranoid behavior and increased confusion than baseline.  Monitored in ER and cleared for discharge by ER and psychiatry.     dementia with behavioral disturbance    hold off on sedatives/antipsychotics like risperidone as calm currently    check b12/tsh      glaucoma: home meds  Dm2: hga1c, raiss.    vte: lovenox.    dispo: per Sw/Cm once family reached

## 2019-11-06 NOTE — PROGRESS NOTE ADULT - SUBJECTIVE AND OBJECTIVE BOX
seen for dementia    ros unable to obtain as sleeping, when awakened yells "leave me alone"      MEDICATIONS  (STANDING):  dextrose 5%. 1000 milliLiter(s) (50 mL/Hr) IV Continuous <Continuous>  dextrose 50% Injectable 12.5 Gram(s) IV Push once  dextrose 50% Injectable 25 Gram(s) IV Push once  dextrose 50% Injectable 25 Gram(s) IV Push once  dorzolamide 2% Ophthalmic Solution 1 Drop(s) Both EYES three times a day  heparin  Injectable 5000 Unit(s) SubCutaneous every 8 hours  insulin glargine Injectable (LANTUS) 8 Unit(s) SubCutaneous at bedtime  insulin lispro (HumaLOG) corrective regimen sliding scale   SubCutaneous three times a day before meals  latanoprost 0.005% Ophthalmic Solution 1 Drop(s) Both EYES at bedtime    MEDICATIONS  (PRN):  dextrose 40% Gel 15 Gram(s) Oral once PRN Blood Glucose LESS THAN 70 milliGRAM(s)/deciliter  glucagon  Injectable 1 milliGRAM(s) IntraMuscular once PRN Glucose LESS THAN 70 milligrams/deciliter      Allergies    No Known Allergies    Vital Signs Last 24 Hrs  T(C): 36.7 (06 Nov 2019 07:33), Max: 37.1 (05 Nov 2019 14:23)  T(F): 98 (06 Nov 2019 07:33), Max: 98.7 (05 Nov 2019 14:23)  HR: 68 (06 Nov 2019 07:33) (68 - 83)  BP: 120/70 (06 Nov 2019 07:33) (116/54 - 155/72)  BP(mean): --  RR: 18 (06 Nov 2019 07:33) (17 - 18)  SpO2: 98% (06 Nov 2019 07:33) (98% - 99%)    PHYSICAL EXAM:    GENERAL: NAD  declines rest of exam    LABS:                CAPILLARY BLOOD GLUCOSE      POCT Blood Glucose.: 135 mg/dL (06 Nov 2019 11:05)  POCT Blood Glucose.: 112 mg/dL (06 Nov 2019 08:30)  POCT Blood Glucose.: 218 mg/dL (05 Nov 2019 22:27)  POCT Blood Glucose.: 196 mg/dL (05 Nov 2019 16:27)        RADIOLOGY & ADDITIONAL TESTS:

## 2019-11-07 ENCOUNTER — TRANSCRIPTION ENCOUNTER (OUTPATIENT)
Age: 79
End: 2019-11-07

## 2019-11-07 LAB
ANION GAP SERPL CALC-SCNC: 11 MMOL/L — SIGNIFICANT CHANGE UP (ref 5–17)
BUN SERPL-MCNC: 25 MG/DL — HIGH (ref 8–20)
CALCIUM SERPL-MCNC: 9.6 MG/DL — SIGNIFICANT CHANGE UP (ref 8.6–10.2)
CHLORIDE SERPL-SCNC: 102 MMOL/L — SIGNIFICANT CHANGE UP (ref 98–107)
CO2 SERPL-SCNC: 27 MMOL/L — SIGNIFICANT CHANGE UP (ref 22–29)
CREAT SERPL-MCNC: 0.94 MG/DL — SIGNIFICANT CHANGE UP (ref 0.5–1.3)
GLUCOSE BLDC GLUCOMTR-MCNC: 150 MG/DL — HIGH (ref 70–99)
GLUCOSE BLDC GLUCOMTR-MCNC: 241 MG/DL — HIGH (ref 70–99)
GLUCOSE BLDC GLUCOMTR-MCNC: 261 MG/DL — HIGH (ref 70–99)
GLUCOSE BLDC GLUCOMTR-MCNC: 64 MG/DL — LOW (ref 70–99)
GLUCOSE SERPL-MCNC: 69 MG/DL — LOW (ref 70–115)
POTASSIUM SERPL-MCNC: 3.6 MMOL/L — SIGNIFICANT CHANGE UP (ref 3.5–5.3)
POTASSIUM SERPL-SCNC: 3.6 MMOL/L — SIGNIFICANT CHANGE UP (ref 3.5–5.3)
SODIUM SERPL-SCNC: 140 MMOL/L — SIGNIFICANT CHANGE UP (ref 135–145)
T PALLIDUM AB TITR SER: NEGATIVE — SIGNIFICANT CHANGE UP

## 2019-11-07 PROCEDURE — 99231 SBSQ HOSP IP/OBS SF/LOW 25: CPT

## 2019-11-07 RX ORDER — INSULIN GLARGINE 100 [IU]/ML
6 INJECTION, SOLUTION SUBCUTANEOUS AT BEDTIME
Refills: 0 | Status: DISCONTINUED | OUTPATIENT
Start: 2019-11-07 | End: 2019-11-10

## 2019-11-07 RX ADMIN — DORZOLAMIDE HYDROCHLORIDE 1 DROP(S): 20 SOLUTION/ DROPS OPHTHALMIC at 13:07

## 2019-11-07 RX ADMIN — DORZOLAMIDE HYDROCHLORIDE 1 DROP(S): 20 SOLUTION/ DROPS OPHTHALMIC at 04:40

## 2019-11-07 RX ADMIN — HEPARIN SODIUM 5000 UNIT(S): 5000 INJECTION INTRAVENOUS; SUBCUTANEOUS at 22:16

## 2019-11-07 RX ADMIN — Medication 6: at 13:06

## 2019-11-07 RX ADMIN — HEPARIN SODIUM 5000 UNIT(S): 5000 INJECTION INTRAVENOUS; SUBCUTANEOUS at 13:07

## 2019-11-07 RX ADMIN — DORZOLAMIDE HYDROCHLORIDE 1 DROP(S): 20 SOLUTION/ DROPS OPHTHALMIC at 22:17

## 2019-11-07 RX ADMIN — HEPARIN SODIUM 5000 UNIT(S): 5000 INJECTION INTRAVENOUS; SUBCUTANEOUS at 04:40

## 2019-11-07 RX ADMIN — LATANOPROST 1 DROP(S): 0.05 SOLUTION/ DROPS OPHTHALMIC; TOPICAL at 22:17

## 2019-11-07 RX ADMIN — INSULIN GLARGINE 6 UNIT(S): 100 INJECTION, SOLUTION SUBCUTANEOUS at 23:40

## 2019-11-07 NOTE — DISCHARGE NOTE PROVIDER - NSDCMRMEDTOKEN_GEN_ALL_CORE_FT
acetaminophen 325 mg oral tablet: 1 tab(s) orally every 6 hours, As needed, Mild Pain (1 - 3)  atorvastatin 20 mg oral tablet: 1 tab(s) orally once a day (at bedtime)  dorzolamide 2% ophthalmic solution: 1 drop(s) to each affected eye 3 times a day  gabapentin 100 mg oral capsule: 1 cap(s) orally 3 times a day    Note: last dispensed by pharmacy February 2018    insulin glargine: 6 unit(s) subcutaneous once a day (at bedtime)  insulin lispro: 3 unit(s) subcutaneous 3 times a day with meals. Hold if NPO or poor appetite  insulin lispro: 3 times a day (before meals):  1 Unit(s) if Glucose 151 - 200  2 Unit(s) if Glucose 201 - 250  3 Unit(s) if Glucose 251 - 300  4 Unit(s) if Glucose 301 - 350  5 Unit(s) if Glucose 351 - 400  6 Unit(s) if Glucose GREATER THAN 400  insulin lispro: once a day (at bedtime):  0 Unit(s) if Glucose 0 - 250  1 Unit(s) if Glucose 251 - 300  2 Unit(s) if Glucose 301 - 350  3 Unit(s) if Glucose 351 - 400  4 Unit(s) if Glucose GREATER THAN 400  latanoprost 0.005% ophthalmic solution: 1 drop(s) to each affected eye once a day (in the evening)    Note: last dispensed January 2018 acetaminophen 325 mg oral tablet: 1 tab(s) orally every 6 hours, As needed, Mild Pain (1 - 3)  atorvastatin 20 mg oral tablet: 1 tab(s) orally once a day (at bedtime)  dorzolamide 2% ophthalmic solution: 1 drop(s) to each affected eye 3 times a day  gabapentin 100 mg oral capsule: 1 cap(s) orally 3 times a day    Note: last dispensed by pharmacy 2018    insulin glargine: 6 unit(s) subcutaneous once a day (at bedtime)  insulin lispro: 3 times a day (before meals):  1 Unit(s) if Glucose 151 - 200  2 Unit(s) if Glucose 201 - 250  3 Unit(s) if Glucose 251 - 300  4 Unit(s) if Glucose 301 - 350  5 Unit(s) if Glucose 351 - 400  6 Unit(s) if Glucose GREATER THAN 400  insulin lispro: once a day (at bedtime):  0 Unit(s) if Glucose 0 - 250  1 Unit(s) if Glucose 251 - 300  2 Unit(s) if Glucose 301 - 350  3 Unit(s) if Glucose 351 - 400  4 Unit(s) if Glucose GREATER THAN 400  latanoprost 0.005% ophthalmic solution: 1 drop(s) to each affected eye once a day (in the evening)    Note: last dispensed 2018    risperiDONE 0.5 mg oral tablet, disintegratin tab(s) orally once a day (at bedtime) acetaminophen 325 mg oral tablet: 1 tab(s) orally every 6 hours, As needed, Mild Pain (1 - 3)  atorvastatin 20 mg oral tablet: 1 tab(s) orally once a day (at bedtime)  dorzolamide 2% ophthalmic solution: 1 drop(s) to each affected eye 3 times a day  gabapentin 100 mg oral capsule: 1 cap(s) orally 3 times a day    Note: last dispensed by pharmacy 2018    insulin glargine 100 units/mL subcutaneous solution: 6 unit(s) subcutaneous once a day (at bedtime)   insulin lispro: 3 times a day (before meals):  1 Unit(s) if Glucose 151 - 200  2 Unit(s) if Glucose 201 - 250  3 Unit(s) if Glucose 251 - 300  4 Unit(s) if Glucose 301 - 350  5 Unit(s) if Glucose 351 - 400  6 Unit(s) if Glucose GREATER THAN 400  insulin lispro: once a day (at bedtime):  0 Unit(s) if Glucose 0 - 250  1 Unit(s) if Glucose 251 - 300  2 Unit(s) if Glucose 301 - 350  3 Unit(s) if Glucose 351 - 400  4 Unit(s) if Glucose GREATER THAN 400  latanoprost 0.005% ophthalmic solution: 1 drop(s) to each affected eye once a day (in the evening)    Note: last dispensed 2018    risperiDONE 0.5 mg oral tablet, disintegratin tab(s) orally once a day (at bedtime)

## 2019-11-07 NOTE — PROGRESS NOTE ADULT - SUBJECTIVE AND OBJECTIVE BOX
CC: confusion      INTERVAL HPI/OVERNIGHT EVENTS: Patient seen and examined. No acute issues overnight. Denies chest pain, sOB, dizziness, nausea, vomiting, fever, chills. Taking po diet well.     Vital Signs Last 24 Hrs  T(C): 36.6 (07 Nov 2019 08:00), Max: 36.7 (06 Nov 2019 20:56)  T(F): 97.8 (07 Nov 2019 08:00), Max: 98.1 (06 Nov 2019 20:56)  HR: 67 (07 Nov 2019 08:00) (67 - 79)  BP: 111/59 (07 Nov 2019 08:00) (111/59 - 155/78)  BP(mean): --  RR: 18 (07 Nov 2019 08:00) (17 - 18)  SpO2: 99% (07 Nov 2019 08:00) (97% - 99%)    PHYSICAL EXAM:    General: Well developed; well nourished; in no acute distress  Eyes: PERRL, EOM intact; conjunctiva and sclera clear  Head: Normocephalic; atraumatic  Respiratory: No wheezes, rales or rhonchi  Cardiovascular: Regular rate and rhythm. S1 and S2 Normal; No murmurs, gallops or rubs  Gastrointestinal: Soft non-tender non-distended; Normal bowel sounds; No hepatosplenomegaly  Extremities: Normal range of motion, No clubbing, cyanosis or edema  Vascular: Peripheral pulses palpable 2+ bilaterally  Neurological: Alert and oriented to person, knows she is in a hospital  Musculoskeletal: Normal gait, tone, without deformities  Psychiatric: Cooperative and calm at present  I&O's Detail    06 Nov 2019 07:01  -  07 Nov 2019 07:00  --------------------------------------------------------  IN:    Oral Fluid: 300 mL  Total IN: 300 mL    OUT:  Total OUT: 0 mL    Total NET: 300 mL                                    11.7   5.56  )-----------( 130      ( 06 Nov 2019 20:14 )             37.4     07 Nov 2019 09:10    140    |  102    |  25.0   ----------------------------<  69     3.6     |  27.0   |  0.94     Ca    9.6        07 Nov 2019 09:10    TPro  7.5    /  Alb  4.2    /  TBili  <0.2   /  DBili  x      /  AST  43     /  ALT  33     /  AlkPhos  105    06 Nov 2019 20:14      CAPILLARY BLOOD GLUCOSE      POCT Blood Glucose.: 261 mg/dL (07 Nov 2019 12:46)  POCT Blood Glucose.: 64 mg/dL (07 Nov 2019 09:27)  POCT Blood Glucose.: 224 mg/dL (06 Nov 2019 20:53)  POCT Blood Glucose.: 186 mg/dL (06 Nov 2019 17:46)    LIVER FUNCTIONS - ( 06 Nov 2019 20:14 )  Alb: 4.2 g/dL / Pro: 7.5 g/dL / ALK PHOS: 105 U/L / ALT: 33 U/L / AST: 43 U/L / GGT: x             Hemoglobin A1C, Whole Blood: 9.6 % (11-06-19 @ 20:14)    MEDICATIONS  (STANDING):  dextrose 5%. 1000 milliLiter(s) (50 mL/Hr) IV Continuous <Continuous>  dextrose 50% Injectable 12.5 Gram(s) IV Push once  dextrose 50% Injectable 25 Gram(s) IV Push once  dextrose 50% Injectable 25 Gram(s) IV Push once  dorzolamide 2% Ophthalmic Solution 1 Drop(s) Both EYES three times a day  heparin  Injectable 5000 Unit(s) SubCutaneous every 8 hours  insulin glargine Injectable (LANTUS) 8 Unit(s) SubCutaneous at bedtime  insulin lispro (HumaLOG) corrective regimen sliding scale   SubCutaneous three times a day before meals  latanoprost 0.005% Ophthalmic Solution 1 Drop(s) Both EYES at bedtime    MEDICATIONS  (PRN):  dextrose 40% Gel 15 Gram(s) Oral once PRN Blood Glucose LESS THAN 70 milliGRAM(s)/deciliter  glucagon  Injectable 1 milliGRAM(s) IntraMuscular once PRN Glucose LESS THAN 70 milligrams/deciliter      RADIOLOGY & ADDITIONAL TESTS:

## 2019-11-07 NOTE — DISCHARGE NOTE PROVIDER - PROVIDER TOKENS
FREE:[LAST:[primary care doctor.],PHONE:[(   )    -],FAX:[(   )    -]] FREE:[LAST:[primary care doctor.],PHONE:[(   )    -],FAX:[(   )    -]],PROVIDER:[TOKEN:[[id number]]]

## 2019-11-07 NOTE — DISCHARGE NOTE PROVIDER - HOSPITAL COURSE
78 yo F w/ hx dementia presents from home for paranoid behavior and increased confusion than baseline. Monitored in ER and cleared for discharge by ER and psychiatry however family has been unreachable.     patient monitored in hospital while family attempted to be reached. 78 yo F w/ hx dementia presents from home for paranoid behavior and increased confusion than baseline. Monitored in ER and cleared for discharge by ER and psychiatry however family has been unreachable.     patient monitored in hospital while family attempted to be reached. Patient followed by Psychiatry, no psychiatric contraindications to discharge.  The patient lives alone and is unable to care for herself. The     patient is back to baseline mental status and is in agreement to go to [facility]. the patient is medically stable for discharge. 78 yo F w/ hx dementia presents from home for paranoid behavior and increased confusion than baseline. Monitored in ER and cleared for discharge by ER and psychiatry however family has been unreachable.     Patient monitored in the hospital while family attempted to be reached. Patient followed by Psychiatry, no psychiatric contraindications to discharge.  The patient lives alone and is unable to care for herself. The     patient is back to baseline mental status and is in agreement to go to [facility]. Son came to see patient and will take patient home with him.  Patient is medically stable for discharge.        Vital Signs Last 24 Hrs    T(C): 36.4 (18 Nov 2019 08:35), Max: 36.9 (18 Nov 2019 00:08)    T(F): 97.5 (18 Nov 2019 08:35), Max: 98.4 (18 Nov 2019 00:08)    HR: 80 (18 Nov 2019 08:35) (79 - 84)    BP: 116/71 (18 Nov 2019 08:35) (116/71 - 147/73)    BP(mean): --    RR: 18 (18 Nov 2019 08:35) (17 - 18)    SpO2: 98% (18 Nov 2019 08:35) (97% - 98%)        PHYSICAL EXAM:    GENERAL: NAD    HEAD:  Atraumatic, Normocephalic    NECK: Supple, No JVD, Normal thyroid    NERVOUS SYSTEM:  Alert, Disoriented, Good concentration; Motor Strength 5/5 B/L upper and lower extremities    CHEST/LUNG: Clear to auscultation bilaterally; No rales, rhonchi, wheezing, or rubs    HEART: Regular rate and rhythm; No murmurs, rubs, or gallops    ABDOMEN: Soft, Nontender, Nondistended; Bowel sounds present    EXTREMITIES:  2+ Peripheral Pulses, No clubbing, cyanosis, or edema

## 2019-11-07 NOTE — PROGRESS NOTE ADULT - ASSESSMENT
80 yo F w/ hx dementia presents from home for paranoid behavior and increased confusion than baseline.  Monitored in ER and cleared for discharge by ER and psychiatry however family has been unreachable.     dementia with behavioral disturbance    patient awake and cooperative at present    hold off on sedatives/antipsychotics like risperidone    enhanced supervision      glaucoma: home meds    Dm2: consistent carb diet. HGA1c 9.6, ISS    vte: heparin.    dispo: per Sw/Cm once family reached

## 2019-11-07 NOTE — DISCHARGE NOTE PROVIDER - NSDCCPCAREPLAN_GEN_ALL_CORE_FT
PRINCIPAL DISCHARGE DIAGNOSIS  Diagnosis: Alzheimer's dementia with behavioral disturbance  Assessment and Plan of Treatment: continue home medications  follow up with your primary care doctor.      SECONDARY DISCHARGE DIAGNOSES  Diagnosis: Type 2 diabetes mellitus  Assessment and Plan of Treatment: continue insulin therapy  frequent glucose monitoring

## 2019-11-07 NOTE — DISCHARGE NOTE PROVIDER - CARE PROVIDER_API CALL
primary care doctor.,   Phone: (   )    -  Fax: (   )    -  Follow Up Time: primary care doctor.,   Phone: (   )    -  Fax: (   )    -  Follow Up Time:     Gonzalez Hylton)  Neurology; Vascular Neurology  [address], [address]  [place] [address]  Phone: [phone]  Fax: [phone]  Follow Up Time:

## 2019-11-07 NOTE — DISCHARGE NOTE PROVIDER - CARE PROVIDERS DIRECT ADDRESSES
,DirectAddress_Unknown ,DirectAddress_Unknown,[email or web address]

## 2019-11-08 LAB
GLUCOSE BLDC GLUCOMTR-MCNC: 150 MG/DL — HIGH (ref 70–99)
GLUCOSE BLDC GLUCOMTR-MCNC: 306 MG/DL — HIGH (ref 70–99)
GLUCOSE BLDC GLUCOMTR-MCNC: 312 MG/DL — HIGH (ref 70–99)
GLUCOSE BLDC GLUCOMTR-MCNC: 44 MG/DL — CRITICAL LOW (ref 70–99)
GLUCOSE BLDC GLUCOMTR-MCNC: 48 MG/DL — LOW (ref 70–99)
GLUCOSE BLDC GLUCOMTR-MCNC: 49 MG/DL — LOW (ref 70–99)
GLUCOSE BLDC GLUCOMTR-MCNC: 63 MG/DL — LOW (ref 70–99)
GLUCOSE BLDC GLUCOMTR-MCNC: 76 MG/DL — SIGNIFICANT CHANGE UP (ref 70–99)
GLUCOSE BLDC GLUCOMTR-MCNC: 92 MG/DL — SIGNIFICANT CHANGE UP (ref 70–99)
GLUCOSE BLDC GLUCOMTR-MCNC: 99 MG/DL — SIGNIFICANT CHANGE UP (ref 70–99)

## 2019-11-08 PROCEDURE — 99222 1ST HOSP IP/OBS MODERATE 55: CPT

## 2019-11-08 PROCEDURE — 99231 SBSQ HOSP IP/OBS SF/LOW 25: CPT

## 2019-11-08 RX ORDER — DEXTROSE 50 % IN WATER 50 %
15 SYRINGE (ML) INTRAVENOUS ONCE
Refills: 0 | Status: COMPLETED | OUTPATIENT
Start: 2019-11-08 | End: 2019-11-08

## 2019-11-08 RX ORDER — RISPERIDONE 4 MG/1
0.25 TABLET ORAL AT BEDTIME
Refills: 0 | Status: DISCONTINUED | OUTPATIENT
Start: 2019-11-08 | End: 2019-11-12

## 2019-11-08 RX ORDER — INSULIN LISPRO 100/ML
VIAL (ML) SUBCUTANEOUS
Refills: 0 | Status: DISCONTINUED | OUTPATIENT
Start: 2019-11-08 | End: 2019-11-18

## 2019-11-08 RX ADMIN — DORZOLAMIDE HYDROCHLORIDE 1 DROP(S): 20 SOLUTION/ DROPS OPHTHALMIC at 06:14

## 2019-11-08 RX ADMIN — DORZOLAMIDE HYDROCHLORIDE 1 DROP(S): 20 SOLUTION/ DROPS OPHTHALMIC at 12:03

## 2019-11-08 RX ADMIN — HEPARIN SODIUM 5000 UNIT(S): 5000 INJECTION INTRAVENOUS; SUBCUTANEOUS at 21:42

## 2019-11-08 RX ADMIN — DORZOLAMIDE HYDROCHLORIDE 1 DROP(S): 20 SOLUTION/ DROPS OPHTHALMIC at 21:41

## 2019-11-08 RX ADMIN — LATANOPROST 1 DROP(S): 0.05 SOLUTION/ DROPS OPHTHALMIC; TOPICAL at 21:42

## 2019-11-08 RX ADMIN — Medication 8: at 12:02

## 2019-11-08 RX ADMIN — Medication 4: at 21:42

## 2019-11-08 RX ADMIN — HEPARIN SODIUM 5000 UNIT(S): 5000 INJECTION INTRAVENOUS; SUBCUTANEOUS at 12:03

## 2019-11-08 RX ADMIN — INSULIN GLARGINE 6 UNIT(S): 100 INJECTION, SOLUTION SUBCUTANEOUS at 21:42

## 2019-11-08 RX ADMIN — HEPARIN SODIUM 5000 UNIT(S): 5000 INJECTION INTRAVENOUS; SUBCUTANEOUS at 06:14

## 2019-11-08 RX ADMIN — Medication 15 GRAM(S): at 16:32

## 2019-11-08 RX ADMIN — RISPERIDONE 0.25 MILLIGRAM(S): 4 TABLET ORAL at 22:07

## 2019-11-08 NOTE — BEHAVIORAL HEALTH ASSESSMENT NOTE - NSBHCHARTREVIEWIMAGING_PSY_A_CORE FT
< from: CT Head No Cont (11.02.19 @ 01:59) >    Moderate generalized cerebral volume loss and mild periventricular white   matter hypoattenuation about chronic microvascular ischemic disease   appear grossly stable from 06/05/2019.    Small amount of secretions within the right frontal sinus and right   sphenoid sinus.  Moderate mucosal thickening in the left sphenoid sinus.    The mastoid air cells and middle ear cavities are clear.    The calvarium, skull base, and extracranial soft tissues are unremarkable.    IMPRESSION:   No CT evidence of acute intracranial hemorrhage, mass effect or acute   territorial infarct.    < end of copied text >

## 2019-11-08 NOTE — BEHAVIORAL HEALTH ASSESSMENT NOTE - NSBHCHARTREVIEWLAB_PSY_A_CORE FT
11.7   5.56  )-----------( 130      ( 06 Nov 2019 20:14 )             37.4     11-07    140  |  102  |  25.0<H>  ----------------------------<  69<L>  3.6   |  27.0  |  0.94    Ca    9.6      07 Nov 2019 09:10    TPro  7.5  /  Alb  4.2  /  TBili  <0.2<L>  /  DBili  x   /  AST  43<H>  /  ALT  33<H>  /  AlkPhos  105  11-06    LIVER FUNCTIONS - ( 06 Nov 2019 20:14 )  Alb: 4.2 g/dL / Pro: 7.5 g/dL / ALK PHOS: 105 U/L / ALT: 33 U/L / AST: 43 U/L / GGT: x

## 2019-11-08 NOTE — DIETITIAN INITIAL EVALUATION ADULT. - OTHER INFO
79 year old female with hx dementia admitted with paranoid behavior and increased confusion than baseline. Limited nutrition interview conducted due to pt confused state. Pt consumed 100% of breakfast per tray observation.

## 2019-11-08 NOTE — PROGRESS NOTE ADULT - SUBJECTIVE AND OBJECTIVE BOX
seen for dementia    no acute events  calm tolerating diet  seen by psych  ros negative     MEDICATIONS  (STANDING):  dextrose 5%. 1000 milliLiter(s) (50 mL/Hr) IV Continuous <Continuous>  dextrose 50% Injectable 12.5 Gram(s) IV Push once  dextrose 50% Injectable 25 Gram(s) IV Push once  dextrose 50% Injectable 25 Gram(s) IV Push once  dorzolamide 2% Ophthalmic Solution 1 Drop(s) Both EYES three times a day  heparin  Injectable 5000 Unit(s) SubCutaneous every 8 hours  insulin glargine Injectable (LANTUS) 6 Unit(s) SubCutaneous at bedtime  insulin lispro (HumaLOG) corrective regimen sliding scale   SubCutaneous three times a day before meals  latanoprost 0.005% Ophthalmic Solution 1 Drop(s) Both EYES at bedtime  risperiDONE   Tablet 0.25 milliGRAM(s) Oral at bedtime    MEDICATIONS  (PRN):  dextrose 40% Gel 15 Gram(s) Oral once PRN Blood Glucose LESS THAN 70 milliGRAM(s)/deciliter  glucagon  Injectable 1 milliGRAM(s) IntraMuscular once PRN Glucose LESS THAN 70 milligrams/deciliter      Allergies    No Known Allergies      Vital Signs Last 24 Hrs  T(C): 36.7 (08 Nov 2019 07:50), Max: 36.7 (07 Nov 2019 16:23)  T(F): 98 (08 Nov 2019 07:50), Max: 98.1 (07 Nov 2019 16:23)  HR: 76 (08 Nov 2019 12:07) (76 - 82)  BP: 117/69 (08 Nov 2019 12:07) (94/57 - 132/62)  BP(mean): --  RR: 18 (08 Nov 2019 12:07) (18 - 18)  SpO2: 98% (08 Nov 2019 12:07) (97% - 98%)    PHYSICAL EXAM:    GENERAL: NAD  CHEST/LUNG: Clear to percussion bilaterally  HEART: Regular rate and rhythm; S1 S2  ABDOMEN: Soft, Bowel sounds present  EXTREMITIES: no edema    LABS:                        11.7   5.56  )-----------( 130      ( 06 Nov 2019 20:14 )             37.4     11-07    140  |  102  |  25.0<H>  ----------------------------<  69<L>  3.6   |  27.0  |  0.94    Ca    9.6      07 Nov 2019 09:10    TPro  7.5  /  Alb  4.2  /  TBili  <0.2<L>  /  DBili  x   /  AST  43<H>  /  ALT  33<H>  /  AlkPhos  105  11-06          CAPILLARY BLOOD GLUCOSE      POCT Blood Glucose.: 306 mg/dL (08 Nov 2019 11:53)  POCT Blood Glucose.: 76 mg/dL (08 Nov 2019 08:10)  POCT Blood Glucose.: 241 mg/dL (07 Nov 2019 22:22)  POCT Blood Glucose.: 150 mg/dL (07 Nov 2019 17:25)        RADIOLOGY & ADDITIONAL TESTS:

## 2019-11-08 NOTE — BEHAVIORAL HEALTH ASSESSMENT NOTE - DIFFERENTIAL
may have some element of delirium at times related to diabetes control Presently alert and well oriented

## 2019-11-08 NOTE — BEHAVIORAL HEALTH ASSESSMENT NOTE - NSBHCHARTREVIEWVS_PSY_A_CORE FT
6 week sized, anteverted, uterus, closed cervix. 5cm fibroid in the anterior uterine wall, 10% submucosal. 6 week sized, anteverted, uterus, closed cervix. 5cm fibroid seen in the anterior uterine wall, 1 o'clock position, 10% submucosal. 12-14 week sized, anteverted, uterus, closed cervix. Cavity distorted from intramural fibroids, more on the right. both ostia seen, endometrium fluffy.  Described submucosal fibroid on imaging had little submucosal component and not amenable to resection via myosure. Vital Signs Last 24 Hrs  T(C): 36.7 (08 Nov 2019 07:50), Max: 36.7 (07 Nov 2019 16:23)  T(F): 98 (08 Nov 2019 07:50), Max: 98.1 (07 Nov 2019 16:23)  HR: 76 (08 Nov 2019 12:07) (76 - 82)  BP: 117/69 (08 Nov 2019 12:07) (94/57 - 132/62)  RR: 18 (08 Nov 2019 12:07) (18 - 18)  SpO2: 98% (08 Nov 2019 12:07) (97% - 98%)

## 2019-11-08 NOTE — CHART NOTE - NSCHARTNOTEFT_GEN_A_CORE
Upon Nutritional Assessment by the Registered Dietitian your patient was determined to meet criteria / has evidence of the following diagnosis/diagnoses:          [x ] Severe Protein Calorie Malnutrition      Findings as based on:  •  Comprehensive nutrition assessment and consultation  •  Calorie counts (nutrient intake analysis)  •  Food acceptance and intake status from observations by staff  •  Follow up  •  Patient education  •  Intervention secondary to interdisciplinary rounds  •   concerns      Treatment:    The following diet has been recommended:      PROVIDER Section:     By signing this assessment you are acknowledging and agree with the diagnosis/diagnoses assigned by the Registered Dietitian    Comments:    Rx: nalini COHEN none

## 2019-11-08 NOTE — BEHAVIORAL HEALTH ASSESSMENT NOTE - HPI (INCLUDE ILLNESS QUALITY, SEVERITY, DURATION, TIMING, CONTEXT, MODIFYING FACTORS, ASSOCIATED SIGNS AND SYMPTOMS)
past assessment by Dr Raymond dated 11/02/2019 appreciated Subsequent to that visit family brought patient back [facility] continued living alone unsafe and was admitted as "social admit" for placement described as"in and out" of orientation Concern over her ability to make reasonable decisions Patient expresses belief that her ex- would prefer her dead describing "mock funerals he  conducted at their [employer]"  and that her adult children have abandoned her  She admittedly reports needing help due to physical complications of diabetes i.e visual loss, weakness, acknowledging need for assistance with ADLs such as cooking shopping cleaning and paying bills 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

## 2019-11-08 NOTE — PROGRESS NOTE ADULT - ASSESSMENT
78 yo F w/ hx dementia presents from home for paranoid behavior and increased confusion than baseline.  Monitored in ER and cleared for discharge by ER and psychiatry however family has been unreachable.     dementia with behavioral disturbance    patient awake and cooperative at present    psych states has ability to make medical decisions    risperidone 0.25mg qhs initiated      glaucoma: home meds    Dm2: consistent carb diet. HGA1c 9.6, ISS and lantus     vte: heparin.    dispo: family not picking up patient, patient unable to manage own care    SW/CM re dispo

## 2019-11-08 NOTE — DIETITIAN INITIAL EVALUATION ADULT. - MALNUTRITION
Severe muscle wasting at temples, clavicle, shoulders. Severe fat loss in orbital region, triceps. severe (chronic)

## 2019-11-08 NOTE — BEHAVIORAL HEALTH ASSESSMENT NOTE - NSBHCHARTREVIEWINVESTIGATE_PSY_A_CORE FT
< from: 12 Lead ECG (11.01.19 @ 22:49) >    QTC Calculation(Bezet) 435 ms    P Axis 50 degrees    R Axis -52 degrees    T Axis 82 degrees    Diagnosis Line Normal sinus rhythm  Left anterior fascicular block  Left ventricular hypertrophy with QRS widening and repolarization abnormality  Cannot rule out Septal infarct , age undetermined  Possible Lateral infarct , age undetermined  Cannot rule out Inferior infarct (masked by fascicular block?) , age undetermined  Abnormal ECG    < end of copied text >

## 2019-11-08 NOTE — BEHAVIORAL HEALTH ASSESSMENT NOTE - RISK ASSESSMENT
Low Acute Suicide Risk no current urges to harm self imminent risk low however chronic risk elevated as she has mild cognitive impairment and paranoia

## 2019-11-09 LAB
GLUCOSE BLDC GLUCOMTR-MCNC: 139 MG/DL — HIGH (ref 70–99)
GLUCOSE BLDC GLUCOMTR-MCNC: 146 MG/DL — HIGH (ref 70–99)
GLUCOSE BLDC GLUCOMTR-MCNC: 179 MG/DL — HIGH (ref 70–99)
GLUCOSE BLDC GLUCOMTR-MCNC: 341 MG/DL — HIGH (ref 70–99)
GLUCOSE BLDC GLUCOMTR-MCNC: 359 MG/DL — HIGH (ref 70–99)

## 2019-11-09 PROCEDURE — 99232 SBSQ HOSP IP/OBS MODERATE 35: CPT

## 2019-11-09 RX ORDER — INSULIN LISPRO 100/ML
4 VIAL (ML) SUBCUTANEOUS
Refills: 0 | Status: DISCONTINUED | OUTPATIENT
Start: 2019-11-09 | End: 2019-11-10

## 2019-11-09 RX ORDER — HALOPERIDOL DECANOATE 100 MG/ML
0.5 INJECTION INTRAMUSCULAR ONCE
Refills: 0 | Status: COMPLETED | OUTPATIENT
Start: 2019-11-09 | End: 2019-11-09

## 2019-11-09 RX ADMIN — Medication 4 UNIT(S): at 16:41

## 2019-11-09 RX ADMIN — HALOPERIDOL DECANOATE 0.5 MILLIGRAM(S): 100 INJECTION INTRAMUSCULAR at 13:40

## 2019-11-09 RX ADMIN — Medication 1: at 16:42

## 2019-11-09 RX ADMIN — DORZOLAMIDE HYDROCHLORIDE 1 DROP(S): 20 SOLUTION/ DROPS OPHTHALMIC at 21:21

## 2019-11-09 RX ADMIN — HEPARIN SODIUM 5000 UNIT(S): 5000 INJECTION INTRAVENOUS; SUBCUTANEOUS at 12:27

## 2019-11-09 RX ADMIN — Medication 5: at 12:25

## 2019-11-09 RX ADMIN — LATANOPROST 1 DROP(S): 0.05 SOLUTION/ DROPS OPHTHALMIC; TOPICAL at 21:21

## 2019-11-09 RX ADMIN — HEPARIN SODIUM 5000 UNIT(S): 5000 INJECTION INTRAVENOUS; SUBCUTANEOUS at 05:49

## 2019-11-09 RX ADMIN — INSULIN GLARGINE 6 UNIT(S): 100 INJECTION, SOLUTION SUBCUTANEOUS at 21:21

## 2019-11-09 RX ADMIN — RISPERIDONE 0.25 MILLIGRAM(S): 4 TABLET ORAL at 21:22

## 2019-11-09 RX ADMIN — HEPARIN SODIUM 5000 UNIT(S): 5000 INJECTION INTRAVENOUS; SUBCUTANEOUS at 21:21

## 2019-11-09 RX ADMIN — DORZOLAMIDE HYDROCHLORIDE 1 DROP(S): 20 SOLUTION/ DROPS OPHTHALMIC at 05:49

## 2019-11-09 RX ADMIN — DORZOLAMIDE HYDROCHLORIDE 1 DROP(S): 20 SOLUTION/ DROPS OPHTHALMIC at 12:27

## 2019-11-09 NOTE — PROGRESS NOTE ADULT - ASSESSMENT
The patient is a 79 year old female with a history of dementia and diabetes mellitus type 2 who was brought to the ER by family for  paranoid behavior and increased confusion. Patient was evaluated in the ER, with no metabolic abnormalities noted. Cleared by psychiatry for discharge home. However, once family was reached stated that patient lives alone and is unable to care for herself. Awaiting placement.       Assessment/Plan:    1. Dementia with behavioral disturbance: Started on risperdone  Haldol as needed    2. Diabetes mellitus type 2: Add Humalog 4 units with meals  Continue lantus with HSS  MOnitor bsl     VTE- heparin subcut

## 2019-11-09 NOTE — PROGRESS NOTE ADULT - SUBJECTIVE AND OBJECTIVE BOX
CC: Follow up    INTERVAL HPI/OVERNIGHT EVENTS: Patient seen and examined, calm this morning. Hyperglycemic in this morning.       Vital Signs Last 24 Hrs  T(C): 36.5 (09 Nov 2019 08:00), Max: 36.6 (08 Nov 2019 15:22)  T(F): 97.7 (09 Nov 2019 08:00), Max: 97.9 (08 Nov 2019 15:22)  HR: 74 (09 Nov 2019 08:00) (74 - 83)  BP: 112/64 (09 Nov 2019 08:00) (112/64 - 147/67)  BP(mean): --  RR: 18 (09 Nov 2019 08:00) (18 - 18)  SpO2: 94% (09 Nov 2019 08:00) (94% - 99%)    PHYSICAL EXAM:    GENERAL: NAD, AOX3  EYES: EOMI, PERRLA, conjunctiva and sclera clear  ENMT: Moist mucous membranes  NECK: Supple, No JVD  CHEST/LUNG: Clear to auscultation bilaterally; No rales, rhonchi, wheezing, or rubs  HEART: Regular rate and rhythm; No murmurs, rubs, or gallops  ABDOMEN: Soft, Nontender, Nondistended; Bowel sounds present  EXTREMITIES:  2+ Peripheral Pulses, No clubbing, cyanosis, or edema        MEDICATIONS  (STANDING):  dextrose 5%. 1000 milliLiter(s) (50 mL/Hr) IV Continuous <Continuous>  dextrose 50% Injectable 12.5 Gram(s) IV Push once  dextrose 50% Injectable 25 Gram(s) IV Push once  dextrose 50% Injectable 25 Gram(s) IV Push once  dorzolamide 2% Ophthalmic Solution 1 Drop(s) Both EYES three times a day  haloperidol    Injectable 0.5 milliGRAM(s) IntraMuscular once  heparin  Injectable 5000 Unit(s) SubCutaneous every 8 hours  insulin glargine Injectable (LANTUS) 6 Unit(s) SubCutaneous at bedtime  insulin lispro (HumaLOG) corrective regimen sliding scale   SubCutaneous three times a day before meals  insulin lispro Injectable (HumaLOG) 4 Unit(s) SubCutaneous three times a day before meals  latanoprost 0.005% Ophthalmic Solution 1 Drop(s) Both EYES at bedtime  risperiDONE   Tablet 0.25 milliGRAM(s) Oral at bedtime    MEDICATIONS  (PRN):  dextrose 40% Gel 15 Gram(s) Oral once PRN Blood Glucose LESS THAN 70 milliGRAM(s)/deciliter  glucagon  Injectable 1 milliGRAM(s) IntraMuscular once PRN Glucose LESS THAN 70 milligrams/deciliter      Allergies    No Known Allergies    Intolerances          LABS:                  RADIOLOGY & ADDITIONAL TESTS:

## 2019-11-10 LAB
ANION GAP SERPL CALC-SCNC: 12 MMOL/L — SIGNIFICANT CHANGE UP (ref 5–17)
BUN SERPL-MCNC: 32 MG/DL — HIGH (ref 8–20)
CALCIUM SERPL-MCNC: 9.5 MG/DL — SIGNIFICANT CHANGE UP (ref 8.6–10.2)
CHLORIDE SERPL-SCNC: 101 MMOL/L — SIGNIFICANT CHANGE UP (ref 98–107)
CO2 SERPL-SCNC: 27 MMOL/L — SIGNIFICANT CHANGE UP (ref 22–29)
CREAT SERPL-MCNC: 0.98 MG/DL — SIGNIFICANT CHANGE UP (ref 0.5–1.3)
GLUCOSE BLDC GLUCOMTR-MCNC: 123 MG/DL — HIGH (ref 70–99)
GLUCOSE BLDC GLUCOMTR-MCNC: 165 MG/DL — HIGH (ref 70–99)
GLUCOSE BLDC GLUCOMTR-MCNC: 176 MG/DL — HIGH (ref 70–99)
GLUCOSE BLDC GLUCOMTR-MCNC: 267 MG/DL — HIGH (ref 70–99)
GLUCOSE SERPL-MCNC: 68 MG/DL — LOW (ref 70–115)
HCT VFR BLD CALC: 33.7 % — LOW (ref 34.5–45)
HGB BLD-MCNC: 10.4 G/DL — LOW (ref 11.5–15.5)
MCHC RBC-ENTMCNC: 23 PG — LOW (ref 27–34)
MCHC RBC-ENTMCNC: 30.9 GM/DL — LOW (ref 32–36)
MCV RBC AUTO: 74.4 FL — LOW (ref 80–100)
PLATELET # BLD AUTO: 208 K/UL — SIGNIFICANT CHANGE UP (ref 150–400)
POTASSIUM SERPL-MCNC: 4.2 MMOL/L — SIGNIFICANT CHANGE UP (ref 3.5–5.3)
POTASSIUM SERPL-SCNC: 4.2 MMOL/L — SIGNIFICANT CHANGE UP (ref 3.5–5.3)
RBC # BLD: 4.53 M/UL — SIGNIFICANT CHANGE UP (ref 3.8–5.2)
RBC # FLD: 13.9 % — SIGNIFICANT CHANGE UP (ref 10.3–14.5)
SODIUM SERPL-SCNC: 140 MMOL/L — SIGNIFICANT CHANGE UP (ref 135–145)
WBC # BLD: 4.64 K/UL — SIGNIFICANT CHANGE UP (ref 3.8–10.5)
WBC # FLD AUTO: 4.64 K/UL — SIGNIFICANT CHANGE UP (ref 3.8–10.5)

## 2019-11-10 PROCEDURE — 99232 SBSQ HOSP IP/OBS MODERATE 35: CPT

## 2019-11-10 RX ORDER — INSULIN GLARGINE 100 [IU]/ML
6 INJECTION, SOLUTION SUBCUTANEOUS AT BEDTIME
Refills: 0 | Status: DISCONTINUED | OUTPATIENT
Start: 2019-11-10 | End: 2019-11-16

## 2019-11-10 RX ADMIN — DORZOLAMIDE HYDROCHLORIDE 1 DROP(S): 20 SOLUTION/ DROPS OPHTHALMIC at 06:19

## 2019-11-10 RX ADMIN — HEPARIN SODIUM 5000 UNIT(S): 5000 INJECTION INTRAVENOUS; SUBCUTANEOUS at 22:08

## 2019-11-10 RX ADMIN — Medication 1: at 11:41

## 2019-11-10 RX ADMIN — LATANOPROST 1 DROP(S): 0.05 SOLUTION/ DROPS OPHTHALMIC; TOPICAL at 22:08

## 2019-11-10 RX ADMIN — DORZOLAMIDE HYDROCHLORIDE 1 DROP(S): 20 SOLUTION/ DROPS OPHTHALMIC at 13:22

## 2019-11-10 RX ADMIN — INSULIN GLARGINE 6 UNIT(S): 100 INJECTION, SOLUTION SUBCUTANEOUS at 22:08

## 2019-11-10 RX ADMIN — RISPERIDONE 0.25 MILLIGRAM(S): 4 TABLET ORAL at 22:06

## 2019-11-10 RX ADMIN — Medication 3: at 17:12

## 2019-11-10 RX ADMIN — HEPARIN SODIUM 5000 UNIT(S): 5000 INJECTION INTRAVENOUS; SUBCUTANEOUS at 13:22

## 2019-11-10 RX ADMIN — DORZOLAMIDE HYDROCHLORIDE 1 DROP(S): 20 SOLUTION/ DROPS OPHTHALMIC at 22:08

## 2019-11-10 RX ADMIN — HEPARIN SODIUM 5000 UNIT(S): 5000 INJECTION INTRAVENOUS; SUBCUTANEOUS at 06:19

## 2019-11-10 NOTE — PROGRESS NOTE ADULT - SUBJECTIVE AND OBJECTIVE BOX
CC: Follow up    INTERVAL HPI/OVERNIGHT EVENTS: Patient seen and examined, no acute complaints overnight. Was restless yesterday became agitated requiring IM haldol x 1       Vital Signs Last 24 Hrs  T(C): 36.8 (10 Nov 2019 07:44), Max: 36.8 (10 Nov 2019 07:44)  T(F): 98.3 (10 Nov 2019 07:44), Max: 98.3 (10 Nov 2019 07:44)  HR: 76 (10 Nov 2019 07:44) (74 - 76)  BP: 116/58 (10 Nov 2019 07:44) (116/58 - 129/73)  BP(mean): --  RR: 18 (10 Nov 2019 07:44) (18 - 18)  SpO2: 97% (10 Nov 2019 07:44) (94% - 97%)    PHYSICAL EXAM:    GENERAL: NAD, aox3  ENMT: Moist mucous membranes  CHEST/LUNG: Clear to auscultation bilaterally; No rales, rhonchi, wheezing, or rubs  HEART: Regular rate and rhythm; No murmurs, rubs, or gallops  ABDOMEN: Soft, Nontender, Nondistended; Bowel sounds present  EXTREMITIES:  2+ Peripheral Pulses, No clubbing, cyanosis, or edema        MEDICATIONS  (STANDING):  dextrose 5%. 1000 milliLiter(s) (50 mL/Hr) IV Continuous <Continuous>  dextrose 50% Injectable 12.5 Gram(s) IV Push once  dextrose 50% Injectable 25 Gram(s) IV Push once  dextrose 50% Injectable 25 Gram(s) IV Push once  dorzolamide 2% Ophthalmic Solution 1 Drop(s) Both EYES three times a day  heparin  Injectable 5000 Unit(s) SubCutaneous every 8 hours  insulin glargine Injectable (LANTUS) 6 Unit(s) SubCutaneous at bedtime  insulin lispro (HumaLOG) corrective regimen sliding scale   SubCutaneous three times a day before meals  latanoprost 0.005% Ophthalmic Solution 1 Drop(s) Both EYES at bedtime  risperiDONE   Tablet 0.25 milliGRAM(s) Oral at bedtime    MEDICATIONS  (PRN):  dextrose 40% Gel 15 Gram(s) Oral once PRN Blood Glucose LESS THAN 70 milliGRAM(s)/deciliter  glucagon  Injectable 1 milliGRAM(s) IntraMuscular once PRN Glucose LESS THAN 70 milligrams/deciliter      Allergies    No Known Allergies    Intolerances          LABS:                          10.4   4.64  )-----------( 208      ( 10 Nov 2019 06:15 )             33.7     11-10    140  |  101  |  32.0<H>  ----------------------------<  68<L>  4.2   |  27.0  |  0.98    Ca    9.5      10 Nov 2019 06:15            RADIOLOGY & ADDITIONAL TESTS:

## 2019-11-10 NOTE — PROGRESS NOTE ADULT - ASSESSMENT
The patient is a 79 year old female with a history of dementia and diabetes mellitus type 2 who was brought to the ER by family for  paranoid behavior and increased confusion. Patient was evaluated in the ER, with no metabolic abnormalities noted. Cleared by psychiatry for discharge home. However, once family was reached stated that patient lives alone and is unable to care for herself. Awaiting placement.       Assessment/Plan:    1. Dementia with behavioral disturbance: Started on risperidone  Haldol as needed    2. Diabetes mellitus type 2: Add Humalog 4 units with meals  Continue lantus with HSS  MOnitor bsl     VTE- heparin subcut The patient is a 79 year old female with a history of dementia and diabetes mellitus type 2 who was brought to the ER by family for  paranoid behavior and increased confusion. Patient was evaluated in the ER, with no metabolic abnormalities noted. Cleared by psychiatry for discharge home. However, once family was reached stated that patient lives alone and is unable to care for herself. Awaiting placement.       Assessment/Plan:    1. Dementia with behavioral disturbance: Started on risperidone  Haldol as needed    2. Diabetes mellitus type 2: Fasting BSL of 69 this morning  Discontinue Humalog 4 untis  Continue lantus with HSS  MOnitor bsl     VTE- heparin subcut

## 2019-11-10 NOTE — CHART NOTE - NSCHARTNOTEFT_GEN_A_CORE
Source: Patient [x ]  Family [ ]   other [ x] Staff    Current Diet: Diet, Mechanical Soft:   Consistent Carbohydrate {No Snacks}  Supplement Feeding Modality:  Oral  Glucerna Shake Cans or Servings Per Day:  1       Frequency:  Three Times a day (11-08-19 @ 10:19)    PO intake:  pt reported eating fair. Staff present during interview stated pt was not eating well. Pt consumed 50% Glucerna per tray observation. Pt encouraged to eat meals and Glucerna.     Current Weight:   (11/6) 96.7#  RX: daily weights    % Weight Change: will continue to monitor weight trends.     Pertinent Medications: MEDICATIONS  (STANDING):  dextrose 5%. 1000 milliLiter(s) (50 mL/Hr) IV Continuous <Continuous>  dextrose 50% Injectable 12.5 Gram(s) IV Push once  dextrose 50% Injectable 25 Gram(s) IV Push once  dextrose 50% Injectable 25 Gram(s) IV Push once  dorzolamide 2% Ophthalmic Solution 1 Drop(s) Both EYES three times a day  heparin  Injectable 5000 Unit(s) SubCutaneous every 8 hours  insulin glargine Injectable (LANTUS) 6 Unit(s) SubCutaneous at bedtime  insulin lispro (HumaLOG) corrective regimen sliding scale   SubCutaneous three times a day before meals  latanoprost 0.005% Ophthalmic Solution 1 Drop(s) Both EYES at bedtime  risperiDONE   Tablet 0.25 milliGRAM(s) Oral at bedtime    MEDICATIONS  (PRN):  dextrose 40% Gel 15 Gram(s) Oral once PRN Blood Glucose LESS THAN 70 milliGRAM(s)/deciliter  glucagon  Injectable 1 milliGRAM(s) IntraMuscular once PRN Glucose LESS THAN 70 milligrams/deciliter    Pertinent Labs: CBC Full  -  ( 10 Nov 2019 06:15 )  WBC Count : 4.64 K/uL  RBC Count : 4.53 M/uL  Hemoglobin : 10.4 g/dL  Hematocrit : 33.7 %  Platelet Count - Automated : 208 K/uL  Mean Cell Volume : 74.4 fl  Mean Cell Hemoglobin : 23.0 pg  Mean Cell Hemoglobin Concentration : 30.9 gm/dL  Auto Neutrophil # : x  Auto Lymphocyte # : x  Auto Monocyte # : x  Auto Eosinophil # : x  Auto Basophil # : x  Auto Neutrophil % : x  Auto Lymphocyte % : x  Auto Monocyte % : x  Auto Eosinophil % : x  Auto Basophil % : x  11-10 Na140 mmol/L Glu 68 mg/dL<L> K+ 4.2 mmol/L Cr  0.98 mg/dL BUN 32.0 mg/dL<H> Phos n/a   Alb n/a   PAB n/a       Skin: intact    Nutrition focused physical exam previously conducted - found signs of malnutrition [ ]absent [x ]present    Subcutaneous fat loss: [ x] Orbital fat pads region, [ ]Buccal fat region, [ x]Triceps region,  [ ]Ribs region    Muscle wasting: [x ]Temples region, [ x]Clavicle region, [x ]Shoulder region, [ ]Scapula region, [ ]Interosseous region,  [ ]thigh region, [ ]Calf region    Estimated Needs:   [x ] no change since previous assessment  [ ] recalculated:     Current Nutrition Diagnosis: Pt remains at high nutritional risk and meets criteria for severe (chronic) malnutrition related to inadequate protein-calorie intake in setting of Alzheimer's Dementia dx as evidenced by severe muscle wasting at temples, clavicle, shoulders and severe fat loss in orbital region and triceps. Limited nutrition interview conducted due to pt confused state. Pt consumed 50% of Glucerna per tray observation.    Recommendations:   -Continue Glucerna TID  -Monitor weights/daily weights  -Encourage intake during meals    Monitoring and Evaluation:   [x ] PO intake [x ] Tolerance to diet prescription [X] Weights  [X] Follow up per protocol [X] Labs: Source: Patient [x ]  Family [ ]   other [ x] Staff    Current Diet: Diet, Mechanical Soft:   Consistent Carbohydrate {No Snacks}  Supplement Feeding Modality:  Oral  Glucerna Shake Cans or Servings Per Day:  1       Frequency:  Three Times a day (11-08-19 @ 10:19)    PO intake:  pt reported eating fair. Staff present during interview stated pt was not eating well. Pt consumed 50% Glucerna per tray observation. Pt encouraged to eat meals and Glucerna.     Current Weight:   (11/6) 96.7#  RX: daily weights    % Weight Change: will continue to monitor weight trends.     Pertinent Medications: MEDICATIONS  (STANDING):  dextrose 5%. 1000 milliLiter(s) (50 mL/Hr) IV Continuous <Continuous>  dextrose 50% Injectable 12.5 Gram(s) IV Push once  dextrose 50% Injectable 25 Gram(s) IV Push once  dextrose 50% Injectable 25 Gram(s) IV Push once  dorzolamide 2% Ophthalmic Solution 1 Drop(s) Both EYES three times a day  heparin  Injectable 5000 Unit(s) SubCutaneous every 8 hours  insulin glargine Injectable (LANTUS) 6 Unit(s) SubCutaneous at bedtime  insulin lispro (HumaLOG) corrective regimen sliding scale   SubCutaneous three times a day before meals  latanoprost 0.005% Ophthalmic Solution 1 Drop(s) Both EYES at bedtime  risperiDONE   Tablet 0.25 milliGRAM(s) Oral at bedtime    MEDICATIONS  (PRN):  dextrose 40% Gel 15 Gram(s) Oral once PRN Blood Glucose LESS THAN 70 milliGRAM(s)/deciliter  glucagon  Injectable 1 milliGRAM(s) IntraMuscular once PRN Glucose LESS THAN 70 milligrams/deciliter    Pertinent Labs: CBC Full  -  ( 10 Nov 2019 06:15 )  WBC Count : 4.64 K/uL  RBC Count : 4.53 M/uL  Hemoglobin : 10.4 g/dL  Hematocrit : 33.7 %  Platelet Count - Automated : 208 K/uL  Mean Cell Volume : 74.4 fl  Mean Cell Hemoglobin : 23.0 pg  Mean Cell Hemoglobin Concentration : 30.9 gm/dL  Auto Neutrophil # : x  Auto Lymphocyte # : x  Auto Monocyte # : x  Auto Eosinophil # : x  Auto Basophil # : x  Auto Neutrophil % : x  Auto Lymphocyte % : x  Auto Monocyte % : x  Auto Eosinophil % : x  Auto Basophil % : x  11-10 Na140 mmol/L Glu 68 mg/dL<L> K+ 4.2 mmol/L Cr  0.98 mg/dL BUN 32.0 mg/dL<H> Phos n/a   Alb n/a   PAB n/a       Skin: intact    Nutrition focused physical exam previously conducted - found signs of malnutrition [ ]absent [x ]present    Subcutaneous fat loss: [ x] Orbital fat pads region, [ ]Buccal fat region, [ x]Triceps region,  [ ]Ribs region    Muscle wasting: [x ]Temples region, [ x]Clavicle region, [x ]Shoulder region, [ ]Scapula region, [ ]Interosseous region,  [ ]thigh region, [ ]Calf region    Estimated Needs:   [x ] no change since previous assessment  [ ] recalculated:     Current Nutrition Diagnosis: Pt remains at high nutritional risk and meets criteria for severe (chronic) malnutrition related to inadequate protein-calorie intake in setting of Alzheimer's Dementia dx as evidenced by severe muscle wasting at temples, clavicle, shoulders and severe fat loss in orbital region and triceps. Limited nutrition interview conducted due to pt confused state. Pt consumed 50% of Glucerna per tray observation.    Recommendations:   -Continue Glucerna TID  -Monitor weights/daily weights  -Encourage PO intake during meals    Monitoring and Evaluation:   [x ] PO intake [x ] Tolerance to diet prescription [X] Weights  [X] Follow up per protocol [X] Labs:

## 2019-11-11 LAB
GLUCOSE BLDC GLUCOMTR-MCNC: 150 MG/DL — HIGH (ref 70–99)
GLUCOSE BLDC GLUCOMTR-MCNC: 202 MG/DL — HIGH (ref 70–99)
GLUCOSE BLDC GLUCOMTR-MCNC: 235 MG/DL — HIGH (ref 70–99)
GLUCOSE BLDC GLUCOMTR-MCNC: 278 MG/DL — HIGH (ref 70–99)

## 2019-11-11 PROCEDURE — 99232 SBSQ HOSP IP/OBS MODERATE 35: CPT

## 2019-11-11 RX ORDER — HALOPERIDOL DECANOATE 100 MG/ML
0.5 INJECTION INTRAMUSCULAR ONCE
Refills: 0 | Status: COMPLETED | OUTPATIENT
Start: 2019-11-11 | End: 2019-11-11

## 2019-11-11 RX ADMIN — DORZOLAMIDE HYDROCHLORIDE 1 DROP(S): 20 SOLUTION/ DROPS OPHTHALMIC at 05:11

## 2019-11-11 RX ADMIN — HEPARIN SODIUM 5000 UNIT(S): 5000 INJECTION INTRAVENOUS; SUBCUTANEOUS at 05:11

## 2019-11-11 RX ADMIN — Medication 2: at 12:01

## 2019-11-11 RX ADMIN — HEPARIN SODIUM 5000 UNIT(S): 5000 INJECTION INTRAVENOUS; SUBCUTANEOUS at 13:43

## 2019-11-11 RX ADMIN — HALOPERIDOL DECANOATE 0.5 MILLIGRAM(S): 100 INJECTION INTRAMUSCULAR at 02:19

## 2019-11-11 RX ADMIN — DORZOLAMIDE HYDROCHLORIDE 1 DROP(S): 20 SOLUTION/ DROPS OPHTHALMIC at 21:51

## 2019-11-11 RX ADMIN — RISPERIDONE 0.25 MILLIGRAM(S): 4 TABLET ORAL at 21:51

## 2019-11-11 RX ADMIN — INSULIN GLARGINE 6 UNIT(S): 100 INJECTION, SOLUTION SUBCUTANEOUS at 21:51

## 2019-11-11 RX ADMIN — DORZOLAMIDE HYDROCHLORIDE 1 DROP(S): 20 SOLUTION/ DROPS OPHTHALMIC at 13:43

## 2019-11-11 RX ADMIN — LATANOPROST 1 DROP(S): 0.05 SOLUTION/ DROPS OPHTHALMIC; TOPICAL at 21:51

## 2019-11-11 RX ADMIN — HEPARIN SODIUM 5000 UNIT(S): 5000 INJECTION INTRAVENOUS; SUBCUTANEOUS at 21:51

## 2019-11-11 RX ADMIN — Medication 3: at 18:52

## 2019-11-11 NOTE — PROGRESS NOTE ADULT - SUBJECTIVE AND OBJECTIVE BOX
CC: Confusion/Agitation    INTERVAL HPI/OVERNIGHT EVENTS: Patient seen and examined. As per RN patient did not sleep last night. This morning patient trying ambulate but is impulsive and unsteady. Resistant to help. When asked where she is states, "I am home".     Vital Signs Last 24 Hrs  T(C): 36.8 (11 Nov 2019 08:41), Max: 36.9 (10 Nov 2019 23:44)  T(F): 98.2 (11 Nov 2019 08:41), Max: 98.4 (10 Nov 2019 23:44)  HR: 76 (11 Nov 2019 08:41) (76 - 87)  BP: 111/68 (11 Nov 2019 08:41) (111/68 - 144/74)  BP(mean): --  RR: 18 (11 Nov 2019 08:41) (18 - 18)  SpO2: 97% (11 Nov 2019 08:41) (96% - 99%)    ROS:  Unable to assess due to current mental status    PHYSICAL EXAM:    GENERAL: NAD, aox3  ENMT: Moist mucous membranes  CHEST/LUNG: Clear to auscultation bilaterally; No rales, rhonchi, wheezing, or rubs  HEART: Regular rate and rhythm; No murmurs, rubs, or gallops  ABDOMEN: Soft, Nontender, Nondistended; Bowel sounds present  EXTREMITIES:  2+ Peripheral Pulses, No clubbing, cyanosis, or edema  NEURO: Alert, oriented to person only, non focal  PSYCH: Intermittent agitation, impulsive    I&O's Detail                                10.4   4.64  )-----------( 208      ( 10 Nov 2019 06:15 )             33.7     10 Nov 2019 06:15    140    |  101    |  32.0   ----------------------------<  68     4.2     |  27.0   |  0.98     Ca    9.5        10 Nov 2019 06:15        CAPILLARY BLOOD GLUCOSE      POCT Blood Glucose.: 235 mg/dL (11 Nov 2019 11:55)  POCT Blood Glucose.: 150 mg/dL (11 Nov 2019 08:34)  POCT Blood Glucose.: 176 mg/dL (10 Nov 2019 22:05)  POCT Blood Glucose.: 267 mg/dL (10 Nov 2019 17:09)        Hemoglobin A1C, Whole Blood: 9.6 % (11-06-19 @ 20:14)    MEDICATIONS  (STANDING):  dextrose 5%. 1000 milliLiter(s) (50 mL/Hr) IV Continuous <Continuous>  dextrose 50% Injectable 12.5 Gram(s) IV Push once  dextrose 50% Injectable 25 Gram(s) IV Push once  dextrose 50% Injectable 25 Gram(s) IV Push once  dorzolamide 2% Ophthalmic Solution 1 Drop(s) Both EYES three times a day  heparin  Injectable 5000 Unit(s) SubCutaneous every 8 hours  insulin glargine Injectable (LANTUS) 6 Unit(s) SubCutaneous at bedtime  insulin lispro (HumaLOG) corrective regimen sliding scale   SubCutaneous three times a day before meals  latanoprost 0.005% Ophthalmic Solution 1 Drop(s) Both EYES at bedtime  risperiDONE   Tablet 0.25 milliGRAM(s) Oral at bedtime    MEDICATIONS  (PRN):  dextrose 40% Gel 15 Gram(s) Oral once PRN Blood Glucose LESS THAN 70 milliGRAM(s)/deciliter  glucagon  Injectable 1 milliGRAM(s) IntraMuscular once PRN Glucose LESS THAN 70 milligrams/deciliter      RADIOLOGY & ADDITIONAL TESTS:

## 2019-11-11 NOTE — PROGRESS NOTE BEHAVIORAL HEALTH - NSBHCONSULTRECOMMENDOTHER_PSY_A_CORE FT
On initial assessment 3 days previous, pt had capacity. Her capacity could not be assessed during present interview.

## 2019-11-11 NOTE — PROGRESS NOTE BEHAVIORAL HEALTH - NSBHCHARTREVIEWLAB_PSY_A_CORE FT
CBC Full  -  ( 10 Nov 2019 06:15 )  WBC Count : 4.64 K/uL  RBC Count : 4.53 M/uL  Hemoglobin : 10.4 g/dL  Hematocrit : 33.7 %  Platelet Count - Automated : 208 K/uL  Mean Cell Volume : 74.4 fl  Mean Cell Hemoglobin : 23.0 pg  Mean Cell Hemoglobin Concentration : 30.9 gm/dL    11-10    140  |  101  |  32.0<H>  ----------------------------<  68<L>  4.2   |  27.0  |  0.98    Ca    9.5      10 Nov 2019 06:15    CAPILLARY BLOOD GLUCOSE      POCT Blood Glucose.: 235 mg/dL (11 Nov 2019 11:55)  POCT Blood Glucose.: 150 mg/dL (11 Nov 2019 08:34)  POCT Blood Glucose.: 176 mg/dL (10 Nov 2019 22:05)  POCT Blood Glucose.: 267 mg/dL (10 Nov 2019 17:09)

## 2019-11-11 NOTE — PROGRESS NOTE ADULT - ASSESSMENT
The patient is a 79 year old female with a history of dementia and diabetes mellitus type 2 who was brought to the ER by family for  paranoid behavior and increased confusion. Patient was evaluated in the ER, with no metabolic abnormalities noted. Cleared by psychiatry for discharge home. However, once family was reached stated that patient lives alone and is unable to care for herself. Awaiting placement.       Assessment/Plan:    1. Dementia with behavioral disturbance: Started on risperidone  Haldol as needed  Aggressive this morning confused requiring 1:1 observation  Psychiatry consultation requested    2. Diabetes mellitus type 2: BSL stable   Continue lantus with HSS  MOnitor bsl     VTE- heparin subcut

## 2019-11-11 NOTE — PROGRESS NOTE ADULT - ASSESSMENT
The patient is a 79 year old female with a history of dementia and diabetes mellitus type 2 who was brought to the ER by family for  paranoid behavior and increased confusion. Patient was evaluated in the ER, with no metabolic abnormalities noted. Cleared by psychiatry for discharge home. However, once family was reached stated that patient lives alone and is unable to care for herself. Awaiting placement.       Assessment/Plan:    1. Dementia with behavioral disturbance: Started on risperidone  Haldol as needed  Psych recalled ? decision making    2. Diabetes mellitus type 2:   Continue lantus with HSS  Accuchecks AC/HS    VTE- heparin subcut

## 2019-11-11 NOTE — PROGRESS NOTE BEHAVIORAL HEALTH - NSBHFUPINTERVALHXFT_PSY_A_CORE
80yo F seen following evening of agitation, getting out of bed, wandering around, not sleeping. Pt was given her standard dose of Risperidol at 10pm and given another dose of Haldol 0.5mg at 2am with no effect. Pt kept roommate up all night. On interview this afternoon pt was somnolent although arousable, she was not able to provide any information. On Friday, three days previous, pt was Oriented x 3 and had capacity. Unable to evaluate today.

## 2019-11-11 NOTE — PROGRESS NOTE ADULT - SUBJECTIVE AND OBJECTIVE BOX
CC: Follow up    INTERVAL HPI/OVERNIGHT EVENTS: Patient seen and examined, confused and agitated this morning.       Vital Signs Last 24 Hrs  T(C): 36.8 (11 Nov 2019 08:41), Max: 36.9 (10 Nov 2019 23:44)  T(F): 98.2 (11 Nov 2019 08:41), Max: 98.4 (10 Nov 2019 23:44)  HR: 76 (11 Nov 2019 08:41) (76 - 87)  BP: 111/68 (11 Nov 2019 08:41) (111/68 - 144/74)  BP(mean): --  RR: 18 (11 Nov 2019 08:41) (18 - 18)  SpO2: 97% (11 Nov 2019 08:41) (96% - 99%)    PHYSICAL EXAM:    GENERAL: NAD  ENMT: Moist mucous membranes  CHEST/LUNG: Clear to auscultation bilaterally; No rales, rhonchi, wheezing, or rubs  HEART: Regular rate and rhythm; No murmurs, rubs, or gallops  ABDOMEN: Soft, Nontender, Nondistended; Bowel sounds present  EXTREMITIES:  2+ Peripheral Pulses, No clubbing, cyanosis, or edema        MEDICATIONS  (STANDING):  dextrose 5%. 1000 milliLiter(s) (50 mL/Hr) IV Continuous <Continuous>  dextrose 50% Injectable 12.5 Gram(s) IV Push once  dextrose 50% Injectable 25 Gram(s) IV Push once  dextrose 50% Injectable 25 Gram(s) IV Push once  dorzolamide 2% Ophthalmic Solution 1 Drop(s) Both EYES three times a day  heparin  Injectable 5000 Unit(s) SubCutaneous every 8 hours  insulin glargine Injectable (LANTUS) 6 Unit(s) SubCutaneous at bedtime  insulin lispro (HumaLOG) corrective regimen sliding scale   SubCutaneous three times a day before meals  latanoprost 0.005% Ophthalmic Solution 1 Drop(s) Both EYES at bedtime  risperiDONE   Tablet 0.25 milliGRAM(s) Oral at bedtime    MEDICATIONS  (PRN):  dextrose 40% Gel 15 Gram(s) Oral once PRN Blood Glucose LESS THAN 70 milliGRAM(s)/deciliter  glucagon  Injectable 1 milliGRAM(s) IntraMuscular once PRN Glucose LESS THAN 70 milligrams/deciliter      Allergies    No Known Allergies    Intolerances          LABS:                          10.4   4.64  )-----------( 208      ( 10 Nov 2019 06:15 )             33.7     11-10    140  |  101  |  32.0<H>  ----------------------------<  68<L>  4.2   |  27.0  |  0.98    Ca    9.5      10 Nov 2019 06:15            RADIOLOGY & ADDITIONAL TESTS:

## 2019-11-12 DIAGNOSIS — F01.51 VASCULAR DEMENTIA, UNSPECIFIED SEVERITY, WITH BEHAVIORAL DISTURBANCE: ICD-10-CM

## 2019-11-12 LAB
GLUCOSE BLDC GLUCOMTR-MCNC: 114 MG/DL — HIGH (ref 70–99)
GLUCOSE BLDC GLUCOMTR-MCNC: 163 MG/DL — HIGH (ref 70–99)
GLUCOSE BLDC GLUCOMTR-MCNC: 184 MG/DL — HIGH (ref 70–99)
GLUCOSE BLDC GLUCOMTR-MCNC: 246 MG/DL — HIGH (ref 70–99)

## 2019-11-12 PROCEDURE — 99232 SBSQ HOSP IP/OBS MODERATE 35: CPT

## 2019-11-12 RX ORDER — HALOPERIDOL DECANOATE 100 MG/ML
2 INJECTION INTRAMUSCULAR ONCE
Refills: 0 | Status: COMPLETED | OUTPATIENT
Start: 2019-11-12 | End: 2019-11-12

## 2019-11-12 RX ORDER — RISPERIDONE 4 MG/1
0.5 TABLET ORAL AT BEDTIME
Refills: 0 | Status: DISCONTINUED | OUTPATIENT
Start: 2019-11-12 | End: 2019-11-18

## 2019-11-12 RX ADMIN — RISPERIDONE 0.5 MILLIGRAM(S): 4 TABLET ORAL at 22:26

## 2019-11-12 RX ADMIN — Medication 2: at 12:05

## 2019-11-12 RX ADMIN — HEPARIN SODIUM 5000 UNIT(S): 5000 INJECTION INTRAVENOUS; SUBCUTANEOUS at 22:27

## 2019-11-12 RX ADMIN — Medication 1: at 17:07

## 2019-11-12 RX ADMIN — LATANOPROST 1 DROP(S): 0.05 SOLUTION/ DROPS OPHTHALMIC; TOPICAL at 22:26

## 2019-11-12 RX ADMIN — DORZOLAMIDE HYDROCHLORIDE 1 DROP(S): 20 SOLUTION/ DROPS OPHTHALMIC at 15:10

## 2019-11-12 RX ADMIN — HALOPERIDOL DECANOATE 2 MILLIGRAM(S): 100 INJECTION INTRAMUSCULAR at 01:13

## 2019-11-12 RX ADMIN — HEPARIN SODIUM 5000 UNIT(S): 5000 INJECTION INTRAVENOUS; SUBCUTANEOUS at 06:47

## 2019-11-12 RX ADMIN — DORZOLAMIDE HYDROCHLORIDE 1 DROP(S): 20 SOLUTION/ DROPS OPHTHALMIC at 22:26

## 2019-11-12 RX ADMIN — HEPARIN SODIUM 5000 UNIT(S): 5000 INJECTION INTRAVENOUS; SUBCUTANEOUS at 15:10

## 2019-11-12 RX ADMIN — INSULIN GLARGINE 6 UNIT(S): 100 INJECTION, SOLUTION SUBCUTANEOUS at 22:27

## 2019-11-12 RX ADMIN — DORZOLAMIDE HYDROCHLORIDE 1 DROP(S): 20 SOLUTION/ DROPS OPHTHALMIC at 06:47

## 2019-11-12 NOTE — PROGRESS NOTE ADULT - ASSESSMENT
The patient is a 79 year old female with a history of dementia and diabetes mellitus type 2 who was brought to the ER by family for  paranoid behavior and increased confusion. Patient was evaluated in the ER, with no metabolic abnormalities noted. Cleared by psychiatry for discharge home. However, once family was reached stated that patient lives alone and is unable to care for herself. Awaiting placement.       Assessment/Plan:    1. Dementia with behavioral disturbance: Risperidone increased by psychiatry   Haldol as needed  1:1 observation for safety    2. Diabetes mellitus type 2: BSL stable   Continue lantus with HSS  MOnitor bsl     VTE- heparin subcut

## 2019-11-12 NOTE — PROGRESS NOTE BEHAVIORAL HEALTH - SUMMARY
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
remains with episodic agitation and wandering behaviors received IM haldol last night  will increase Risperdal to 0.5 mg HS

## 2019-11-12 NOTE — PROGRESS NOTE BEHAVIORAL HEALTH - AXIS III
DM; Vertigo; Hearing Loss-left ear; Left Eye vision loss
DM; Vertigo; Hearing Loss-left ear; Left Eye vision loss

## 2019-11-12 NOTE — PROGRESS NOTE BEHAVIORAL HEALTH - NSBHCONSULTOBSREASON_PSY_A_CORE FT
At time of interview, pt was sleeping soundly and difficult to arouse. Currently on constant observation. Cannot assess from current condition whether that level will be required going forward.
safety wandering

## 2019-11-12 NOTE — PROGRESS NOTE BEHAVIORAL HEALTH - NS ED [facility] REVIEW OF ED CHART AVAILABLE IMAGING REVIEWED
Outpatient Behavioral Health Progress Note    Date: 1/24/18  Time Session Began: 0900   Ended: 1030  Session Type: OP Group Therapy ([id number])    Others Present: Group Members    Intervention: Behavioral, Cognitive, Insight and Supportive    Patient Level of Functioning: No Change    Suicide/Homicide/Violence Ideation: No  If yes, explain:    Current GAF (1-100) 63    Change in Medication(s) Reported: No    Pt/Family Education Provided: Yes   Pt/Family Displays Understanding: Yes  If no, explain:    Need for Community Resources Assessed: Yes  Resources Needed: No  If yes, what resources:    Primary Diagnosis with Code: Opioid, Cocaine & Alcohol Use Disorder Code: F11.2,F14.2,F10.1    Treatment Plan: Unchanged    Discharge Plan: Abstinence, Identified support, Weekly support groups recommended    Next Appointment: 1/31/18    Chief Complaint: Substance Abuse      Progress Note: Patient attended group session with writer in the Outpatient Opiate Recovery Program focusing on relapse prevention. Patient was alert and oriented x3, presented with a calm affect and approrpriate mood.  Pt denied any SI / HI. Patient shared during group process, but at times was distracted and engaged in side-talking to female peer. Before Pt's time to share, Pt stated that he had to leave early to catch his Dr. before getting to work. Pt left group ~10am without a personal check-in. Patient will continue to follow up in the outpatient Opiate Recovery Program.    MORAIMA Hutson, APSW, SAC-IT  1/24/2018          
None available
None available

## 2019-11-12 NOTE — PROGRESS NOTE BEHAVIORAL HEALTH - NSBHFUPINTERVALHXFT_PSY_A_CORE
aide reports restlessness up and down most of night looking for envelope "she left me" States it is "1982" "[place]"

## 2019-11-12 NOTE — PROGRESS NOTE ADULT - SUBJECTIVE AND OBJECTIVE BOX
CC: Follow up    INTERVAL HPI/OVERNIGHT EVENTS: Patient seen and examined, agitated overnight requiring haldol. 1:1 at bedside. Knows where she is, thinks its 1980      Vital Signs Last 24 Hrs  T(C): 36.5 (12 Nov 2019 08:03), Max: 36.8 (11 Nov 2019 23:48)  T(F): 97.7 (12 Nov 2019 08:03), Max: 98.3 (11 Nov 2019 23:48)  HR: 82 (12 Nov 2019 08:03) (82 - 85)  BP: 129/73 (12 Nov 2019 08:03) (129/73 - 142/85)  BP(mean): --  RR: 18 (12 Nov 2019 08:03) (18 - 18)  SpO2: 92% (12 Nov 2019 08:03) (92% - 99%)    PHYSICAL EXAM:    GENERAL: NAD, AOX1  ENMT: Moist mucous membranes  NECK: Supple, No JVD  CHEST/LUNG: Clear to auscultation bilaterally; No rales, rhonchi, wheezing, or rubs  HEART: Regular rate and rhythm; No murmurs, rubs, or gallops  ABDOMEN: Soft, Nontender, Nondistended; Bowel sounds present  EXTREMITIES:  2+ Peripheral Pulses, No clubbing, cyanosis, or edema        MEDICATIONS  (STANDING):  dextrose 5%. 1000 milliLiter(s) (50 mL/Hr) IV Continuous <Continuous>  dextrose 50% Injectable 12.5 Gram(s) IV Push once  dextrose 50% Injectable 25 Gram(s) IV Push once  dextrose 50% Injectable 25 Gram(s) IV Push once  dorzolamide 2% Ophthalmic Solution 1 Drop(s) Both EYES three times a day  heparin  Injectable 5000 Unit(s) SubCutaneous every 8 hours  insulin glargine Injectable (LANTUS) 6 Unit(s) SubCutaneous at bedtime  insulin lispro (HumaLOG) corrective regimen sliding scale   SubCutaneous three times a day before meals  latanoprost 0.005% Ophthalmic Solution 1 Drop(s) Both EYES at bedtime  risperiDONE  Disintegrating Tablet 0.5 milliGRAM(s) Oral at bedtime    MEDICATIONS  (PRN):  dextrose 40% Gel 15 Gram(s) Oral once PRN Blood Glucose LESS THAN 70 milliGRAM(s)/deciliter  glucagon  Injectable 1 milliGRAM(s) IntraMuscular once PRN Glucose LESS THAN 70 milligrams/deciliter      Allergies    No Known Allergies    Intolerances          LABS:                  RADIOLOGY & ADDITIONAL TESTS:

## 2019-11-12 NOTE — PROGRESS NOTE BEHAVIORAL HEALTH - PRIMARY DX
Dementia associated with other underlying disease with behavioral disturbance
Vascular dementia with behavior disturbance

## 2019-11-12 NOTE — PROGRESS NOTE BEHAVIORAL HEALTH - NSBHCHARTREVIEWVS_PSY_A_CORE FT
Vital Signs Last 24 Hrs  T(C): 36.8 (11 Nov 2019 08:41), Max: 36.9 (10 Nov 2019 23:44)  T(F): 98.2 (11 Nov 2019 08:41), Max: 98.4 (10 Nov 2019 23:44)  HR: 76 (11 Nov 2019 08:41) (76 - 87)  BP: 111/68 (11 Nov 2019 08:41) (111/68 - 144/74)  RR: 18 (11 Nov 2019 08:41) (18 - 18)  SpO2: 97% (11 Nov 2019 08:41) (96% - 99%)
Vital Signs Last 24 Hrs  T(C): 36.5 (12 Nov 2019 08:03), Max: 36.8 (11 Nov 2019 08:41)  T(F): 97.7 (12 Nov 2019 08:03), Max: 98.3 (11 Nov 2019 23:48)  HR: 82 (12 Nov 2019 08:03) (76 - 85)  BP: 129/73 (12 Nov 2019 08:03) (111/68 - 142/85)  BP(mean): --  RR: 18 (12 Nov 2019 08:03) (18 - 18)  SpO2: 92% (12 Nov 2019 08:03) (92% - 99%)

## 2019-11-13 LAB
GLUCOSE BLDC GLUCOMTR-MCNC: 198 MG/DL — HIGH (ref 70–99)
GLUCOSE BLDC GLUCOMTR-MCNC: 270 MG/DL — HIGH (ref 70–99)
GLUCOSE BLDC GLUCOMTR-MCNC: 327 MG/DL — HIGH (ref 70–99)
GLUCOSE BLDC GLUCOMTR-MCNC: 341 MG/DL — HIGH (ref 70–99)
GLUCOSE BLDC GLUCOMTR-MCNC: 98 MG/DL — SIGNIFICANT CHANGE UP (ref 70–99)

## 2019-11-13 PROCEDURE — 99232 SBSQ HOSP IP/OBS MODERATE 35: CPT

## 2019-11-13 RX ADMIN — HEPARIN SODIUM 5000 UNIT(S): 5000 INJECTION INTRAVENOUS; SUBCUTANEOUS at 05:12

## 2019-11-13 RX ADMIN — HEPARIN SODIUM 5000 UNIT(S): 5000 INJECTION INTRAVENOUS; SUBCUTANEOUS at 13:05

## 2019-11-13 RX ADMIN — RISPERIDONE 0.5 MILLIGRAM(S): 4 TABLET ORAL at 21:58

## 2019-11-13 RX ADMIN — INSULIN GLARGINE 6 UNIT(S): 100 INJECTION, SOLUTION SUBCUTANEOUS at 21:58

## 2019-11-13 RX ADMIN — DORZOLAMIDE HYDROCHLORIDE 1 DROP(S): 20 SOLUTION/ DROPS OPHTHALMIC at 05:11

## 2019-11-13 RX ADMIN — DORZOLAMIDE HYDROCHLORIDE 1 DROP(S): 20 SOLUTION/ DROPS OPHTHALMIC at 13:05

## 2019-11-13 RX ADMIN — LATANOPROST 1 DROP(S): 0.05 SOLUTION/ DROPS OPHTHALMIC; TOPICAL at 21:57

## 2019-11-13 RX ADMIN — DORZOLAMIDE HYDROCHLORIDE 1 DROP(S): 20 SOLUTION/ DROPS OPHTHALMIC at 21:57

## 2019-11-13 RX ADMIN — Medication 4: at 13:09

## 2019-11-13 RX ADMIN — Medication 1: at 17:59

## 2019-11-13 RX ADMIN — HEPARIN SODIUM 5000 UNIT(S): 5000 INJECTION INTRAVENOUS; SUBCUTANEOUS at 21:57

## 2019-11-13 NOTE — PHYSICAL THERAPY INITIAL EVALUATION ADULT - CRITERIA FOR SKILLED THERAPEUTIC INTERVENTIONS
Pt is functionally independent but requires supervision due to cognitive issues and not in need of skilled PT, will no longer follow

## 2019-11-13 NOTE — PHYSICAL THERAPY INITIAL EVALUATION ADULT - REHAB POTENTIAL, PT EVAL
Pt is functionally independent but requires supervision due to cognitive issues and not in need of skilled PT, will no longer follow/none

## 2019-11-13 NOTE — PHYSICAL THERAPY INITIAL EVALUATION ADULT - DISCHARGE DISPOSITION, PT EVAL
Pt is functionally independent but requires supervision due to cognitive issues and not in need of skilled PT, will no longer follow/home w/ assist

## 2019-11-13 NOTE — PROGRESS NOTE ADULT - ASSESSMENT
The patient is a 79 year old female with a history of dementia and diabetes mellitus type 2 who was brought to the ER by family for  paranoid behavior and increased confusion. Patient was evaluated in the ER, with no metabolic abnormalities noted. Cleared by psychiatry for discharge home. However, once family was reached stated that patient lives alone and is unable to care for herself. Awaiting placement.       Assessment/Plan:    1. Dementia with behavioral disturbance: Risperidone increased by psychiatry   Haldol as needed  Enhanced supervision  Appears more calm this morning     2. Diabetes mellitus type 2: BSL stable   Continue lantus with HSS  MOnitor bsl     VTE- heparin subcut      She does not have the capacity to make decisions and is not safe to live alone

## 2019-11-13 NOTE — PROGRESS NOTE ADULT - SUBJECTIVE AND OBJECTIVE BOX
CC: Follow up     INTERVAL HPI/OVERNIGHT EVENTS: Patient seen and examined, calm this morning but still confused. She knows where she is but cannot tell me how she came to the hospital or what year it is. She does not have the capacity to make decisions and is not safe to live alone      Vital Signs Last 24 Hrs  T(C): 36.2 (13 Nov 2019 08:39), Max: 37.1 (12 Nov 2019 15:15)  T(F): 97.2 (13 Nov 2019 08:39), Max: 98.8 (12 Nov 2019 15:15)  HR: 76 (13 Nov 2019 08:39) (75 - 78)  BP: 124/70 (13 Nov 2019 08:39) (109/66 - 124/70)  BP(mean): --  RR: 18 (13 Nov 2019 08:39) (18 - 18)  SpO2: 97% (13 Nov 2019 08:39) (97% - 99%)    PHYSICAL EXAM:    GENERAL: NAD, AOX2  HEAD:  Atraumatic, Normocephalic  CHEST/LUNG: Clear to auscultation bilaterally; No rales, rhonchi, wheezing, or rubs  HEART: Regular rate and rhythm; No murmurs, rubs, or gallops  ABDOMEN: Soft, Nontender, Nondistended; Bowel sounds present  EXTREMITIES:  2+ Peripheral Pulses, No clubbing, cyanosis, or edema        MEDICATIONS  (STANDING):  dextrose 5%. 1000 milliLiter(s) (50 mL/Hr) IV Continuous <Continuous>  dextrose 50% Injectable 12.5 Gram(s) IV Push once  dextrose 50% Injectable 25 Gram(s) IV Push once  dextrose 50% Injectable 25 Gram(s) IV Push once  dorzolamide 2% Ophthalmic Solution 1 Drop(s) Both EYES three times a day  heparin  Injectable 5000 Unit(s) SubCutaneous every 8 hours  insulin glargine Injectable (LANTUS) 6 Unit(s) SubCutaneous at bedtime  insulin lispro (HumaLOG) corrective regimen sliding scale   SubCutaneous three times a day before meals  latanoprost 0.005% Ophthalmic Solution 1 Drop(s) Both EYES at bedtime  risperiDONE  Disintegrating Tablet 0.5 milliGRAM(s) Oral at bedtime    MEDICATIONS  (PRN):  dextrose 40% Gel 15 Gram(s) Oral once PRN Blood Glucose LESS THAN 70 milliGRAM(s)/deciliter  glucagon  Injectable 1 milliGRAM(s) IntraMuscular once PRN Glucose LESS THAN 70 milligrams/deciliter      Allergies    No Known Allergies    Intolerances          LABS:                  RADIOLOGY & ADDITIONAL TESTS:

## 2019-11-13 NOTE — PHYSICAL THERAPY INITIAL EVALUATION ADULT - ADDITIONAL COMMENTS
Pt is a questionable historian, reports living in a room, family close by, and being independent without devices.

## 2019-11-14 LAB
GLUCOSE BLDC GLUCOMTR-MCNC: 147 MG/DL — HIGH (ref 70–99)
GLUCOSE BLDC GLUCOMTR-MCNC: 180 MG/DL — HIGH (ref 70–99)
GLUCOSE BLDC GLUCOMTR-MCNC: 334 MG/DL — HIGH (ref 70–99)

## 2019-11-14 PROCEDURE — 99232 SBSQ HOSP IP/OBS MODERATE 35: CPT

## 2019-11-14 RX ORDER — RISPERIDONE 4 MG/1
1 TABLET ORAL
Qty: 0 | Refills: 0 | DISCHARGE
Start: 2019-11-14

## 2019-11-14 RX ORDER — HALOPERIDOL DECANOATE 100 MG/ML
1 INJECTION INTRAMUSCULAR ONCE
Refills: 0 | Status: COMPLETED | OUTPATIENT
Start: 2019-11-14 | End: 2019-11-14

## 2019-11-14 RX ADMIN — Medication 1: at 08:00

## 2019-11-14 RX ADMIN — RISPERIDONE 0.5 MILLIGRAM(S): 4 TABLET ORAL at 21:43

## 2019-11-14 RX ADMIN — DORZOLAMIDE HYDROCHLORIDE 1 DROP(S): 20 SOLUTION/ DROPS OPHTHALMIC at 05:25

## 2019-11-14 RX ADMIN — HEPARIN SODIUM 5000 UNIT(S): 5000 INJECTION INTRAVENOUS; SUBCUTANEOUS at 21:43

## 2019-11-14 RX ADMIN — HEPARIN SODIUM 5000 UNIT(S): 5000 INJECTION INTRAVENOUS; SUBCUTANEOUS at 05:24

## 2019-11-14 RX ADMIN — DORZOLAMIDE HYDROCHLORIDE 1 DROP(S): 20 SOLUTION/ DROPS OPHTHALMIC at 21:42

## 2019-11-14 RX ADMIN — DORZOLAMIDE HYDROCHLORIDE 1 DROP(S): 20 SOLUTION/ DROPS OPHTHALMIC at 13:26

## 2019-11-14 RX ADMIN — HALOPERIDOL DECANOATE 1 MILLIGRAM(S): 100 INJECTION INTRAMUSCULAR at 14:24

## 2019-11-14 RX ADMIN — LATANOPROST 1 DROP(S): 0.05 SOLUTION/ DROPS OPHTHALMIC; TOPICAL at 21:42

## 2019-11-14 RX ADMIN — Medication 4: at 16:33

## 2019-11-14 RX ADMIN — INSULIN GLARGINE 6 UNIT(S): 100 INJECTION, SOLUTION SUBCUTANEOUS at 21:54

## 2019-11-14 RX ADMIN — HEPARIN SODIUM 5000 UNIT(S): 5000 INJECTION INTRAVENOUS; SUBCUTANEOUS at 13:26

## 2019-11-14 NOTE — PROGRESS NOTE ADULT - SUBJECTIVE AND OBJECTIVE BOX
CC: Follow up    INTERVAL HPI/OVERNIGHT EVENTS: Patient seen and examined, calm this morning on enhanced supervision.       Vital Signs Last 24 Hrs  T(C): 36.6 (14 Nov 2019 07:14), Max: 36.8 (13 Nov 2019 16:16)  T(F): 97.9 (14 Nov 2019 07:14), Max: 98.2 (13 Nov 2019 16:16)  HR: 78 (14 Nov 2019 07:14) (78 - 94)  BP: 115/66 (14 Nov 2019 07:14) (115/66 - 148/69)  BP(mean): --  RR: 16 (14 Nov 2019 07:14) (16 - 18)  SpO2: 98% (14 Nov 2019 07:14) (96% - 98%)    PHYSICAL EXAM:    GENERAL: NAD  CHEST/LUNG: Clear to auscultation bilaterally; No rales, rhonchi, wheezing, or rubs  HEART: Regular rate and rhythm; No murmurs, rubs, or gallops  ABDOMEN: Soft, Nontender, Nondistended; Bowel sounds present  EXTREMITIES:  2+ Peripheral Pulses, No clubbing, cyanosis, or edema        MEDICATIONS  (STANDING):  dextrose 5%. 1000 milliLiter(s) (50 mL/Hr) IV Continuous <Continuous>  dextrose 50% Injectable 12.5 Gram(s) IV Push once  dextrose 50% Injectable 25 Gram(s) IV Push once  dextrose 50% Injectable 25 Gram(s) IV Push once  dorzolamide 2% Ophthalmic Solution 1 Drop(s) Both EYES three times a day  heparin  Injectable 5000 Unit(s) SubCutaneous every 8 hours  insulin glargine Injectable (LANTUS) 6 Unit(s) SubCutaneous at bedtime  insulin lispro (HumaLOG) corrective regimen sliding scale   SubCutaneous three times a day before meals  latanoprost 0.005% Ophthalmic Solution 1 Drop(s) Both EYES at bedtime  risperiDONE  Disintegrating Tablet 0.5 milliGRAM(s) Oral at bedtime    MEDICATIONS  (PRN):  dextrose 40% Gel 15 Gram(s) Oral once PRN Blood Glucose LESS THAN 70 milliGRAM(s)/deciliter  glucagon  Injectable 1 milliGRAM(s) IntraMuscular once PRN Glucose LESS THAN 70 milligrams/deciliter      Allergies    No Known Allergies    Intolerances          LABS:                  RADIOLOGY & ADDITIONAL TESTS:

## 2019-11-14 NOTE — PROGRESS NOTE ADULT - ASSESSMENT
The patient is a 79 year old female with a history of dementia and diabetes mellitus type 2 who was brought to the ER by family for  paranoid behavior and increased confusion. Patient was evaluated in the ER, with no metabolic abnormalities noted. Cleared by psychiatry for discharge home. However, once family was reached stated that patient lives alone and is unable to care for herself. Awaiting placement.       Assessment/Plan:    1. Dementia with behavioral disturbance: Risperidone increased by psychiatry   Now calm but still not oriented   Haldol as needed  Enhanced supervision      2. Diabetes mellitus type 2: BSL stable   Continue lantus with HSS  MOnitor bsl     VTE- heparin subcut      She does not have the capacity to make decisions and is not safe to live alone. SW on consult for placement The patient is a 79 year old female with a history of dementia and diabetes mellitus type 2 who was brought to the ER by family for  paranoid behavior and increased confusion. Patient was evaluated in the ER, with no metabolic abnormalities noted. Cleared by psychiatry for discharge home. However, once family was reached stated that patient lives alone and is unable to care for herself. Awaiting placement.       Assessment/Plan:    1. Dementia with behavioral disturbance: Risperidone increased by psychiatry   Now calm but still not oriented   Haldol as needed  Enhanced supervision      2. Diabetes mellitus type 2: BSL stable   Continue lantus with HSS  MOnitor bsl     VTE- heparin subcut      dispo: [facility] when arrangements made

## 2019-11-15 LAB
ANION GAP SERPL CALC-SCNC: 9 MMOL/L — SIGNIFICANT CHANGE UP (ref 5–17)
BUN SERPL-MCNC: 26 MG/DL — HIGH (ref 8–20)
CALCIUM SERPL-MCNC: 9.6 MG/DL — SIGNIFICANT CHANGE UP (ref 8.6–10.2)
CHLORIDE SERPL-SCNC: 102 MMOL/L — SIGNIFICANT CHANGE UP (ref 98–107)
CO2 SERPL-SCNC: 29 MMOL/L — SIGNIFICANT CHANGE UP (ref 22–29)
CREAT SERPL-MCNC: 0.78 MG/DL — SIGNIFICANT CHANGE UP (ref 0.5–1.3)
GLUCOSE BLDC GLUCOMTR-MCNC: 136 MG/DL — HIGH (ref 70–99)
GLUCOSE BLDC GLUCOMTR-MCNC: 150 MG/DL — HIGH (ref 70–99)
GLUCOSE BLDC GLUCOMTR-MCNC: 200 MG/DL — HIGH (ref 70–99)
GLUCOSE BLDC GLUCOMTR-MCNC: 316 MG/DL — HIGH (ref 70–99)
GLUCOSE SERPL-MCNC: 143 MG/DL — HIGH (ref 70–115)
HCT VFR BLD CALC: 32.6 % — LOW (ref 34.5–45)
HGB BLD-MCNC: 10.3 G/DL — LOW (ref 11.5–15.5)
MCHC RBC-ENTMCNC: 23.6 PG — LOW (ref 27–34)
MCHC RBC-ENTMCNC: 31.6 GM/DL — LOW (ref 32–36)
MCV RBC AUTO: 74.8 FL — LOW (ref 80–100)
PLATELET # BLD AUTO: 224 K/UL — SIGNIFICANT CHANGE UP (ref 150–400)
POTASSIUM SERPL-MCNC: 4.3 MMOL/L — SIGNIFICANT CHANGE UP (ref 3.5–5.3)
POTASSIUM SERPL-SCNC: 4.3 MMOL/L — SIGNIFICANT CHANGE UP (ref 3.5–5.3)
RBC # BLD: 4.36 M/UL — SIGNIFICANT CHANGE UP (ref 3.8–5.2)
RBC # FLD: 13.6 % — SIGNIFICANT CHANGE UP (ref 10.3–14.5)
SODIUM SERPL-SCNC: 140 MMOL/L — SIGNIFICANT CHANGE UP (ref 135–145)
WBC # BLD: 4 K/UL — SIGNIFICANT CHANGE UP (ref 3.8–10.5)
WBC # FLD AUTO: 4 K/UL — SIGNIFICANT CHANGE UP (ref 3.8–10.5)

## 2019-11-15 PROCEDURE — 99232 SBSQ HOSP IP/OBS MODERATE 35: CPT

## 2019-11-15 RX ORDER — RISPERIDONE 4 MG/1
0.25 TABLET ORAL ONCE
Refills: 0 | Status: COMPLETED | OUTPATIENT
Start: 2019-11-15 | End: 2019-11-15

## 2019-11-15 RX ORDER — RISPERIDONE 4 MG/1
0.25 TABLET ORAL
Refills: 0 | Status: DISCONTINUED | OUTPATIENT
Start: 2019-11-16 | End: 2019-11-18

## 2019-11-15 RX ADMIN — INSULIN GLARGINE 6 UNIT(S): 100 INJECTION, SOLUTION SUBCUTANEOUS at 22:20

## 2019-11-15 RX ADMIN — HEPARIN SODIUM 5000 UNIT(S): 5000 INJECTION INTRAVENOUS; SUBCUTANEOUS at 15:57

## 2019-11-15 RX ADMIN — RISPERIDONE 0.25 MILLIGRAM(S): 4 TABLET ORAL at 11:21

## 2019-11-15 RX ADMIN — RISPERIDONE 0.5 MILLIGRAM(S): 4 TABLET ORAL at 22:20

## 2019-11-15 RX ADMIN — DORZOLAMIDE HYDROCHLORIDE 1 DROP(S): 20 SOLUTION/ DROPS OPHTHALMIC at 06:20

## 2019-11-15 RX ADMIN — LATANOPROST 1 DROP(S): 0.05 SOLUTION/ DROPS OPHTHALMIC; TOPICAL at 22:20

## 2019-11-15 RX ADMIN — DORZOLAMIDE HYDROCHLORIDE 1 DROP(S): 20 SOLUTION/ DROPS OPHTHALMIC at 15:56

## 2019-11-15 RX ADMIN — Medication 4: at 12:28

## 2019-11-15 RX ADMIN — DORZOLAMIDE HYDROCHLORIDE 1 DROP(S): 20 SOLUTION/ DROPS OPHTHALMIC at 22:20

## 2019-11-15 RX ADMIN — HEPARIN SODIUM 5000 UNIT(S): 5000 INJECTION INTRAVENOUS; SUBCUTANEOUS at 22:20

## 2019-11-15 RX ADMIN — HEPARIN SODIUM 5000 UNIT(S): 5000 INJECTION INTRAVENOUS; SUBCUTANEOUS at 06:21

## 2019-11-15 NOTE — PROGRESS NOTE ADULT - ASSESSMENT
The patient is a 79 year old female with a history of dementia and diabetes mellitus type 2 who was brought to the ER by family for  paranoid behavior and increased confusion. Patient was evaluated in the ER, with no metabolic abnormalities noted. Cleared by psychiatry for discharge home. However, once family was reached stated that patient lives alone and is unable to care for herself. Awaiting placement.       Assessment/Plan:    1. Dementia with behavioral disturbance: Risperidone changed to BID  Haldol as needed  Enhanced supervision      2. Diabetes mellitus type 2: BSL stable   Continue lantus with HSS  MOnitor bsl     VTE- heparin subcut     Discharge disposition; Patient's son is the HCP and POA for patient,  Yesica García has been attempting to reach him to complete financials for a safe discharge to NH/[facility]. Patient lives alone and lacks capacity to make decisions for herself.

## 2019-11-15 NOTE — CHART NOTE - NSCHARTNOTEFT_GEN_A_CORE
Source: Patient [ ]  Family [ ]   other [x ] staff    Current Diet: Diet, Mechanical Soft:   Consistent Carbohydrate {No Snacks}  Supplement Feeding Modality:  Oral  Glucerna Shake Cans or Servings Per Day:  1       Frequency:  Three Times a day (11-08-19 @ 10:19)    PO intake: staff reported good po intake at breakfast consuming about 75% of meal. pt currently asleep with lunch tray untouched. staff reported she will encourage lunch intake when she wakes up.     Current Weight:   (11/6) 96.7#  RX: daily weights    % Weight Change: no recent weight available, will continue to monitor.     Pertinent Medications: MEDICATIONS  (STANDING):  dextrose 5%. 1000 milliLiter(s) (50 mL/Hr) IV Continuous <Continuous>  dextrose 50% Injectable 12.5 Gram(s) IV Push once  dextrose 50% Injectable 25 Gram(s) IV Push once  dextrose 50% Injectable 25 Gram(s) IV Push once  dorzolamide 2% Ophthalmic Solution 1 Drop(s) Both EYES three times a day  heparin  Injectable 5000 Unit(s) SubCutaneous every 8 hours  insulin glargine Injectable (LANTUS) 6 Unit(s) SubCutaneous at bedtime  insulin lispro (HumaLOG) corrective regimen sliding scale   SubCutaneous three times a day before meals  latanoprost 0.005% Ophthalmic Solution 1 Drop(s) Both EYES at bedtime  LORazepam   Injectable 2 milliGRAM(s) IV Push once  risperiDONE  Disintegrating Tablet 0.5 milliGRAM(s) Oral at bedtime    MEDICATIONS  (PRN):  dextrose 40% Gel 15 Gram(s) Oral once PRN Blood Glucose LESS THAN 70 milliGRAM(s)/deciliter  glucagon  Injectable 1 milliGRAM(s) IntraMuscular once PRN Glucose LESS THAN 70 milligrams/deciliter    Pertinent Labs: CBC Full  -  ( 15 Nov 2019 09:41 )  WBC Count : 4.00 K/uL  RBC Count : 4.36 M/uL  Hemoglobin : 10.3 g/dL  Hematocrit : 32.6 %  Platelet Count - Automated : 224 K/uL  Mean Cell Volume : 74.8 fl  Mean Cell Hemoglobin : 23.6 pg  Mean Cell Hemoglobin Concentration : 31.6 gm/dL  Auto Neutrophil # : x  Auto Lymphocyte # : x  Auto Monocyte # : x  Auto Eosinophil # : x  Auto Basophil # : x  Auto Neutrophil % : x  Auto Lymphocyte % : x  Auto Monocyte % : x  Auto Eosinophil % : x  Auto Basophil % : x  11-15 Na140 mmol/L Glu 143 mg/dL<H> K+ 4.3 mmol/L Cr  0.78 mg/dL BUN 26.0 mg/dL<H> Phos n/a   Alb n/a   PAB n/a       Skin: intact    Nutrition focused physical exam previously conducted - found signs of malnutrition [ ]absent [x]present    Subcutaneous fat loss: [x ] Orbital fat pads region, [ ]Buccal fat region, [x ]Triceps region,  [ ]Ribs region    Muscle wasting: [ x]Temples region, [x ]Clavicle region, [x ]Shoulder region, [ ]Scapula region, [ ]Interosseous region,  [ ]thigh region, [ ]Calf region    Estimated Needs:   [ x] no change since previous assessment  [ ] recalculated:     Current Nutrition Diagnosis: Pt remains at high nutritional risk and meets criteria for severe (chronic) malnutrition related to inadequate protein-calorie intake in setting of Alzheimer's Dementia dx as evidenced by severe muscle wasting at temples, clavicle, shoulders and severe fat loss in orbital region and triceps. Limited nutrition interview conducted due to pt asleep. Pt consumed 75% lunch per staff.     Recommendations:   -Continue Glucerna TID  -Monitor weights/daily weights  -Encourage PO intake during meals    Monitoring and Evaluation:   [x ] PO intake [x ] Tolerance to diet prescription [X] Weights  [X] Follow up per protocol [X] Labs:

## 2019-11-15 NOTE — PROGRESS NOTE ADULT - SUBJECTIVE AND OBJECTIVE BOX
CC: Follow up    INTERVAL HPI/OVERNIGHT EVENTS: Patient seen and examined, sitting in a chair unable to tell me the yaer or month. Was agitated yesterday requiring IV ativan. On Enhanced supervision for safety.       Vital Signs Last 24 Hrs  T(C): 37 (15 Nov 2019 08:38), Max: 37 (14 Nov 2019 15:53)  T(F): 98.6 (15 Nov 2019 08:38), Max: 98.6 (14 Nov 2019 15:53)  HR: 76 (15 Nov 2019 08:38) (76 - 92)  BP: 111/64 (15 Nov 2019 08:38) (111/64 - 138/67)  BP(mean): --  RR: 18 (15 Nov 2019 08:38) (18 - 18)  SpO2: 100% (15 Nov 2019 08:38) (96% - 100%)    PHYSICAL EXAM:    GENERAL: NAD  HEAD:  Atraumatic, Normocephalic  CHEST/LUNG: Clear to auscultation bilaterally; No rales, rhonchi, wheezing, or rubs  HEART: Regular rate and rhythm; No murmurs, rubs, or gallops  ABDOMEN: Soft, Nontender, Nondistended; Bowel sounds present  EXTREMITIES:  2+ Peripheral Pulses, No clubbing, cyanosis, or edema        MEDICATIONS  (STANDING):  dextrose 5%. 1000 milliLiter(s) (50 mL/Hr) IV Continuous <Continuous>  dextrose 50% Injectable 12.5 Gram(s) IV Push once  dextrose 50% Injectable 25 Gram(s) IV Push once  dextrose 50% Injectable 25 Gram(s) IV Push once  dorzolamide 2% Ophthalmic Solution 1 Drop(s) Both EYES three times a day  heparin  Injectable 5000 Unit(s) SubCutaneous every 8 hours  insulin glargine Injectable (LANTUS) 6 Unit(s) SubCutaneous at bedtime  insulin lispro (HumaLOG) corrective regimen sliding scale   SubCutaneous three times a day before meals  latanoprost 0.005% Ophthalmic Solution 1 Drop(s) Both EYES at bedtime  LORazepam   Injectable 2 milliGRAM(s) IV Push once  risperiDONE  Disintegrating Tablet 0.5 milliGRAM(s) Oral at bedtime    MEDICATIONS  (PRN):  dextrose 40% Gel 15 Gram(s) Oral once PRN Blood Glucose LESS THAN 70 milliGRAM(s)/deciliter  glucagon  Injectable 1 milliGRAM(s) IntraMuscular once PRN Glucose LESS THAN 70 milligrams/deciliter      Allergies    No Known Allergies    Intolerances          LABS:                          10.3   4.00  )-----------( 224      ( 15 Nov 2019 09:41 )             32.6     11-15    140  |  102  |  26.0<H>  ----------------------------<  143<H>  4.3   |  29.0  |  0.78    Ca    9.6      15 Nov 2019 09:41            RADIOLOGY & ADDITIONAL TESTS:

## 2019-11-16 LAB
GLUCOSE BLDC GLUCOMTR-MCNC: 140 MG/DL — HIGH (ref 70–99)
GLUCOSE BLDC GLUCOMTR-MCNC: 225 MG/DL — HIGH (ref 70–99)
GLUCOSE BLDC GLUCOMTR-MCNC: 298 MG/DL — HIGH (ref 70–99)
GLUCOSE BLDC GLUCOMTR-MCNC: 365 MG/DL — HIGH (ref 70–99)

## 2019-11-16 PROCEDURE — 99232 SBSQ HOSP IP/OBS MODERATE 35: CPT

## 2019-11-16 RX ORDER — INSULIN GLARGINE 100 [IU]/ML
12 INJECTION, SOLUTION SUBCUTANEOUS AT BEDTIME
Refills: 0 | Status: DISCONTINUED | OUTPATIENT
Start: 2019-11-16 | End: 2019-11-17

## 2019-11-16 RX ADMIN — Medication 5: at 13:07

## 2019-11-16 RX ADMIN — HEPARIN SODIUM 5000 UNIT(S): 5000 INJECTION INTRAVENOUS; SUBCUTANEOUS at 21:08

## 2019-11-16 RX ADMIN — DORZOLAMIDE HYDROCHLORIDE 1 DROP(S): 20 SOLUTION/ DROPS OPHTHALMIC at 16:00

## 2019-11-16 RX ADMIN — INSULIN GLARGINE 12 UNIT(S): 100 INJECTION, SOLUTION SUBCUTANEOUS at 21:38

## 2019-11-16 RX ADMIN — RISPERIDONE 0.5 MILLIGRAM(S): 4 TABLET ORAL at 21:42

## 2019-11-16 RX ADMIN — Medication 3: at 17:32

## 2019-11-16 RX ADMIN — DORZOLAMIDE HYDROCHLORIDE 1 DROP(S): 20 SOLUTION/ DROPS OPHTHALMIC at 21:09

## 2019-11-16 RX ADMIN — LATANOPROST 1 DROP(S): 0.05 SOLUTION/ DROPS OPHTHALMIC; TOPICAL at 21:09

## 2019-11-16 RX ADMIN — RISPERIDONE 0.25 MILLIGRAM(S): 4 TABLET ORAL at 10:11

## 2019-11-16 RX ADMIN — DORZOLAMIDE HYDROCHLORIDE 1 DROP(S): 20 SOLUTION/ DROPS OPHTHALMIC at 05:27

## 2019-11-16 RX ADMIN — HEPARIN SODIUM 5000 UNIT(S): 5000 INJECTION INTRAVENOUS; SUBCUTANEOUS at 05:27

## 2019-11-16 RX ADMIN — HEPARIN SODIUM 5000 UNIT(S): 5000 INJECTION INTRAVENOUS; SUBCUTANEOUS at 13:08

## 2019-11-16 NOTE — PROGRESS NOTE ADULT - SUBJECTIVE AND OBJECTIVE BOX
CC:   HPI:    REVIEW OF SYSTEMS:    Patient denied fever, chills, abdominal pain, nausea, vomiting, cough, shortness of breath, chest pain or palpitations    Vital Signs Last 24 Hrs  T(C): 36.5 (16 Nov 2019 15:20), Max: 36.8 (15 Nov 2019 23:55)  T(F): 97.7 (16 Nov 2019 15:20), Max: 98.2 (15 Nov 2019 23:55)  HR: 83 (16 Nov 2019 15:20) (81 - 83)  BP: 115/63 (16 Nov 2019 15:20) (103/64 - 124/74)  BP(mean): --  RR: 18 (16 Nov 2019 15:20) (16 - 18)  SpO2: 93% (16 Nov 2019 15:20) (93% - 98%)I&O's Summary    15 Nov 2019 07:01  -  16 Nov 2019 07:00  --------------------------------------------------------  IN: 478 mL / OUT: 0 mL / NET: 478 mL    CAPILLARY BLOOD GLUCOSE    PHYSICAL EXAM:  GENERAL: NAD, well-groomed  HEENT: PERRL, +EOMI, anicteric, no [place]  NECK: Supple, No JVD   CHEST/LUNG: CTA bilaterally; Normal effort  HEART: S1S2 Normal intensity, no murmurs, gallops or rubs noted  ABDOMEN: Soft, BS Normoactive, NT, ND, no HSM noted  EXTREMITIES:  2+ radial and DP pulses noted, no clubbing, cyanosis, or edema noted, FROM x 4  SKIN: No rashes or lesions noted  NEURO: A&Ox3, no focal deficits noted, CN II-XII intact  PSYCH: normal mood and affect; insight/judgement appropriate  LABS:                        10.3   4.00  )-----------( 224      ( 15 Nov 2019 09:41 )             32.6     11-15    140  |  102  |  26.0<H>  ----------------------------<  143<H>  4.3   |  29.0  |  0.78    Ca    9.6      15 Nov 2019 09:41          RADIOLOGY & ADDITIONAL TESTS:    MEDICATIONS:  MEDICATIONS  (STANDING):  dextrose 5%. 1000 milliLiter(s) (50 mL/Hr) IV Continuous <Continuous>  dextrose 50% Injectable 12.5 Gram(s) IV Push once  dextrose 50% Injectable 25 Gram(s) IV Push once  dextrose 50% Injectable 25 Gram(s) IV Push once  dorzolamide 2% Ophthalmic Solution 1 Drop(s) Both EYES three times a day  heparin  Injectable 5000 Unit(s) SubCutaneous every 8 hours  insulin glargine Injectable (LANTUS) 6 Unit(s) SubCutaneous at bedtime  insulin lispro (HumaLOG) corrective regimen sliding scale   SubCutaneous three times a day before meals  latanoprost 0.005% Ophthalmic Solution 1 Drop(s) Both EYES at bedtime  LORazepam   Injectable 2 milliGRAM(s) IV Push once  risperiDONE   Tablet 0.25 milliGRAM(s) Oral <User Schedule>  risperiDONE  Disintegrating Tablet 0.5 milliGRAM(s) Oral at bedtime    MEDICATIONS  (PRN):  dextrose 40% Gel 15 Gram(s) Oral once PRN Blood Glucose LESS THAN 70 milliGRAM(s)/deciliter  glucagon  Injectable 1 milliGRAM(s) IntraMuscular once PRN Glucose LESS THAN 70 milligrams/deciliter CC: Patient with no complaints at this time.  Son at bedside assisted patient with lunch    REVIEW OF SYSTEMS:    Patient denied fever, chills, abdominal pain, nausea, vomiting, cough, shortness of breath, chest pain or palpitations    Vital Signs Last 24 Hrs  T(C): 36.5 (16 Nov 2019 15:20), Max: 36.8 (15 Nov 2019 23:55)  T(F): 97.7 (16 Nov 2019 15:20), Max: 98.2 (15 Nov 2019 23:55)  HR: 83 (16 Nov 2019 15:20) (81 - 83)  BP: 115/63 (16 Nov 2019 15:20) (103/64 - 124/74)  BP(mean): --  RR: 18 (16 Nov 2019 15:20) (16 - 18)  SpO2: 93% (16 Nov 2019 15:20) (93% - 98%)I&O's Summary    15 Nov 2019 07:01  -  16 Nov 2019 07:00  --------------------------------------------------------  IN: 478 mL / OUT: 0 mL / NET: 478 mL    CAPILLARY BLOOD GLUCOSE    PHYSICAL EXAM:  GENERAL: NAD, well-groomed  HEENT: + bilateral blindness "sees shadows", +EOMI, anicteric, + [place]  NECK: Supple, No JVD   CHEST/LUNG: CTA bilaterally; Normal effort  HEART: S1S2 decreased intensity, no murmurs, gallops or rubs noted  ABDOMEN: Soft, BS Normoactive, NT, ND, no HSM noted  EXTREMITIES:  1+ radial and DP pulses noted, no clubbing, cyanosis, or edema noted, FROM x 4  SKIN: No rashes or lesions noted  NEURO: A&Ox1 - person only, + bilaterally reduced visual acuity  PSYCH: normal mood and affect; insight/judgement poor  LABS:                        10.3   4.00  )-----------( 224      ( 15 Nov 2019 09:41 )             32.6     11-15    140  |  102  |  26.0<H>  ----------------------------<  143<H>  4.3   |  29.0  |  0.78    Ca    9.6      15 Nov 2019 09:41          RADIOLOGY & ADDITIONAL TESTS:    MEDICATIONS:  MEDICATIONS  (STANDING):  dextrose 5%. 1000 milliLiter(s) (50 mL/Hr) IV Continuous <Continuous>  dextrose 50% Injectable 12.5 Gram(s) IV Push once  dextrose 50% Injectable 25 Gram(s) IV Push once  dextrose 50% Injectable 25 Gram(s) IV Push once  dorzolamide 2% Ophthalmic Solution 1 Drop(s) Both EYES three times a day  heparin  Injectable 5000 Unit(s) SubCutaneous every 8 hours  insulin glargine Injectable (LANTUS) 6 Unit(s) SubCutaneous at bedtime  insulin lispro (HumaLOG) corrective regimen sliding scale   SubCutaneous three times a day before meals  latanoprost 0.005% Ophthalmic Solution 1 Drop(s) Both EYES at bedtime  LORazepam   Injectable 2 milliGRAM(s) IV Push once  risperiDONE   Tablet 0.25 milliGRAM(s) Oral <User Schedule>  risperiDONE  Disintegrating Tablet 0.5 milliGRAM(s) Oral at bedtime    MEDICATIONS  (PRN):  dextrose 40% Gel 15 Gram(s) Oral once PRN Blood Glucose LESS THAN 70 milliGRAM(s)/deciliter  glucagon  Injectable 1 milliGRAM(s) IntraMuscular once PRN Glucose LESS THAN 70 milligrams/deciliter

## 2019-11-16 NOTE — PROGRESS NOTE ADULT - ASSESSMENT
The patient is a 79 year old female with a history of dementia and diabetes mellitus type 2 who was brought to the ER by family for  paranoid behavior and increased confusion. Patient was evaluated in the ER, with no metabolic abnormalities noted. Cleared by psychiatry for discharge home. However, once family was reached stated that patient lives alone and is unable to care for herself. Awaiting placement.       Assessment/Plan:    1. Dementia with behavioral disturbance: Risperidone changed to BID  Haldol as needed  Enhanced supervision      2. Diabetes mellitus type 2: BSL stable   Continue lantus with HSS  MOnitor bsl     VTE- heparin subcut     Discharge disposition; Patient's son is the HCP and POA for patient,  Yesica García has been attempting to reach him to complete financials for a safe discharge to NH/[facility] Patient lives alone and lacks capacity to make decisions for herself. The patient is a 79 year old female with a history of dementia and diabetes mellitus type 2 who was brought to the ER by family for  paranoid behavior and increased confusion. Patient was evaluated in the ER, with no metabolic abnormalities noted. Cleared by psychiatry for discharge home. However, once family was reached stated that patient lives alone and is unable to care for herself. Awaiting placement - son stated that he brought in paperwork today.     Assessment/Plan:    1. Dementia with behavioral disturbance: Risperidone changed to BID - continue current mgmt for now  Haldol as needed  Enhanced supervision    2. Diabetes mellitus type 2: Uncontrolled   Increase lantus with HSS  Monitor bsl     VTE- heparin subcut     Discharge disposition; Patient's son is the HCP and POA for patient,  Yesica García has been attempting to reach him to complete financials for a safe discharge to NH/[facility]. Patient lives alone and lacks capacity to make decisions for herself.

## 2019-11-17 LAB
GLUCOSE BLDC GLUCOMTR-MCNC: 152 MG/DL — HIGH (ref 70–99)
GLUCOSE BLDC GLUCOMTR-MCNC: 201 MG/DL — HIGH (ref 70–99)
GLUCOSE BLDC GLUCOMTR-MCNC: 285 MG/DL — HIGH (ref 70–99)
GLUCOSE BLDC GLUCOMTR-MCNC: 293 MG/DL — HIGH (ref 70–99)

## 2019-11-17 PROCEDURE — 99231 SBSQ HOSP IP/OBS SF/LOW 25: CPT

## 2019-11-17 RX ORDER — INSULIN LISPRO 100/ML
2 VIAL (ML) SUBCUTANEOUS ONCE
Refills: 0 | Status: COMPLETED | OUTPATIENT
Start: 2019-11-17 | End: 2019-11-17

## 2019-11-17 RX ORDER — INSULIN GLARGINE 100 [IU]/ML
15 INJECTION, SOLUTION SUBCUTANEOUS AT BEDTIME
Refills: 0 | Status: DISCONTINUED | OUTPATIENT
Start: 2019-11-17 | End: 2019-11-18

## 2019-11-17 RX ADMIN — Medication 3: at 17:50

## 2019-11-17 RX ADMIN — DORZOLAMIDE HYDROCHLORIDE 1 DROP(S): 20 SOLUTION/ DROPS OPHTHALMIC at 13:17

## 2019-11-17 RX ADMIN — HEPARIN SODIUM 5000 UNIT(S): 5000 INJECTION INTRAVENOUS; SUBCUTANEOUS at 13:17

## 2019-11-17 RX ADMIN — INSULIN GLARGINE 15 UNIT(S): 100 INJECTION, SOLUTION SUBCUTANEOUS at 21:24

## 2019-11-17 RX ADMIN — HEPARIN SODIUM 5000 UNIT(S): 5000 INJECTION INTRAVENOUS; SUBCUTANEOUS at 21:25

## 2019-11-17 RX ADMIN — RISPERIDONE 0.5 MILLIGRAM(S): 4 TABLET ORAL at 21:26

## 2019-11-17 RX ADMIN — DORZOLAMIDE HYDROCHLORIDE 1 DROP(S): 20 SOLUTION/ DROPS OPHTHALMIC at 05:23

## 2019-11-17 RX ADMIN — LATANOPROST 1 DROP(S): 0.05 SOLUTION/ DROPS OPHTHALMIC; TOPICAL at 21:25

## 2019-11-17 RX ADMIN — Medication 2 UNIT(S): at 21:24

## 2019-11-17 RX ADMIN — Medication 2: at 13:16

## 2019-11-17 RX ADMIN — Medication 1: at 08:52

## 2019-11-17 RX ADMIN — RISPERIDONE 0.25 MILLIGRAM(S): 4 TABLET ORAL at 08:53

## 2019-11-17 RX ADMIN — DORZOLAMIDE HYDROCHLORIDE 1 DROP(S): 20 SOLUTION/ DROPS OPHTHALMIC at 21:25

## 2019-11-17 RX ADMIN — HEPARIN SODIUM 5000 UNIT(S): 5000 INJECTION INTRAVENOUS; SUBCUTANEOUS at 05:23

## 2019-11-17 NOTE — PROGRESS NOTE ADULT - ASSESSMENT
The patient is a 79 year old female with a history of dementia and diabetes mellitus type 2 who was brought to the ER by family for  paranoid behavior and increased confusion. Patient was evaluated in the ER, with no metabolic abnormalities noted. Cleared by psychiatry for discharge home. However, once family was reached stated that patient lives alone and is unable to care for herself. Awaiting placement - son stated that he brought in paperwork today.     Assessment/Plan:    1. Dementia with behavioral disturbance: Risperidone changed to BID - continue current mgmt for now  Haldol as needed  Enhanced supervision    2. Diabetes mellitus type 2: Uncontrolled   Increase lantus with HSS  Monitor bsl     VTE- heparin subcut     Discharge disposition; Patient's son is the HCP and POA for patient,  Yesica García has been attempting to reach him to complete financials for a safe discharge to NH/[facility]. Patient lives alone and lacks capacity to make decisions for herself. The patient is a 79 year old female with a history of dementia and diabetes mellitus type 2 who was brought to the ER by family for  paranoid behavior and increased confusion. Patient was evaluated in the ER, with no metabolic abnormalities noted. Cleared by psychiatry for discharge home. However, once family was reached stated that patient lives alone and is unable to care for herself. Awaiting placement - son stated that he brought in paperwork 11/16.     Assessment/Plan:    1. Dementia with behavioral disturbance - controlled:   Risperidone BID - continue current mgmt for now  Haldol as needed  Enhanced supervision    2. Diabetes mellitus type 2: improving control  increased lantus 6 -> 12 last night and will increase to 15 units along with sliding scale  Monitor bsl     VTE- heparin subcut     Discharge disposition; Patient's son is the HCP and POA for patient,  Yesica García had been attempting to reach him to complete financials for a safe discharge to NH/[facility]. Patient lives alone and lacks capacity to make decisions for herself.

## 2019-11-17 NOTE — PROGRESS NOTE ADULT - SUBJECTIVE AND OBJECTIVE BOX
CC:   HPI:    REVIEW OF SYSTEMS:    Patient denied fever, chills, abdominal pain, nausea, vomiting, cough, shortness of breath, chest pain or palpitations    Vital Signs Last 24 Hrs  T(C): 36.8 (17 Nov 2019 08:35), Max: 37.2 (16 Nov 2019 23:55)  T(F): 98.3 (17 Nov 2019 08:35), Max: 98.9 (16 Nov 2019 23:55)  HR: 83 (17 Nov 2019 08:35) (83 - 89)  BP: 109/77 (17 Nov 2019 08:35) (109/77 - 120/54)  BP(mean): --  RR: 18 (17 Nov 2019 08:35) (18 - 18)  SpO2: 100% (17 Nov 2019 08:35) (93% - 100%)I&O's Summary  CAPILLARY BLOOD GLUCOSE    PHYSICAL EXAM:  GENERAL: NAD, well-groomed  HEENT: PERRL, +EOMI, anicteric, no [place]  NECK: Supple, No JVD   CHEST/LUNG: CTA bilaterally; Normal effort  HEART: S1S2 Normal intensity, no murmurs, gallops or rubs noted  ABDOMEN: Soft, BS Normoactive, NT, ND, no HSM noted  EXTREMITIES:  2+ radial and DP pulses noted, no clubbing, cyanosis, or edema noted, FROM x 4  SKIN: No rashes or lesions noted  NEURO: A&Ox3, no focal deficits noted, CN II-XII intact  PSYCH: normal mood and affect; insight/judgement appropriate  LABS:              RADIOLOGY & ADDITIONAL TESTS:    MEDICATIONS:  MEDICATIONS  (STANDING):  dextrose 5%. 1000 milliLiter(s) (50 mL/Hr) IV Continuous <Continuous>  dextrose 50% Injectable 12.5 Gram(s) IV Push once  dextrose 50% Injectable 25 Gram(s) IV Push once  dextrose 50% Injectable 25 Gram(s) IV Push once  dorzolamide 2% Ophthalmic Solution 1 Drop(s) Both EYES three times a day  heparin  Injectable 5000 Unit(s) SubCutaneous every 8 hours  insulin glargine Injectable (LANTUS) 12 Unit(s) SubCutaneous at bedtime  insulin lispro (HumaLOG) corrective regimen sliding scale   SubCutaneous three times a day before meals  latanoprost 0.005% Ophthalmic Solution 1 Drop(s) Both EYES at bedtime  LORazepam   Injectable 2 milliGRAM(s) IV Push once  risperiDONE   Tablet 0.25 milliGRAM(s) Oral <User Schedule>  risperiDONE  Disintegrating Tablet 0.5 milliGRAM(s) Oral at bedtime    MEDICATIONS  (PRN):  dextrose 40% Gel 15 Gram(s) Oral once PRN Blood Glucose LESS THAN 70 milliGRAM(s)/deciliter  glucagon  Injectable 1 milliGRAM(s) IntraMuscular once PRN Glucose LESS THAN 70 milligrams/deciliter CC: Patient denied any new complaints    REVIEW OF SYSTEMS:    Patient denied fever, chills, abdominal pain, nausea, vomiting, cough, shortness of breath, chest pain or palpitations    Vital Signs Last 24 Hrs  T(C): 36.8 (17 Nov 2019 08:35), Max: 37.2 (16 Nov 2019 23:55)  T(F): 98.3 (17 Nov 2019 08:35), Max: 98.9 (16 Nov 2019 23:55)  HR: 83 (17 Nov 2019 08:35) (83 - 89)  BP: 109/77 (17 Nov 2019 08:35) (109/77 - 120/54)  BP(mean): --  RR: 18 (17 Nov 2019 08:35) (18 - 18)  SpO2: 100% (17 Nov 2019 08:35) (93% - 100%)I&O's Summary  CAPILLARY BLOOD GLUCOSE    PHYSICAL EXAM:  GENERAL: NAD, well-groomed  HEENT: +EOMI, anicteric, no [place]  NECK: Supple, No JVD   CHEST/LUNG: CTA bilaterally; Normal effort  HEART: S1S2 Normal intensity, no murmurs, gallops or rubs noted  ABDOMEN: Soft, BS Normoactive, NT, ND, no HSM noted  EXTREMITIES:  2+ radial and DP pulses noted, no clubbing, cyanosis, or edema noted, FROM x 4  SKIN: No rashes or lesions noted  NEURO: A&Ox2 - not time, no focal deficits noted, CN II-XII intact  PSYCH: normal mood and affect; insight/judgement appropriate      MEDICATIONS:  MEDICATIONS  (STANDING):  dextrose 5%. 1000 milliLiter(s) (50 mL/Hr) IV Continuous <Continuous>  dextrose 50% Injectable 12.5 Gram(s) IV Push once  dextrose 50% Injectable 25 Gram(s) IV Push once  dextrose 50% Injectable 25 Gram(s) IV Push once  dorzolamide 2% Ophthalmic Solution 1 Drop(s) Both EYES three times a day  heparin  Injectable 5000 Unit(s) SubCutaneous every 8 hours  insulin glargine Injectable (LANTUS) 12 Unit(s) SubCutaneous at bedtime  insulin lispro (HumaLOG) corrective regimen sliding scale   SubCutaneous three times a day before meals  latanoprost 0.005% Ophthalmic Solution 1 Drop(s) Both EYES at bedtime  LORazepam   Injectable 2 milliGRAM(s) IV Push once  risperiDONE   Tablet 0.25 milliGRAM(s) Oral <User Schedule>  risperiDONE  Disintegrating Tablet 0.5 milliGRAM(s) Oral at bedtime    MEDICATIONS  (PRN):  dextrose 40% Gel 15 Gram(s) Oral once PRN Blood Glucose LESS THAN 70 milliGRAM(s)/deciliter  glucagon  Injectable 1 milliGRAM(s) IntraMuscular once PRN Glucose LESS THAN 70 milligrams/deciliter

## 2019-11-18 ENCOUNTER — TRANSCRIPTION ENCOUNTER (OUTPATIENT)
Age: 79
End: 2019-11-18

## 2019-11-18 VITALS
HEART RATE: 80 BPM | SYSTOLIC BLOOD PRESSURE: 116 MMHG | DIASTOLIC BLOOD PRESSURE: 71 MMHG | RESPIRATION RATE: 18 BRPM | OXYGEN SATURATION: 98 % | TEMPERATURE: 98 F

## 2019-11-18 LAB
GLUCOSE BLDC GLUCOMTR-MCNC: 141 MG/DL — HIGH (ref 70–99)
GLUCOSE BLDC GLUCOMTR-MCNC: 167 MG/DL — HIGH (ref 70–99)

## 2019-11-18 PROCEDURE — 36415 COLL VENOUS BLD VENIPUNCTURE: CPT

## 2019-11-18 PROCEDURE — 97163 PT EVAL HIGH COMPLEX 45 MIN: CPT

## 2019-11-18 PROCEDURE — 82962 GLUCOSE BLOOD TEST: CPT

## 2019-11-18 PROCEDURE — 83036 HEMOGLOBIN GLYCOSYLATED A1C: CPT

## 2019-11-18 PROCEDURE — 85027 COMPLETE CBC AUTOMATED: CPT

## 2019-11-18 PROCEDURE — 80048 BASIC METABOLIC PNL TOTAL CA: CPT

## 2019-11-18 PROCEDURE — 99239 HOSP IP/OBS DSCHRG MGMT >30: CPT

## 2019-11-18 RX ORDER — GABAPENTIN 400 MG/1
1 CAPSULE ORAL
Qty: 0 | Refills: 0 | DISCHARGE

## 2019-11-18 RX ORDER — RISPERIDONE 4 MG/1
1 TABLET ORAL
Qty: 30 | Refills: 0
Start: 2019-11-18

## 2019-11-18 RX ORDER — HALOPERIDOL DECANOATE 100 MG/ML
1 INJECTION INTRAMUSCULAR ONCE
Refills: 0 | Status: COMPLETED | OUTPATIENT
Start: 2019-11-18 | End: 2019-11-18

## 2019-11-18 RX ADMIN — RISPERIDONE 0.25 MILLIGRAM(S): 4 TABLET ORAL at 08:45

## 2019-11-18 RX ADMIN — HALOPERIDOL DECANOATE 1 MILLIGRAM(S): 100 INJECTION INTRAMUSCULAR at 03:22

## 2019-11-18 RX ADMIN — DORZOLAMIDE HYDROCHLORIDE 1 DROP(S): 20 SOLUTION/ DROPS OPHTHALMIC at 05:07

## 2019-11-18 RX ADMIN — HEPARIN SODIUM 5000 UNIT(S): 5000 INJECTION INTRAVENOUS; SUBCUTANEOUS at 05:07

## 2019-11-18 NOTE — DISCHARGE NOTE NURSING/CASE MANAGEMENT/SOCIAL WORK - NSDCFUADDAPPT_GEN_ALL_CORE_FT
PT. AWAKE ALERT CONFUSED, AMBULATE INDEPENDENTLY, REQUIRE SUPERVISION DUE TO COGNITIVE DEFICIT. PT AGREE FOR CCC TO SPEAK TO SON JANA [phone], [phone].

## 2019-11-18 NOTE — DISCHARGE NOTE NURSING/CASE MANAGEMENT/SOCIAL WORK - PATIENT PORTAL LINK FT
You can access the FollowMyHealth Patient Portal offered by [facility] by registering at the following website: [email or web address]. By joining [facility]’s FollowMyHealth portal, you will also be able to view your health information using other applications (apps) compatible with our system.

## 2020-11-20 ENCOUNTER — APPOINTMENT (OUTPATIENT)
Dept: ENDOCRINOLOGY | Facility: CLINIC | Age: 80
End: 2020-11-20
Payer: MEDICARE

## 2020-11-20 VITALS
DIASTOLIC BLOOD PRESSURE: 77 MMHG | OXYGEN SATURATION: 99 % | HEART RATE: 79 BPM | WEIGHT: 102 LBS | HEIGHT: 59.06 IN | BODY MASS INDEX: 20.56 KG/M2 | TEMPERATURE: 96.8 F | SYSTOLIC BLOOD PRESSURE: 164 MMHG

## 2020-11-20 DIAGNOSIS — Z78.9 OTHER SPECIFIED HEALTH STATUS: ICD-10-CM

## 2020-11-20 DIAGNOSIS — E55.9 VITAMIN D DEFICIENCY, UNSPECIFIED: ICD-10-CM

## 2020-11-20 LAB — GLUCOSE BLDC GLUCOMTR-MCNC: 300

## 2020-11-20 PROCEDURE — 99204 OFFICE O/P NEW MOD 45 MIN: CPT | Mod: 25

## 2020-11-20 PROCEDURE — G0108 DIAB MANAGE TRN  PER INDIV: CPT

## 2020-11-20 PROCEDURE — 36415 COLL VENOUS BLD VENIPUNCTURE: CPT

## 2020-11-20 RX ORDER — BLOOD PRESSURE TEST KIT-MEDIUM
KIT MISCELLANEOUS
Qty: 1 | Refills: 0 | Status: ACTIVE | COMMUNITY
Start: 2020-11-20 | End: 1900-01-01

## 2020-11-22 PROBLEM — Z78.9 NON-SMOKER: Status: ACTIVE | Noted: 2020-11-22

## 2020-11-23 LAB — FRUCTOSAMINE SERPL-MCNC: 512 UMOL/L

## 2020-11-23 RX ORDER — QUETIAPINE FUMARATE 50 MG/1
50 TABLET ORAL
Qty: 30 | Refills: 0 | Status: ACTIVE | COMMUNITY
Start: 2020-11-09

## 2020-11-23 RX ORDER — QUETIAPINE FUMARATE 25 MG/1
25 TABLET ORAL
Qty: 30 | Refills: 0 | Status: ACTIVE | COMMUNITY
Start: 2020-10-27

## 2020-11-23 RX ORDER — METFORMIN HYDROCHLORIDE 1000 MG/1
1000 TABLET, COATED ORAL
Qty: 180 | Refills: 0 | Status: DISCONTINUED | COMMUNITY
Start: 2020-07-08 | End: 2020-11-23

## 2020-11-23 RX ORDER — HALOPERIDOL 0.5 MG/1
0.5 TABLET ORAL
Qty: 90 | Refills: 0 | Status: ACTIVE | COMMUNITY
Start: 2020-07-08

## 2020-11-23 RX ORDER — BRIMONIDINE TARTRATE, TIMOLOL MALEATE 2; 5 MG/ML; MG/ML
0.2-0.5 SOLUTION/ DROPS OPHTHALMIC
Qty: 5 | Refills: 0 | Status: ACTIVE | COMMUNITY
Start: 2020-09-09

## 2020-11-23 RX ORDER — MIRTAZAPINE 7.5 MG/1
7.5 TABLET, FILM COATED ORAL
Qty: 30 | Refills: 0 | Status: ACTIVE | COMMUNITY
Start: 2020-10-08

## 2020-11-23 RX ORDER — INSULIN GLARGINE 100 [IU]/ML
100 INJECTION, SOLUTION SUBCUTANEOUS
Qty: 6 | Refills: 0 | Status: DISCONTINUED | COMMUNITY
Start: 2020-05-06 | End: 2020-11-23

## 2020-11-23 RX ORDER — INSULIN DEGLUDEC INJECTION 100 U/ML
100 INJECTION, SOLUTION SUBCUTANEOUS
Qty: 3 | Refills: 2 | Status: ACTIVE | COMMUNITY
Start: 2020-11-23 | End: 1900-01-01

## 2020-11-23 NOTE — CONSULT LETTER
[Dear  ___] : Dear  [unfilled], [Consult Letter:] : I had the pleasure of evaluating your patient, [unfilled]. [Please see my note below.] : Please see my note below. [Consult Closing:] : Thank you very much for allowing me to participate in the care of this patient.  If you have any questions, please do not hesitate to contact me. [Sincerely,] : Sincerely, [FreeTextEntry3] : Chivo Euceda MD, FACE\par

## 2020-11-23 NOTE — REASON FOR VISIT
[Consultation] : a consultation visit [DM Type 2] : DM Type 2 [Other: _____] : [unfilled] [FreeTextEntry2] : Dr. Veronica

## 2020-11-23 NOTE — ASSESSMENT
[FreeTextEntry1] : This is a 80-year-old female with type 2 diabetes mellitus, hypertension, hyperlipidemia, vitamin D insufficiency, anemia, glaucoma, dementia, legally blind, here for consultation.\par Her daughter provided information for this visit as patient has dementia.\par 1.  Type 2 diabetes mellitus\par Hemoglobin A1c is uncontrolled.\par Will initiate basal/bolus insulin.  Start Fiasp 4 units before meals.  Start Tresiba 10 units at bedtime.\par Increase Metformin to 500 mg 2 times a day.\par Appointment with CDE today.\par Check urine microalbumin to evaluate for nephropathy.\par Her last ophthalmology exam was in August 2020 and she denies retinopathy.  She is legally blind.\par She denies neuropathy.\par Vitamin B12 level is normal.\par 2.  Hypertension\par Uncontrolled today.\par Per daughter due to waking up early, preparing patient to arrive on time.\par Check BP at home and call with readings.\par 3.  Hyperlipidemia\par LDL is not at goal (181).\par Will start statin over the next few weeks as she is starting insulin today and increasing Metformin today.\par \par

## 2020-11-23 NOTE — HISTORY OF PRESENT ILLNESS
[FreeTextEntry1] : CC: Diabetes\par This is a 80-year-old female with type 2 diabetes mellitus, hypertension, hyperlipidemia, vitamin D insufficiency, anemia, glaucoma, dementia, legally blind, here for consultation.\par She is accompanied by her daughter who provides information as patient has dementia.\par She is currently on Metformin 500 mg daily.\par Recent blood work from October 17, 2020 showed hemoglobin A1c 11.7%.  Fingerstick glucose today is 300.\par She was prescribed insulin in the past but is not compliant in taking it.  Per daughter, patient can be combative and refuses insulin.\par Her last ophthalmology exam was in August 2020 and should not denies retinopathy.  She is legally blind.\par She denies neuropathy.\par She denies nephropathy.\par She is not on a statin.\par \par

## 2020-11-23 NOTE — PHYSICAL EXAM
[Alert] : alert [Well Nourished] : well nourished [Healthy Appearance] : healthy appearance [No Acute Distress] : no acute distress [Well Developed] : well developed [Normal Sclera/Conjunctiva] : normal sclera/conjunctiva [No Proptosis] : no proptosis [No Neck Mass] : no neck mass was observed [No LAD] : no lymphadenopathy [Supple] : the neck was supple [Thyroid Not Enlarged] : the thyroid was not enlarged [No Thyroid Nodules] : no palpable thyroid nodules [No Respiratory Distress] : no respiratory distress [No Accessory Muscle Use] : no accessory muscle use [Normal Rate and Effort] : normal respiratory rate and effort [Normal Rate] : heart rate was normal [No Edema] : no peripheral edema [Pedal Pulses Normal] : the pedal pulses are present [Not Distended] : not distended [Spine Straight] : spine straight [No Stigmata of Cushings Syndrome] : no stigmata of Cushings Syndrome [Normal Gait] : normal gait [No Clubbing, Cyanosis] : no clubbing  or cyanosis of the fingernails [No Involuntary Movements] : no involuntary movements were seen [Acanthosis Nigricans] : no acanthosis nigricans [Hirsutism] : no hirsutism [Right Foot Was Examined] : right foot ~C was examined [Left Foot Was Examined] : left foot ~C was examined [Normal] : normal [Diminished Throughout Both Feet] : normal tactile sensation with monofilament testing throughout both feet [No Tremors] : no tremors [Normal Sensation on Monofilament Testing] : normal sensation on monofilament testing of lower extremities

## 2020-11-24 ENCOUNTER — NON-APPOINTMENT (OUTPATIENT)
Age: 80
End: 2020-11-24

## 2020-11-25 ENCOUNTER — APPOINTMENT (OUTPATIENT)
Dept: ENDOCRINOLOGY | Facility: CLINIC | Age: 80
End: 2020-11-25

## 2020-12-03 ENCOUNTER — APPOINTMENT (OUTPATIENT)
Dept: ENDOCRINOLOGY | Facility: CLINIC | Age: 80
End: 2020-12-03

## 2020-12-16 ENCOUNTER — APPOINTMENT (OUTPATIENT)
Dept: ENDOCRINOLOGY | Facility: CLINIC | Age: 80
End: 2020-12-16

## 2020-12-23 ENCOUNTER — APPOINTMENT (OUTPATIENT)
Dept: ENDOCRINOLOGY | Facility: CLINIC | Age: 80
End: 2020-12-23
Payer: MEDICARE

## 2020-12-23 VITALS
HEIGHT: 58.86 IN | DIASTOLIC BLOOD PRESSURE: 56 MMHG | WEIGHT: 101.73 LBS | BODY MASS INDEX: 20.51 KG/M2 | OXYGEN SATURATION: 100 % | SYSTOLIC BLOOD PRESSURE: 155 MMHG | TEMPERATURE: 96.6 F | HEART RATE: 78 BPM

## 2020-12-23 DIAGNOSIS — R73.9 HYPERGLYCEMIA, UNSPECIFIED: ICD-10-CM

## 2020-12-23 LAB — GLUCOSE BLDC GLUCOMTR-MCNC: 214

## 2020-12-23 PROCEDURE — 36415 COLL VENOUS BLD VENIPUNCTURE: CPT

## 2020-12-23 PROCEDURE — 99214 OFFICE O/P EST MOD 30 MIN: CPT | Mod: 25

## 2020-12-23 PROCEDURE — 99072 ADDL SUPL MATRL&STAF TM PHE: CPT

## 2020-12-23 RX ORDER — LIOTHYRONINE SODIUM 25 UG/1
25 TABLET ORAL
Qty: 15 | Refills: 0 | Status: COMPLETED | COMMUNITY
Start: 2020-10-28 | End: 2020-12-23

## 2020-12-23 NOTE — HISTORY OF PRESENT ILLNESS
[FreeTextEntry1] : CC: Diabetes\par This is a 80-year-old female with type 2 diabetes mellitus, hypertension, hyperlipidemia, vitamin D insufficiency, anemia, glaucoma, dementia, legally blind, here for follow up.\par She is accompanied by her daughter who provides information as patient has dementia.\par She is currently on Metformin 1000 mg daily, Fiasp 4 units before meals, and Tresiba 15 units at bedtime.  Her private aide administers insulin for her and reports that patient refuses Tresiba insulin approximately 3-5 times per week.  Patient's daughter is unsure if patient is getting Fiasp with each meal.  She monitors blood glucose 1-4 times per day depending on the day and reports glucose levels in the 200s.  She has had a few episodes of hypoglycemia but there is no pattern.  She reports glucose levels were well controlled when she took Metformin 1000 mg 2 times a day.\par Blood work from October 17, 2020 showed hemoglobin A1c 11.7%.  \par Her last ophthalmology exam was in August 2020 and she denies retinopathy.  She is legally blind.\par She denies neuropathy.\par She denies nephropathy.\par She is not on a statin.\par \par

## 2020-12-23 NOTE — ASSESSMENT
[FreeTextEntry1] : This is a 80-year-old female with type 2 diabetes mellitus, hypertension, hyperlipidemia, vitamin D insufficiency, anemia, glaucoma, dementia, legally blind, here for follow up.\par 1.  T2DM\par Hemoglobin A1c from October 17, 2020 was 11.7%.\par Compliance with insulin advised.\par Start self-monitoring blood glucose 4 times a day and send logs to office for adjustments.\par Change Tresiba to a.m. as patient's daughter reports she may be more compliant with insulin if she has to take it in the morning.\par She skipped Tresiba last night and we will therefore take Tresiba tomorrow morning.\par Check BMP.\par Will consider increasing Metformin to 1000 mg 2 times a day.\par Check urine microalbumin to evaluate for nephropathy.\par Her last ophthalmology exam to evaluate for retinopathy was in August 2020 she denies retinopathy.  She is legally blind.\par She denies neuropathy.\par She is not on a statin.  Check lipid panel.\par

## 2020-12-26 LAB
ANION GAP SERPL CALC-SCNC: 12 MMOL/L
BUN SERPL-MCNC: 24 MG/DL
CALCIUM SERPL-MCNC: 10.4 MG/DL
CHLORIDE SERPL-SCNC: 101 MMOL/L
CO2 SERPL-SCNC: 30 MMOL/L
CREAT SERPL-MCNC: 1.13 MG/DL
GLUCOSE SERPL-MCNC: 123 MG/DL
POTASSIUM SERPL-SCNC: 4.4 MMOL/L
SODIUM SERPL-SCNC: 143 MMOL/L

## 2020-12-28 ENCOUNTER — NON-APPOINTMENT (OUTPATIENT)
Age: 80
End: 2020-12-28

## 2021-02-15 ENCOUNTER — APPOINTMENT (OUTPATIENT)
Dept: ENDOCRINOLOGY | Facility: CLINIC | Age: 81
End: 2021-02-15
Payer: COMMERCIAL

## 2021-02-15 ENCOUNTER — APPOINTMENT (OUTPATIENT)
Dept: ENDOCRINOLOGY | Facility: CLINIC | Age: 81
End: 2021-02-15
Payer: MEDICARE

## 2021-02-15 VITALS
DIASTOLIC BLOOD PRESSURE: 67 MMHG | SYSTOLIC BLOOD PRESSURE: 106 MMHG | HEART RATE: 79 BPM | OXYGEN SATURATION: 99 % | TEMPERATURE: 98 F | HEIGHT: 59.06 IN | BODY MASS INDEX: 20.53 KG/M2 | WEIGHT: 101.84 LBS

## 2021-02-15 DIAGNOSIS — I10 ESSENTIAL (PRIMARY) HYPERTENSION: ICD-10-CM

## 2021-02-15 DIAGNOSIS — E55.9 VITAMIN D DEFICIENCY, UNSPECIFIED: ICD-10-CM

## 2021-02-15 DIAGNOSIS — E78.5 HYPERLIPIDEMIA, UNSPECIFIED: ICD-10-CM

## 2021-02-15 LAB — GLUCOSE BLDC GLUCOMTR-MCNC: 161

## 2021-02-15 PROCEDURE — 99214 OFFICE O/P EST MOD 30 MIN: CPT | Mod: 25

## 2021-02-15 PROCEDURE — 99072 ADDL SUPL MATRL&STAF TM PHE: CPT

## 2021-02-15 PROCEDURE — 36415 COLL VENOUS BLD VENIPUNCTURE: CPT

## 2021-02-15 PROCEDURE — G0108 DIAB MANAGE TRN  PER INDIV: CPT

## 2021-02-15 RX ORDER — FLASH GLUCOSE SCANNING READER
EACH MISCELLANEOUS
Qty: 1 | Refills: 0 | Status: ACTIVE | COMMUNITY
Start: 2021-02-15 | End: 1900-01-01

## 2021-02-15 RX ORDER — FLASH GLUCOSE SENSOR
KIT MISCELLANEOUS
Qty: 6 | Refills: 3 | Status: ACTIVE | COMMUNITY
Start: 2021-02-15 | End: 1900-01-01

## 2021-02-15 NOTE — ASSESSMENT
[FreeTextEntry1] : This is a 80-year-old female with type 2 diabetes mellitus, hypertension, hyperlipidemia, vitamin D insufficiency, anemia, glaucoma, dementia, legally blind, here for follow up.\par 1.  T2DM\par Check hemoglobin A1c.\par Will consider discontinuing Fiasp insulin and adding another oral medication as patient is noncompliant with insulin due to dementia.\par Check urine microalbumin to evaluate for nephropathy.\par Her last ophthalmology exam to evaluate for retinopathy was in August 2020 she denies retinopathy.  She is legally blind.\par She denies neuropathy.\par Check vitamin B12 as she is on Metformin.\par She is not on a statin.  Check lipid panel.\par Appointment with podiatrist for left foot pain.\par 2.  Hypertension\par Well-controlled.\par Vitamin D insufficiency\par Check 25 vitamin D.\par \par

## 2021-02-15 NOTE — HISTORY OF PRESENT ILLNESS
[FreeTextEntry1] : CC: Diabetes\par This is a 80-year-old female with type 2 diabetes mellitus, hypertension, hyperlipidemia, vitamin D insufficiency, anemia, glaucoma, dementia, legally blind, here for follow up.\par She is accompanied by her daughter who provides information as patient has dementia.\par She is currently on Metformin 1000 mg daily, Fiasp 4 units before meals, and Tresiba 15 units at bedtime.  Her private aide administers insulin.  Patient's daughter reports patient refuses Tresiba and Fiasp a few times per week.  She believes she takes Tresiba approximately 50% of the time and skips approximately 1-2 doses of Fiasp daily.  She monitors blood glucose 1-4 times per day depending on the day and reports glucose levels between 120–200s.  \par Her last ophthalmology exam was in August 2020 and she denies retinopathy.  She is legally blind.\par She denies neuropathy.\par She denies nephropathy.\par She is not on a statin.\par She reports left foot pain.\par \par

## 2021-02-16 DIAGNOSIS — E11.65 TYPE 2 DIABETES MELLITUS WITH HYPERGLYCEMIA: ICD-10-CM

## 2021-02-16 LAB
25(OH)D3 SERPL-MCNC: 45.7 NG/ML
ALBUMIN SERPL ELPH-MCNC: 4.1 G/DL
ALP BLD-CCNC: 73 U/L
ALT SERPL-CCNC: 13 U/L
ANION GAP SERPL CALC-SCNC: 13 MMOL/L
AST SERPL-CCNC: 16 U/L
BASOPHILS # BLD AUTO: 0.03 K/UL
BASOPHILS NFR BLD AUTO: 0.6 %
BILIRUB SERPL-MCNC: <0.2 MG/DL
BUN SERPL-MCNC: 23 MG/DL
CALCIUM SERPL-MCNC: 9.6 MG/DL
CHLORIDE SERPL-SCNC: 102 MMOL/L
CHOLEST SERPL-MCNC: 225 MG/DL
CO2 SERPL-SCNC: 25 MMOL/L
CREAT SERPL-MCNC: 1.01 MG/DL
EOSINOPHIL # BLD AUTO: 0.21 K/UL
EOSINOPHIL NFR BLD AUTO: 4.4 %
ESTIMATED AVERAGE GLUCOSE: 214 MG/DL
GLUCOSE SERPL-MCNC: 168 MG/DL
HBA1C MFR BLD HPLC: 9.1 %
HCT VFR BLD CALC: 34.1 %
HDLC SERPL-MCNC: 49 MG/DL
HGB BLD-MCNC: 10.2 G/DL
IMM GRANULOCYTES NFR BLD AUTO: 0 %
LDLC SERPL CALC-MCNC: 146 MG/DL
LYMPHOCYTES # BLD AUTO: 1.91 K/UL
LYMPHOCYTES NFR BLD AUTO: 39.9 %
MAN DIFF?: NORMAL
MCHC RBC-ENTMCNC: 23.2 PG
MCHC RBC-ENTMCNC: 29.9 GM/DL
MCV RBC AUTO: 77.5 FL
MONOCYTES # BLD AUTO: 0.28 K/UL
MONOCYTES NFR BLD AUTO: 5.8 %
NEUTROPHILS # BLD AUTO: 2.36 K/UL
NEUTROPHILS NFR BLD AUTO: 49.3 %
NONHDLC SERPL-MCNC: 176 MG/DL
PLATELET # BLD AUTO: 246 K/UL
POTASSIUM SERPL-SCNC: 4.6 MMOL/L
PROT SERPL-MCNC: 6.4 G/DL
RBC # BLD: 4.4 M/UL
RBC # FLD: 15.5 %
SODIUM SERPL-SCNC: 139 MMOL/L
TRIGL SERPL-MCNC: 150 MG/DL
TSH SERPL-ACNC: 1.91 UIU/ML
VIT B12 SERPL-MCNC: 558 PG/ML
WBC # FLD AUTO: 4.79 K/UL

## 2021-02-18 ENCOUNTER — NON-APPOINTMENT (OUTPATIENT)
Age: 81
End: 2021-02-18

## 2021-02-18 LAB — FRUCTOSAMINE SERPL-MCNC: 392 UMOL/L

## 2021-02-18 RX ORDER — METFORMIN HYDROCHLORIDE 500 MG/1
500 TABLET, COATED ORAL
Qty: 180 | Refills: 2 | Status: COMPLETED | COMMUNITY
Start: 2020-11-23 | End: 2021-02-18

## 2021-02-18 RX ORDER — SITAGLIPTIN AND METFORMIN HYDROCHLORIDE 100; 1000 MG/1; MG/1
100-1000 TABLET, FILM COATED, EXTENDED RELEASE ORAL DAILY
Qty: 90 | Refills: 2 | Status: COMPLETED | COMMUNITY
Start: 2021-02-18 | End: 2021-11-15

## 2021-02-18 RX ORDER — INSULIN ASPART INJECTION 100 [IU]/ML
100 INJECTION, SOLUTION SUBCUTANEOUS
Qty: 5 | Refills: 2 | Status: DISCONTINUED | COMMUNITY
Start: 2020-11-23 | End: 2021-02-18

## 2021-04-21 NOTE — ED BEHAVIORAL HEALTH ASSESSMENT NOTE - PAST PSYCHOTROPIC MEDICATION
37 yo F with PMHx of morbid obesity presents with vomiting and subjective fever. Hematology consulted for anemia, leukocytosis and thrombocytosis.    #) DIAGNOSIS  - Leukocytosis, neutrophil and monocyte-predominant, reactive, resolving   - Microcytic anemia, likely 2/2 menorrhagia. Suspected iron deficiency anemia   - Thrombocytosis, likely reactive (e.g. iron deficiency anemia)  - Mild coagulopathy  - Elevated D-dimer    #) PLAN   - Initial Hematology consult was placed for evaluation of leukocytosis and thrombocytosis, in the presence of microcytic anemia. Leukocytosis and thrombocytosis is likely reactive to underlying infection and has been improving with antibiotic treatment. Mild persistent thrombocytosis may be secondary to iron deficiency anemia and ongoing infection.   - Mentzer index > 13 suggestive of iron deficiency anemia. Can follow-up as outpatient with Dr. Audi Reaves [[address] [place] [address] [phone]] upon discharge for evaluation of iron deficiency anemia.  - Hematology team to sign off    Discussed with Dr. Richard  None

## 2021-04-24 ENCOUNTER — INPATIENT (INPATIENT)
Facility: HOSPITAL | Age: 81
LOS: 9 days | Discharge: NURSING, REHAB OR LONG-TERM CARE | End: 2021-05-04
Attending: INTERNAL MEDICINE | Admitting: INTERNAL MEDICINE
Payer: MEDICARE

## 2021-04-24 VITALS
HEART RATE: 82 BPM | HEIGHT: 63 IN | RESPIRATION RATE: 16 BRPM | OXYGEN SATURATION: 98 % | SYSTOLIC BLOOD PRESSURE: 122 MMHG | WEIGHT: 130.07 LBS | TEMPERATURE: 98 F | DIASTOLIC BLOOD PRESSURE: 72 MMHG

## 2021-04-24 DIAGNOSIS — E11.65 TYPE 2 DIABETES MELLITUS WITH HYPERGLYCEMIA: ICD-10-CM

## 2021-04-24 DIAGNOSIS — H40.9 UNSPECIFIED GLAUCOMA: ICD-10-CM

## 2021-04-24 DIAGNOSIS — R41.82 ALTERED MENTAL STATUS, UNSPECIFIED: ICD-10-CM

## 2021-04-24 DIAGNOSIS — Z98.891 HISTORY OF UTERINE SCAR FROM PREVIOUS SURGERY: Chronic | ICD-10-CM

## 2021-04-24 DIAGNOSIS — Z29.9 ENCOUNTER FOR PROPHYLACTIC MEASURES, UNSPECIFIED: ICD-10-CM

## 2021-04-24 LAB
ACETONE SERPL-MCNC: NEGATIVE — SIGNIFICANT CHANGE UP
ALBUMIN SERPL ELPH-MCNC: 3.5 G/DL — SIGNIFICANT CHANGE UP (ref 3.3–5)
ALP SERPL-CCNC: 71 U/L — SIGNIFICANT CHANGE UP (ref 40–120)
ALT FLD-CCNC: 21 U/L — SIGNIFICANT CHANGE UP (ref 12–78)
ANION GAP SERPL CALC-SCNC: 9 MMOL/L — SIGNIFICANT CHANGE UP (ref 5–17)
ANISOCYTOSIS BLD QL: SLIGHT — SIGNIFICANT CHANGE UP
APPEARANCE UR: CLEAR — SIGNIFICANT CHANGE UP
AST SERPL-CCNC: 25 U/L — SIGNIFICANT CHANGE UP (ref 15–37)
BASOPHILS # BLD AUTO: 0.02 K/UL — SIGNIFICANT CHANGE UP (ref 0–0.2)
BASOPHILS NFR BLD AUTO: 0.4 % — SIGNIFICANT CHANGE UP (ref 0–2)
BILIRUB SERPL-MCNC: 0.3 MG/DL — SIGNIFICANT CHANGE UP (ref 0.2–1.2)
BILIRUB UR-MCNC: NEGATIVE — SIGNIFICANT CHANGE UP
BUN SERPL-MCNC: 34 MG/DL — HIGH (ref 7–23)
CALCIUM SERPL-MCNC: 9.1 MG/DL — SIGNIFICANT CHANGE UP (ref 8.5–10.1)
CHLORIDE SERPL-SCNC: 103 MMOL/L — SIGNIFICANT CHANGE UP (ref 96–108)
CO2 SERPL-SCNC: 27 MMOL/L — SIGNIFICANT CHANGE UP (ref 22–31)
COLOR SPEC: YELLOW — SIGNIFICANT CHANGE UP
CREAT SERPL-MCNC: 1.29 MG/DL — SIGNIFICANT CHANGE UP (ref 0.5–1.3)
DIFF PNL FLD: ABNORMAL
EOSINOPHIL # BLD AUTO: 0.06 K/UL — SIGNIFICANT CHANGE UP (ref 0–0.5)
EOSINOPHIL NFR BLD AUTO: 1.1 % — SIGNIFICANT CHANGE UP (ref 0–6)
EPI CELLS # UR: SIGNIFICANT CHANGE UP
FLUAV AG NPH QL: SIGNIFICANT CHANGE UP
FLUBV AG NPH QL: SIGNIFICANT CHANGE UP
GLUCOSE BLDC GLUCOMTR-MCNC: 167 MG/DL — HIGH (ref 70–99)
GLUCOSE BLDC GLUCOMTR-MCNC: 313 MG/DL — HIGH (ref 70–99)
GLUCOSE SERPL-MCNC: 305 MG/DL — HIGH (ref 70–99)
GLUCOSE UR QL: 250 MG/DL
HCT VFR BLD CALC: 31.5 % — LOW (ref 34.5–45)
HGB BLD-MCNC: 9.9 G/DL — LOW (ref 11.5–15.5)
HYPOCHROMIA BLD QL: SLIGHT — SIGNIFICANT CHANGE UP
IMM GRANULOCYTES NFR BLD AUTO: 0.2 % — SIGNIFICANT CHANGE UP (ref 0–1.5)
KETONES UR-MCNC: NEGATIVE — SIGNIFICANT CHANGE UP
LEUKOCYTE ESTERASE UR-ACNC: NEGATIVE — SIGNIFICANT CHANGE UP
LYMPHOCYTES # BLD AUTO: 1.55 K/UL — SIGNIFICANT CHANGE UP (ref 1–3.3)
LYMPHOCYTES # BLD AUTO: 29.6 % — SIGNIFICANT CHANGE UP (ref 13–44)
MACROCYTES BLD QL: SLIGHT — SIGNIFICANT CHANGE UP
MAGNESIUM SERPL-MCNC: 1.7 MG/DL — SIGNIFICANT CHANGE UP (ref 1.6–2.6)
MANUAL SMEAR VERIFICATION: SIGNIFICANT CHANGE UP
MCHC RBC-ENTMCNC: 22.8 PG — LOW (ref 27–34)
MCHC RBC-ENTMCNC: 31.4 GM/DL — LOW (ref 32–36)
MCV RBC AUTO: 72.6 FL — LOW (ref 80–100)
MICROCYTES BLD QL: SLIGHT — SIGNIFICANT CHANGE UP
MONOCYTES # BLD AUTO: 0.34 K/UL — SIGNIFICANT CHANGE UP (ref 0–0.9)
MONOCYTES NFR BLD AUTO: 6.5 % — SIGNIFICANT CHANGE UP (ref 2–14)
NEUTROPHILS # BLD AUTO: 3.25 K/UL — SIGNIFICANT CHANGE UP (ref 1.8–7.4)
NEUTROPHILS NFR BLD AUTO: 62.2 % — SIGNIFICANT CHANGE UP (ref 43–77)
NITRITE UR-MCNC: NEGATIVE — SIGNIFICANT CHANGE UP
NRBC # BLD: 0 /100 WBCS — SIGNIFICANT CHANGE UP (ref 0–0)
PH UR: 5 — SIGNIFICANT CHANGE UP (ref 5–8)
PLAT MORPH BLD: NORMAL — SIGNIFICANT CHANGE UP
PLATELET # BLD AUTO: 230 K/UL — SIGNIFICANT CHANGE UP (ref 150–400)
POIKILOCYTOSIS BLD QL AUTO: SLIGHT — SIGNIFICANT CHANGE UP
POTASSIUM SERPL-MCNC: 4.8 MMOL/L — SIGNIFICANT CHANGE UP (ref 3.5–5.3)
POTASSIUM SERPL-SCNC: 4.8 MMOL/L — SIGNIFICANT CHANGE UP (ref 3.5–5.3)
PROT SERPL-MCNC: 7.1 GM/DL — SIGNIFICANT CHANGE UP (ref 6–8.3)
PROT UR-MCNC: 100 MG/DL
RBC # BLD: 4.34 M/UL — SIGNIFICANT CHANGE UP (ref 3.8–5.2)
RBC # FLD: 15.4 % — HIGH (ref 10.3–14.5)
RBC BLD AUTO: ABNORMAL
RBC CASTS # UR COMP ASSIST: ABNORMAL /HPF (ref 0–4)
SARS-COV-2 RNA SPEC QL NAA+PROBE: SIGNIFICANT CHANGE UP
SODIUM SERPL-SCNC: 139 MMOL/L — SIGNIFICANT CHANGE UP (ref 135–145)
SP GR SPEC: 1.02 — SIGNIFICANT CHANGE UP (ref 1.01–1.02)
TROPONIN I SERPL-MCNC: <.015 NG/ML — SIGNIFICANT CHANGE UP (ref 0.01–0.04)
TSH SERPL-MCNC: 1.45 UIU/ML — SIGNIFICANT CHANGE UP (ref 0.36–3.74)
UROBILINOGEN FLD QL: NEGATIVE MG/DL — SIGNIFICANT CHANGE UP
WBC # BLD: 5.23 K/UL — SIGNIFICANT CHANGE UP (ref 3.8–10.5)
WBC # FLD AUTO: 5.23 K/UL — SIGNIFICANT CHANGE UP (ref 3.8–10.5)

## 2021-04-24 PROCEDURE — 99222 1ST HOSP IP/OBS MODERATE 55: CPT

## 2021-04-24 PROCEDURE — 99285 EMERGENCY DEPT VISIT HI MDM: CPT

## 2021-04-24 PROCEDURE — 71045 X-RAY EXAM CHEST 1 VIEW: CPT | Mod: 26

## 2021-04-24 PROCEDURE — 70450 CT HEAD/BRAIN W/O DYE: CPT | Mod: 26,MH

## 2021-04-24 PROCEDURE — 74176 CT ABD & PELVIS W/O CONTRAST: CPT | Mod: 26,MH

## 2021-04-24 PROCEDURE — 93010 ELECTROCARDIOGRAM REPORT: CPT

## 2021-04-24 RX ORDER — SODIUM CHLORIDE 9 MG/ML
1000 INJECTION, SOLUTION INTRAVENOUS ONCE
Refills: 0 | Status: COMPLETED | OUTPATIENT
Start: 2021-04-24 | End: 2021-04-24

## 2021-04-24 RX ORDER — ATORVASTATIN CALCIUM 80 MG/1
20 TABLET, FILM COATED ORAL AT BEDTIME
Refills: 0 | Status: DISCONTINUED | OUTPATIENT
Start: 2021-04-24 | End: 2021-05-04

## 2021-04-24 RX ORDER — SODIUM CHLORIDE 9 MG/ML
1000 INJECTION, SOLUTION INTRAVENOUS
Refills: 0 | Status: DISCONTINUED | OUTPATIENT
Start: 2021-04-24 | End: 2021-04-29

## 2021-04-24 RX ORDER — DORZOLAMIDE HYDROCHLORIDE 20 MG/ML
1 SOLUTION/ DROPS OPHTHALMIC THREE TIMES A DAY
Refills: 0 | Status: DISCONTINUED | OUTPATIENT
Start: 2021-04-24 | End: 2021-05-04

## 2021-04-24 RX ORDER — RISPERIDONE 4 MG/1
0.5 TABLET ORAL AT BEDTIME
Refills: 0 | Status: DISCONTINUED | OUTPATIENT
Start: 2021-04-24 | End: 2021-05-04

## 2021-04-24 RX ORDER — LATANOPROST 0.05 MG/ML
1 SOLUTION/ DROPS OPHTHALMIC; TOPICAL AT BEDTIME
Refills: 0 | Status: DISCONTINUED | OUTPATIENT
Start: 2021-04-24 | End: 2021-05-04

## 2021-04-24 RX ORDER — SODIUM CHLORIDE 9 MG/ML
1000 INJECTION INTRAMUSCULAR; INTRAVENOUS; SUBCUTANEOUS ONCE
Refills: 0 | Status: COMPLETED | OUTPATIENT
Start: 2021-04-24 | End: 2021-04-24

## 2021-04-24 RX ADMIN — SODIUM CHLORIDE 1000 MILLILITER(S): 9 INJECTION INTRAMUSCULAR; INTRAVENOUS; SUBCUTANEOUS at 19:26

## 2021-04-24 RX ADMIN — SODIUM CHLORIDE 1000 MILLILITER(S): 9 INJECTION, SOLUTION INTRAVENOUS at 20:04

## 2021-04-24 RX ADMIN — SODIUM CHLORIDE 1000 MILLILITER(S): 9 INJECTION INTRAMUSCULAR; INTRAVENOUS; SUBCUTANEOUS at 17:46

## 2021-04-24 NOTE — ED PROVIDER NOTE - CLINICAL SUMMARY MEDICAL DECISION MAKING FREE TEXT BOX
Pt with no obv slurred speech, as well as out of window for any TPA. Pt symptoms likely started 3 days ago and likely from ? UTI, electrolyte disturbance, worsening dementia. will do labs, CT, UA and admit. Pt with no obv slurred speech, as well as out of window for any TPA. Pt symptoms likely started 3 days ago and likely from ? UTI, electrolyte disturbance, worsening dementia. will do labs, CT, UA and admit.    Lab values do not require emergent intervention. No UTI. pending CT. Patient signed out to incoming physician.  All decisions regarding the progression of care will be made at their discretion. Pt with no obv slurred speech, as well as out of window for any TPA. Pt symptoms likely started 3 days ago and likely from ? UTI, electrolyte disturbance, worsening dementia, medications. will do labs, CT, UA and admit.    Lab values do not require emergent intervention. No UTI. pending CT. Patient signed out to incoming physician.  All decisions regarding the progression of care will be made at their discretion.

## 2021-04-24 NOTE — H&P ADULT - HISTORY OF PRESENT ILLNESS
Pt is a 79 y/o female w/pmhx of legally blind, dm2, retinopathy and glaucoma and dementia sent in w/complain of hyperglycemia and also more conusion, agitation, not sleeping and agressive.  pt here lying in bed sleeping, ignores questions, and on tactile stimuli says leave me alone, movin all extremities.  pt doesn cooperative or offer any sx's.

## 2021-04-24 NOTE — ED ADULT NURSE REASSESSMENT NOTE - NS ED NURSE REASSESS COMMENT FT1
Pt received sleeping in bed. Attempted to speak with and assess pt but refused to answer questions at this time. When asked if she is experiencing any pain, pt shook head no. Speech in indiscernible. When asked if she want to sleep pt nodded head yes. Will return to assess. Report received from Juliette LE RN.

## 2021-04-24 NOTE — H&P ADULT - ASSESSMENT
79 y/o female w/pmhx of legally blind, dm2, retinopathy and glaucoma and dementia sent in w/complain of hyperglycemia and also more conusion, agitation, not sleeping and agressive.

## 2021-04-24 NOTE — H&P ADULT - NSHPLABSRESULTS_GEN_ALL_CORE
LABS:                        9.9    5.23  )-----------( 230      ( 2021 16:39 )             31.5     -    139  |  103  |  34<H>  ----------------------------<  305<H>  4.8   |  27  |  1.29    Ca    9.1      2021 16:39  Mg     1.7         TPro  7.1  /  Alb  3.5  /  TBili  0.3  /  DBili  x   /  AST  25  /  ALT  21  /  AlkPhos  71        Urinalysis Basic - ( 2021 16:40 )    Color: Yellow / Appearance: Clear / S.020 / pH: x  Gluc: x / Ketone: Negative  / Bili: Negative / Urobili: Negative mg/dL   Blood: x / Protein: 100 mg/dL / Nitrite: Negative   Leuk Esterase: Negative / RBC: 3-5 /HPF / WBC x   Sq Epi: x / Non Sq Epi: Few / Bacteria: x      CAPILLARY BLOOD GLUCOSE      POCT Blood Glucose.: 167 mg/dL (2021 21:50)  POCT Blood Glucose.: 313 mg/dL (2021 15:55)        RADIOLOGY & ADDITIONAL TESTS:    Imaging Personally Reviewed:  [ X] YES  [ ] NO

## 2021-04-24 NOTE — PATIENT PROFILE ADULT - INTERPRETATION SERVICES DECLINED
pt refused Implemented All Universal Safety Interventions:  [place] to call system. Call bell, personal items and telephone within reach. Instruct patient to call for assistance. Room bathroom lighting operational. Non-slip footwear when patient is off stretcher. Physically safe environment: no spills, clutter or unnecessary equipment. Stretcher in lowest position, wheels locked, appropriate side rails in place.

## 2021-04-24 NOTE — ED ADULT NURSE NOTE - OBJECTIVE STATEMENT
pt received lethargic, responsive to voice and painful stimuli. pt speaks at times. pt made statement after placing straight catheter then went back to sleep.

## 2021-04-24 NOTE — ED PROVIDER NOTE - OBJECTIVE STATEMENT
Pertinent PMH/PSH/FHx/SHx and Review of Systems contained within:     81 y/o F PMH Dementia, DM biba from home after son called. pt currently sleeping and responsive to pain. pt seems she is refusing to answer questions. when [place] placed pt responded "you're not allowed to do this"    No fever/chills, No photophobia/eye pain/changes in vision, No ear pain/sore throat/dysphagia, No chest pain/palpitations, no SOB/cough/wheeze/stridor, No abdominal pain, No N/V/D, no dysuria/frequency/discharge, No neck/back pain, no rash, (+) ams Pertinent PMH/PSH/FHx/SHx and Review of Systems contained within:     81 y/o female from home with HHA with pmh dementia, DM biba after daughter called. Daughter reports pt hasn't slept for the past 3 days, more agitated, hallucinating, defecating and urinating on herself, and not really working. Today family noted slurred speech 2 hrs prior to calling EMS. Per family, felt her speech was not slurred at 11am, when she last spoke. Pt currently sleeping, responsive to loud voice and pain, but seems to not be bothered to wake up or answer questions. moving all limbs spontaneously, relatively even. When graves placed, pt answers "you're not allowed to do this" with no obv slurred speech. Per family, no fever, vomiting, cough, sob, diarrhea. Pertinent PMH/PSH/FHx/SHx and Review of Systems contained within:     81 y/o female from home with HHA with pmh dementia, DM biba after daughter called. Daughter reports pt hasn't slept for the past 3 days, more agitated, hallucinating, defecating and urinating on herself, and not really walking. Today family noted slurred speech 2 hrs prior to calling EMS. Per family, felt her speech was not slurred at 11am, when she last spoke. Pt currently sleeping, responsive to loud voice and pain, but seems to not be bothered to wake up or answer questions. moving all limbs spontaneously, relatively even. When graves placed, pt answers "you're not allowed to do this" with no obv slurred speech. Per family, no fever, vomiting, cough, sob, diarrhea.

## 2021-04-24 NOTE — ED ADULT TRIAGE NOTE - CHIEF COMPLAINT QUOTE
Son called 911 from [place], not sleeping enough, fs [phone]  found sleeping by EMS, not really talking and answering questions

## 2021-04-24 NOTE — ED ADULT NURSE NOTE - NSIMPLEMENTINTERV_GEN_ALL_ED
Implemented All Fall Risk Interventions:  [place] to call system. Call bell, personal items and telephone within reach. Instruct patient to call for assistance. Room bathroom lighting operational. Non-slip footwear when patient is off stretcher. Physically safe environment: no spills, clutter or unnecessary equipment. Stretcher in lowest position, wheels locked, appropriate side rails in place. Provide visual cue, wrist band, yellow gown, etc. Monitor gait and stability. Monitor for mental status changes and reorient to person, place, and time. Review medications for side effects contributing to fall risk. Reinforce activity limits and safety measures with patient and family.

## 2021-04-24 NOTE — H&P ADULT - NSHPPHYSICALEXAM_GEN_ALL_CORE
Vital Signs Last 24 Hrs  T(C): 36.7 (24 Apr 2021 21:25), Max: 36.7 (24 Apr 2021 15:46)  T(F): 98 (24 Apr 2021 21:25), Max: 98 (24 Apr 2021 15:46)  HR: 81 (24 Apr 2021 21:25) (81 - 82)  BP: 147/62 (24 Apr 2021 21:25) (115/71 - 147/62)  BP(mean): --  RR: 16 (24 Apr 2021 21:25) (12 - 16)  SpO2: 97% (24 Apr 2021 21:25) (97% - 100%)    PHYSICAL EXAM:    GENERAL: NAD, well-groomed, well-developed, elderly female, keeping her eyes closed  HEAD:  Atraumatic, Normocephalic  EYES: sclera clear  ENMT: Moist mucous membranes, No lesions  NECK: Supple, No JVD, Normal thyroid  NERVOUS SYSTEM:  doesn't cooperate, but able to move all limbs.  CHEST/LUNG: Clear to percussion bilaterally; No rales, rhonchi, wheezing, or rubs  HEART: Regular rate and rhythm; No rubs, or gallops, +S1,S2  ABDOMEN: Soft, Nontender, Nondistended; Bowel sounds present  EXTREMITIES:  2+ Peripheral Pulses, No clubbing, cyanosis, or edema  LYMPH: No cervical adenopathy  RECTAL: deferred  BREAST: deferred  : deferred  SKIN: No rashes or lesions    IMPROVE VTE Individual Risk Assessment        RISK                                                          Points  [  ] Previous VTE                                                3  [  ] Thrombophilia                                             2  [  ] Lower limb paralysis                                    2        (unable to hold up >15 seconds)    [  ] Current Cancer                                             2         (within 6 months)  [ x ] Immobilization > 24 hrs                              1  [  ] ICU/CCU stay > 24 hours                            1  [  x] Age > 60                                                    1  IMPROVE VTE Score __2_______

## 2021-04-24 NOTE — ED PROVIDER NOTE - PR
158 Infliximab Counseling:  I discussed with the patient the risks of infliximab including but not limited to myelosuppression, immunosuppression, autoimmune hepatitis, demyelinating diseases, lymphoma, and serious infections.  The patient understands that monitoring is required including a PPD at baseline and must alert us or the primary physician if symptoms of infection or other concerning signs are noted.

## 2021-04-24 NOTE — H&P ADULT - PROBLEM SELECTOR PLAN 1
admit to medicine  iv hydration  blood culutres  urine cx  no signs of infection  hold gabapentin ?contributing to sleepiness

## 2021-04-24 NOTE — ED PROVIDER NOTE - PHYSICAL EXAMINATION
Gen: arousable to loud voice, states "yes" when you call her name  Head: NC, AT, PERRL, normal lids/conjunctiva   ENT:  patent oropharynx without erythema/exudate, uvula midline  Neck: supple, no tenderness/meningismus  Pulm: Bilateral clear BS, normal resp effort  CV: RRR, no M/R/G, +dist pulses   Abd: soft, + mild tenderness, ND, +BS, no guarding/rebound tenderness  Mskel: no edema/erythema/cyanosis   Skin: no rash, no bruising  Neuro: arousable to loud voice, moans, no obv facial asymmetry. withdraws evenly both sides in arms and legs

## 2021-04-25 LAB
COVID-19 SPIKE DOMAIN AB INTERP: NEGATIVE — SIGNIFICANT CHANGE UP
COVID-19 SPIKE DOMAIN ANTIBODY RESULT: 0.4 U/ML — SIGNIFICANT CHANGE UP
CULTURE RESULTS: NO GROWTH — SIGNIFICANT CHANGE UP
GLUCOSE BLDC GLUCOMTR-MCNC: 128 MG/DL — HIGH (ref 70–99)
GLUCOSE BLDC GLUCOMTR-MCNC: 132 MG/DL — HIGH (ref 70–99)
GLUCOSE BLDC GLUCOMTR-MCNC: 137 MG/DL — HIGH (ref 70–99)
GLUCOSE BLDC GLUCOMTR-MCNC: 139 MG/DL — HIGH (ref 70–99)
GLUCOSE BLDC GLUCOMTR-MCNC: 169 MG/DL — HIGH (ref 70–99)
SARS-COV-2 IGG+IGM SERPL QL IA: 0.4 U/ML — SIGNIFICANT CHANGE UP
SARS-COV-2 IGG+IGM SERPL QL IA: NEGATIVE — SIGNIFICANT CHANGE UP
SPECIMEN SOURCE: SIGNIFICANT CHANGE UP

## 2021-04-25 PROCEDURE — 99233 SBSQ HOSP IP/OBS HIGH 50: CPT

## 2021-04-25 RX ORDER — POLYETHYLENE GLYCOL 3350 17 G/17G
17 POWDER, FOR SOLUTION ORAL DAILY
Refills: 0 | Status: DISCONTINUED | OUTPATIENT
Start: 2021-04-25 | End: 2021-05-04

## 2021-04-25 RX ORDER — SENNA PLUS 8.6 MG/1
2 TABLET ORAL AT BEDTIME
Refills: 0 | Status: DISCONTINUED | OUTPATIENT
Start: 2021-04-25 | End: 2021-05-04

## 2021-04-25 RX ORDER — MAGNESIUM HYDROXIDE 400 MG/1
30 TABLET, CHEWABLE ORAL DAILY
Refills: 0 | Status: DISCONTINUED | OUTPATIENT
Start: 2021-04-25 | End: 2021-05-04

## 2021-04-25 RX ADMIN — SODIUM CHLORIDE 75 MILLILITER(S): 9 INJECTION, SOLUTION INTRAVENOUS at 00:04

## 2021-04-25 NOTE — PROGRESS NOTE ADULT - SUBJECTIVE AND OBJECTIVE BOX
Patient is a 80y old  Female who presents with a chief complaint of hyperglycemia (2021 22:23)      INTERVAL HPI/OVERNIGHT EVENTS: Pt opens eyes when called her name, otherwise does not want to engage in communication, does not follow commands.     MEDICATIONS  (STANDING):  atorvastatin 20 milliGRAM(s) Oral at bedtime  dorzolamide 2% Ophthalmic Solution 1 Drop(s) Both EYES three times a day  lactated ringers. 1000 milliLiter(s) (75 mL/Hr) IV Continuous <Continuous>  latanoprost 0.005% Ophthalmic Solution 1 Drop(s) Both EYES at bedtime  risperiDONE  Disintegrating Tablet 0.5 milliGRAM(s) Oral at bedtime    MEDICATIONS  (PRN):      Allergies    No Known Allergies    Intolerances        REVIEW OF SYSTEMS:  unable to obtain due to ams    Vital Signs Last 24 Hrs  T(C): 37 (2021 11:49), Max: 37 (2021 05:07)  T(F): 98.6 (2021 11:49), Max: 98.6 (2021 05:07)  HR: 77 (2021 11:49) (77 - 82)  BP: 131/72 (2021 11:49) (115/71 - 147/62)  BP(mean): --  RR: 17 (2021 11:49) (12 - 18)  SpO2: 100% (2021 11:49) (97% - 100%)    PHYSICAL EXAM:  GENERAL: NAD, well-groomed, well-developed  HEAD:  Atraumatic, Normocephalic  EYES: EOMI, PERRLA, conjunctiva and sclera clear  ENMT: No tonsillar erythema, exudates, or enlargement; Moist mucous membranes, Good dentition, No lesions  NECK: Supple, No JVD, Normal thyroid  NERVOUS SYSTEM:  Alert & Oriented X1, poor concentration;   CHEST/LUNG: Clear to percussion bilaterally; No rales, rhonchi, wheezing, or rubs  HEART: Regular rate and rhythm; No murmurs, rubs, or gallops  ABDOMEN: Soft, Nontender, Nondistended; Bowel sounds present  EXTREMITIES:  2+ Peripheral Pulses, No clubbing, cyanosis, or edema  LYMPH: No lymphadenopathy noted  SKIN: No rashes or lesions                              9.9    5.23  )-----------( 230      ( 2021 16:39 )             31.5         139  |  103  |  34<H>  ----------------------------<  305<H>  4.8   |  27  |  1.29    Ca    9.1      2021 16:39  Mg     1.7         TPro  7.1  /  Alb  3.5  /  TBili  0.3  /  DBili  x   /  AST  25  /  ALT  21  /  AlkPhos  71        Urinalysis Basic - ( 2021 16:40 )    Color: Yellow / Appearance: Clear / S.020 / pH: x  Gluc: x / Ketone: Negative  / Bili: Negative / Urobili: Negative mg/dL   Blood: x / Protein: 100 mg/dL / Nitrite: Negative   Leuk Esterase: Negative / RBC: 3-5 /HPF / WBC x   Sq Epi: x / Non Sq Epi: Few / Bacteria: x

## 2021-04-25 NOTE — PROGRESS NOTE ADULT - ASSESSMENT
Pt is a 81 y/o female w/pmhx of legally blind, dm2, retinopathy and glaucoma and dementia sent in w/complain of hyperglycemia and also more confusion, agitation, not sleeping and agressive.  pt here lying in bed sleeping, ignores questions, and on tactile stimuli says leave me alone, movin all extremities.  pt doesn cooperative or offer any sx's.      Altered mental status, unspecified altered mental status type  vitals stable,   FS normalized, was 300 on admission  CXR-Suspect early RIGHT upper lobe lung diffuse airspace disease., possible aspiration PNA?  awaiting Blood, Urine Cx  continue iv hydration  blood culutres  urine cx  no signs of infection except for possible PNA? will continue to observe vitals, labs if worsen- will start antibiotics  hold gabapentin ?contributing to sleepiness.   NPO- except for fluids--awaiting speech and swallow eval     Type 2 diabetes mellitus with hyperglycemia, with long-term current use of insulin.  Plan: ss insulin coverage.      Glaucoma of both eyes, unspecified glaucoma type.  Plan: cont out pt meds.     Constipation- CT of abdomen showing fecal impaction, laxatives     : Preventive measure.  Plan: sq lovenox.    Pt is a 81 y/o female w/pmhx of legally blind, dm2, retinopathy and glaucoma and dementia sent in w/complain of hyperglycemia and also more confusion, agitation, not sleeping and agressive.  pt here lying in bed sleeping, ignores questions, and on tactile stimuli says leave me alone, movin all extremities.  pt doesn cooperative or offer any sx's.      Altered mental status, unspecified altered mental status type  vitals stable,   FS normalized, was 300 on admission  CXR-Suspect early RIGHT upper lobe lung diffuse airspace disease., possible aspiration PNA?  awaiting Blood, Urine Cx  continue iv hydration  blood culutres  urine cx  no signs of infection except for possible PNA? will continue to observe vitals, labs if worsen- will start antibiotics  hold gabapentin ?contributing to sleepiness.   NPO- except for fluids--awaiting speech and swallow eval     Type 2 diabetes mellitus with hyperglycemia, with long-term current use of insulin.  Plan: ss insulin coverage.      Glaucoma of both eyes, unspecified glaucoma type.  Plan: cont out pt meds.     Constipation- CT of abdomen showing fecal impaction, laxatives     Dementia- monitor for delirium    : Preventive measure.  Plan: sq lovenox.

## 2021-04-26 DIAGNOSIS — F03.91 UNSPECIFIED DEMENTIA WITH BEHAVIORAL DISTURBANCE: ICD-10-CM

## 2021-04-26 LAB
ALBUMIN SERPL ELPH-MCNC: 3.2 G/DL — LOW (ref 3.3–5)
ALP SERPL-CCNC: 57 U/L — SIGNIFICANT CHANGE UP (ref 40–120)
ALT FLD-CCNC: 16 U/L — SIGNIFICANT CHANGE UP (ref 12–78)
ANION GAP SERPL CALC-SCNC: 5 MMOL/L — SIGNIFICANT CHANGE UP (ref 5–17)
AST SERPL-CCNC: 22 U/L — SIGNIFICANT CHANGE UP (ref 15–37)
BILIRUB SERPL-MCNC: 0.3 MG/DL — SIGNIFICANT CHANGE UP (ref 0.2–1.2)
BUN SERPL-MCNC: 14 MG/DL — SIGNIFICANT CHANGE UP (ref 7–23)
CALCIUM SERPL-MCNC: 9 MG/DL — SIGNIFICANT CHANGE UP (ref 8.5–10.1)
CHLORIDE SERPL-SCNC: 103 MMOL/L — SIGNIFICANT CHANGE UP (ref 96–108)
CO2 SERPL-SCNC: 30 MMOL/L — SIGNIFICANT CHANGE UP (ref 22–31)
CREAT SERPL-MCNC: 0.63 MG/DL — SIGNIFICANT CHANGE UP (ref 0.5–1.3)
GLUCOSE BLDC GLUCOMTR-MCNC: 101 MG/DL — HIGH (ref 70–99)
GLUCOSE BLDC GLUCOMTR-MCNC: 111 MG/DL — HIGH (ref 70–99)
GLUCOSE BLDC GLUCOMTR-MCNC: 123 MG/DL — HIGH (ref 70–99)
GLUCOSE BLDC GLUCOMTR-MCNC: 169 MG/DL — HIGH (ref 70–99)
GLUCOSE SERPL-MCNC: 99 MG/DL — SIGNIFICANT CHANGE UP (ref 70–99)
HCT VFR BLD CALC: 33.5 % — LOW (ref 34.5–45)
HGB BLD-MCNC: 10.4 G/DL — LOW (ref 11.5–15.5)
MAGNESIUM SERPL-MCNC: 1.4 MG/DL — LOW (ref 1.6–2.6)
MCHC RBC-ENTMCNC: 22.7 PG — LOW (ref 27–34)
MCHC RBC-ENTMCNC: 31 GM/DL — LOW (ref 32–36)
MCV RBC AUTO: 73.1 FL — LOW (ref 80–100)
NRBC # BLD: 0 /100 WBCS — SIGNIFICANT CHANGE UP (ref 0–0)
PLATELET # BLD AUTO: 224 K/UL — SIGNIFICANT CHANGE UP (ref 150–400)
POTASSIUM SERPL-MCNC: 3.5 MMOL/L — SIGNIFICANT CHANGE UP (ref 3.5–5.3)
POTASSIUM SERPL-SCNC: 3.5 MMOL/L — SIGNIFICANT CHANGE UP (ref 3.5–5.3)
PROT SERPL-MCNC: 6.5 GM/DL — SIGNIFICANT CHANGE UP (ref 6–8.3)
RBC # BLD: 4.58 M/UL — SIGNIFICANT CHANGE UP (ref 3.8–5.2)
RBC # FLD: 15 % — HIGH (ref 10.3–14.5)
SODIUM SERPL-SCNC: 138 MMOL/L — SIGNIFICANT CHANGE UP (ref 135–145)
WBC # BLD: 4.85 K/UL — SIGNIFICANT CHANGE UP (ref 3.8–10.5)
WBC # FLD AUTO: 4.85 K/UL — SIGNIFICANT CHANGE UP (ref 3.8–10.5)

## 2021-04-26 PROCEDURE — 90792 PSYCH DIAG EVAL W/MED SRVCS: CPT

## 2021-04-26 PROCEDURE — 99233 SBSQ HOSP IP/OBS HIGH 50: CPT

## 2021-04-26 RX ORDER — POTASSIUM CHLORIDE 20 MEQ
10 PACKET (EA) ORAL
Refills: 0 | Status: DISCONTINUED | OUTPATIENT
Start: 2021-04-26 | End: 2021-04-26

## 2021-04-26 RX ORDER — MAGNESIUM SULFATE 500 MG/ML
2 VIAL (ML) INJECTION ONCE
Refills: 0 | Status: COMPLETED | OUTPATIENT
Start: 2021-04-26 | End: 2021-04-26

## 2021-04-26 RX ORDER — TRAZODONE HCL 50 MG
25 TABLET ORAL AT BEDTIME
Refills: 0 | Status: DISCONTINUED | OUTPATIENT
Start: 2021-04-26 | End: 2021-04-27

## 2021-04-26 RX ORDER — HYDRALAZINE HCL 50 MG
10 TABLET ORAL THREE TIMES A DAY
Refills: 0 | Status: DISCONTINUED | OUTPATIENT
Start: 2021-04-26 | End: 2021-05-04

## 2021-04-26 RX ORDER — HALOPERIDOL DECANOATE 100 MG/ML
2 INJECTION INTRAMUSCULAR ONCE
Refills: 0 | Status: COMPLETED | OUTPATIENT
Start: 2021-04-26 | End: 2021-04-26

## 2021-04-26 RX ORDER — POTASSIUM CHLORIDE 20 MEQ
40 PACKET (EA) ORAL ONCE
Refills: 0 | Status: DISCONTINUED | OUTPATIENT
Start: 2021-04-26 | End: 2021-04-26

## 2021-04-26 RX ORDER — MAGNESIUM OXIDE 400 MG ORAL TABLET 241.3 MG
400 TABLET ORAL
Refills: 0 | Status: DISCONTINUED | OUTPATIENT
Start: 2021-04-26 | End: 2021-04-26

## 2021-04-26 RX ORDER — MAGNESIUM SULFATE 500 MG/ML
1 VIAL (ML) INJECTION ONCE
Refills: 0 | Status: DISCONTINUED | OUTPATIENT
Start: 2021-04-26 | End: 2021-04-26

## 2021-04-26 RX ADMIN — Medication 100 MILLIEQUIVALENT(S): at 13:16

## 2021-04-26 RX ADMIN — Medication 50 GRAM(S): at 14:58

## 2021-04-26 RX ADMIN — HALOPERIDOL DECANOATE 2 MILLIGRAM(S): 100 INJECTION INTRAMUSCULAR at 13:16

## 2021-04-26 RX ADMIN — Medication 1 MILLIGRAM(S): at 20:43

## 2021-04-26 NOTE — PROGRESS NOTE ADULT - ASSESSMENT
Pt is a 81 y/o female w/pmhx of legally blind, dm2, retinopathy and glaucoma and dementia sent in w/complain of hyperglycemia and also more confusion, agitation, not sleeping and agressive.  pt here lying in bed sleeping, ignores questions, and on tactile stimuli says leave me alone, movin all extremities.  pt doesn cooperative or offer any sx's.      Altered mental status, unspecified altered mental status type- improved suddenly today   Restarted on diet  Vitals stable, Labs stable  FS normalized  Blood and Urine Cx- neg to growth    CXR-Suspect early RIGHT upper lobe lung diffuse airspace disease., possible aspiration PNA? will repeat CXR today  CT of abdomen/pelvis- showing moderate amount of stool- constipation meds   Slurred speech?- will r/o infarct with MRI , pt does not seem to have any deficits on physical exam    Continue to hold Gabapentin, on Risperidol- Psych evaluation for possibly worsening Dementia     Hypomagnesemia- will supplement     Type 2 diabetes mellitus with hyperglycemia, with long-term current use of insulin.  Plan: ss insulin coverage.      Glaucoma of both eyes, unspecified glaucoma type.  Plan: cont out pt meds.     Constipation- CT of abdomen showing fecal impaction, laxatives     Dementia- monitor for delirium    : Preventive measure.  Plan: sq lovenox.     Dispo- if all tests negative-most likely discharge tomorrow. CM aware

## 2021-04-26 NOTE — BH CONSULTATION LIAISON ASSESSMENT NOTE - NSBHCHARTREVIEWVS_PSY_A_CORE FT
Vital Signs Last 24 Hrs  T(C): 36.5 (26 Apr 2021 05:17), Max: 36.6 (26 Apr 2021 00:08)  T(F): 97.7 (26 Apr 2021 05:17), Max: 97.8 (26 Apr 2021 00:08)  HR: 70 (26 Apr 2021 05:17) (69 - 79)  BP: 175/62 (26 Apr 2021 05:17) (150/61 - 182/67)  BP(mean): 91 (25 Apr 2021 16:56) (91 - 91)  RR: 18 (26 Apr 2021 05:17) (18 - 19)  SpO2: 98% (26 Apr 2021 05:17) (97% - 99%)

## 2021-04-26 NOTE — BH CONSULTATION LIAISON ASSESSMENT NOTE - RISK ASSESSMENT
low risk for intentional, planned out and executed harm to self or others given advanced age, physical frailty and multi-domain cognitive deficits. More likely to unintentionally/accidentally lash out at caretakers during ADL assistance or hurt self by trying to climb out of bed/pulls lines etc secondary to her chronically confused state.

## 2021-04-26 NOTE — BH CONSULTATION LIAISON ASSESSMENT NOTE - NSBHCHARTREVIEWLAB_PSY_A_CORE FT
04-26    138  |  103  |  14  ----------------------------<  99  3.5   |  30  |  0.63    Ca    9.0      26 Apr 2021 08:24  Mg     1.4     04-26    TPro  6.5  /  Alb  3.2<L>  /  TBili  0.3  /  DBili  x   /  AST  22  /  ALT  16  /  AlkPhos  57  04-26

## 2021-04-26 NOTE — BH CONSULTATION LIAISON ASSESSMENT NOTE - CURRENT MEDICATION
MEDICATIONS  (STANDING):  atorvastatin 20 milliGRAM(s) Oral at bedtime  dorzolamide 2% Ophthalmic Solution 1 Drop(s) Both EYES three times a day  lactated ringers. 1000 milliLiter(s) (75 mL/Hr) IV Continuous <Continuous>  latanoprost 0.005% Ophthalmic Solution 1 Drop(s) Both EYES at bedtime  magnesium hydroxide Suspension 30 milliLiter(s) Oral daily  magnesium sulfate  IVPB 2 Gram(s) IV Intermittent once  polyethylene glycol 3350 17 Gram(s) Oral daily  potassium chloride  10 mEq/100 mL IVPB 10 milliEquivalent(s) IV Intermittent every 1 hour  risperiDONE  Disintegrating Tablet 0.5 milliGRAM(s) Oral at bedtime  senna 2 Tablet(s) Oral at bedtime    MEDICATIONS  (PRN):

## 2021-04-26 NOTE — BH CONSULTATION LIAISON ASSESSMENT NOTE - NSSUICPROTFACT_PSY_ALL_CORE
Supportive social network of family or friends/Cultural, spiritual and/or moral attitudes against suicide/Positive therapeutic relationships/Christian beliefs

## 2021-04-26 NOTE — PROGRESS NOTE ADULT - SUBJECTIVE AND OBJECTIVE BOX
Patient is a 80y old  Female who presents with a chief complaint of hyperglycemia (2021 22:23)      INTERVAL HPI/OVERNIGHT EVENTS: Pt seen and examined at bedside this morning, patient fully awake- speaks both English and Creole- asking for food, stating she's very hungry. Dysphagia screen done with nursing- patient ate well- crackers, apple sauce and drank water and juice. Denies any pain or discomfort except for being very hungry.   Spoke with daughter today- who does not live with Mom- but states that was talking to her on the phone and noticed her speech was very slurred so told the aid to bring her to the hospital.     MEDICATIONS  (STANDING):  atorvastatin 20 milliGRAM(s) Oral at bedtime  dorzolamide 2% Ophthalmic Solution 1 Drop(s) Both EYES three times a day  lactated ringers. 1000 milliLiter(s) (75 mL/Hr) IV Continuous <Continuous>  latanoprost 0.005% Ophthalmic Solution 1 Drop(s) Both EYES at bedtime  magnesium hydroxide Suspension 30 milliLiter(s) Oral daily  magnesium sulfate  IVPB 2 Gram(s) IV Intermittent once  polyethylene glycol 3350 17 Gram(s) Oral daily  potassium chloride  10 mEq/100 mL IVPB 10 milliEquivalent(s) IV Intermittent every 1 hour  risperiDONE  Disintegrating Tablet 0.5 milliGRAM(s) Oral at bedtime  senna 2 Tablet(s) Oral at bedtime    MEDICATIONS  (PRN):        Allergies    No Known Allergies    Intolerances        REVIEW OF SYSTEMS:  unable to obtain due to ams    ICU Vital Signs Last 24 Hrs  T(C): 36.5 (2021 05:17), Max: 36.6 (2021 00:08)  T(F): 97.7 (2021 05:17), Max: 97.8 (2021 00:08)  HR: 70 (2021 05:17) (69 - 79)  BP: 175/62 (2021 05:17) (150/61 - 182/67)  BP(mean): 91 (2021 16:56) (91 - 91)  ABP: --  ABP(mean): --  RR: 18 (2021 05:17) (18 - 19)  SpO2: 98% (2021 05:17) (97% - 99%)    PHYSICAL EXAM:  GENERAL: NAD, well-groomed, well-developed  HEAD:  Atraumatic, Normocephalic  EYES: EOMI, PERRLA, conjunctiva and sclera clear  ENMT: No tonsillar erythema, exudates, or enlargement; Moist mucous membranes, Good dentition, No lesions  NECK: Supple, No JVD, Normal thyroid  NERVOUS SYSTEM:  Alert & Oriented X3, no motor or sensory deficits.   CHEST/LUNG: Clear to percussion bilaterally; No rales, rhonchi, wheezing, or rubs  HEART: Regular rate and rhythm; No murmurs, rubs, or gallops  ABDOMEN: Soft, Nontender, Nondistended; Bowel sounds present  EXTREMITIES:  2+ Peripheral Pulses, No clubbing, cyanosis, or edema  LYMPH: No lymphadenopathy noted  SKIN: No rashes or lesions                                             10.4   4.85  )-----------( 224      ( 2021 08:24 )             33.5     04-    138  |  103  |  14  ----------------------------<  99  3.5   |  30  |  0.63    Ca    9.0      2021 08:24  Mg     1.4     -    TPro  6.5  /  Alb  3.2<L>  /  TBili  0.3  /  DBili  x   /  AST  22  /  ALT  16  /  AlkPhos  57  04-      Urinalysis Basic - ( 2021 16:40 )    Color: Yellow / Appearance: Clear / S.020 / pH: x  Gluc: x / Ketone: Negative  / Bili: Negative / Urobili: Negative mg/dL   Blood: x / Protein: 100 mg/dL / Nitrite: Negative   Leuk Esterase: Negative / RBC: 3-5 /HPF / WBC x   Sq Epi: x / Non Sq Epi: Few / Bacteria: x

## 2021-04-27 ENCOUNTER — TRANSCRIPTION ENCOUNTER (OUTPATIENT)
Age: 81
End: 2021-04-27

## 2021-04-27 LAB
GLUCOSE BLDC GLUCOMTR-MCNC: 114 MG/DL — HIGH (ref 70–99)
GLUCOSE BLDC GLUCOMTR-MCNC: 116 MG/DL — HIGH (ref 70–99)
GLUCOSE BLDC GLUCOMTR-MCNC: 129 MG/DL — HIGH (ref 70–99)
GLUCOSE BLDC GLUCOMTR-MCNC: 155 MG/DL — HIGH (ref 70–99)

## 2021-04-27 PROCEDURE — 99233 SBSQ HOSP IP/OBS HIGH 50: CPT

## 2021-04-27 PROCEDURE — 70551 MRI BRAIN STEM W/O DYE: CPT | Mod: 26

## 2021-04-27 PROCEDURE — 99231 SBSQ HOSP IP/OBS SF/LOW 25: CPT

## 2021-04-27 RX ORDER — TRAZODONE HCL 50 MG
25 TABLET ORAL AT BEDTIME
Refills: 0 | Status: DISCONTINUED | OUTPATIENT
Start: 2021-04-27 | End: 2021-05-04

## 2021-04-27 RX ADMIN — MAGNESIUM HYDROXIDE 30 MILLILITER(S): 400 TABLET, CHEWABLE ORAL at 11:00

## 2021-04-27 RX ADMIN — SENNA PLUS 2 TABLET(S): 8.6 TABLET ORAL at 21:40

## 2021-04-27 RX ADMIN — ATORVASTATIN CALCIUM 20 MILLIGRAM(S): 80 TABLET, FILM COATED ORAL at 21:41

## 2021-04-27 RX ADMIN — LATANOPROST 1 DROP(S): 0.05 SOLUTION/ DROPS OPHTHALMIC; TOPICAL at 21:40

## 2021-04-27 RX ADMIN — DORZOLAMIDE HYDROCHLORIDE 1 DROP(S): 20 SOLUTION/ DROPS OPHTHALMIC at 11:01

## 2021-04-27 RX ADMIN — RISPERIDONE 0.5 MILLIGRAM(S): 4 TABLET ORAL at 21:40

## 2021-04-27 RX ADMIN — DORZOLAMIDE HYDROCHLORIDE 1 DROP(S): 20 SOLUTION/ DROPS OPHTHALMIC at 21:40

## 2021-04-27 NOTE — PROGRESS NOTE ADULT - SUBJECTIVE AND OBJECTIVE BOX
Patient is a 80y old  Female who presents with a chief complaint of hyperglycemia (24 Apr 2021 22:23)      INTERVAL HPI/OVERNIGHT EVENTS: Pt sleeping when I came in morning. As per nursing pt was very agitated overnight and received 1mg of Ativan.       MEDICATIONS  (STANDING):  atorvastatin 20 milliGRAM(s) Oral at bedtime  dorzolamide 2% Ophthalmic Solution 1 Drop(s) Both EYES three times a day  lactated ringers. 1000 milliLiter(s) (75 mL/Hr) IV Continuous <Continuous>  latanoprost 0.005% Ophthalmic Solution 1 Drop(s) Both EYES at bedtime  magnesium hydroxide Suspension 30 milliLiter(s) Oral daily  polyethylene glycol 3350 17 Gram(s) Oral daily  risperiDONE  Disintegrating Tablet 0.5 milliGRAM(s) Oral at bedtime  senna 2 Tablet(s) Oral at bedtime    MEDICATIONS  (PRN):  hydrALAZINE Injectable 10 milliGRAM(s) IV Push three times a day PRN elevated bp  LORazepam   Injectable 0.5 milliGRAM(s) IV Push every 6 hours PRN Agitation  traZODone 25 milliGRAM(s) Oral at bedtime PRN insomnia / HS restlessness        Allergies    No Known Allergies    Intolerances        REVIEW OF SYSTEMS:  unable to obtain due to ams    ICU Vital Signs Last 24 Hrs  T(C): 36.4 (27 Apr 2021 12:18), Max: 36.4 (27 Apr 2021 12:18)  T(F): 97.6 (27 Apr 2021 12:18), Max: 97.6 (27 Apr 2021 12:18)  HR: 72 (27 Apr 2021 12:18) (72 - 78)  BP: 113/72 (27 Apr 2021 12:18) (113/72 - 117/60)  BP(mean): --  ABP: --  ABP(mean): --  RR: 18 (27 Apr 2021 12:18) (18 - 18)  SpO2: 100% (27 Apr 2021 12:18) (96% - 100%)      PHYSICAL EXAM:  GENERAL: NAD, well-groomed, well-developed  HEAD:  Atraumatic, Normocephalic  EYES: EOMI, PERRLA, conjunctiva and sclera clear  ENMT: No tonsillar erythema, exudates, or enlargement; Moist mucous membranes, Good dentition, No lesions  NECK: Supple, No JVD, Normal thyroid  NERVOUS SYSTEM:  Alert & Oriented X3, no motor or sensory deficits.   CHEST/LUNG: Clear to percussion bilaterally; No rales, rhonchi, wheezing, or rubs  HEART: Regular rate and rhythm; No murmurs, rubs, or gallops  ABDOMEN: Soft, Nontender, Nondistended; Bowel sounds present  EXTREMITIES:  2+ Peripheral Pulses, No clubbing, cyanosis, or edema  LYMPH: No lymphadenopathy noted  SKIN: No rashes or lesions                                       10.4   4.85  )-----------( 224      ( 26 Apr 2021 08:24 )             33.5     04-26    138  |  103  |  14  ----------------------------<  99  3.5   |  30  |  0.63    Ca    9.0      26 Apr 2021 08:24  Mg     1.4     04-26    TPro  6.5  /  Alb  3.2<L>  /  TBili  0.3  /  DBili  x   /  AST  22  /  ALT  16  /  AlkPhos  57  04-26

## 2021-04-27 NOTE — PROGRESS NOTE ADULT - ASSESSMENT
Pt is a 81 y/o female w/pmhx of legally blind, dm2, retinopathy and glaucoma and dementia sent in w/complain of hyperglycemia and also more confusion, agitation, not sleeping and agressive.  pt here lying in bed sleeping, ignores questions, and on tactile stimuli says leave me alone, movin all extremities.  pt doesn cooperative or offer any sx's.      Altered mental status, unspecified altered mental status type- improved suddenly today   Restarted on diet  Vitals stable, Labs stable  FS normalized  Blood and Urine Cx- neg to growth    CXR-Suspect early RIGHT upper lobe lung diffuse airspace disease., possible aspiration PNA? awaiting CXR  CT of abdomen/pelvis- showing moderate amount of stool- constipation meds   Slurred speech?- will r/o infarct with MRI , pt does not seem to have any deficits on physical exam,awaiting MRI of head    Continue to hold Gabapentin, on Risperidol- Psych evaluation for possibly worsening Dementia     Hypomagnesemia- will supplement     Type 2 diabetes mellitus with hyperglycemia, with long-term current use of insulin.  Plan: ss insulin coverage.      Glaucoma of both eyes, unspecified glaucoma type.  Plan: cont out pt meds.     Constipation- CT of abdomen showing fecal impaction, laxatives     Dementia- monitor for delirium    : Preventive measure.  Plan: sq lovenox.   PT evaluation- pending    Dispo-awaiting MRI/CXR, Spoke with daughter today- explained that Mom's symptoms most likely due to advanced dementia.

## 2021-04-27 NOTE — DISCHARGE NOTE PROVIDER - HOSPITAL COURSE
Pt is a 79 y/o female w/pmhx of legally blind, dm2, retinopathy and glaucoma and dementia sent in w/complain of hyperglycemia and also more confusion, agitation, not sleeping and agressive.  pt here lying in bed sleeping, ignores questions, and on tactile stimuli says leave me alone, movin all extremities.  pt doesn cooperative or offer any sx's.      Altered mental status, unspecified altered mental status type-   FS normalized  Blood and Urine Cx- neg to growth  Back on diet  MRI- no infarct seen  Most likely cause of altered mental status- due to advancing dementia    Continue to hold Gabapentin, on Risperidol- Psych evaluation for possibly worsening Dementia        Glaucoma of both eyes, unspecified glaucoma type.  Plan: cont out pt meds.     Constipation- CT of abdomen showing fecal impaction, laxatives     Dementia- will need close follow up with Dementia Work up with [facility] specialist.       Pt is a 81 y/o female w/pmhx of legally blind, dm2, retinopathy and glaucoma and dementia sent in w/complain of hyperglycemia and also more confusion, agitation, not sleeping and agressive.  pt here lying in bed sleeping, ignores questions, and on tactile stimuli says leave me alone, movin all extremities.  pt doesn cooperative or offer any sx's.        Assessment and Plan:   · Assessment	  Pt is a 81 y/o female w/pmhx of legally blind, dm2, retinopathy and glaucoma and dementia sent in w/complain of hyperglycemia and also more confusion, agitation, not sleeping and agressive.  pt here lying in bed sleeping, ignores questions, and on tactile stimuli says leave me alone, movin all extremities.  pt doesn cooperative or offer any sx's.      Altered mental stat presumed due to dementia appraciet psych note   mri brain done by colleague negatived for acute intracranial event   5/3/2021 ct head from 5/3/2021 no acute intracranial events    continue with meds as outline by [place] .     HEDY- improving   HTN: start low dose norvasc, bp fair     Type 2 diabetes mellitus with hyperglycemia, with long-term current use of insulin.  Plan: ss insulin coverage. Back on home insulin regiment  FS fairly stable   AIC-8.9     Glaucoma of both eyes, unspecified glaucoma type.  Plan: cont out pt meds.     Constipation- CT of abdomen showing fecal impaction, laxatives    resolved    Dementia- monitor for delirium    : Preventive measure.  Plan: sq lovenox.   PT evaluation- pt needs Rehab    Dispo- once able to get off 1:1 as per CM, may go to rehab, AMS due to advanced Dementia   5/1/2021 ready for d/c off one to one  SW is aware j[facility] will be sent   5/3/2021 per SW the  rehab the son wanted is not accepting and now she has to provide him with more options  to chose from        Nutritional Assessment:  · Nutritional Assessment	This patient has been assessed with a concern for Malnutrition and has been determined to have a diagnosis/diagnoses of Severe protein-calorie malnutrition.

## 2021-04-27 NOTE — DISCHARGE NOTE PROVIDER - NSDCMRMEDTOKEN_GEN_ALL_CORE_FT
acetaminophen 325 mg oral tablet: 1 tab(s) orally every 6 hours, As needed, Mild Pain (1 - 3)  atorvastatin 20 mg oral tablet: 1 tab(s) orally once a day (at bedtime)  dorzolamide 2% ophthalmic solution: 1 drop(s) to each affected eye 3 times a day  gabapentin 100 mg oral capsule: 1 cap(s) orally 3 times a day    Note: last dispensed by pharmacy 2018    insulin glargine 100 units/mL subcutaneous solution: 6 unit(s) subcutaneous once a day (at bedtime)   insulin lispro: 3 times a day (before meals):  1 Unit(s) if Glucose 151 - 200  2 Unit(s) if Glucose 201 - 250  3 Unit(s) if Glucose 251 - 300  4 Unit(s) if Glucose 301 - 350  5 Unit(s) if Glucose 351 - 400  6 Unit(s) if Glucose GREATER THAN 400  insulin lispro: once a day (at bedtime):  0 Unit(s) if Glucose 0 - 250  1 Unit(s) if Glucose 251 - 300  2 Unit(s) if Glucose 301 - 350  3 Unit(s) if Glucose 351 - 400  4 Unit(s) if Glucose GREATER THAN 400  latanoprost 0.005% ophthalmic solution: 1 drop(s) to each affected eye once a day (in the evening)    Note: last dispensed 2018    risperiDONE 0.5 mg oral tablet, disintegratin tab(s) orally once a day (at bedtime)   acetaminophen 325 mg oral tablet: 1 tab(s) orally every 6 hours, As needed, Mild Pain (1 - 3)  amLODIPine 5 mg oral tablet: 1 tab(s) orally once a day  atorvastatin 20 mg oral tablet: 1 tab(s) orally once a day (at bedtime)  dorzolamide 2% ophthalmic solution: 1 drop(s) to each affected eye 3 times a day  gabapentin 100 mg oral capsule: 1 cap(s) orally 3 times a day    Note: last dispensed by pharmacy February 2018    insulin glargine 100 units/mL subcutaneous solution: 6 unit(s) subcutaneous once a day (at bedtime)   insulin lispro: 3 times a day (before meals):  1 Unit(s) if Glucose 151 - 200  2 Unit(s) if Glucose 201 - 250  3 Unit(s) if Glucose 251 - 300  4 Unit(s) if Glucose 301 - 350  5 Unit(s) if Glucose 351 - 400  6 Unit(s) if Glucose GREATER THAN 400  insulin lispro: once a day (at bedtime):  0 Unit(s) if Glucose 0 - 250  1 Unit(s) if Glucose 251 - 300  2 Unit(s) if Glucose 301 - 350  3 Unit(s) if Glucose 351 - 400  4 Unit(s) if Glucose GREATER THAN 400  latanoprost 0.005% ophthalmic solution: 1 drop(s) to each affected eye once a day (in the evening)    Note: last dispensed January 2018    traZODone: 25 milligram(s) orally once a day   acetaminophen 325 mg oral tablet: 1 tab(s) orally every 6 hours, As needed, Mild Pain (1 - 3)  amLODIPine 5 mg oral tablet: 1 tab(s) orally once a day  atorvastatin 20 mg oral tablet: 1 tab(s) orally once a day (at bedtime)  dorzolamide 2% ophthalmic solution: 1 drop(s) to each affected eye 3 times a day  gabapentin 100 mg oral capsule: 1 cap(s) orally 3 times a day    Note: last dispensed by pharmacy February 2018    heparin 5000 units/mL injectable solution: 5000 unit(s) subcutaneously every 12 hours   insulin glargine 100 units/mL subcutaneous solution: 6 unit(s) subcutaneous once a day (at bedtime)   insulin lispro: 3 times a day (before meals):  1 Unit(s) if Glucose 151 - 200  2 Unit(s) if Glucose 201 - 250  3 Unit(s) if Glucose 251 - 300  4 Unit(s) if Glucose 301 - 350  5 Unit(s) if Glucose 351 - 400  6 Unit(s) if Glucose GREATER THAN 400  insulin lispro: once a day (at bedtime):  0 Unit(s) if Glucose 0 - 250  1 Unit(s) if Glucose 251 - 300  2 Unit(s) if Glucose 301 - 350  3 Unit(s) if Glucose 351 - 400  4 Unit(s) if Glucose GREATER THAN 400  latanoprost 0.005% ophthalmic solution: 1 drop(s) to each affected eye once a day (in the evening)    Note: last dispensed January 2018    traZODone: 25 milligram(s) orally once a day   acetaminophen 325 mg oral tablet: 1 tab(s) orally every 6 hours, As needed, Mild Pain (1 - 3)  amLODIPine 5 mg oral tablet: 1 tab(s) orally once a day  atorvastatin 20 mg oral tablet: 1 tab(s) orally once a day (at bedtime)  dorzolamide 2% ophthalmic solution: 1 drop(s) to each affected eye 3 times a day  gabapentin 100 mg oral capsule: 1 cap(s) orally 3 times a day    Note: last dispensed by pharmacy February 2018    heparin 5000 units/mL injectable solution: 5000 unit(s) subcutaneously every 12 hours   insulin glargine 100 units/mL subcutaneous solution: 6 unit(s) subcutaneous once a day (at bedtime)   insulin lispro: 3 times a day (before meals):  1 Unit(s) if Glucose 151 - 200  2 Unit(s) if Glucose 201 - 250  3 Unit(s) if Glucose 251 - 300  4 Unit(s) if Glucose 301 - 350  5 Unit(s) if Glucose 351 - 400  6 Unit(s) if Glucose GREATER THAN 400  insulin lispro: once a day (at bedtime):  0 Unit(s) if Glucose 0 - 250  1 Unit(s) if Glucose 251 - 300  2 Unit(s) if Glucose 301 - 350  3 Unit(s) if Glucose 351 - 400  4 Unit(s) if Glucose GREATER THAN 400  latanoprost 0.005% ophthalmic solution: 1 drop(s) to each affected eye once a day (in the evening)    Note: last dispensed January 2018    polyethylene glycol 3350 oral powder for reconstitution: 17 gram(s) orally once a day  traZODone: 25 milligram(s) orally once a day

## 2021-04-27 NOTE — DISCHARGE NOTE PROVIDER - CARE PROVIDER_API CALL
Maryam Stark)  Geriatric Psychiatry; Psychiatry  [address] [place] [address]  Phone: [phone]  Fax: [phone]  Follow Up Time:

## 2021-04-27 NOTE — DISCHARGE NOTE PROVIDER - DETAILS OF MALNUTRITION DIAGNOSIS/DIAGNOSES
This patient has been assessed with a concern for Malnutrition and was treated during this hospitalization for the following Nutrition diagnosis/diagnoses:     -  04/30/2021: Severe protein-calorie malnutrition

## 2021-04-27 NOTE — DISCHARGE NOTE PROVIDER - NSDCCPCAREPLAN_GEN_ALL_CORE_FT
PRINCIPAL DISCHARGE DIAGNOSIS  Diagnosis: Altered mental status  Assessment and Plan of Treatment:       SECONDARY DISCHARGE DIAGNOSES  Diagnosis: Type 2 diabetes mellitus with hyperglycemia, with long-term current use of insulin  Assessment and Plan of Treatment: Type 2 diabetes mellitus with hyperglycemia, with long-term current use of insulin    Diagnosis: Glaucoma of both eyes, unspecified glaucoma type  Assessment and Plan of Treatment: Glaucoma of both eyes, unspecified glaucoma type    Diagnosis: Altered mental status, unspecified altered mental status type  Assessment and Plan of Treatment: Altered mental status, unspecified altered mental status type     PRINCIPAL DISCHARGE DIAGNOSIS  Diagnosis: Dementia  Assessment and Plan of Treatment:       SECONDARY DISCHARGE DIAGNOSES  Diagnosis: Altered mental status, unspecified altered mental status type  Assessment and Plan of Treatment: Altered mental status, unspecified altered mental status type    Diagnosis: Type 2 diabetes mellitus with hyperglycemia, with long-term current use of insulin  Assessment and Plan of Treatment: Type 2 diabetes mellitus with hyperglycemia, with long-term current use of insulin    Diagnosis: Glaucoma of both eyes, unspecified glaucoma type  Assessment and Plan of Treatment: Glaucoma of both eyes, unspecified glaucoma type    Diagnosis: Essential hypertension  Assessment and Plan of Treatment:      PRINCIPAL DISCHARGE DIAGNOSIS  Diagnosis: Dementia  Assessment and Plan of Treatment:       SECONDARY DISCHARGE DIAGNOSES  Diagnosis: Altered mental status, unspecified altered mental status type  Assessment and Plan of Treatment: Altered mental status, unspecified altered mental status type    Diagnosis: Type 2 diabetes mellitus with hyperglycemia, with long-term current use of insulin  Assessment and Plan of Treatment: Type 2 diabetes mellitus with hyperglycemia, with long-term current use of insulin    Diagnosis: Glaucoma of both eyes, unspecified glaucoma type  Assessment and Plan of Treatment: Glaucoma of both eyes, unspecified glaucoma type    Diagnosis: Essential hypertension  Assessment and Plan of Treatment:     Diagnosis: Severe protein-calorie malnutrition  Assessment and Plan of Treatment:     Diagnosis: Acute kidney failure  Assessment and Plan of Treatment:

## 2021-04-28 LAB
ANION GAP SERPL CALC-SCNC: 9 MMOL/L — SIGNIFICANT CHANGE UP (ref 5–17)
BUN SERPL-MCNC: 19 MG/DL — SIGNIFICANT CHANGE UP (ref 7–23)
CALCIUM SERPL-MCNC: 8.7 MG/DL — SIGNIFICANT CHANGE UP (ref 8.5–10.1)
CHLORIDE SERPL-SCNC: 104 MMOL/L — SIGNIFICANT CHANGE UP (ref 96–108)
CO2 SERPL-SCNC: 30 MMOL/L — SIGNIFICANT CHANGE UP (ref 22–31)
CREAT SERPL-MCNC: 0.94 MG/DL — SIGNIFICANT CHANGE UP (ref 0.5–1.3)
FOLATE SERPL-MCNC: 15.3 NG/ML — SIGNIFICANT CHANGE UP
GLUCOSE BLDC GLUCOMTR-MCNC: 110 MG/DL — HIGH (ref 70–99)
GLUCOSE BLDC GLUCOMTR-MCNC: 202 MG/DL — HIGH (ref 70–99)
GLUCOSE BLDC GLUCOMTR-MCNC: 244 MG/DL — HIGH (ref 70–99)
GLUCOSE BLDC GLUCOMTR-MCNC: 283 MG/DL — HIGH (ref 70–99)
GLUCOSE SERPL-MCNC: 108 MG/DL — HIGH (ref 70–99)
HCT VFR BLD CALC: 34 % — LOW (ref 34.5–45)
HGB BLD-MCNC: 10.5 G/DL — LOW (ref 11.5–15.5)
MAGNESIUM SERPL-MCNC: 2.1 MG/DL — SIGNIFICANT CHANGE UP (ref 1.6–2.6)
MCHC RBC-ENTMCNC: 22.9 PG — LOW (ref 27–34)
MCHC RBC-ENTMCNC: 30.9 GM/DL — LOW (ref 32–36)
MCV RBC AUTO: 74.1 FL — LOW (ref 80–100)
NRBC # BLD: 0 /100 WBCS — SIGNIFICANT CHANGE UP (ref 0–0)
PLATELET # BLD AUTO: 224 K/UL — SIGNIFICANT CHANGE UP (ref 150–400)
POTASSIUM SERPL-MCNC: 3.8 MMOL/L — SIGNIFICANT CHANGE UP (ref 3.5–5.3)
POTASSIUM SERPL-SCNC: 3.8 MMOL/L — SIGNIFICANT CHANGE UP (ref 3.5–5.3)
RBC # BLD: 4.59 M/UL — SIGNIFICANT CHANGE UP (ref 3.8–5.2)
RBC # FLD: 15 % — HIGH (ref 10.3–14.5)
SODIUM SERPL-SCNC: 143 MMOL/L — SIGNIFICANT CHANGE UP (ref 135–145)
TSH SERPL-MCNC: 0.96 UU/ML — SIGNIFICANT CHANGE UP (ref 0.36–3.74)
VIT B12 SERPL-MCNC: 1061 PG/ML — SIGNIFICANT CHANGE UP (ref 232–1245)
WBC # BLD: 5.8 K/UL — SIGNIFICANT CHANGE UP (ref 3.8–10.5)
WBC # FLD AUTO: 5.8 K/UL — SIGNIFICANT CHANGE UP (ref 3.8–10.5)

## 2021-04-28 PROCEDURE — 99231 SBSQ HOSP IP/OBS SF/LOW 25: CPT

## 2021-04-28 PROCEDURE — 99233 SBSQ HOSP IP/OBS HIGH 50: CPT

## 2021-04-28 PROCEDURE — 71045 X-RAY EXAM CHEST 1 VIEW: CPT | Mod: 26

## 2021-04-28 RX ORDER — INSULIN LISPRO 100/ML
VIAL (ML) SUBCUTANEOUS
Refills: 0 | Status: DISCONTINUED | OUTPATIENT
Start: 2021-04-28 | End: 2021-04-29

## 2021-04-28 RX ORDER — DEXTROSE 50 % IN WATER 50 %
12.5 SYRINGE (ML) INTRAVENOUS ONCE
Refills: 0 | Status: DISCONTINUED | OUTPATIENT
Start: 2021-04-28 | End: 2021-05-04

## 2021-04-28 RX ORDER — SODIUM CHLORIDE 9 MG/ML
1000 INJECTION, SOLUTION INTRAVENOUS
Refills: 0 | Status: DISCONTINUED | OUTPATIENT
Start: 2021-04-28 | End: 2021-05-04

## 2021-04-28 RX ORDER — DEXTROSE 50 % IN WATER 50 %
25 SYRINGE (ML) INTRAVENOUS ONCE
Refills: 0 | Status: DISCONTINUED | OUTPATIENT
Start: 2021-04-28 | End: 2021-05-04

## 2021-04-28 RX ORDER — DEXTROSE 50 % IN WATER 50 %
15 SYRINGE (ML) INTRAVENOUS ONCE
Refills: 0 | Status: DISCONTINUED | OUTPATIENT
Start: 2021-04-28 | End: 2021-05-04

## 2021-04-28 RX ORDER — GLUCAGON INJECTION, SOLUTION 0.5 MG/.1ML
1 INJECTION, SOLUTION SUBCUTANEOUS ONCE
Refills: 0 | Status: DISCONTINUED | OUTPATIENT
Start: 2021-04-28 | End: 2021-05-04

## 2021-04-28 RX ADMIN — Medication 2: at 16:35

## 2021-04-28 RX ADMIN — Medication 0.5 MILLIGRAM(S): at 21:35

## 2021-04-28 RX ADMIN — Medication 25 MILLIGRAM(S): at 21:26

## 2021-04-28 NOTE — PHYSICAL THERAPY INITIAL EVALUATION ADULT - ADDITIONAL COMMENTS
PT spoke with patient's daughter Ford Godinez at [phone] (yesterday 4/27/2021) who reports that patient lives in an apartment building with no stairs to enter on the 1st floor. Patient was independent without a device prior to hospitalization, denies owning any equipment. Patient was able to walk community distances. She has a home health aide 24/7 who assists in cooking and supervises patient secondary to patient being legally blind. Patient was able to perform ADLs with modified independence secondary to being legally blind.

## 2021-04-28 NOTE — PHYSICAL THERAPY INITIAL EVALUATION ADULT - GENERAL OBSERVATIONS, REHAB EVAL
Pt encountered supine, alert, confused, NAD, on observation. Pt admitted with AMS, improved mental status today.

## 2021-04-28 NOTE — PHYSICAL THERAPY INITIAL EVALUATION ADULT - TRANSFER TRAINING, PT EVAL
pt will be able to perform sit to stand transfers with appropriate device and stand by assistance x8 weeks

## 2021-04-28 NOTE — SWALLOW BEDSIDE ASSESSMENT ADULT - COMMENTS
MRI head 4/27/2021 INTERPRETATION:  MRI brain without contrast History slurred speech, dementia Comparison CT performed 3 days ago  An abbreviated exam was performed as the patient would not tolerate a complete study. Only diffusion-weighted sequences were obtained.    There is no diffusion restriction to suggest acute infarct.  IMPRESSION: As above    CXR 4/24/2021 COMPARISON: 5/5/2019 chest available for review.  FINDINGS:The lungs show early RIGHT upper lobe airspace disease. Remaining of visualized lung parenchyma clear.. No pneumothorax.  The heart and mediastinum are within normal limits.Visualized osseous structures are intact.  IMPRESSION:   Suspect early RIGHT upper lobe lung diffuse airspace disease..

## 2021-04-28 NOTE — BH CONSULTATION LIAISON PROGRESS NOTE - OTHER
thin  unable to ascertain due to cognitive deficits  looks confused  unable to articulate  does not seem to be responding to internal stimuli

## 2021-04-28 NOTE — BH CONSULTATION LIAISON PROGRESS NOTE - NSBHCONSULTRECOMMENDOTHER_PSY_A_CORE FT
trazodone 25mg PO qhs for sleep changed to PRN to limit daytime sedation   - can be followed outpatient by a Geriatrician, Neurologist or Geriatric Psychiatrist to monitor worsening dementia symptoms trazodone 25mg PO qhs for sleep changed to PRN to limit daytime sedation  continue risperdal 0.5mg Po qhs   - can be followed outpatient by a Geriatrician, Neurologist or Geriatric Psychiatrist to monitor worsening dementia symptoms

## 2021-04-28 NOTE — SWALLOW BEDSIDE ASSESSMENT ADULT - H & P REVIEW
Pt is a 81 y/o female w/pmhx of legally blind, dm2, retinopathy and glaucoma and dementia sent in w/complain of hyperglycemia and also more conusion, agitation, not sleeping and agressive.  pt here lying in bed sleeping, ignores questions, and on tactile stimuli says leave me alone, movin all extremities.  pt doesn cooperative or offer any sx's./yes

## 2021-04-28 NOTE — PHYSICAL THERAPY INITIAL EVALUATION ADULT - GAIT DEVIATIONS NOTED, PT EVAL
decreased kristopher/increased time in double stance/decreased stride length/increased stride width/decreased weight-shifting ability

## 2021-04-28 NOTE — PROGRESS NOTE ADULT - SUBJECTIVE AND OBJECTIVE BOX
Patient is a 80y old  Female who presents with a chief complaint of hyperglycemia (24 Apr 2021 22:23)      INTERVAL HPI/OVERNIGHT EVENTS: Pt resting, ate breakfast has no complaints      MEDICATIONS  (STANDING):  atorvastatin 20 milliGRAM(s) Oral at bedtime  dorzolamide 2% Ophthalmic Solution 1 Drop(s) Both EYES three times a day  lactated ringers. 1000 milliLiter(s) (75 mL/Hr) IV Continuous <Continuous>  latanoprost 0.005% Ophthalmic Solution 1 Drop(s) Both EYES at bedtime  magnesium hydroxide Suspension 30 milliLiter(s) Oral daily  polyethylene glycol 3350 17 Gram(s) Oral daily  risperiDONE  Disintegrating Tablet 0.5 milliGRAM(s) Oral at bedtime  senna 2 Tablet(s) Oral at bedtime    MEDICATIONS  (PRN):  hydrALAZINE Injectable 10 milliGRAM(s) IV Push three times a day PRN elevated bp  LORazepam   Injectable 0.5 milliGRAM(s) IV Push every 6 hours PRN Agitation  traZODone 25 milliGRAM(s) Oral at bedtime PRN insomnia / HS restlessness        Allergies    No Known Allergies    Intolerances        REVIEW OF SYSTEMS:  unable to obtain due to ams    ICU Vital Signs Last 24 Hrs  T(C): 36.7 (28 Apr 2021 10:41), Max: 36.7 (28 Apr 2021 10:41)  T(F): 98.1 (28 Apr 2021 10:41), Max: 98.1 (28 Apr 2021 10:41)  HR: 70 (28 Apr 2021 14:00) (70 - 78)  BP: 106/66 (28 Apr 2021 14:00) (106/66 - 146/69)  BP(mean): --  ABP: --  ABP(mean): --  RR: 18 (28 Apr 2021 14:00) (16 - 18)  SpO2: 97% (28 Apr 2021 14:00) (96% - 98%)        PHYSICAL EXAM:  GENERAL: NAD, well-groomed, well-developed  HEAD:  Atraumatic, Normocephalic  EYES: EOMI, PERRLA, conjunctiva and sclera clear  ENMT: No tonsillar erythema, exudates, or enlargement; Moist mucous membranes, Good dentition, No lesions  NECK: Supple, No JVD, Normal thyroid  NERVOUS SYSTEM:  Alert & Oriented X2, no motor or sensory deficits. Knows she's in the hospital, knows her name, no clue about date  CHEST/LUNG: Clear to percussion bilaterally; No rales, rhonchi, wheezing, or rubs  HEART: Regular rate and rhythm; No murmurs, rubs, or gallops  ABDOMEN: Soft, Nontender, Nondistended; Bowel sounds present  EXTREMITIES:  2+ Peripheral Pulses, No clubbing, cyanosis, or edema  LYMPH: No lymphadenopathy noted  SKIN: No rashes or lesions                            10.5   5.80  )-----------( 224      ( 28 Apr 2021 08:27 )             34.0     04-28    143  |  104  |  19  ----------------------------<  108<H>  3.8   |  30  |  0.94    Ca    8.7      28 Apr 2021 08:27  Mg     2.1     04-28

## 2021-04-28 NOTE — PHYSICAL THERAPY INITIAL EVALUATION ADULT - GAIT TRAINING, PT EVAL
pt will be able to ambulate distances into the community >500 feet with appropriate device and stand by assistance x8 weeks

## 2021-04-28 NOTE — PROGRESS NOTE ADULT - ASSESSMENT
Pt is a 81 y/o female w/pmhx of legally blind, dm2, retinopathy and glaucoma and dementia sent in w/complain of hyperglycemia and also more confusion, agitation, not sleeping and agressive.  pt here lying in bed sleeping, ignores questions, and on tactile stimuli says leave me alone, movin all extremities.  pt doesn cooperative or offer any sx's.      Altered mental status, unspecified altered mental status type- improved suddenly today   Restarted on diet  Vitals stable, Labs stable  FS normalized  Blood and Urine Cx- neg to growth    CXR-Suspect early RIGHT upper lobe lung diffuse airspace disease., possible aspiration PNA? awaiting new CXR  CT of abdomen/pelvis- showing moderate amount of stool- constipation meds   Slurred speech?- will r/o infarct with MRI , pt does not seem to have any deficits on physical exam, MRI of head- not a good study- but states no infarct    Continue to hold Gabapentin, on Risperidol- Psych evaluation appreciated- pt has progressing Dementia    Hypomagnesemia- will supplement     Type 2 diabetes mellitus with hyperglycemia, with long-term current use of insulin.  Plan: ss insulin coverage.      Glaucoma of both eyes, unspecified glaucoma type.  Plan: cont out pt meds.     Constipation- CT of abdomen showing fecal impaction, laxatives     Dementia- monitor for delirium    : Preventive measure.  Plan: sq lovenox.   PT evaluation- pt needs Rehab    Dispo-awaiting CXR, COVID testing today---plan for REHAB discharge tomorrow

## 2021-04-28 NOTE — SWALLOW BEDSIDE ASSESSMENT ADULT - SLP GENERAL OBSERVATIONS
pt seen bedside alert and oriented to self- SLP provided place. pt responded to simple questions for assessment- noted reduced initiation and she was able to follow one step directions for oral [facility] exam. noted good speech intelligibility and delay in response with frequent request for repetition- unclear if 2/2 hard of hearing or processing..

## 2021-04-28 NOTE — PHYSICAL THERAPY INITIAL EVALUATION ADULT - PERTINENT HX OF CURRENT PROBLEM, REHAB EVAL
Pt is a 79 y/o female w/pmhx of legally blind, dm2, retinopathy and glaucoma and dementia sent in w/complain of hyperglycemia and also more confusion, agitation, not sleeping and apgressive.

## 2021-04-29 LAB
A1C WITH ESTIMATED AVERAGE GLUCOSE RESULT: 8.9 % — HIGH (ref 4–5.6)
ESTIMATED AVERAGE GLUCOSE: 209 MG/DL — HIGH (ref 68–114)
FLUAV AG NPH QL: SIGNIFICANT CHANGE UP
FLUBV AG NPH QL: SIGNIFICANT CHANGE UP
GLUCOSE BLDC GLUCOMTR-MCNC: 140 MG/DL — HIGH (ref 70–99)
GLUCOSE BLDC GLUCOMTR-MCNC: 189 MG/DL — HIGH (ref 70–99)
GLUCOSE BLDC GLUCOMTR-MCNC: 276 MG/DL — HIGH (ref 70–99)
GLUCOSE BLDC GLUCOMTR-MCNC: 285 MG/DL — HIGH (ref 70–99)
SARS-COV-2 RNA SPEC QL NAA+PROBE: SIGNIFICANT CHANGE UP

## 2021-04-29 PROCEDURE — 99231 SBSQ HOSP IP/OBS SF/LOW 25: CPT

## 2021-04-29 PROCEDURE — 99233 SBSQ HOSP IP/OBS HIGH 50: CPT

## 2021-04-29 RX ORDER — ENOXAPARIN SODIUM 100 MG/ML
40 INJECTION SUBCUTANEOUS DAILY
Refills: 0 | Status: DISCONTINUED | OUTPATIENT
Start: 2021-04-29 | End: 2021-05-04

## 2021-04-29 RX ORDER — INSULIN LISPRO 100/ML
VIAL (ML) SUBCUTANEOUS
Refills: 0 | Status: DISCONTINUED | OUTPATIENT
Start: 2021-04-29 | End: 2021-05-04

## 2021-04-29 RX ORDER — INSULIN GLARGINE 100 [IU]/ML
6 INJECTION, SOLUTION SUBCUTANEOUS AT BEDTIME
Refills: 0 | Status: DISCONTINUED | OUTPATIENT
Start: 2021-04-29 | End: 2021-05-04

## 2021-04-29 RX ADMIN — Medication 1: at 11:36

## 2021-04-29 RX ADMIN — DORZOLAMIDE HYDROCHLORIDE 1 DROP(S): 20 SOLUTION/ DROPS OPHTHALMIC at 22:00

## 2021-04-29 RX ADMIN — Medication 3: at 07:58

## 2021-04-29 RX ADMIN — ATORVASTATIN CALCIUM 20 MILLIGRAM(S): 80 TABLET, FILM COATED ORAL at 21:59

## 2021-04-29 RX ADMIN — INSULIN GLARGINE 6 UNIT(S): 100 INJECTION, SOLUTION SUBCUTANEOUS at 22:01

## 2021-04-29 RX ADMIN — Medication 3: at 16:26

## 2021-04-29 RX ADMIN — LATANOPROST 1 DROP(S): 0.05 SOLUTION/ DROPS OPHTHALMIC; TOPICAL at 22:00

## 2021-04-29 RX ADMIN — SENNA PLUS 2 TABLET(S): 8.6 TABLET ORAL at 22:02

## 2021-04-29 RX ADMIN — POLYETHYLENE GLYCOL 3350 17 GRAM(S): 17 POWDER, FOR SOLUTION ORAL at 11:37

## 2021-04-29 RX ADMIN — RISPERIDONE 0.5 MILLIGRAM(S): 4 TABLET ORAL at 22:01

## 2021-04-29 RX ADMIN — MAGNESIUM HYDROXIDE 30 MILLILITER(S): 400 TABLET, CHEWABLE ORAL at 11:37

## 2021-04-29 NOTE — BH CONSULTATION LIAISON PROGRESS NOTE - NSBHCONSULTRECOMMENDOTHER_PSY_A_CORE FT
trazodone 25mg PO qhs for sleep changed to PRN to limit daytime sedation  continue risperdal 0.5mg Po qhs   - can be followed outpatient by a Geriatrician, Neurologist or Geriatric Psychiatrist to monitor worsening dementia symptoms

## 2021-04-29 NOTE — BH CONSULTATION LIAISON PROGRESS NOTE - OTHER
unable to ascertain due to cognitive deficits  looks confused  thin  unable to articulate  does not seem to be responding to internal stimuli

## 2021-04-29 NOTE — PROGRESS NOTE ADULT - ASSESSMENT
Pt is a 81 y/o female w/pmhx of legally blind, dm2, retinopathy and glaucoma and dementia sent in w/complain of hyperglycemia and also more confusion, agitation, not sleeping and agressive.  pt here lying in bed sleeping, ignores questions, and on tactile stimuli says leave me alone, movin all extremities.  pt doesn cooperative or offer any sx's.      Altered mental status, unspecified altered mental status type- improved suddenly today   Restarted on diet  Vitals stable, Labs stable  FS normalized  Blood and Urine Cx- neg to growth    CXR-Suspect early RIGHT upper lobe lung diffuse airspace disease., possible aspiration PNA? awaiting new CXR  CT of abdomen/pelvis- showing moderate amount of stool- constipation meds   Slurred speech?- will r/o infarct with MRI , pt does not seem to have any deficits on physical exam, MRI of head- not a good study- but states no infarct    Continue to hold Gabapentin, on Risperidol- Psych evaluation appreciated- pt has progressing Dementia    Hypomagnesemia- will supplement     Type 2 diabetes mellitus with hyperglycemia, with long-term current use of insulin.  Plan: ss insulin coverage.      Glaucoma of both eyes, unspecified glaucoma type.  Plan: cont out pt meds.     Constipation- CT of abdomen showing fecal impaction, laxatives     Dementia- monitor for delirium    : Preventive measure.  Plan: sq lovenox.   PT evaluation- pt needs Rehab    Dispo-awaiting CXR, COVID testing today---plan for REHAB discharge tomorrow      Pt is a 81 y/o female w/pmhx of legally blind, dm2, retinopathy and glaucoma and dementia sent in w/complain of hyperglycemia and also more confusion, agitation, not sleeping and agressive.  pt here lying in bed sleeping, ignores questions, and on tactile stimuli says leave me alone, movin all extremities.  pt doesn cooperative or offer any sx's.      Altered mental status, unspecified altered mental status type- on/off   Restarted on diet  Vitals stable, Labs stable   Blood and Urine Cx- neg to growth    CXR-Suspect early RIGHT upper lobe lung diffuse airspace disease., possible aspiration PNA? CXR repeated- no evidence of active disease  CT of abdomen/pelvis- showing moderate amount of stool- constipation meds   Slurred speech?- will r/o infarct with MRI , pt does not seem to have any deficits on physical exam, MRI of head- not a good study- but states no infarct  Continue to hold Gabapentin, on Risperidol- Psych evaluation appreciated- pt has progressing Dementia  FS- increasing now that patient is eating- as per daughter- becomes more aggressive when glucose increases    - appreciate Psych recs- AMS due to worsening dementia symptoms  -continue all anti-psych meds as suggested by Psychiatry     Type 2 diabetes mellitus with hyperglycemia, with long-term current use of insulin.  Plan: ss insulin coverage. Back on home insulin regiment  IF FS continue to increase, may consider Endocrinology consult     Glaucoma of both eyes, unspecified glaucoma type.  Plan: cont out pt meds.     Constipation- CT of abdomen showing fecal impaction, laxatives     Dementia- monitor for delirium    : Preventive measure.  Plan: sq lovenox.   PT evaluation- pt needs Rehab    Dispo- once able to get off 1:1 as per CM, may go to rehab, AMS due to advanced Dementia   Pt is a 81 y/o female w/pmhx of legally blind, dm2, retinopathy and glaucoma and dementia sent in w/complain of hyperglycemia and also more confusion, agitation, not sleeping and agressive.  pt here lying in bed sleeping, ignores questions, and on tactile stimuli says leave me alone, movin all extremities.  pt doesn cooperative or offer any sx's.      Altered mental status, unspecified altered mental status type- on/off   Restarted on diet  Vitals stable, Labs stable   Blood and Urine Cx- neg to growth    CXR-Suspect early RIGHT upper lobe lung diffuse airspace disease., possible aspiration PNA? CXR repeated- no evidence of active disease  CT of abdomen/pelvis- showing moderate amount of stool- constipation meds   Slurred speech?- will r/o infarct with MRI , pt does not seem to have any deficits on physical exam, MRI of head- not a good study- but states no infarct  Continue to hold Gabapentin, on Risperidol- Psych evaluation appreciated- pt has progressing Dementia  FS- increasing now that patient is eating- as per daughter- becomes more aggressive when glucose increases    - appreciate Psych recs- AMS due to worsening dementia symptoms  -continue all anti-psych meds as suggested by Psychiatry     Type 2 diabetes mellitus with hyperglycemia, with long-term current use of insulin.  Plan: ss insulin coverage. Back on home insulin regiment  IF FS continue to increase, may consider Endocrinology consult  AIC-8.9     Glaucoma of both eyes, unspecified glaucoma type.  Plan: cont out pt meds.     Constipation- CT of abdomen showing fecal impaction, laxatives     Dementia- monitor for delirium    : Preventive measure.  Plan: sq lovenox.   PT evaluation- pt needs Rehab    Dispo- once able to get off 1:1 as per CM, may go to rehab, AMS due to advanced Dementia

## 2021-04-29 NOTE — PROGRESS NOTE ADULT - SUBJECTIVE AND OBJECTIVE BOX
Patient is a 80y old  Female who presents with a chief complaint of hyperglycemia (24 Apr 2021 22:23)      INTERVAL HPI/OVERNIGHT EVENTS: Pt resting, ate breakfast has no complaints      MEDICATIONS  (STANDING):  atorvastatin 20 milliGRAM(s) Oral at bedtime  dorzolamide 2% Ophthalmic Solution 1 Drop(s) Both EYES three times a day  lactated ringers. 1000 milliLiter(s) (75 mL/Hr) IV Continuous <Continuous>  latanoprost 0.005% Ophthalmic Solution 1 Drop(s) Both EYES at bedtime  magnesium hydroxide Suspension 30 milliLiter(s) Oral daily  polyethylene glycol 3350 17 Gram(s) Oral daily  risperiDONE  Disintegrating Tablet 0.5 milliGRAM(s) Oral at bedtime  senna 2 Tablet(s) Oral at bedtime    MEDICATIONS  (PRN):  hydrALAZINE Injectable 10 milliGRAM(s) IV Push three times a day PRN elevated bp  LORazepam   Injectable 0.5 milliGRAM(s) IV Push every 6 hours PRN Agitation  traZODone 25 milliGRAM(s) Oral at bedtime PRN insomnia / HS restlessness        Allergies    No Known Allergies    Intolerances        REVIEW OF SYSTEMS:  unable to obtain due to ams    ICU Vital Signs Last 24 Hrs  T(C): 36.7 (28 Apr 2021 10:41), Max: 36.7 (28 Apr 2021 10:41)  T(F): 98.1 (28 Apr 2021 10:41), Max: 98.1 (28 Apr 2021 10:41)  HR: 70 (28 Apr 2021 14:00) (70 - 78)  BP: 106/66 (28 Apr 2021 14:00) (106/66 - 146/69)  BP(mean): --  ABP: --  ABP(mean): --  RR: 18 (28 Apr 2021 14:00) (16 - 18)  SpO2: 97% (28 Apr 2021 14:00) (96% - 98%)        PHYSICAL EXAM:  GENERAL: NAD, well-groomed, well-developed  HEAD:  Atraumatic, Normocephalic  EYES: EOMI, PERRLA, conjunctiva and sclera clear  ENMT: No tonsillar erythema, exudates, or enlargement; Moist mucous membranes, Good dentition, No lesions  NECK: Supple, No JVD, Normal thyroid  NERVOUS SYSTEM:  Alert & Oriented X2, no motor or sensory deficits. Knows she's in the hospital, knows her name, no clue about date  CHEST/LUNG: Clear to percussion bilaterally; No rales, rhonchi, wheezing, or rubs  HEART: Regular rate and rhythm; No murmurs, rubs, or gallops  ABDOMEN: Soft, Nontender, Nondistended; Bowel sounds present  EXTREMITIES:  2+ Peripheral Pulses, No clubbing, cyanosis, or edema  LYMPH: No lymphadenopathy noted  SKIN: No rashes or lesions                            10.5   5.80  )-----------( 224      ( 28 Apr 2021 08:27 )             34.0     04-28    143  |  104  |  19  ----------------------------<  108<H>  3.8   |  30  |  0.94    Ca    8.7      28 Apr 2021 08:27  Mg     2.1     04-28                           Patient is a 80y old  Female who presents with a chief complaint of hyperglycemia (24 Apr 2021 22:23)      INTERVAL HPI/OVERNIGHT EVENTS: Pt was agitated as per nursing again overnight  Continue 1:1 for now  FS higher than 200 at times    MEDICATIONS  (STANDING):  atorvastatin 20 milliGRAM(s) Oral at bedtime  dextrose 40% Gel 15 Gram(s) Oral once  dextrose 5%. 1000 milliLiter(s) (50 mL/Hr) IV Continuous <Continuous>  dextrose 5%. 1000 milliLiter(s) (100 mL/Hr) IV Continuous <Continuous>  dextrose 50% Injectable 25 Gram(s) IV Push once  dextrose 50% Injectable 12.5 Gram(s) IV Push once  dextrose 50% Injectable 25 Gram(s) IV Push once  dorzolamide 2% Ophthalmic Solution 1 Drop(s) Both EYES three times a day  glucagon  Injectable 1 milliGRAM(s) IntraMuscular once  insulin glargine Injectable (LANTUS) 6 Unit(s) SubCutaneous at bedtime  insulin lispro (ADMELOG) corrective regimen sliding scale   SubCutaneous three times a day before meals  lactated ringers. 1000 milliLiter(s) (75 mL/Hr) IV Continuous <Continuous>  latanoprost 0.005% Ophthalmic Solution 1 Drop(s) Both EYES at bedtime  magnesium hydroxide Suspension 30 milliLiter(s) Oral daily  polyethylene glycol 3350 17 Gram(s) Oral daily  risperiDONE  Disintegrating Tablet 0.5 milliGRAM(s) Oral at bedtime  senna 2 Tablet(s) Oral at bedtime    MEDICATIONS  (PRN):  hydrALAZINE Injectable 10 milliGRAM(s) IV Push three times a day PRN elevated bp  LORazepam   Injectable 0.5 milliGRAM(s) IV Push every 6 hours PRN Agitation  traZODone 25 milliGRAM(s) Oral at bedtime PRN insomnia / HS restlessness        Allergies    No Known Allergies    Intolerances        REVIEW OF SYSTEMS:  unable to obtain due to ams    ICU Vital Signs Last 24 Hrs  T(C): 36.3 (29 Apr 2021 17:04), Max: 36.7 (28 Apr 2021 23:48)  T(F): 97.4 (29 Apr 2021 17:04), Max: 98.1 (29 Apr 2021 06:54)  HR: 79 (29 Apr 2021 17:04) (72 - 89)  BP: 114/62 (29 Apr 2021 17:04) (114/62 - 128/75)  BP(mean): --  ABP: --  ABP(mean): --  RR: 18 (29 Apr 2021 17:04) (16 - 18)  SpO2: 97% (29 Apr 2021 17:04) (96% - 100%)          PHYSICAL EXAM:  GENERAL: NAD, well-groomed, well-developed  HEAD:  Atraumatic, Normocephalic  EYES: EOMI, PERRLA, conjunctiva and sclera clear  ENMT: No tonsillar erythema, exudates, or enlargement; Moist mucous membranes, Good dentition, No lesions  NECK: Supple, No JVD, Normal thyroid  NERVOUS SYSTEM:  Alert & Oriented X1, sleepy  CHEST/LUNG: Clear to percussion bilaterally; No rales, rhonchi, wheezing, or rubs  HEART: Regular rate and rhythm; No murmurs, rubs, or gallops  ABDOMEN: Soft, Nontender, Nondistended; Bowel sounds present  EXTREMITIES:  2+ Peripheral Pulses, No clubbing, cyanosis, or edema  LYMPH: No lymphadenopathy noted  SKIN: No rashes or lesions                                       10.5   5.80  )-----------( 224      ( 28 Apr 2021 08:27 )             34.0     04-28    143  |  104  |  19  ----------------------------<  108<H>  3.8   |  30  |  0.94    Ca    8.7      28 Apr 2021 08:27  Mg     2.1     04-28

## 2021-04-30 LAB
ANION GAP SERPL CALC-SCNC: 7 MMOL/L — SIGNIFICANT CHANGE UP (ref 5–17)
BUN SERPL-MCNC: 37 MG/DL — HIGH (ref 7–23)
CALCIUM SERPL-MCNC: 8.8 MG/DL — SIGNIFICANT CHANGE UP (ref 8.5–10.1)
CHLORIDE SERPL-SCNC: 104 MMOL/L — SIGNIFICANT CHANGE UP (ref 96–108)
CO2 SERPL-SCNC: 29 MMOL/L — SIGNIFICANT CHANGE UP (ref 22–31)
CREAT SERPL-MCNC: 1.33 MG/DL — HIGH (ref 0.5–1.3)
CULTURE RESULTS: SIGNIFICANT CHANGE UP
CULTURE RESULTS: SIGNIFICANT CHANGE UP
GLUCOSE BLDC GLUCOMTR-MCNC: 138 MG/DL — HIGH (ref 70–99)
GLUCOSE BLDC GLUCOMTR-MCNC: 157 MG/DL — HIGH (ref 70–99)
GLUCOSE BLDC GLUCOMTR-MCNC: 249 MG/DL — HIGH (ref 70–99)
GLUCOSE BLDC GLUCOMTR-MCNC: 98 MG/DL — SIGNIFICANT CHANGE UP (ref 70–99)
GLUCOSE SERPL-MCNC: 144 MG/DL — HIGH (ref 70–99)
HCT VFR BLD CALC: 36.5 % — SIGNIFICANT CHANGE UP (ref 34.5–45)
HGB BLD-MCNC: 11.1 G/DL — LOW (ref 11.5–15.5)
MAGNESIUM SERPL-MCNC: 2 MG/DL — SIGNIFICANT CHANGE UP (ref 1.6–2.6)
MCHC RBC-ENTMCNC: 22.6 PG — LOW (ref 27–34)
MCHC RBC-ENTMCNC: 30.4 GM/DL — LOW (ref 32–36)
MCV RBC AUTO: 74.2 FL — LOW (ref 80–100)
NRBC # BLD: 0 /100 WBCS — SIGNIFICANT CHANGE UP (ref 0–0)
PLATELET # BLD AUTO: 239 K/UL — SIGNIFICANT CHANGE UP (ref 150–400)
POTASSIUM SERPL-MCNC: 3.1 MMOL/L — LOW (ref 3.5–5.3)
POTASSIUM SERPL-SCNC: 3.1 MMOL/L — LOW (ref 3.5–5.3)
RBC # BLD: 4.92 M/UL — SIGNIFICANT CHANGE UP (ref 3.8–5.2)
RBC # FLD: 15 % — HIGH (ref 10.3–14.5)
SODIUM SERPL-SCNC: 140 MMOL/L — SIGNIFICANT CHANGE UP (ref 135–145)
SPECIMEN SOURCE: SIGNIFICANT CHANGE UP
SPECIMEN SOURCE: SIGNIFICANT CHANGE UP
WBC # BLD: 7.01 K/UL — SIGNIFICANT CHANGE UP (ref 3.8–10.5)
WBC # FLD AUTO: 7.01 K/UL — SIGNIFICANT CHANGE UP (ref 3.8–10.5)

## 2021-04-30 PROCEDURE — 99232 SBSQ HOSP IP/OBS MODERATE 35: CPT

## 2021-04-30 PROCEDURE — 99231 SBSQ HOSP IP/OBS SF/LOW 25: CPT

## 2021-04-30 RX ORDER — POTASSIUM CHLORIDE 20 MEQ
40 PACKET (EA) ORAL ONCE
Refills: 0 | Status: COMPLETED | OUTPATIENT
Start: 2021-04-30 | End: 2021-04-30

## 2021-04-30 RX ORDER — RISPERIDONE 4 MG/1
0.25 TABLET ORAL EVERY 8 HOURS
Refills: 0 | Status: DISCONTINUED | OUTPATIENT
Start: 2021-04-30 | End: 2021-05-04

## 2021-04-30 RX ORDER — LACTULOSE 10 G/15ML
10 SOLUTION ORAL
Refills: 0 | Status: COMPLETED | OUTPATIENT
Start: 2021-04-30 | End: 2021-05-01

## 2021-04-30 RX ADMIN — LACTULOSE 10 GRAM(S): 10 SOLUTION ORAL at 16:11

## 2021-04-30 RX ADMIN — Medication 40 MILLIEQUIVALENT(S): at 14:37

## 2021-04-30 RX ADMIN — POLYETHYLENE GLYCOL 3350 17 GRAM(S): 17 POWDER, FOR SOLUTION ORAL at 11:34

## 2021-04-30 RX ADMIN — ENOXAPARIN SODIUM 40 MILLIGRAM(S): 100 INJECTION SUBCUTANEOUS at 11:33

## 2021-04-30 RX ADMIN — DORZOLAMIDE HYDROCHLORIDE 1 DROP(S): 20 SOLUTION/ DROPS OPHTHALMIC at 13:19

## 2021-04-30 RX ADMIN — Medication 1: at 07:45

## 2021-04-30 RX ADMIN — DORZOLAMIDE HYDROCHLORIDE 1 DROP(S): 20 SOLUTION/ DROPS OPHTHALMIC at 21:05

## 2021-04-30 RX ADMIN — MAGNESIUM HYDROXIDE 30 MILLILITER(S): 400 TABLET, CHEWABLE ORAL at 11:34

## 2021-04-30 RX ADMIN — ATORVASTATIN CALCIUM 20 MILLIGRAM(S): 80 TABLET, FILM COATED ORAL at 21:08

## 2021-04-30 RX ADMIN — LATANOPROST 1 DROP(S): 0.05 SOLUTION/ DROPS OPHTHALMIC; TOPICAL at 21:09

## 2021-04-30 RX ADMIN — RISPERIDONE 0.5 MILLIGRAM(S): 4 TABLET ORAL at 21:08

## 2021-04-30 RX ADMIN — Medication 2: at 16:11

## 2021-04-30 RX ADMIN — DORZOLAMIDE HYDROCHLORIDE 1 DROP(S): 20 SOLUTION/ DROPS OPHTHALMIC at 05:42

## 2021-04-30 RX ADMIN — SENNA PLUS 2 TABLET(S): 8.6 TABLET ORAL at 21:07

## 2021-04-30 RX ADMIN — INSULIN GLARGINE 6 UNIT(S): 100 INJECTION, SOLUTION SUBCUTANEOUS at 21:04

## 2021-04-30 NOTE — PROGRESS NOTE ADULT - SUBJECTIVE AND OBJECTIVE BOX
I am inheriting this patient on today 4/30/2021     Patient is a 80y old  Female who presents with a chief complaint of hyperglycemia (24 Apr 2021 22:23)      INTERVAL HPI/OVERNIGHT EVENTS:     MEDICATIONS  (STANDING):  atorvastatin 20 milliGRAM(s) Oral at bedtime  dextrose 40% Gel 15 Gram(s) Oral once  dextrose 5%. 1000 milliLiter(s) (50 mL/Hr) IV Continuous <Continuous>  dextrose 5%. 1000 milliLiter(s) (100 mL/Hr) IV Continuous <Continuous>  dextrose 50% Injectable 25 Gram(s) IV Push once  dextrose 50% Injectable 12.5 Gram(s) IV Push once  dextrose 50% Injectable 25 Gram(s) IV Push once  dorzolamide 2% Ophthalmic Solution 1 Drop(s) Both EYES three times a day  enoxaparin Injectable 40 milliGRAM(s) SubCutaneous daily  glucagon  Injectable 1 milliGRAM(s) IntraMuscular once  insulin glargine Injectable (LANTUS) 6 Unit(s) SubCutaneous at bedtime  insulin lispro (ADMELOG) corrective regimen sliding scale   SubCutaneous three times a day before meals  latanoprost 0.005% Ophthalmic Solution 1 Drop(s) Both EYES at bedtime  magnesium hydroxide Suspension 30 milliLiter(s) Oral daily  polyethylene glycol 3350 17 Gram(s) Oral daily  risperiDONE  Disintegrating Tablet 0.5 milliGRAM(s) Oral at bedtime  senna 2 Tablet(s) Oral at bedtime    MEDICATIONS  (PRN):  hydrALAZINE Injectable 10 milliGRAM(s) IV Push three times a day PRN elevated bp  LORazepam   Injectable 0.5 milliGRAM(s) IV Push every 6 hours PRN Agitation  risperiDONE   Tablet 0.25 milliGRAM(s) Oral every 8 hours PRN mild agitation  traZODone 25 milliGRAM(s) Oral at bedtime PRN insomnia / HS restlessness        Allergies    No Known Allergies    Intolerances        REVIEW OF SYSTEMS:  unable to obtain due to ams    Vital Signs Last 24 Hrs  T(C): 36.4 (30 Apr 2021 11:18), Max: 36.5 (30 Apr 2021 06:16)  T(F): 97.5 (30 Apr 2021 11:18), Max: 97.7 (30 Apr 2021 06:16)  HR: 77 (30 Apr 2021 12:35) (73 - 79)  BP: 125/75 (30 Apr 2021 11:18) (114/62 - 125/75)  BP(mean): --  RR: 18 (30 Apr 2021 12:35) (17 - 18)  SpO2: 99% (30 Apr 2021 12:35) (95% - 100%)        PHYSICAL EXAM:  GENERAL: NAD, well-groomed, well-developed  HEAD:  Atraumatic, Normocephalic  EYES: EOMI, PERRLA, conjunctiva and sclera clear  ENMT: No tonsillar erythema, exudates, or enlargement; Moist mucous membranes, Good dentition, No lesions  NECK: Supple, No JVD, Normal thyroid  NERVOUS SYSTEM:  Alert & Oriented X1, sleepy  CHEST/LUNG: Clear to percussion bilaterally; No rales, rhonchi, wheezing, or rubs  HEART: Regular rate and rhythm; No murmurs, rubs, or gallops  ABDOMEN: Soft, Nontender, Nondistended; Bowel sounds present  EXTREMITIES:  2+ Peripheral Pulses, No clubbing, cyanosis, or edema  SKIN: No rashes or lesions                  LABS:                                11.1   7.01  )-----------( 239      ( 30 Apr 2021 08:31 )             36.5   04-30    140  |  104  |  37<H>  ----------------------------<  144<H>  3.1<L>   |  29  |  1.33<H>    Ca    8.8      30 Apr 2021 08:31  Mg     2.0     04-30              CAPILLARY BLOOD GLUCOSE      POCT Blood Glucose.: 138 mg/dL (30 Apr 2021 10:49)  POCT Blood Glucose.: 157 mg/dL (30 Apr 2021 07:21)  POCT Blood Glucose.: 140 mg/dL (29 Apr 2021 20:22)  POCT Blood Glucose.: 285 mg/dL (29 Apr 2021 15:53)

## 2021-04-30 NOTE — DIETITIAN INITIAL EVALUATION ADULT. - OTHER INFO
Pt appeared lethargic, confused; unable to obtain diet hx or wt hx due to confused state/advanced dementia noted. Per chart review, pt lives at home with private hire aide; dependent for ALDs. Per swallow evaluation on 4/28, SLP recommended regular food consistency-thin liquids. Per nursing assistant, pt with no difficulty chewing/swallowing; eating % of meals; requires total assistance as pt legally blind & cannot see food/feed herself.

## 2021-04-30 NOTE — BH CONSULTATION LIAISON PROGRESS NOTE - OTHER
unable to ascertain due to cognitive deficits  unable to articulate  does not seem to be responding to internal stimuli  thin  looks confused

## 2021-04-30 NOTE — DIETITIAN INITIAL EVALUATION ADULT. - PERTINENT MEDS FT
MEDICATIONS  (STANDING):  atorvastatin 20 milliGRAM(s) Oral at bedtime  dextrose 40% Gel 15 Gram(s) Oral once  dextrose 5%. 1000 milliLiter(s) (50 mL/Hr) IV Continuous <Continuous>  dextrose 5%. 1000 milliLiter(s) (100 mL/Hr) IV Continuous <Continuous>  dextrose 50% Injectable 25 Gram(s) IV Push once  dextrose 50% Injectable 12.5 Gram(s) IV Push once  dextrose 50% Injectable 25 Gram(s) IV Push once  dorzolamide 2% Ophthalmic Solution 1 Drop(s) Both EYES three times a day  enoxaparin Injectable 40 milliGRAM(s) SubCutaneous daily  glucagon  Injectable 1 milliGRAM(s) IntraMuscular once  insulin glargine Injectable (LANTUS) 6 Unit(s) SubCutaneous at bedtime  insulin lispro (ADMELOG) corrective regimen sliding scale   SubCutaneous three times a day before meals  latanoprost 0.005% Ophthalmic Solution 1 Drop(s) Both EYES at bedtime  magnesium hydroxide Suspension 30 milliLiter(s) Oral daily  polyethylene glycol 3350 17 Gram(s) Oral daily  risperiDONE  Disintegrating Tablet 0.5 milliGRAM(s) Oral at bedtime  senna 2 Tablet(s) Oral at bedtime    MEDICATIONS  (PRN):  hydrALAZINE Injectable 10 milliGRAM(s) IV Push three times a day PRN elevated bp  LORazepam   Injectable 0.5 milliGRAM(s) IV Push every 6 hours PRN Agitation  risperiDONE   Tablet 0.25 milliGRAM(s) Oral every 8 hours PRN mild agitation  traZODone 25 milliGRAM(s) Oral at bedtime PRN insomnia / HS restlessness

## 2021-04-30 NOTE — BH CONSULTATION LIAISON PROGRESS NOTE - NSBHCONSULTRECOMMENDOTHER_PSY_A_CORE FT
trazodone 25mg PO qhs for sleep changed to PRN to limit daytime sedation  continue risperdal 0.5mg Po qhs; can use risperdal 0.5mg PO q8hrs PRN for mild agitation   - can be followed outpatient by a Geriatrician, Neurologist or Geriatric Psychiatrist to monitor worsening dementia symptoms trazodone 25mg PO qhs for sleep changed to PRN to limit daytime sedation  continue risperdal 0.5mg Po qhs; can use risperdal 0.25mg PO q8hrs PRN for mild agitation   - can be followed outpatient by a Geriatrician, Neurologist or Geriatric Psychiatrist to monitor worsening dementia symptoms

## 2021-04-30 NOTE — PROGRESS NOTE ADULT - ASSESSMENT
Pt is a 81 y/o female w/pmhx of legally blind, dm2, retinopathy and glaucoma and dementia sent in w/complain of hyperglycemia and also more confusion, agitation, not sleeping and agressive.  pt here lying in bed sleeping, ignores questions, and on tactile stimuli says leave me alone, movin all extremities.  pt doesn cooperative or offer any sx's.      Altered mental stat presumed due to dementia appraciet psych note   mri brain done by colleague negatived for acute intracranial event     continue with meds as outline by [place] .        Type 2 diabetes mellitus with hyperglycemia, with long-term current use of insulin.  Plan: ss insulin coverage. Back on home insulin regiment  FS fairly stable   AIC-8.9     Glaucoma of both eyes, unspecified glaucoma type.  Plan: cont out pt meds.     Constipation- CT of abdomen showing fecal impaction, laxatives     Dementia- monitor for delirium    : Preventive measure.  Plan: sq lovenox.   PT evaluation- pt needs Rehab    Dispo- once able to get off 1:1 as per CM, may go to rehab, AMS due to advanced Dementia

## 2021-04-30 NOTE — DIETITIAN NUTRITION RISK NOTIFICATION - TREATMENT: THE FOLLOWING DIET HAS BEEN RECOMMENDED
Diet, Consistent Carbohydrate w/Evening Snack:   Supplement Feeding Modality:  Oral  Glucerna Shake Cans or Servings Per Day:  1       Frequency:  Three Times a day (04-30-21 @ 15:08) [Pending Verification By Attending]  Diet, Consistent Carbohydrate w/Evening Snack (04-26-21 @ 10:36) [Active]

## 2021-04-30 NOTE — DIETITIAN INITIAL EVALUATION ADULT. - PERTINENT LABORATORY DATA
04-30 Na140 mmol/L Glu 144 mg/dL<H> K+ 3.1 mmol/L<L> Cr  1.33 mg/dL<H> BUN 37 mg/dL<H> WBC 7.01 K/uL 04-26 Alb 3.2 g/dL<L>

## 2021-05-01 LAB
ANION GAP SERPL CALC-SCNC: 4 MMOL/L — LOW (ref 5–17)
BUN SERPL-MCNC: 32 MG/DL — HIGH (ref 7–23)
CALCIUM SERPL-MCNC: 9 MG/DL — SIGNIFICANT CHANGE UP (ref 8.5–10.1)
CHLORIDE SERPL-SCNC: 107 MMOL/L — SIGNIFICANT CHANGE UP (ref 96–108)
CO2 SERPL-SCNC: 30 MMOL/L — SIGNIFICANT CHANGE UP (ref 22–31)
CREAT SERPL-MCNC: 1.09 MG/DL — SIGNIFICANT CHANGE UP (ref 0.5–1.3)
GLUCOSE BLDC GLUCOMTR-MCNC: 100 MG/DL — HIGH (ref 70–99)
GLUCOSE BLDC GLUCOMTR-MCNC: 144 MG/DL — HIGH (ref 70–99)
GLUCOSE BLDC GLUCOMTR-MCNC: 186 MG/DL — HIGH (ref 70–99)
GLUCOSE BLDC GLUCOMTR-MCNC: 205 MG/DL — HIGH (ref 70–99)
GLUCOSE SERPL-MCNC: 184 MG/DL — HIGH (ref 70–99)
MAGNESIUM SERPL-MCNC: 2.1 MG/DL — SIGNIFICANT CHANGE UP (ref 1.6–2.6)
PHOSPHATE SERPL-MCNC: 3.1 MG/DL — SIGNIFICANT CHANGE UP (ref 2.5–4.5)
POTASSIUM SERPL-MCNC: 4 MMOL/L — SIGNIFICANT CHANGE UP (ref 3.5–5.3)
POTASSIUM SERPL-SCNC: 4 MMOL/L — SIGNIFICANT CHANGE UP (ref 3.5–5.3)
SODIUM SERPL-SCNC: 141 MMOL/L — SIGNIFICANT CHANGE UP (ref 135–145)

## 2021-05-01 PROCEDURE — 99232 SBSQ HOSP IP/OBS MODERATE 35: CPT

## 2021-05-01 RX ADMIN — ENOXAPARIN SODIUM 40 MILLIGRAM(S): 100 INJECTION SUBCUTANEOUS at 11:11

## 2021-05-01 RX ADMIN — DORZOLAMIDE HYDROCHLORIDE 1 DROP(S): 20 SOLUTION/ DROPS OPHTHALMIC at 06:13

## 2021-05-01 RX ADMIN — INSULIN GLARGINE 6 UNIT(S): 100 INJECTION, SOLUTION SUBCUTANEOUS at 21:19

## 2021-05-01 RX ADMIN — LACTULOSE 10 GRAM(S): 10 SOLUTION ORAL at 06:15

## 2021-05-01 RX ADMIN — LACTULOSE 10 GRAM(S): 10 SOLUTION ORAL at 17:29

## 2021-05-01 RX ADMIN — RISPERIDONE 0.25 MILLIGRAM(S): 4 TABLET ORAL at 02:20

## 2021-05-01 RX ADMIN — Medication 0.5 MILLIGRAM(S): at 11:39

## 2021-05-01 RX ADMIN — Medication 1: at 08:25

## 2021-05-01 NOTE — PROGRESS NOTE ADULT - SUBJECTIVE AND OBJECTIVE BOX
unable to obtain due to Altered Mental Status secondary to  	Vital sign as per floor protocol  	Bed rest  	Neurocheck Q 4 hourly  	Seizure, Fall, Aspiration precaution  	Maintain airway  	Oximetry monitoring  	O2- 2 liters by NC to keep SaO2>94%             	CT head is negative  	Neurology consulted, will follow up.  	Awaiting to MRI today.  	Toxicology screen, TSH, Vit B12, RPR sent  	Lorazepam 2mg as per neuro recommendations PRN for seizure I am inheriting this patient on today 4/30/2021     Patient is a 80y old  Female who presents with a chief complaint of hyperglycemia (24 Apr 2021 22:23)      INTERVAL HPI/OVERNIGHT EVENTS:     MEDICATIONS  (STANDING):  atorvastatin 20 milliGRAM(s) Oral at bedtime  dextrose 40% Gel 15 Gram(s) Oral once  dextrose 5%. 1000 milliLiter(s) (50 mL/Hr) IV Continuous <Continuous>  dextrose 5%. 1000 milliLiter(s) (100 mL/Hr) IV Continuous <Continuous>  dextrose 50% Injectable 25 Gram(s) IV Push once  dextrose 50% Injectable 12.5 Gram(s) IV Push once  dextrose 50% Injectable 25 Gram(s) IV Push once  dorzolamide 2% Ophthalmic Solution 1 Drop(s) Both EYES three times a day  enoxaparin Injectable 40 milliGRAM(s) SubCutaneous daily  glucagon  Injectable 1 milliGRAM(s) IntraMuscular once  insulin glargine Injectable (LANTUS) 6 Unit(s) SubCutaneous at bedtime  insulin lispro (ADMELOG) corrective regimen sliding scale   SubCutaneous three times a day before meals  lactulose Syrup 10 Gram(s) Oral two times a day  latanoprost 0.005% Ophthalmic Solution 1 Drop(s) Both EYES at bedtime  magnesium hydroxide Suspension 30 milliLiter(s) Oral daily  polyethylene glycol 3350 17 Gram(s) Oral daily  risperiDONE  Disintegrating Tablet 0.5 milliGRAM(s) Oral at bedtime  senna 2 Tablet(s) Oral at bedtime    MEDICATIONS  (PRN):  hydrALAZINE Injectable 10 milliGRAM(s) IV Push three times a day PRN elevated bp  LORazepam   Injectable 0.5 milliGRAM(s) IV Push every 6 hours PRN Agitation  risperiDONE   Tablet 0.25 milliGRAM(s) Oral every 8 hours PRN mild agitation  traZODone 25 milliGRAM(s) Oral at bedtime PRN insomnia / HS restlessness          Allergies    No Known Allergies    Intolerances        REVIEW OF SYSTEMS:  unable to obtain due to ams    Vital Signs Last 24 Hrs  T(C): 36.4 (01 May 2021 05:43), Max: 36.4 (30 Apr 2021 11:18)  T(F): 97.5 (01 May 2021 05:43), Max: 97.5 (30 Apr 2021 11:18)  HR: 81 (01 May 2021 05:43) (73 - 81)  BP: 135/72 (01 May 2021 05:43) (112/70 - 135/72)  BP(mean): --  RR: 18 (01 May 2021 05:43) (17 - 18)  SpO2: 98% (01 May 2021 05:43) (97% - 100%)      PHYSICAL EXAM:  GENERAL: NAD, well-groomed, well-developed  HEAD:  Atraumatic, Normocephalic  EYES: EOMI, PERRLA, conjunctiva and sclera clear  ENMT: No tonsillar erythema, exudates, or enlargement; Moist mucous membranes, Good dentition, No lesions  NECK: Supple, No JVD, Normal thyroid  NERVOUS SYSTEM:  Alert & demented  X1,   CHEST/LUNG: Clear to percussion bilaterally; No rales, rhonchi, wheezing, or rubs  HEART: Regular rate and rhythm; No murmurs, rubs, or gallops  ABDOMEN: Soft, Nontender, Nondistended; Bowel sounds present  EXTREMITIES:  2+ Peripheral Pulses, No clubbing, cyanosis, or edema  SKIN: No rashes or lesions                  LABS:                                         11.1   7.01  )-----------( 239      ( 30 Apr 2021 08:31 )             36.5   05-01    141  |  107  |  32<H>  ----------------------------<  184<H>  4.0   |  30  |  1.09    Ca    9.0      01 May 2021 07:25  Phos  3.1     05-01  Mg     2.1     05-01                CAPILLARY BLOOD GLUCOSE  CAPILLARY BLOOD GLUCOSE      POCT Blood Glucose.: 186 mg/dL (01 May 2021 07:32)  POCT Blood Glucose.: 98 mg/dL (30 Apr 2021 20:29)  POCT Blood Glucose.: 249 mg/dL (30 Apr 2021 15:29)  POCT Blood Glucose.: 138 mg/dL (30 Apr 2021 10:49)                        I am inheriting this patient on today 4/30/2021     Patient is a 80y old  Female who presents with a chief complaint of hyperglycemia (24 Apr 2021 22:23)      INTERVAL HPI/OVERNIGHT EVENTS:     MEDICATIONS  (STANDING):  atorvastatin 20 milliGRAM(s) Oral at bedtime  dextrose 40% Gel 15 Gram(s) Oral once  dextrose 5%. 1000 milliLiter(s) (50 mL/Hr) IV Continuous <Continuous>  dextrose 5%. 1000 milliLiter(s) (100 mL/Hr) IV Continuous <Continuous>  dextrose 50% Injectable 25 Gram(s) IV Push once  dextrose 50% Injectable 12.5 Gram(s) IV Push once  dextrose 50% Injectable 25 Gram(s) IV Push once  dorzolamide 2% Ophthalmic Solution 1 Drop(s) Both EYES three times a day  enoxaparin Injectable 40 milliGRAM(s) SubCutaneous daily  glucagon  Injectable 1 milliGRAM(s) IntraMuscular once  insulin glargine Injectable (LANTUS) 6 Unit(s) SubCutaneous at bedtime  insulin lispro (ADMELOG) corrective regimen sliding scale   SubCutaneous three times a day before meals  lactulose Syrup 10 Gram(s) Oral two times a day  latanoprost 0.005% Ophthalmic Solution 1 Drop(s) Both EYES at bedtime  magnesium hydroxide Suspension 30 milliLiter(s) Oral daily  polyethylene glycol 3350 17 Gram(s) Oral daily  risperiDONE  Disintegrating Tablet 0.5 milliGRAM(s) Oral at bedtime  senna 2 Tablet(s) Oral at bedtime    MEDICATIONS  (PRN):  hydrALAZINE Injectable 10 milliGRAM(s) IV Push three times a day PRN elevated bp  LORazepam   Injectable 0.5 milliGRAM(s) IV Push every 6 hours PRN Agitation  risperiDONE   Tablet 0.25 milliGRAM(s) Oral every 8 hours PRN mild agitation  traZODone 25 milliGRAM(s) Oral at bedtime PRN insomnia / HS restlessness          Allergies    No Known Allergies    Intolerances        REVIEW OF SYSTEMS:  unable to obtain due to ams    Vital Signs Last 24 Hrs  T(C): 36.4 (01 May 2021 05:43), Max: 36.4 (30 Apr 2021 11:18)  T(F): 97.5 (01 May 2021 05:43), Max: 97.5 (30 Apr 2021 11:18)  HR: 81 (01 May 2021 05:43) (73 - 81)  BP: 135/72 (01 May 2021 05:43) (112/70 - 135/72)  BP(mean): --  RR: 18 (01 May 2021 05:43) (17 - 18)  SpO2: 98% (01 May 2021 05:43) (97% - 100%)      PHYSICAL EXAM:  GENERAL: NAD, well-groomed, well-developed  HEAD:  Atraumatic, Normocephalic  EYES: EOMI, PERRLA, conjunctiva and sclera clear  ENMT: No tonsillar erythema, exudates, or enlargement; Moist mucous membranes, Good dentition, No lesions  NECK: Supple, No JVD, Normal thyroid  NERVOUS SYSTEM:  Alert & demented  X1,   CHEST/LUNG: Clear to percussion bilaterally; No rales, rhonchi, wheezing, or rubs  HEART: Regular rate and rhythm; No murmurs, rubs, or gallops  ABDOMEN: Soft, Nontender, Nondistended; Bowel sounds present  EXTREMITIES:  2+ Peripheral Pulses, No clubbing, cyanosis, or edema  SKIN: No rashes or lesions                  LABS:                                         11.1   7.01  )-----------( 239      ( 30 Apr 2021 08:31 )             36.5   05-01    141  |  107  |  32<H>  ----------------------------<  184<H>  4.0   |  30  |  1.09    Ca    9.0      01 May 2021 07:25  Phos  3.1     05-01  Mg     2.1     05-01                CAPILLARY BLOOD GLUCOSE  CAPILLARY BLOOD GLUCOSE      POCT Blood Glucose.: 186 mg/dL (01 May 2021 07:32)  POCT Blood Glucose.: 98 mg/dL (30 Apr 2021 20:29)  POCT Blood Glucose.: 249 mg/dL (30 Apr 2021 15:29)  POCT Blood Glucose.: 138 mg/dL (30 Apr 2021 10:49)

## 2021-05-01 NOTE — PROGRESS NOTE ADULT - ASSESSMENT
Pt is a 79 y/o female w/pmhx of legally blind, dm2, retinopathy and glaucoma and dementia sent in w/complain of hyperglycemia and also more confusion, agitation, not sleeping and agressive.  pt here lying in bed sleeping, ignores questions, and on tactile stimuli says leave me alone, movin all extremities.  pt doesn cooperative or offer any sx's.      Altered mental stat presumed due to dementia appraciet psych note   mri brain done by colleague negatived for acute intracranial event     continue with meds as outline by [place] .        Type 2 diabetes mellitus with hyperglycemia, with long-term current use of insulin.  Plan: ss insulin coverage. Back on home insulin regiment  FS fairly stable   AIC-8.9     Glaucoma of both eyes, unspecified glaucoma type.  Plan: cont out pt meds.     Constipation- CT of abdomen showing fecal impaction, laxatives     Dementia- monitor for delirium    : Preventive measure.  Plan: sq lovenox.   PT evaluation- pt needs Rehab    Dispo- once able to get off 1:1 as per CM, may go to rehab, AMS due to advanced Dementia   5/1/2021 ready for d/c off one to one  SW is aware jhon will be sent

## 2021-05-02 LAB
GLUCOSE BLDC GLUCOMTR-MCNC: 101 MG/DL — HIGH (ref 70–99)
GLUCOSE BLDC GLUCOMTR-MCNC: 125 MG/DL — HIGH (ref 70–99)
GLUCOSE BLDC GLUCOMTR-MCNC: 176 MG/DL — HIGH (ref 70–99)
GLUCOSE BLDC GLUCOMTR-MCNC: 88 MG/DL — SIGNIFICANT CHANGE UP (ref 70–99)

## 2021-05-02 PROCEDURE — 99231 SBSQ HOSP IP/OBS SF/LOW 25: CPT

## 2021-05-02 RX ORDER — AMLODIPINE BESYLATE 2.5 MG/1
5 TABLET ORAL DAILY
Refills: 0 | Status: DISCONTINUED | OUTPATIENT
Start: 2021-05-02 | End: 2021-05-04

## 2021-05-02 RX ADMIN — ATORVASTATIN CALCIUM 20 MILLIGRAM(S): 80 TABLET, FILM COATED ORAL at 21:48

## 2021-05-02 RX ADMIN — ENOXAPARIN SODIUM 40 MILLIGRAM(S): 100 INJECTION SUBCUTANEOUS at 12:24

## 2021-05-02 RX ADMIN — LATANOPROST 1 DROP(S): 0.05 SOLUTION/ DROPS OPHTHALMIC; TOPICAL at 22:04

## 2021-05-02 RX ADMIN — INSULIN GLARGINE 6 UNIT(S): 100 INJECTION, SOLUTION SUBCUTANEOUS at 21:49

## 2021-05-02 RX ADMIN — DORZOLAMIDE HYDROCHLORIDE 1 DROP(S): 20 SOLUTION/ DROPS OPHTHALMIC at 22:04

## 2021-05-02 RX ADMIN — Medication 25 MILLIGRAM(S): at 01:15

## 2021-05-02 RX ADMIN — Medication 0.5 MILLIGRAM(S): at 02:01

## 2021-05-02 RX ADMIN — RISPERIDONE 0.5 MILLIGRAM(S): 4 TABLET ORAL at 21:48

## 2021-05-02 RX ADMIN — MAGNESIUM HYDROXIDE 30 MILLILITER(S): 400 TABLET, CHEWABLE ORAL at 12:24

## 2021-05-02 RX ADMIN — Medication 1: at 07:57

## 2021-05-02 RX ADMIN — DORZOLAMIDE HYDROCHLORIDE 1 DROP(S): 20 SOLUTION/ DROPS OPHTHALMIC at 13:35

## 2021-05-02 RX ADMIN — POLYETHYLENE GLYCOL 3350 17 GRAM(S): 17 POWDER, FOR SOLUTION ORAL at 12:25

## 2021-05-02 RX ADMIN — Medication 0.5 MILLIGRAM(S): at 08:15

## 2021-05-02 RX ADMIN — AMLODIPINE BESYLATE 5 MILLIGRAM(S): 2.5 TABLET ORAL at 12:24

## 2021-05-02 RX ADMIN — DORZOLAMIDE HYDROCHLORIDE 1 DROP(S): 20 SOLUTION/ DROPS OPHTHALMIC at 05:57

## 2021-05-02 RX ADMIN — SENNA PLUS 2 TABLET(S): 8.6 TABLET ORAL at 21:48

## 2021-05-02 NOTE — PROGRESS NOTE ADULT - SUBJECTIVE AND OBJECTIVE BOX
I am inheriting this patient on today 4/30/2021     Patient is a 80y old  Female who presents with a chief complaint of hyperglycemia (24 Apr 2021 22:23)      INTERVAL HPI/OVERNIGHT EVENTS:  MEDICATIONS  (STANDING):  atorvastatin 20 milliGRAM(s) Oral at bedtime  dextrose 40% Gel 15 Gram(s) Oral once  dextrose 5%. 1000 milliLiter(s) (50 mL/Hr) IV Continuous <Continuous>  dextrose 5%. 1000 milliLiter(s) (100 mL/Hr) IV Continuous <Continuous>  dextrose 50% Injectable 25 Gram(s) IV Push once  dextrose 50% Injectable 12.5 Gram(s) IV Push once  dextrose 50% Injectable 25 Gram(s) IV Push once  dorzolamide 2% Ophthalmic Solution 1 Drop(s) Both EYES three times a day  enoxaparin Injectable 40 milliGRAM(s) SubCutaneous daily  glucagon  Injectable 1 milliGRAM(s) IntraMuscular once  insulin glargine Injectable (LANTUS) 6 Unit(s) SubCutaneous at bedtime  insulin lispro (ADMELOG) corrective regimen sliding scale   SubCutaneous three times a day before meals  latanoprost 0.005% Ophthalmic Solution 1 Drop(s) Both EYES at bedtime  magnesium hydroxide Suspension 30 milliLiter(s) Oral daily  polyethylene glycol 3350 17 Gram(s) Oral daily  risperiDONE  Disintegrating Tablet 0.5 milliGRAM(s) Oral at bedtime  senna 2 Tablet(s) Oral at bedtime    MEDICATIONS  (PRN):  hydrALAZINE Injectable 10 milliGRAM(s) IV Push three times a day PRN elevated bp  LORazepam   Injectable 0.5 milliGRAM(s) IV Push every 6 hours PRN Agitation  risperiDONE   Tablet 0.25 milliGRAM(s) Oral every 8 hours PRN mild agitation  traZODone 25 milliGRAM(s) Oral at bedtime PRN insomnia / HS restlessness          Allergies    No Known Allergies    Intolerances        REVIEW OF SYSTEMS:  unable to obtain due to ams      Vital Signs Last 24 Hrs  T(C): 36.3 (02 May 2021 05:22), Max: 36.6 (01 May 2021 13:54)  T(F): 97.4 (02 May 2021 05:22), Max: 97.8 (01 May 2021 13:54)  HR: 102 (02 May 2021 05:22) (89 - 102)  BP: 157/69 (02 May 2021 05:22) (113/79 - 168/82)  BP(mean): --  RR: 17 (02 May 2021 05:22) (17 - 18)  SpO2: 100% (02 May 2021 05:22) (94% - 100%)    PHYSICAL EXAM:  GENERAL: NAD, well-groomed, well-developed  HEAD:  Atraumatic, Normocephalic  EYES: EOMI, PERRLA, conjunctiva and sclera clear  ENMT: No tonsillar erythema, exudates, or enlargement; Moist mucous membranes, Good dentition, No lesions  NECK: Supple, No JVD, Normal thyroid  NERVOUS SYSTEM:  Alert & demented  X1,   CHEST/LUNG: Clear to percussion bilaterally; No rales, rhonchi, wheezing, or rubs  HEART: Regular rate and rhythm; No murmurs, rubs, or gallops  ABDOMEN: Soft, Nontender, Nondistended; Bowel sounds present  EXTREMITIES:  2+ Peripheral Pulses, No clubbing, cyanosis, or edema  SKIN: No rashes or lesions                  LABS:                                         11.1   7.01  )-----------( 239      ( 30 Apr 2021 08:31 )             36.5   05-01    141  |  107  |  32<H>  ----------------------------<  184<H>  4.0   |  30  |  1.09    Ca    9.0      01 May 2021 07:25  Phos  3.1     05-01  Mg     2.1     05-01                CAPILLARY BLOOD GLUCOSE  CAPILLARY BLOOD GLUCOSE      POCT Blood Glucose.: 186 mg/dL (01 May 2021 07:32)  POCT Blood Glucose.: 98 mg/dL (30 Apr 2021 20:29)  POCT Blood Glucose.: 249 mg/dL (30 Apr 2021 15:29)  POCT Blood Glucose.: 138 mg/dL (30 Apr 2021 10:49)

## 2021-05-02 NOTE — PROGRESS NOTE ADULT - ASSESSMENT
Pt is a 79 y/o female w/pmhx of legally blind, dm2, retinopathy and glaucoma and dementia sent in w/complain of hyperglycemia and also more confusion, agitation, not sleeping and agressive.  pt here lying in bed sleeping, ignores questions, and on tactile stimuli says leave me alone, movin all extremities.  pt doesn cooperative or offer any sx's.      Altered mental stat presumed due to dementia appraciet psych note   mri brain done by colleague negatived for acute intracranial event     continue with meds as outline by [place] .     HEDY- improving   HTN: start low dose norvasc     Type 2 diabetes mellitus with hyperglycemia, with long-term current use of insulin.  Plan: ss insulin coverage. Back on home insulin regiment  FS fairly stable   AIC-8.9     Glaucoma of both eyes, unspecified glaucoma type.  Plan: cont out pt meds.     Constipation- CT of abdomen showing fecal impaction, laxatives     Dementia- monitor for delirium    : Preventive measure.  Plan: sq lovenox.   PT evaluation- pt needs Rehab    Dispo- once able to get off 1:1 as per CM, may go to rehab, AMS due to advanced Dementia   5/1/2021 ready for d/c off one to one  SW is aware jhon will be sent

## 2021-05-03 LAB
GLUCOSE BLDC GLUCOMTR-MCNC: 134 MG/DL — HIGH (ref 70–99)
GLUCOSE BLDC GLUCOMTR-MCNC: 165 MG/DL — HIGH (ref 70–99)
GLUCOSE BLDC GLUCOMTR-MCNC: 281 MG/DL — HIGH (ref 70–99)
GLUCOSE BLDC GLUCOMTR-MCNC: 287 MG/DL — HIGH (ref 70–99)
GLUCOSE BLDC GLUCOMTR-MCNC: 88 MG/DL — SIGNIFICANT CHANGE UP (ref 70–99)
RAPID RVP RESULT: SIGNIFICANT CHANGE UP
SARS-COV-2 RNA SPEC QL NAA+PROBE: SIGNIFICANT CHANGE UP

## 2021-05-03 PROCEDURE — 99231 SBSQ HOSP IP/OBS SF/LOW 25: CPT

## 2021-05-03 PROCEDURE — 70450 CT HEAD/BRAIN W/O DYE: CPT | Mod: 26

## 2021-05-03 RX ORDER — TRAZODONE HCL 50 MG
25 TABLET ORAL
Qty: 0 | Refills: 0 | DISCHARGE
Start: 2021-05-03

## 2021-05-03 RX ADMIN — ENOXAPARIN SODIUM 40 MILLIGRAM(S): 100 INJECTION SUBCUTANEOUS at 11:47

## 2021-05-03 RX ADMIN — INSULIN GLARGINE 6 UNIT(S): 100 INJECTION, SOLUTION SUBCUTANEOUS at 22:50

## 2021-05-03 RX ADMIN — DORZOLAMIDE HYDROCHLORIDE 1 DROP(S): 20 SOLUTION/ DROPS OPHTHALMIC at 05:40

## 2021-05-03 RX ADMIN — DORZOLAMIDE HYDROCHLORIDE 1 DROP(S): 20 SOLUTION/ DROPS OPHTHALMIC at 13:58

## 2021-05-03 RX ADMIN — MAGNESIUM HYDROXIDE 30 MILLILITER(S): 400 TABLET, CHEWABLE ORAL at 11:47

## 2021-05-03 RX ADMIN — Medication 1: at 11:47

## 2021-05-03 NOTE — PROGRESS NOTE ADULT - REASON FOR ADMISSION
hyperglycemia

## 2021-05-03 NOTE — BH CONSULTATION LIAISON PROGRESS NOTE - NSBHCONSULTRECOMMENDOTHER_PSY_A_CORE FT
trazodone 25mg PO qhs for sleep changed to PRN to limit daytime sedation  continue risperdal 0.5mg Po qhs; can use risperdal 0.25mg PO q8hrs PRN for mild agitation   - can be followed outpatient by a Geriatrician, Neurologist or Geriatric Psychiatrist to monitor worsening dementia symptoms

## 2021-05-03 NOTE — PROGRESS NOTE ADULT - ASSESSMENT
Pt is a 81 y/o female w/pmhx of legally blind, dm2, retinopathy and glaucoma and dementia sent in w/complain of hyperglycemia and also more confusion, agitation, not sleeping and agressive.  pt here lying in bed sleeping, ignores questions, and on tactile stimuli says leave me alone, movin all extremities.  pt doesn cooperative or offer any sx's.      Altered mental stat presumed due to dementia appraciet psych note   mri brain done by colleague negatived for acute intracranial event     continue with meds as outline by [place] .     HEDY- improving   HTN: start low dose norvasc     Type 2 diabetes mellitus with hyperglycemia, with long-term current use of insulin.  Plan: ss insulin coverage. Back on home insulin regiment  FS fairly stable   AIC-8.9     Glaucoma of both eyes, unspecified glaucoma type.  Plan: cont out pt meds.     Constipation- CT of abdomen showing fecal impaction, laxatives     Dementia- monitor for delirium    : Preventive measure.  Plan: sq lovenox.   PT evaluation- pt needs Rehab    Dispo- once able to get off 1:1 as per CM, may go to rehab, AMS due to advanced Dementia   5/1/2021 ready for d/c off one to one  SW is aware jhon will be sent   5/3/2021 per SW the  rehab the son wanted is not accepting and now she has to provide him with more options  to chose from

## 2021-05-03 NOTE — PROGRESS NOTE ADULT - SUBJECTIVE AND OBJECTIVE BOX
I am inheriting this patient on today 4/30/2021     Patient is a 80y old  Female who presents with a chief complaint of hyperglycemia (24 Apr 2021 22:23)      INTERVAL HPI/OVERNIGHT EVENTS:    MEDICATIONS  (STANDING):  amLODIPine   Tablet 5 milliGRAM(s) Oral daily  atorvastatin 20 milliGRAM(s) Oral at bedtime  dextrose 40% Gel 15 Gram(s) Oral once  dextrose 5%. 1000 milliLiter(s) (50 mL/Hr) IV Continuous <Continuous>  dextrose 5%. 1000 milliLiter(s) (100 mL/Hr) IV Continuous <Continuous>  dextrose 50% Injectable 25 Gram(s) IV Push once  dextrose 50% Injectable 12.5 Gram(s) IV Push once  dextrose 50% Injectable 25 Gram(s) IV Push once  dorzolamide 2% Ophthalmic Solution 1 Drop(s) Both EYES three times a day  enoxaparin Injectable 40 milliGRAM(s) SubCutaneous daily  glucagon  Injectable 1 milliGRAM(s) IntraMuscular once  insulin glargine Injectable (LANTUS) 6 Unit(s) SubCutaneous at bedtime  insulin lispro (ADMELOG) corrective regimen sliding scale   SubCutaneous three times a day before meals  latanoprost 0.005% Ophthalmic Solution 1 Drop(s) Both EYES at bedtime  magnesium hydroxide Suspension 30 milliLiter(s) Oral daily  polyethylene glycol 3350 17 Gram(s) Oral daily  risperiDONE  Disintegrating Tablet 0.5 milliGRAM(s) Oral at bedtime  senna 2 Tablet(s) Oral at bedtime    MEDICATIONS  (PRN):  hydrALAZINE Injectable 10 milliGRAM(s) IV Push three times a day PRN elevated bp  LORazepam   Injectable 0.5 milliGRAM(s) IV Push every 6 hours PRN Agitation  risperiDONE   Tablet 0.25 milliGRAM(s) Oral every 8 hours PRN mild agitation  traZODone 25 milliGRAM(s) Oral at bedtime PRN insomnia / HS restlessness        Allergies    No Known Allergies    Intolerances        REVIEW OF SYSTEMS:  unable to obtain due to ams    Vital Signs Last 24 Hrs  T(C): 36.6 (03 May 2021 11:34), Max: 36.7 (02 May 2021 23:15)  T(F): 97.8 (03 May 2021 11:34), Max: 98 (02 May 2021 23:15)  HR: 76 (03 May 2021 11:34) (74 - 99)  BP: 125/76 (03 May 2021 11:34) (94/54 - 161/70)  BP(mean): --  RR: 18 (03 May 2021 11:34) (18 - 18)  SpO2: 98% (03 May 2021 11:34) (95% - 98%)      PHYSICAL EXAM:  GENERAL: NAD, well-groomed, well-developed  HEAD:  Atraumatic, Normocephalic  EYES: EOMI, PERRLA, conjunctiva and sclera clear  ENMT: No tonsillar erythema, exudates, or enlargement; Moist mucous membranes, Good dentition, No lesions  NECK: Supple, No JVD, Normal thyroid  NERVOUS SYSTEM:  Alert & demented  X1,   CHEST/LUNG: Clear to percussion bilaterally; No rales, rhonchi, wheezing, or rubs  HEART: Regular rate and rhythm; No murmurs, rubs, or gallops  ABDOMEN: Soft, Nontender, Nondistended; Bowel sounds present  EXTREMITIES:  2+ Peripheral Pulses, No clubbing, cyanosis, or edema  SKIN: No rashes or lesions                  LABS:                                             CAPILLARY BLOOD GLUCOSE      POCT Blood Glucose.: 165 mg/dL (03 May 2021 11:00)  POCT Blood Glucose.: 88 mg/dL (03 May 2021 07:45)  POCT Blood Glucose.: 125 mg/dL (02 May 2021 21:31)  POCT Blood Glucose.: 88 mg/dL (02 May 2021 15:56)

## 2021-05-03 NOTE — BH CONSULTATION LIAISON PROGRESS NOTE - NSBHCHARTREVIEWLAB_PSY_A_CORE FT
04-30    140  |  104  |  37<H>  ----------------------------<  144<H>  3.1<L>   |  29  |  1.33<H>    Ca    8.8      30 Apr 2021 08:31  Mg     2.0     04-30    
04-28    143  |  104  |  19  ----------------------------<  108<H>  3.8   |  30  |  0.94    Ca    8.7      28 Apr 2021 08:27  Mg     2.1     04-28    
04-28    143  |  104  |  19  ----------------------------<  108<H>  3.8   |  30  |  0.94    Ca    8.7      28 Apr 2021 08:27  Mg     2.1     04-28    
no new labs

## 2021-05-03 NOTE — PROGRESS NOTE ADULT - PROVIDER SPECIALTY LIST ADULT
Hospitalist

## 2021-05-03 NOTE — CHART NOTE - NSCHARTNOTEFT_GEN_A_CORE
Called by RN to evaluate patient s/p fall.  As per RN patient was found sitting on her buttocks on the side of the bed.  The episode was not witnessed.  Patient denies hitting her head but is not a reliable historian.  Denies any specific complaints of pain or discomfort.  Denies h/a, dizziness, CP, SOB, palpitations, hip or back pain.    Vitals: /88  HR 79  RR 18  O2 sat 99%  General:  Pt found sitting in chair with lap belt in place, pt appears in NAD  HEENT: no deformities, echhymosis, hematoma or bleeding noted  Lungs: CTAB  Cardiac: + S1, S2  MSK:  no deformities or ecchymosis noted, no hip or spinous process tenderness, moving all extremities well: no ecchymosis, deformity or tenderness to buttocks    A/P: 79 y/o female with PMHx of DM, legally blind, retinopathy,  glaucoma and dementia sent in w/complain of hyperglycemia and also more confusion, agitation, not sleeping and aggressive.  Patient s/p unwitnessed fall.  -Will get stat CT head  -Maintain enhanced observation  -Redirect and give safety instructions  -Side rails up  -Call bells within reach  -Bed alarm  -Fall precautions   -D/w Dr Alvarado

## 2021-05-03 NOTE — PROGRESS NOTE ADULT - NUTRITIONAL ASSESSMENT
This patient has been assessed with a concern for Malnutrition and has been determined to have a diagnosis/diagnoses of Severe protein-calorie malnutrition.    This patient is being managed with:   Diet Consistent Carbohydrate w/Evening Snack-  Supplement Feeding Modality:  Oral  Glucerna Shake Cans or Servings Per Day:  1       Frequency:  Three Times a day  Entered: Apr 30 2021  3:07PM    

## 2021-05-03 NOTE — BH CONSULTATION LIAISON PROGRESS NOTE - OTHER
does not seem to be responding to internal stimuli  looks confused  unable to ascertain due to cognitive deficits  unable to articulate  thin

## 2021-05-04 ENCOUNTER — TRANSCRIPTION ENCOUNTER (OUTPATIENT)
Age: 81
End: 2021-05-04

## 2021-05-04 VITALS
RESPIRATION RATE: 17 BRPM | OXYGEN SATURATION: 98 % | HEART RATE: 85 BPM | DIASTOLIC BLOOD PRESSURE: 79 MMHG | SYSTOLIC BLOOD PRESSURE: 147 MMHG

## 2021-05-04 LAB
GLUCOSE BLDC GLUCOMTR-MCNC: 153 MG/DL — HIGH (ref 70–99)
GLUCOSE BLDC GLUCOMTR-MCNC: 156 MG/DL — HIGH (ref 70–99)

## 2021-05-04 PROCEDURE — 99231 SBSQ HOSP IP/OBS SF/LOW 25: CPT

## 2021-05-04 PROCEDURE — 99238 HOSP IP/OBS DSCHRG MGMT 30/<: CPT

## 2021-05-04 RX ORDER — LACTULOSE 10 G/15ML
20 SOLUTION ORAL ONCE
Refills: 0 | Status: COMPLETED | OUTPATIENT
Start: 2021-05-04 | End: 2021-05-04

## 2021-05-04 RX ORDER — POLYETHYLENE GLYCOL 3350 17 G/17G
17 POWDER, FOR SOLUTION ORAL
Qty: 510 | Refills: 0
Start: 2021-05-04 | End: 2021-06-02

## 2021-05-04 RX ORDER — HEPARIN SODIUM 5000 [USP'U]/ML
5000 INJECTION INTRAVENOUS; SUBCUTANEOUS
Qty: 1 | Refills: 0
Start: 2021-05-04 | End: 2021-06-02

## 2021-05-04 RX ORDER — DIPHENHYDRAMINE HCL 50 MG
12.5 CAPSULE ORAL ONCE
Refills: 0 | Status: COMPLETED | OUTPATIENT
Start: 2021-05-04 | End: 2021-05-04

## 2021-05-04 RX ADMIN — MAGNESIUM HYDROXIDE 30 MILLILITER(S): 400 TABLET, CHEWABLE ORAL at 11:02

## 2021-05-04 RX ADMIN — ENOXAPARIN SODIUM 40 MILLIGRAM(S): 100 INJECTION SUBCUTANEOUS at 11:03

## 2021-05-04 RX ADMIN — Medication 1: at 11:02

## 2021-05-04 RX ADMIN — Medication 1 ENEMA: at 11:54

## 2021-05-04 RX ADMIN — DORZOLAMIDE HYDROCHLORIDE 1 DROP(S): 20 SOLUTION/ DROPS OPHTHALMIC at 05:16

## 2021-05-04 RX ADMIN — Medication 12.5 MILLIGRAM(S): at 00:31

## 2021-05-04 RX ADMIN — LACTULOSE 20 GRAM(S): 10 SOLUTION ORAL at 11:55

## 2021-05-04 RX ADMIN — DORZOLAMIDE HYDROCHLORIDE 1 DROP(S): 20 SOLUTION/ DROPS OPHTHALMIC at 13:06

## 2021-05-04 RX ADMIN — Medication 1: at 07:41

## 2021-05-04 RX ADMIN — POLYETHYLENE GLYCOL 3350 17 GRAM(S): 17 POWDER, FOR SOLUTION ORAL at 11:03

## 2021-05-04 RX ADMIN — Medication 0.5 MILLIGRAM(S): at 00:32

## 2021-05-04 RX ADMIN — AMLODIPINE BESYLATE 5 MILLIGRAM(S): 2.5 TABLET ORAL at 05:16

## 2021-05-04 NOTE — BH CONSULTATION LIAISON PROGRESS NOTE - NSBHPTASSESSDT_PSY_A_CORE
04-May-2021 15:02
03-May-2021 14:12
28-Apr-2021 13:38
29-Apr-2021 11:54
27-Apr-2021 15:51
30-Apr-2021 12:53

## 2021-05-04 NOTE — CHART NOTE - NSCHARTNOTEFT_GEN_A_CORE
Assessment: Pt admitted for AMS, hyperglycemia, agitation. Pt also with HEDY, HTN, & constipation with fecal impaction (on laxatives). PMHx includes legally blind, DM2, retinopathy, glaucoma & dementia.    Factors impacting intake: [ ] none [ ] nausea  [ ] vomiting [ ] diarrhea [x] constipation  [ ]chewing problems [ ] swallowing issues  [x] other: AMS; dementia; difficulty feeding self; legally blind    Diet Prescription: Diet, Consistent Carbohydrate w/Evening Snack:   Supplement Feeding Modality:  Oral  Glucerna Shake Cans or Servings Per Day:  1       Frequency:  Three Times a day (04-30-21 @ 15:08)    Intake: Po intake varies; 25-75%; Pt assisted with feedings; Pt drinking Glucerna supplements    Current Weight: 50 kg (5/4), 47.1 kg (4/25)  % Weight Change: 6.2% wt gain x 9 days  Fluid accumulation: No edema noted    Pertinent Medications: MEDICATIONS  (STANDING):  amLODIPine   Tablet 5 milliGRAM(s) Oral daily  atorvastatin 20 milliGRAM(s) Oral at bedtime  dextrose 40% Gel 15 Gram(s) Oral once  dextrose 5%. 1000 milliLiter(s) (50 mL/Hr) IV Continuous <Continuous>  dextrose 5%. 1000 milliLiter(s) (100 mL/Hr) IV Continuous <Continuous>  dextrose 50% Injectable 25 Gram(s) IV Push once  dextrose 50% Injectable 12.5 Gram(s) IV Push once  dextrose 50% Injectable 25 Gram(s) IV Push once  dorzolamide 2% Ophthalmic Solution 1 Drop(s) Both EYES three times a day  enoxaparin Injectable 40 milliGRAM(s) SubCutaneous daily  glucagon  Injectable 1 milliGRAM(s) IntraMuscular once  insulin glargine Injectable (LANTUS) 6 Unit(s) SubCutaneous at bedtime  insulin lispro (ADMELOG) corrective regimen sliding scale   SubCutaneous three times a day before meals  latanoprost 0.005% Ophthalmic Solution 1 Drop(s) Both EYES at bedtime  magnesium hydroxide Suspension 30 milliLiter(s) Oral daily  polyethylene glycol 3350 17 Gram(s) Oral daily  risperiDONE  Disintegrating Tablet 0.5 milliGRAM(s) Oral at bedtime  senna 2 Tablet(s) Oral at bedtime    MEDICATIONS  (PRN):  hydrALAZINE Injectable 10 milliGRAM(s) IV Push three times a day PRN elevated bp  LORazepam   Injectable 0.5 milliGRAM(s) IV Push every 6 hours PRN Agitation  risperiDONE   Tablet 0.25 milliGRAM(s) Oral every 8 hours PRN mild agitation  traZODone 25 milliGRAM(s) Oral at bedtime PRN insomnia / HS restlessness    Pertinent Labs:  05-01 Phos 3.1 mg/dL    CAPILLARY BLOOD GLUCOSE    POCT Blood Glucose.: 153 mg/dL (04 May 2021 10:37)  POCT Blood Glucose.: 156 mg/dL (04 May 2021 07:25)  POCT Blood Glucose.: 287 mg/dL (03 May 2021 22:43)  POCT Blood Glucose.: 281 mg/dL (03 May 2021 22:01)  POCT Blood Glucose.: 134 mg/dL (03 May 2021 15:42)    Skin: Pt without pressure ulcers    Estimated Needs:   [x] no change since previous assessment on 4/30  [ ] recalculated:     Previous Nutrition Diagnosis:   Nutrition Diagnostic Terminology #1 Malnutrition...     Malnutrition Severe Malnutrition on context of chronic illness.     Etiology Inadequate energy/protein intake related to advanced dementia, legal blindness.     Signs/Symptoms Physical signs of severe muscle wasting/fat depletion as noted above.     Goal/Expected Outcome Po intake > 75% for meals/supplements - goal mostly not met; Deter further wt loss - goal not applicable as wt gain likely not true wt gain    Nutrition Diagnosis is [x] ongoing  [ ] resolved [ ] not applicable     New Nutrition Diagnosis: [x] not applicable       Interventions:   Recommend  [x] Continue with current diet as rx'd  [ ] Change Diet To:  [ ] Nutrition Supplement  [ ] Nutrition Support  [x] Other: Continue to provide encouragement and assistance with po intake    Monitoring and Evaluation:   [ x ] PO intake [ x ] Tolerance to diet prescription [ x ] weights [ x ] labs (glucose) [ x ] follow up per protocol  [ ] other: Assessment: Pt admitted for AMS, hyperglycemia, agitation. Pt also with HEDY, HTN, & constipation with fecal impaction (on laxatives). PMHx includes legally blind, DM2, retinopathy, glaucoma & dementia. Pt for discharge to STR.    Factors impacting intake: [ ] none [ ] nausea  [ ] vomiting [ ] diarrhea [x] constipation  [ ]chewing problems [ ] swallowing issues  [x] other: AMS; dementia; difficulty feeding self; legally blind    Diet Prescription: Diet, Consistent Carbohydrate w/Evening Snack:   Supplement Feeding Modality:  Oral  Glucerna Shake Cans or Servings Per Day:  1       Frequency:  Three Times a day (04-30-21 @ 15:08)    Intake: Po intake varies; 25-75%; Pt assisted with feedings; Pt drinking Glucerna supplements    Current Weight: 50 kg (5/4), 47.1 kg (4/25)  % Weight Change: 6.2% wt gain x 9 days  Fluid accumulation: No edema noted    Pertinent Medications: MEDICATIONS  (STANDING):  amLODIPine   Tablet 5 milliGRAM(s) Oral daily  atorvastatin 20 milliGRAM(s) Oral at bedtime  dextrose 40% Gel 15 Gram(s) Oral once  dextrose 5%. 1000 milliLiter(s) (50 mL/Hr) IV Continuous <Continuous>  dextrose 5%. 1000 milliLiter(s) (100 mL/Hr) IV Continuous <Continuous>  dextrose 50% Injectable 25 Gram(s) IV Push once  dextrose 50% Injectable 12.5 Gram(s) IV Push once  dextrose 50% Injectable 25 Gram(s) IV Push once  dorzolamide 2% Ophthalmic Solution 1 Drop(s) Both EYES three times a day  enoxaparin Injectable 40 milliGRAM(s) SubCutaneous daily  glucagon  Injectable 1 milliGRAM(s) IntraMuscular once  insulin glargine Injectable (LANTUS) 6 Unit(s) SubCutaneous at bedtime  insulin lispro (ADMELOG) corrective regimen sliding scale   SubCutaneous three times a day before meals  latanoprost 0.005% Ophthalmic Solution 1 Drop(s) Both EYES at bedtime  magnesium hydroxide Suspension 30 milliLiter(s) Oral daily  polyethylene glycol 3350 17 Gram(s) Oral daily  risperiDONE  Disintegrating Tablet 0.5 milliGRAM(s) Oral at bedtime  senna 2 Tablet(s) Oral at bedtime    MEDICATIONS  (PRN):  hydrALAZINE Injectable 10 milliGRAM(s) IV Push three times a day PRN elevated bp  LORazepam   Injectable 0.5 milliGRAM(s) IV Push every 6 hours PRN Agitation  risperiDONE   Tablet 0.25 milliGRAM(s) Oral every 8 hours PRN mild agitation  traZODone 25 milliGRAM(s) Oral at bedtime PRN insomnia / HS restlessness    Pertinent Labs:  05-01 Phos 3.1 mg/dL    CAPILLARY BLOOD GLUCOSE    POCT Blood Glucose.: 153 mg/dL (04 May 2021 10:37)  POCT Blood Glucose.: 156 mg/dL (04 May 2021 07:25)  POCT Blood Glucose.: 287 mg/dL (03 May 2021 22:43)  POCT Blood Glucose.: 281 mg/dL (03 May 2021 22:01)  POCT Blood Glucose.: 134 mg/dL (03 May 2021 15:42)    Skin: Pt without pressure ulcers    Estimated Needs:   [x] no change since previous assessment on 4/30  [ ] recalculated:     Previous Nutrition Diagnosis:   Nutrition Diagnostic Terminology #1 Malnutrition...     Malnutrition Severe Malnutrition on context of chronic illness.     Etiology Inadequate energy/protein intake related to advanced dementia, legal blindness.     Signs/Symptoms Physical signs of severe muscle wasting/fat depletion as noted above.     Goal/Expected Outcome Po intake > 75% for meals/supplements - goal mostly not met; Deter further wt loss - goal not applicable as wt gain likely not true wt gain    Nutrition Diagnosis is [x] ongoing  [ ] resolved [ ] not applicable     New Nutrition Diagnosis: [x] not applicable       Interventions:   Recommend  [x] Continue with current diet as rx'd  [ ] Change Diet To:  [ ] Nutrition Supplement  [ ] Nutrition Support  [x] Other: Continue to provide encouragement and assistance with po intake    Monitoring and Evaluation:   [ x ] PO intake [ x ] Tolerance to diet prescription [ x ] weights [ x ] labs (glucose) [ x ] follow up per protocol  [ ] other:

## 2021-05-04 NOTE — BH CONSULTATION LIAISON PROGRESS NOTE - NSBHMSELANG_PSY_A_CORE
Impaired naming/Unable to assess

## 2021-05-04 NOTE — BH CONSULTATION LIAISON PROGRESS NOTE - NSBHMSESPABN_PSY_A_CORE
Slowed rate/Increased latency/Impaired articulation

## 2021-05-04 NOTE — BH CONSULTATION LIAISON PROGRESS NOTE - NSBHCONSFOLLOWNEEDS_PSY_ALL_CORE
No psychiatric contraindications to discharge

## 2021-05-04 NOTE — BH CONSULTATION LIAISON PROGRESS NOTE - NSBHCHARTREVIEWVS_PSY_A_CORE FT
Vital Signs Last 24 Hrs  T(C): 36.3 (04 May 2021 11:37), Max: 36.7 (03 May 2021 20:46)  T(F): 97.4 (04 May 2021 11:37), Max: 98 (03 May 2021 20:46)  HR: 85 (04 May 2021 12:18) (74 - 101)  BP: 147/79 (04 May 2021 12:18) (123/67 - 160/78)  BP(mean): --  RR: 17 (04 May 2021 12:18) (16 - 18)  SpO2: 98% (04 May 2021 12:18) (98% - 100%)
Vital Signs Last 24 Hrs  T(C): 36.7 (29 Apr 2021 06:54), Max: 36.8 (28 Apr 2021 17:00)  T(F): 98.1 (29 Apr 2021 06:54), Max: 98.2 (28 Apr 2021 17:00)  HR: 85 (29 Apr 2021 06:54) (70 - 89)  BP: 123/82 (29 Apr 2021 06:54) (106/66 - 150/83)  BP(mean): --  RR: 18 (29 Apr 2021 06:54) (18 - 18)  SpO2: 98% (29 Apr 2021 06:54) (96% - 98%)
Vital Signs Last 24 Hrs  T(C): 36.6 (03 May 2021 11:34), Max: 36.7 (02 May 2021 23:15)  T(F): 97.8 (03 May 2021 11:34), Max: 98 (02 May 2021 23:15)  HR: 76 (03 May 2021 11:34) (74 - 99)  BP: 125/76 (03 May 2021 11:34) (94/54 - 161/70)  BP(mean): --  RR: 18 (03 May 2021 11:34) (18 - 18)  SpO2: 98% (03 May 2021 11:34) (95% - 98%)
Vital Signs Last 24 Hrs  T(C): 36.7 (28 Apr 2021 10:41), Max: 36.7 (28 Apr 2021 10:41)  T(F): 98.1 (28 Apr 2021 10:41), Max: 98.1 (28 Apr 2021 10:41)  HR: 76 (28 Apr 2021 10:41) (76 - 78)  BP: 126/67 (28 Apr 2021 10:41) (108/58 - 146/69)  BP(mean): --  RR: 16 (28 Apr 2021 10:41) (16 - 18)  SpO2: 97% (28 Apr 2021 10:41) (96% - 98%)
Vital Signs Last 24 Hrs  T(C): 36.4 (27 Apr 2021 12:18), Max: 36.4 (27 Apr 2021 12:18)  T(F): 97.6 (27 Apr 2021 12:18), Max: 97.6 (27 Apr 2021 12:18)  HR: 72 (27 Apr 2021 12:18) (72 - 78)  BP: 113/72 (27 Apr 2021 12:18) (113/72 - 117/60)  BP(mean): --  RR: 18 (27 Apr 2021 12:18) (18 - 18)  SpO2: 100% (27 Apr 2021 12:18) (96% - 100%)
Vital Signs Last 24 Hrs  T(C): 36.4 (30 Apr 2021 11:18), Max: 36.5 (30 Apr 2021 06:16)  T(F): 97.5 (30 Apr 2021 11:18), Max: 97.7 (30 Apr 2021 06:16)  HR: 77 (30 Apr 2021 12:35) (73 - 79)  BP: 125/75 (30 Apr 2021 11:18) (114/62 - 125/75)  BP(mean): --  RR: 18 (30 Apr 2021 12:35) (17 - 18)  SpO2: 99% (30 Apr 2021 12:35) (95% - 100%)

## 2021-05-04 NOTE — BH CONSULTATION LIAISON PROGRESS NOTE - NSBHFUPINTERVALHXFT_PSY_A_CORE
No significant interval events. Patient did not need any PRNs for agitation in the last 24 hours. Same psychiatric clinical presentation which is highly likely to be Patient's baseline: Patient is able to eat meals by herself and has a good appetite and per staff. Spends most of the day napping in bed. She remains a poor historian and easily agitated in part due to her sensory deficit (eye sight), being in an unfamiliar environment and cognitive decline. No subjective mood sxs manifested, no evidence of suicidality.
Patient has not needed any PRNs since last seen. She also has not taken the HS PRN trazodone. Patient is maintaining her baseline which is chronic disorientation, confusion, multi-domain cognitive deficits consistent with progressing advanced dementia. Poor historian. Does not seem to be in distress. 
Patient received Ativan 1mg IVP x 1 dose at 8pm last night; did not take the HS trazodone. Patient still semi-sedated / somnolent this morning. 
Patient received 2 doses of PRN Ativan for agitation and HS trazodone PRN yesterday for sleep; she has not needed any PRNs today. Patient is awaiting discharge to [facility]. Patient is maintaining the same psychiatric clinical presentation which is highly likely to be Patient's baseline: Patient is able to eat meals by herself and has a good appetite and per staff. Spends most of the day napping in bed. She remains a poor historian and easily agitated in part due to her sensory deficit (eye sight), being in an unfamiliar environment and cognitive decline. No subjective mood sxs manifested, no evidence of suicidality.
Patient is still here -  for [facility] transfer for this afternoon. She di not need any PRNs yesterday and today. She was found sitting in a chair near entrance to her door; sitting calmly and looking around. No complaints.  She remains a poor historian and easily agitated in part due to her sensory deficit (eye sight), being in an unfamiliar environment and cognitive decline. No subjective mood sxs manifested, no evidence of suicidality.
Patient had sundowning episode last night and was agitated, could not sleep and received Ativan o.5mg IVP x 1 dose and trazodone 25mg PO qhs with some positive clinical response and slept for s few hours. Patient is able to eat meals by herself and has a good appetite and per staff. She remains a poor historian and easily agitated in part due to her sensory deficit (eye sight), being in an unfamiliar environment and cognitive decline. No subjective mood sxs manifested, no evidence of suicidality.

## 2021-05-04 NOTE — BH CONSULTATION LIAISON PROGRESS NOTE - OTHER
looks confused  varying  unable to articulate  limited  does not seem to be responding to internal stimuli  unable to ascertain due to cognitive deficits  slim

## 2021-05-04 NOTE — BH CONSULTATION LIAISON PROGRESS NOTE - NSBHMSEAFFRANGE_PSY_A_CORE
Constricted/Unable to assess
Unable to assess
Constricted/Unable to assess
Constricted/Unable to assess

## 2021-05-04 NOTE — BH CONSULTATION LIAISON PROGRESS NOTE - NSBHADMITIPOBS_PSY_A_CORE
----- Message from Skylar Garcia sent at 5/15/2019  4:23 PM CDT -----  Contact: Self  Type: Patient Call Back    Who called:Paulino    What is the request in detail: patient called to notify nurse that gabapentin is 300 mg    Can the clinic reply by [facility]?    Would the patient rather a call back or a response via [facility]? Call    Best call back number: [phone]    Additional Information:    
Enhanced supervision

## 2021-05-04 NOTE — BH CONSULTATION LIAISON PROGRESS NOTE - NSICDXBHPRIMARYDX_PSY_ALL_CORE
Vascular dementia with behavioral disturbance   F01.51  

## 2021-05-04 NOTE — BH CONSULTATION LIAISON PROGRESS NOTE - NSBHCONSULTWORKUPIMAGEFT_PSY_ALL_CORE
MRI of head recommended 

## 2021-05-04 NOTE — BH CONSULTATION LIAISON PROGRESS NOTE - NSBHMSEMOOD_PSY_A_CORE
Other/Unable to assess
Other
Other/Unable to assess
Other/Unable to assess

## 2021-05-04 NOTE — BH CONSULTATION LIAISON PROGRESS NOTE - NSBHFUPREASONCONS_PSY_A_CORE
agitation/med management
dementia/agitation/med management

## 2021-05-04 NOTE — BH CONSULTATION LIAISON PROGRESS NOTE - NSBHASSESSMENTFT_PSY_ALL_CORE
Progressing Neurodegenerative illness (ie dementia)

## 2021-05-04 NOTE — BH CONSULTATION LIAISON PROGRESS NOTE - NSBHPSYCHOLCOGABN_PSY_A_CORE
disoriented to time/disoriented to place/disoriented to situation

## 2021-05-04 NOTE — BH CONSULTATION LIAISON PROGRESS NOTE - CURRENT MEDICATION
MEDICATIONS  (STANDING):  amLODIPine   Tablet 5 milliGRAM(s) Oral daily  atorvastatin 20 milliGRAM(s) Oral at bedtime  dextrose 40% Gel 15 Gram(s) Oral once  dextrose 5%. 1000 milliLiter(s) (50 mL/Hr) IV Continuous <Continuous>  dextrose 5%. 1000 milliLiter(s) (100 mL/Hr) IV Continuous <Continuous>  dextrose 50% Injectable 25 Gram(s) IV Push once  dextrose 50% Injectable 12.5 Gram(s) IV Push once  dextrose 50% Injectable 25 Gram(s) IV Push once  dorzolamide 2% Ophthalmic Solution 1 Drop(s) Both EYES three times a day  enoxaparin Injectable 40 milliGRAM(s) SubCutaneous daily  glucagon  Injectable 1 milliGRAM(s) IntraMuscular once  insulin glargine Injectable (LANTUS) 6 Unit(s) SubCutaneous at bedtime  insulin lispro (ADMELOG) corrective regimen sliding scale   SubCutaneous three times a day before meals  latanoprost 0.005% Ophthalmic Solution 1 Drop(s) Both EYES at bedtime  magnesium hydroxide Suspension 30 milliLiter(s) Oral daily  polyethylene glycol 3350 17 Gram(s) Oral daily  risperiDONE  Disintegrating Tablet 0.5 milliGRAM(s) Oral at bedtime  senna 2 Tablet(s) Oral at bedtime    MEDICATIONS  (PRN):  hydrALAZINE Injectable 10 milliGRAM(s) IV Push three times a day PRN elevated bp  LORazepam   Injectable 0.5 milliGRAM(s) IV Push every 6 hours PRN Agitation  risperiDONE   Tablet 0.25 milliGRAM(s) Oral every 8 hours PRN mild agitation  traZODone 25 milliGRAM(s) Oral at bedtime PRN insomnia / HS restlessness  
MEDICATIONS  (STANDING):  atorvastatin 20 milliGRAM(s) Oral at bedtime  dorzolamide 2% Ophthalmic Solution 1 Drop(s) Both EYES three times a day  lactated ringers. 1000 milliLiter(s) (75 mL/Hr) IV Continuous <Continuous>  latanoprost 0.005% Ophthalmic Solution 1 Drop(s) Both EYES at bedtime  magnesium hydroxide Suspension 30 milliLiter(s) Oral daily  polyethylene glycol 3350 17 Gram(s) Oral daily  risperiDONE  Disintegrating Tablet 0.5 milliGRAM(s) Oral at bedtime  senna 2 Tablet(s) Oral at bedtime    MEDICATIONS  (PRN):  hydrALAZINE Injectable 10 milliGRAM(s) IV Push three times a day PRN elevated bp  LORazepam   Injectable 0.5 milliGRAM(s) IV Push every 6 hours PRN Agitation  traZODone 25 milliGRAM(s) Oral at bedtime PRN insomnia / HS restlessness  
MEDICATIONS  (STANDING):  atorvastatin 20 milliGRAM(s) Oral at bedtime  dextrose 40% Gel 15 Gram(s) Oral once  dextrose 5%. 1000 milliLiter(s) (50 mL/Hr) IV Continuous <Continuous>  dextrose 5%. 1000 milliLiter(s) (100 mL/Hr) IV Continuous <Continuous>  dextrose 50% Injectable 25 Gram(s) IV Push once  dextrose 50% Injectable 12.5 Gram(s) IV Push once  dextrose 50% Injectable 25 Gram(s) IV Push once  dorzolamide 2% Ophthalmic Solution 1 Drop(s) Both EYES three times a day  enoxaparin Injectable 40 milliGRAM(s) SubCutaneous daily  glucagon  Injectable 1 milliGRAM(s) IntraMuscular once  insulin glargine Injectable (LANTUS) 6 Unit(s) SubCutaneous at bedtime  insulin lispro (ADMELOG) corrective regimen sliding scale   SubCutaneous three times a day before meals  latanoprost 0.005% Ophthalmic Solution 1 Drop(s) Both EYES at bedtime  magnesium hydroxide Suspension 30 milliLiter(s) Oral daily  polyethylene glycol 3350 17 Gram(s) Oral daily  risperiDONE  Disintegrating Tablet 0.5 milliGRAM(s) Oral at bedtime  senna 2 Tablet(s) Oral at bedtime    MEDICATIONS  (PRN):  hydrALAZINE Injectable 10 milliGRAM(s) IV Push three times a day PRN elevated bp  LORazepam   Injectable 0.5 milliGRAM(s) IV Push every 6 hours PRN Agitation  traZODone 25 milliGRAM(s) Oral at bedtime PRN insomnia / HS restlessness  
MEDICATIONS  (STANDING):  atorvastatin 20 milliGRAM(s) Oral at bedtime  dorzolamide 2% Ophthalmic Solution 1 Drop(s) Both EYES three times a day  lactated ringers. 1000 milliLiter(s) (75 mL/Hr) IV Continuous <Continuous>  latanoprost 0.005% Ophthalmic Solution 1 Drop(s) Both EYES at bedtime  magnesium hydroxide Suspension 30 milliLiter(s) Oral daily  polyethylene glycol 3350 17 Gram(s) Oral daily  risperiDONE  Disintegrating Tablet 0.5 milliGRAM(s) Oral at bedtime  senna 2 Tablet(s) Oral at bedtime    MEDICATIONS  (PRN):  hydrALAZINE Injectable 10 milliGRAM(s) IV Push three times a day PRN elevated bp  LORazepam   Injectable 0.5 milliGRAM(s) IV Push every 6 hours PRN Agitation  traZODone 25 milliGRAM(s) Oral at bedtime PRN insomnia / HS restlessness  
MEDICATIONS  (STANDING):  amLODIPine   Tablet 5 milliGRAM(s) Oral daily  atorvastatin 20 milliGRAM(s) Oral at bedtime  dextrose 40% Gel 15 Gram(s) Oral once  dextrose 5%. 1000 milliLiter(s) (50 mL/Hr) IV Continuous <Continuous>  dextrose 5%. 1000 milliLiter(s) (100 mL/Hr) IV Continuous <Continuous>  dextrose 50% Injectable 25 Gram(s) IV Push once  dextrose 50% Injectable 12.5 Gram(s) IV Push once  dextrose 50% Injectable 25 Gram(s) IV Push once  dorzolamide 2% Ophthalmic Solution 1 Drop(s) Both EYES three times a day  enoxaparin Injectable 40 milliGRAM(s) SubCutaneous daily  glucagon  Injectable 1 milliGRAM(s) IntraMuscular once  insulin glargine Injectable (LANTUS) 6 Unit(s) SubCutaneous at bedtime  insulin lispro (ADMELOG) corrective regimen sliding scale   SubCutaneous three times a day before meals  latanoprost 0.005% Ophthalmic Solution 1 Drop(s) Both EYES at bedtime  magnesium hydroxide Suspension 30 milliLiter(s) Oral daily  polyethylene glycol 3350 17 Gram(s) Oral daily  risperiDONE  Disintegrating Tablet 0.5 milliGRAM(s) Oral at bedtime  senna 2 Tablet(s) Oral at bedtime    MEDICATIONS  (PRN):  hydrALAZINE Injectable 10 milliGRAM(s) IV Push three times a day PRN elevated bp  LORazepam   Injectable 0.5 milliGRAM(s) IV Push every 6 hours PRN Agitation  risperiDONE   Tablet 0.25 milliGRAM(s) Oral every 8 hours PRN mild agitation  traZODone 25 milliGRAM(s) Oral at bedtime PRN insomnia / HS restlessness  
MEDICATIONS  (STANDING):  atorvastatin 20 milliGRAM(s) Oral at bedtime  dextrose 40% Gel 15 Gram(s) Oral once  dextrose 5%. 1000 milliLiter(s) (50 mL/Hr) IV Continuous <Continuous>  dextrose 5%. 1000 milliLiter(s) (100 mL/Hr) IV Continuous <Continuous>  dextrose 50% Injectable 25 Gram(s) IV Push once  dextrose 50% Injectable 12.5 Gram(s) IV Push once  dextrose 50% Injectable 25 Gram(s) IV Push once  dorzolamide 2% Ophthalmic Solution 1 Drop(s) Both EYES three times a day  glucagon  Injectable 1 milliGRAM(s) IntraMuscular once  insulin lispro (ADMELOG) corrective regimen sliding scale   SubCutaneous three times a day before meals  lactated ringers. 1000 milliLiter(s) (75 mL/Hr) IV Continuous <Continuous>  latanoprost 0.005% Ophthalmic Solution 1 Drop(s) Both EYES at bedtime  magnesium hydroxide Suspension 30 milliLiter(s) Oral daily  polyethylene glycol 3350 17 Gram(s) Oral daily  risperiDONE  Disintegrating Tablet 0.5 milliGRAM(s) Oral at bedtime  senna 2 Tablet(s) Oral at bedtime    MEDICATIONS  (PRN):  hydrALAZINE Injectable 10 milliGRAM(s) IV Push three times a day PRN elevated bp  LORazepam   Injectable 0.5 milliGRAM(s) IV Push every 6 hours PRN Agitation  traZODone 25 milliGRAM(s) Oral at bedtime PRN insomnia / HS restlessness

## 2021-05-04 NOTE — BH CONSULTATION LIAISON PROGRESS NOTE - NSICDXBHSECONDARYDX_PSY_ALL_CORE
Dementia with behavioral disturbance   F03.91  

## 2021-05-04 NOTE — BH CONSULTATION LIAISON PROGRESS NOTE - NSBHCONSULTFOLLOW_PSY_ALL_CORE
No, psychiatric follow up needed - call with questions...
Yes...
No, psychiatric follow up needed - call with questions...
Yes...

## 2021-05-04 NOTE — DISCHARGE NOTE NURSING/CASE MANAGEMENT/SOCIAL WORK - PATIENT PORTAL LINK FT
You can access the FollowMyHealth Patient Portal offered by [facility] by registering at the following website: [email or web address]. By joining [facility]’s FollowMyHealth portal, you will also be able to view your health information using other applications (apps) compatible with our system.

## 2021-05-04 NOTE — BH CONSULTATION LIAISON PROGRESS NOTE - NSBHCONSULTMEDAGITATION_PSY_A_CORE FT
4/27/21: reduced Ativan 1mg IVP q4hrs prn to 0.5mg IVP q6hrs PRN for agitation given residual sedation from higher dose 
reduce Ativan 1mg IVP q4hrs prn to 0.5mg IVP q6hrs PRN for agitation given residual sedation from higher dose 
4/27/21: reduced Ativan 1mg IVP q4hrs prn to 0.5mg IVP q6hrs PRN for agitation given residual sedation from higher dose 

## 2021-05-04 NOTE — BH CONSULTATION LIAISON PROGRESS NOTE - NSCDBILL_PSY_A_CORE
[id number] - Inpatient, low complexity
[id number] - Inpatient, low complexity
[id number] - Inpatient, low complexity
[id number] - Inpatient, low complexity
[id number] - Inpatient, low complexity
[id number] - Inpatient, low complexity

## 2021-05-04 NOTE — BH CONSULTATION LIAISON PROGRESS NOTE - NSBHPSYCHOLCOGORIENT_PSY_A_CORE
Not fully oriented...
Unable to assess
Not fully oriented...

## 2021-05-04 NOTE — BH CONSULTATION LIAISON PROGRESS NOTE - NSBHMSESPEECH_PSY_A_CORE
Non-verbal: unable to assess speech further
Abnormal as indicated, otherwise normal...

## 2021-05-10 DIAGNOSIS — H54.8 LEGAL BLINDNESS, AS DEFINED IN USA: ICD-10-CM
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
## 2021-05-10 DIAGNOSIS — H40.9 UNSPECIFIED GLAUCOMA: ICD-10-CM

## 2021-05-10 DIAGNOSIS — I10 ESSENTIAL (PRIMARY) HYPERTENSION: ICD-10-CM

## 2021-05-10 DIAGNOSIS — E43 UNSPECIFIED SEVERE PROTEIN-CALORIE MALNUTRITION: ICD-10-CM

## 2021-05-10 DIAGNOSIS — K59.00 CONSTIPATION, UNSPECIFIED: ICD-10-CM

## 2021-05-10 DIAGNOSIS — E83.42 HYPOMAGNESEMIA: ICD-10-CM
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
## 2021-05-10 DIAGNOSIS — E11.65 TYPE 2 DIABETES MELLITUS WITH HYPERGLYCEMIA: ICD-10-CM

## 2021-05-10 DIAGNOSIS — Z79.4 LONG TERM (CURRENT) USE OF INSULIN: ICD-10-CM

## 2021-05-10 DIAGNOSIS — E11.319 TYPE 2 DIABETES MELLITUS WITH UNSPECIFIED DIABETIC RETINOPATHY WITHOUT MACULAR EDEMA: ICD-10-CM

## 2021-05-10 DIAGNOSIS — R41.82 ALTERED MENTAL STATUS, UNSPECIFIED: ICD-10-CM

## 2021-05-10 DIAGNOSIS — N17.9 ACUTE KIDNEY FAILURE, UNSPECIFIED: ICD-10-CM

## 2021-05-10 DIAGNOSIS — F03.91 UNSPECIFIED DEMENTIA WITH BEHAVIORAL DISTURBANCE: ICD-10-CM

## 2021-05-13 ENCOUNTER — INPATIENT (INPATIENT)
Facility: HOSPITAL | Age: 81
LOS: 12 days | Discharge: NURSING, REHAB OR LONG-TERM CARE | End: 2021-05-26
Attending: INTERNAL MEDICINE | Admitting: INTERNAL MEDICINE
Payer: MEDICARE

## 2021-05-13 VITALS
SYSTOLIC BLOOD PRESSURE: 164 MMHG | HEIGHT: 63 IN | HEART RATE: 82 BPM | RESPIRATION RATE: 18 BRPM | DIASTOLIC BLOOD PRESSURE: 57 MMHG | OXYGEN SATURATION: 100 % | TEMPERATURE: 98 F

## 2021-05-13 DIAGNOSIS — R45.1 RESTLESSNESS AND AGITATION: ICD-10-CM

## 2021-05-13 DIAGNOSIS — Z98.891 HISTORY OF UTERINE SCAR FROM PREVIOUS SURGERY: Chronic | ICD-10-CM

## 2021-05-13 LAB
ALBUMIN SERPL ELPH-MCNC: 4.1 G/DL — SIGNIFICANT CHANGE UP (ref 3.3–5)
ALP SERPL-CCNC: 98 U/L — SIGNIFICANT CHANGE UP (ref 40–120)
ALT FLD-CCNC: 29 U/L — SIGNIFICANT CHANGE UP (ref 4–33)
ANION GAP SERPL CALC-SCNC: 11 MMOL/L — SIGNIFICANT CHANGE UP (ref 7–14)
APPEARANCE UR: CLEAR — SIGNIFICANT CHANGE UP
AST SERPL-CCNC: 37 U/L — HIGH (ref 4–32)
BASOPHILS # BLD AUTO: 0.01 K/UL — SIGNIFICANT CHANGE UP (ref 0–0.2)
BASOPHILS NFR BLD AUTO: 0.2 % — SIGNIFICANT CHANGE UP (ref 0–2)
BILIRUB SERPL-MCNC: <0.2 MG/DL — SIGNIFICANT CHANGE UP (ref 0.2–1.2)
BILIRUB UR-MCNC: NEGATIVE — SIGNIFICANT CHANGE UP
BLOOD GAS VENOUS COMPREHENSIVE RESULT: SIGNIFICANT CHANGE UP
BUN SERPL-MCNC: 23 MG/DL — SIGNIFICANT CHANGE UP (ref 7–23)
CALCIUM SERPL-MCNC: 9.7 MG/DL — SIGNIFICANT CHANGE UP (ref 8.4–10.5)
CHLORIDE SERPL-SCNC: 101 MMOL/L — SIGNIFICANT CHANGE UP (ref 98–107)
CO2 SERPL-SCNC: 28 MMOL/L — SIGNIFICANT CHANGE UP (ref 22–31)
COLOR SPEC: SIGNIFICANT CHANGE UP
CREAT SERPL-MCNC: 1.02 MG/DL — SIGNIFICANT CHANGE UP (ref 0.5–1.3)
DIFF PNL FLD: NEGATIVE — SIGNIFICANT CHANGE UP
EOSINOPHIL # BLD AUTO: 0.03 K/UL — SIGNIFICANT CHANGE UP (ref 0–0.5)
EOSINOPHIL NFR BLD AUTO: 0.5 % — SIGNIFICANT CHANGE UP (ref 0–6)
GLUCOSE BLDC GLUCOMTR-MCNC: 299 MG/DL — HIGH (ref 70–99)
GLUCOSE SERPL-MCNC: 271 MG/DL — HIGH (ref 70–99)
GLUCOSE UR QL: ABNORMAL
HCT VFR BLD CALC: 31 % — LOW (ref 34.5–45)
HGB BLD-MCNC: 9.6 G/DL — LOW (ref 11.5–15.5)
IANC: 4.26 K/UL — SIGNIFICANT CHANGE UP (ref 1.5–8.5)
IMM GRANULOCYTES NFR BLD AUTO: 0.3 % — SIGNIFICANT CHANGE UP (ref 0–1.5)
KETONES UR-MCNC: NEGATIVE — SIGNIFICANT CHANGE UP
LEUKOCYTE ESTERASE UR-ACNC: NEGATIVE — SIGNIFICANT CHANGE UP
LYMPHOCYTES # BLD AUTO: 1.8 K/UL — SIGNIFICANT CHANGE UP (ref 1–3.3)
LYMPHOCYTES # BLD AUTO: 27.4 % — SIGNIFICANT CHANGE UP (ref 13–44)
MAGNESIUM SERPL-MCNC: 1.6 MG/DL — SIGNIFICANT CHANGE UP (ref 1.6–2.6)
MCHC RBC-ENTMCNC: 22.9 PG — LOW (ref 27–34)
MCHC RBC-ENTMCNC: 31 GM/DL — LOW (ref 32–36)
MCV RBC AUTO: 73.8 FL — LOW (ref 80–100)
MONOCYTES # BLD AUTO: 0.44 K/UL — SIGNIFICANT CHANGE UP (ref 0–0.9)
MONOCYTES NFR BLD AUTO: 6.7 % — SIGNIFICANT CHANGE UP (ref 2–14)
NEUTROPHILS # BLD AUTO: 4.26 K/UL — SIGNIFICANT CHANGE UP (ref 1.8–7.4)
NEUTROPHILS NFR BLD AUTO: 64.9 % — SIGNIFICANT CHANGE UP (ref 43–77)
NITRITE UR-MCNC: NEGATIVE — SIGNIFICANT CHANGE UP
NRBC # BLD: 0 /100 WBCS — SIGNIFICANT CHANGE UP
NRBC # FLD: 0 K/UL — SIGNIFICANT CHANGE UP
PH UR: 6.5 — SIGNIFICANT CHANGE UP (ref 5–8)
PHOSPHATE SERPL-MCNC: 3.1 MG/DL — SIGNIFICANT CHANGE UP (ref 2.5–4.5)
PLATELET # BLD AUTO: 260 K/UL — SIGNIFICANT CHANGE UP (ref 150–400)
POTASSIUM SERPL-MCNC: 4.2 MMOL/L — SIGNIFICANT CHANGE UP (ref 3.5–5.3)
POTASSIUM SERPL-SCNC: 4.2 MMOL/L — SIGNIFICANT CHANGE UP (ref 3.5–5.3)
PROT SERPL-MCNC: 6.7 G/DL — SIGNIFICANT CHANGE UP (ref 6–8.3)
PROT UR-MCNC: ABNORMAL
RBC # BLD: 4.2 M/UL — SIGNIFICANT CHANGE UP (ref 3.8–5.2)
RBC # FLD: 15.1 % — HIGH (ref 10.3–14.5)
SODIUM SERPL-SCNC: 140 MMOL/L — SIGNIFICANT CHANGE UP (ref 135–145)
SP GR SPEC: 1.02 — SIGNIFICANT CHANGE UP (ref 1.01–1.02)
TSH SERPL-MCNC: 2.28 UIU/ML — SIGNIFICANT CHANGE UP (ref 0.27–4.2)
UROBILINOGEN FLD QL: SIGNIFICANT CHANGE UP
WBC # BLD: 6.56 K/UL — SIGNIFICANT CHANGE UP (ref 3.8–10.5)
WBC # FLD AUTO: 6.56 K/UL — SIGNIFICANT CHANGE UP (ref 3.8–10.5)

## 2021-05-13 PROCEDURE — 99223 1ST HOSP IP/OBS HIGH 75: CPT

## 2021-05-13 PROCEDURE — 99285 EMERGENCY DEPT VISIT HI MDM: CPT

## 2021-05-13 PROCEDURE — 71046 X-RAY EXAM CHEST 2 VIEWS: CPT | Mod: 26

## 2021-05-13 RX ORDER — INSULIN LISPRO 100/ML
VIAL (ML) SUBCUTANEOUS
Refills: 0 | Status: DISCONTINUED | OUTPATIENT
Start: 2021-05-13 | End: 2021-05-14

## 2021-05-13 RX ORDER — GLUCAGON INJECTION, SOLUTION 0.5 MG/.1ML
1 INJECTION, SOLUTION SUBCUTANEOUS ONCE
Refills: 0 | Status: DISCONTINUED | OUTPATIENT
Start: 2021-05-13 | End: 2021-05-26

## 2021-05-13 RX ORDER — DEXTROSE 50 % IN WATER 50 %
12.5 SYRINGE (ML) INTRAVENOUS ONCE
Refills: 0 | Status: DISCONTINUED | OUTPATIENT
Start: 2021-05-13 | End: 2021-05-26

## 2021-05-13 RX ORDER — SODIUM CHLORIDE 9 MG/ML
1000 INJECTION, SOLUTION INTRAVENOUS
Refills: 0 | Status: DISCONTINUED | OUTPATIENT
Start: 2021-05-13 | End: 2021-05-26

## 2021-05-13 RX ORDER — DEXTROSE 50 % IN WATER 50 %
15 SYRINGE (ML) INTRAVENOUS ONCE
Refills: 0 | Status: DISCONTINUED | OUTPATIENT
Start: 2021-05-13 | End: 2021-05-26

## 2021-05-13 RX ORDER — SODIUM CHLORIDE 9 MG/ML
1000 INJECTION, SOLUTION INTRAVENOUS ONCE
Refills: 0 | Status: COMPLETED | OUTPATIENT
Start: 2021-05-13 | End: 2021-05-13

## 2021-05-13 RX ORDER — DEXTROSE 50 % IN WATER 50 %
25 SYRINGE (ML) INTRAVENOUS ONCE
Refills: 0 | Status: DISCONTINUED | OUTPATIENT
Start: 2021-05-13 | End: 2021-05-26

## 2021-05-13 RX ORDER — RISPERIDONE 4 MG/1
0.25 TABLET ORAL ONCE
Refills: 0 | Status: DISCONTINUED | OUTPATIENT
Start: 2021-05-13 | End: 2021-05-14

## 2021-05-13 RX ORDER — INSULIN LISPRO 100/ML
VIAL (ML) SUBCUTANEOUS AT BEDTIME
Refills: 0 | Status: DISCONTINUED | OUTPATIENT
Start: 2021-05-13 | End: 2021-05-14

## 2021-05-13 RX ADMIN — Medication 1 MILLIGRAM(S): at 22:53

## 2021-05-13 RX ADMIN — Medication 3: at 23:56

## 2021-05-13 RX ADMIN — SODIUM CHLORIDE 1000 MILLILITER(S): 9 INJECTION, SOLUTION INTRAVENOUS at 21:28

## 2021-05-13 NOTE — H&P ADULT - PROBLEM SELECTOR PLAN 1
Pt here with agitation, noted to be combative and aggressive, refusing to go to her room or take her medications and swatting at NH staff. Likely in setting of progressing advanced dementia.  - Psychiatry consult to be called in AM for medication management  - Pt currently sedated s/p IV Ativan 1 mg x1 in ED. Keep NPO for now. Will perform dysphagia screen once pt awake and alert. If pt remains NPO, will consider starting IVF with D5.   - S&S eval and PT consult once pt more awake and alert  - No need for constant observation at this time, will reassess need for constant observation if pt becomes agitated again  - EKG ordered to check QTc. F/u EKG. C/w IV Ativan 0.5 mg q6hrs PRN for agitation for now, as per NH staff, Haldol has been ineffective.   - UA negative for nitrite and leuk esterase. CXR with clear lungs. COVID-19 PCR negative. Pt afebrile with no leukocytosis. No evidence of underlying infection as cause for agitation.   - CTH on 5/3/21 with no acute pathology noted, MRI head on 4/27/21 with no evidence of an acute infarct Pt here with agitation, noted to be combative and aggressive, refusing to go to her room or take her medications and swatting at NH staff. Likely in setting of progressing advanced dementia.  - Psychiatry consult to be called in AM for medication management  - Pt currently sedated s/p IV Ativan 1 mg x1 in ED. Keep NPO for now. Will perform dysphagia screen once pt awake and alert. If pt remains NPO, will consider starting IVF with D5.   - Pt on Trazodone at NH. Will hold for now.  - S&S eval and PT consult once pt more awake and alert  - No need for constant observation at this time, will reassess need for constant observation if pt becomes agitated again  - EKG ordered to check QTc. F/u EKG. C/w IV Ativan 0.5 mg PRN for agitation for now, given that as per NH staff, Haldol has been ineffective.   - UA negative for nitrite and leuk esterase. CXR with clear lungs. COVID-19 PCR negative. Pt afebrile with no leukocytosis. No evidence of underlying infection as cause for agitation.   - CTH on 5/3/21 with no acute pathology noted, MRI head on 4/27/21 with no evidence of an acute infarct

## 2021-05-13 NOTE — ED PROVIDER NOTE - ATTENDING CONTRIBUTION TO CARE
Attending Attestation: Dr. Yuan  I have personally performed a history and physical examination of the patient and discussed management with the resident as well as the patient.  I reviewed the resident's note and agree with the documented findings and plan of care.  I have authored and modified critical sections of the Provider Note, including but not limited to HPI, Physical Exam and MDM. pt c complex PMHx incl dementia c behavioral disturbances sent in from SNF due to agitation. In ED pt calm, cooperative.  In NAD, denies all complaints (though A&O x1 d/t dementia, at baseline). Will r/o infectious etiology. R/o UTI. R/o metabolic abnormality. Dispo pending workup and d/w SNF staff.

## 2021-05-13 NOTE — H&P ADULT - NSHPLABSRESULTS_GEN_ALL_CORE
Imaging personally reviewed. Imaging personally reviewed.  CXR:  FINDINGS:  LUNGS: The lungs are clear.  PLEURA: No pleural effusion or pneumothorax.  HEART AND MEDIASTINUM: Cardiomediastinal silhouette size is within normal limits.  SKELETON: No significant abnormality.    IMPRESSION:  Clear lungs.

## 2021-05-13 NOTE — H&P ADULT - PROBLEM SELECTOR PROBLEM 3
Type 2 diabetes mellitus with hyperglycemia, with long-term current use of insulin HTN (hypertension)

## 2021-05-13 NOTE — ED PROVIDER NOTE - CLINICAL SUMMARY MEDICAL DECISION MAKING FREE TEXT BOX
O'Deondre DO PGY-1: O'Deondre DO PGY-1: pt presents due to agigation. In ED pt calm. Will r/o infectious etiology. R/o UTI. R/o metabolic abnormality. Dispo pending workup, pt c complex PMHx incl dementia c behavioral disturbances sent in from SNF due to agitation. In ED pt calm, cooperative.  In NAD, denies all complaints (though A&O x1 d/t dementia, at baseline). Will r/o infectious etiology. R/o UTI. R/o metabolic abnormality. Dispo pending workup and d/w SNF staff.

## 2021-05-13 NOTE — H&P ADULT - NSICDXPASTMEDICALHX_GEN_ALL_CORE_FT
PAST MEDICAL HISTORY:  Dementia     Diabetes     Glaucoma Left eye     PAST MEDICAL HISTORY:  Dementia     Glaucoma Left eye    Legal blindness     Type 2 diabetes mellitus      PAST MEDICAL HISTORY:  Dementia     Glaucoma     HTN (hypertension)     Legal blindness     Type 2 diabetes mellitus

## 2021-05-13 NOTE — ED PROVIDER NOTE - NS ED ROS FT
CONSTITUTIONAL - No fever, No diaphoresis, No weight change  EYES - No eye pain, No blurred vision  ENT - No change in hearing, No sore throat, No neck pain, No rhinorrhea, No ear pain  RESPIRATORY - No shortness of breath, No cough  CARDIAC -No chest pain, No palpitations  GI - No abdominal pain, No nausea, No vomiting, No diarrhea, No constipation     +dysuria  MUSCULOSKELETAL - No joint pain, No swelling, No back pain  NEUROLOGIC - No numbness, No focal weakness, No headache, No dizziness

## 2021-05-13 NOTE — ED PROVIDER NOTE - PROGRESS NOTE DETAILS
Faith Melvin M.D. Resident  called [facility], spoke to Fred KRUSE., states pt has been swatting at staff and requiring constant observation and haldol x 2 for agitation. Faith Melvin M.D. Resident  Hospitalist requesting constant obs bed. Faith Melvin M.D. Resident  Hospitalist requesting constant obs bed. While in ED HW pt has become more agitated, attempting to get out of bed, less cooperative, yelling intermittently.

## 2021-05-13 NOTE — H&P ADULT - PROBLEM SELECTOR PLAN 5
- DVT ppx: HSQ  - Diet: NPO pending dysphagia screen. If pt passes dysphagia screen, will start chopped, thin liquid diet (PO diet at NH). S&S eval once pt more awake and alert. - C/w ophthalmic drops

## 2021-05-13 NOTE — H&P ADULT - ASSESSMENT
81 yo woman with history of advanced dementia (at baseline, requires assistance with all ADLs), DM2 (on insulin), and legal blindness with glaucoma and retinopathy presents from [facility] due to agitation with combative, aggressive behavior.

## 2021-05-13 NOTE — H&P ADULT - PROBLEM SELECTOR PLAN 4
- C/w ophthalmic drops Pt on Lantus 6u qhs and low-dose ISS qAC and qhs at NH. Pt hyperglycemic on admission with serum glucose 271 and FS as high as 299.  - Lantus 6u ordered overnight  - Pt currently NPO pending dysphagia screen. If pt remains NPO, will hold bedtime Lantus unless pt started on IVF with D5.   - Start low-dose ISS and FS checks q6hrs while NPO  - C/w atorvastatin once pt able to take PO meds

## 2021-05-13 NOTE — ED ADULT NURSE NOTE - CHIEF COMPLAINT QUOTE
Pt comes from [facility], sent to ED for "behavioral issues and aggression." Pt has hx of dementia & is legally blind, EMS reports "according to [place] staff, pt has been refusing her medication and they don't know the last time she took any of her meds." Pt is calm in triage.

## 2021-05-13 NOTE — H&P ADULT - NSHPREVIEWOFSYSTEMS_GEN_ALL_CORE
Unable to obtain Review of Systems, as pt currently sedated and also with dementia at baseline. Unable to obtain Review of Systems, as pt currently sedated and also with advanced dementia at baseline.

## 2021-05-13 NOTE — H&P ADULT - PROBLEM SELECTOR PLAN 2
Hgb 9.6 on admission, near baseline Hgb. No S/S of active bleed.  - Monitor H/H and transfuse for Hgb < 7  - Check iron studies, folate, vitamin B12, and retic count

## 2021-05-13 NOTE — H&P ADULT - PROBLEM SELECTOR PLAN 3
Pt on Lantus 6u qhs and low-dose ISS qAC and qhs at NH. Pt hyperglycemic on admission with serum glucose 271 and FS as high as 299.  - Lantus 6u ordered overnight  - Pt currently NPO pending dysphagia screen. If pt remains NPO, will hold bedtime Lantus unless pt started on IVF with D5.   - Start low-dose ISS and FS checks q6hrs while NPO BPs elevated on admission, likely 2/2 agitation. Pt on amlodipine at NH.  - Hold amlodipine for now. Can resume if pt able to take PO meds and if BP tolerates.

## 2021-05-13 NOTE — ED ADULT NURSE REASSESSMENT NOTE - NS ED NURSE REASSESS COMMENT FT1
PT in bed, AAO1, nonambulatory, incontinent, bruise noted to left eye. Respirations even and unlabored. Awaiting dispo.

## 2021-05-13 NOTE — ED ADULT TRIAGE NOTE - CHIEF COMPLAINT QUOTE
Pt comes from [facility], sent to ED for "behavioral issues and aggression." Pt has hx of dementia & is legally blind, EMS reports "according to [place] staff, pt has been refusing her medication and they don't know the last time she took any of her meds." Pt is calm in triage.

## 2021-05-13 NOTE — ED ADULT NURSE NOTE - OBJECTIVE STATEMENT
Received pt to bed 10, A+Ox1 to self, ambulatory. arrives from [facility] for "behavioral issues and aggression." PMHx of dementia & is legally blind, EMS reports "according to [place] staff, pt has been refusing her medication and they don't know the last time she took any of her meds." pt offers no complaints at this time. Respirations even and unlabored, normal work of breathing, no accessory muscle use, speaking in full clear uninterrupted sentences. ABD is soft, non tender, non distended. Pt denies any chest pain, SOB, headache, dizziness, N/V/D, fever, chills.  20G to LAC, Labs sent, will continue to monitor. Received pt to bed 10, A+Ox1 to self, ambulatory. arrives from [facility] for "behavioral issues and aggression." PMHx of dementia & is legally blind, EMS reports "according to [place] staff, pt has been refusing her medication and they don't know the last time she took any of her meds." pt offers no complaints at this time, calm and cooperative at this time. Respirations even and unlabored, normal work of breathing, no accessory muscle use, speaking in full clear uninterrupted sentences. ABD is soft, non tender, non distended. Pt denies any chest pain, SOB, headache, dizziness, N/V/D, fever, chills.  20G to LAC, Labs sent, will continue to monitor.

## 2021-05-13 NOTE — ED PROVIDER NOTE - OBJECTIVE STATEMENT
81 y/o F w/ pmhx of DM, dementia, legal blindness presents from SNF due to agitation. In ED pt AOx1 but denies any pain. Denies any recent f/c, n/v, cp, sob, abd pain. Does endorse dysuria for the past month. Called [facility] for collateral who said they had the pt there for the past week however today the pt began agitated and was refusing to go to her room and would "swat" at the staff members. 79 y/o F w/ pmhx of DM, dementia, legal blindness presents from SNF due to agitation. HAs been there for approx 1 week since DC from [facility] recently. In ED pt AOx1 but denies any pain. Denies any recent f/c, n/v, cp, sob, abd pain. Does endorse dysuria for the past month. Is cooperative, calm. Called [facility] for collateral who said they had the pt there for the past week however today the pt began agitated and was refusing to go to her room and would "swat" at the staff members.

## 2021-05-13 NOTE — H&P ADULT - PROBLEM SELECTOR PLAN 6
- DVT ppx: HSQ  - Diet: NPO pending dysphagia screen. If pt passes dysphagia screen, will start chopped, thin liquid diet (PO diet at NH). S&S eval once pt more awake and alert. - DVT ppx: HSQ  - Diet: NPO pending dysphagia screen. If pt passes dysphagia screen, will start chopped, thin liquid diet (PO diet at NH). S&S eval once pt more awake and alert.  - Pt FULL CODE

## 2021-05-13 NOTE — ED PROVIDER NOTE - PHYSICAL EXAMINATION
CONSTITUTIONAL: Well-developed; well-nourished; in no acute distress.   SKIN: warm, dry  HEAD: Normocephalic; atraumatic.  EYES: no conjunctival injection.   ENT: No nasal discharge; airway clear.  NECK: Supple; non tender.  CARD: S1, S2 normal; no murmurs, gallops, or rubs. Regular rate and rhythm.   RESP: No wheezes, rales or rhonchi. Good air movement bilaterally.   ABD: soft ntnd, no guarding, no distention, no rigidity. No CVA tenderness.   EXT: No cyanosis or edema.   NEURO: AOx1   PSYCH: Cooperative, appropriate.

## 2021-05-13 NOTE — H&P ADULT - HISTORY OF PRESENT ILLNESS
HPI unable to be obtained from pt, as pt currently sedated and also with dementia at baseline.    Per chart review and documentation from [facility]:  81 yo woman with history of dementia (at baseline, requires assistance with all ADLs, nonambulatory), DM2 (on insulin), and legal blindness with L eye glaucoma presents from [facility] due to agitation with combative, aggressive behavior. According to the ED Provider Note, pt had been at [facility] for the past week, and on Thursday, became agitated, refusing to go to her room and swatting at staff members.  HPI unable to be obtained from pt, as pt currently sedated and also with advanced dementia at baseline.    Per documentation from [facility] and the ED:  81 yo woman with history of advanced dementia (at baseline, requires assistance with all ADLs), DM2 (on insulin), and legal blindness with glaucoma and retinopathy presents from [facility] due to agitation with combative, aggressive behavior. Pt had been at [facility] for the past week, and on Thursday, became agitated, refusing to go to her room and swatting at staff members. EMS had reported that according to [place] staff, pt has been refusing her medications and they don't know the last time she took any of her medications."     In the ED, pt A&Ox1 to self and again agitated, continuously trying to get out of bed, and therefore sedated with IV Ativan 1 mg x1. Pt currently sedated but arousable to tactile stimuli and protecting airway.  T 98-98.3, HR 80-88, -195/57-84, RR 16-18, O2 sats 100% RA.   HPI unable to be obtained from pt, as pt currently sedated and also with advanced dementia at baseline.    Per documentation from [facility] and the ED:  81 yo woman with history of advanced dementia (at baseline, requires assistance with all ADLs), HTN, DM2 (on insulin), and legal blindness with glaucoma and retinopathy presents from [facility] due to agitation with combative, aggressive behavior. Pt had been at [facility] for the past week, and on Thursday, became agitated, refusing to go to her room and swatting at staff members. EMS had reported that according to [place] staff, pt has been refusing her medications and they don't know the last time she took any of her medications."     In the ED, pt A&Ox1 to self and again agitated, continuously trying to get out of bed, and therefore sedated with IV Ativan 1 mg x1. Pt currently sedated but arousable to tactile stimuli and protecting airway.  T 98-98.3, HR 80-88, -195/57-84, RR 16-18, O2 sats 100% RA.

## 2021-05-13 NOTE — H&P ADULT - PROBLEM/PLAN-4
Anticoagulation Clinic Progress Note    Anticoagulation Summary  As of 2020    INR goal:   2.0-3.0   TTR:   78.0 % (1.3 y)   INR used for dosin.0 (2020)   Warfarin maintenance plan:   4 mg every Mon, Wed, Fri; 2 mg all other days   Weekly warfarin total:   20 mg   No change documented:   Alexander Valiente RPH   Plan last modified:   Alexander Valiente RPH (2020)   Next INR check:   2020   Priority:   Maintenance   Target end date:   Indefinite    Indications    PAF (paroxysmal atrial fibrillation) (CMS/[facility]) [I48.0]             Anticoagulation Episode Summary     INR check location:       Preferred lab:       Send INR reminders to:    [facility] [place]    Comments:         Anticoagulation Care Providers     Provider Role Specialty Phone number    Rommel Veliz MD Referring Cardiology [phone]          Clinic Interview:  Patient Findings     Positives:   Signs/symptoms of bleeding, Upcoming invasive procedure,   Change in medications    Negatives:   Signs/symptoms of thrombosis, Laboratory test error   suspected, Change in health, Change in alcohol use, Change in activity,   Emergency department visit, Upcoming dental procedure, Missed doses, Extra   doses, Change in diet/appetite, Hospital admission, Bruising, Other   complaints    Comments:   Pacemaker procedure 2/7; reports being instructed to hold x 4   days (including procedure day). As noted previously, on 5 wk course of   azithromycin. Reports nosebleed 1/10 after a bad coughing fit; resolved   after ~40 min.      Clinical Outcomes     Negatives:   Major bleeding event, Thromboembolic event,   Anticoagulation-related hospital admission, Anticoagulation-related ED   visit, Anticoagulation-related fatality    Comments:   Pacemaker procedure 2/7; reports being instructed to hold x 4   days (including procedure day). As noted previously, on 5 wk course of   azithromycin. Reports nosebleed 1/10 after a bad coughing fit; resolved    after ~40 min.        INR History:  Anticoagulation Monitoring 12/19/2019 1/7/2020 1/14/2020   INR 2.8 4.3 2.0   INR Date 12/19/2019 1/7/2020 1/14/2020   INR Goal 2.0-3.0 2.0-3.0 2.0-3.0   Trend Same Down Same   Last Week Total 18 mg 24 mg 16 mg   Next Week Total 24 mg 16 mg 20 mg   Sun 4 mg 2 mg 2 mg   Mon 4 mg 4 mg 4 mg   Tue 2 mg Hold (1/7) 2 mg   Wed 4 mg 2 mg (1/8) 4 mg   Thu 4 mg 2 mg 2 mg   Fri 4 mg 4 mg 4 mg   Sat 2 mg 2 mg 2 mg   Visit Report - - -   Some recent data might be hidden       Plan:  1. INR is Therapeutic today- see above in Anticoagulation Summary.  Will instruct Caitlyn GARRETT Kesslermaxx to Continue their warfarin regimen- see above in Anticoagulation Summary.  2. Follow up in 1 week  3. Patient declines warfarin refills.  4. Verbal and written information provided. Reviewed nosebleed management/prevention; provided handout. Patient expresses understanding and has no further questions at this time.    Alexander Valiente [facility]   DISPLAY PLAN FREE TEXT

## 2021-05-13 NOTE — ED ADULT NURSE REASSESSMENT NOTE - NS ED NURSE REASSESS COMMENT FT1
Pt continuously trying to get out of bed. MD Yuan made aware, medicated as ordered. B/L handrails raised, bed in lowest position, will continue to monitor.

## 2021-05-13 NOTE — H&P ADULT - NSHPPHYSICALEXAM_GEN_ALL_CORE
Vital Signs Last 24 Hrs  T(C): 36.8 (14 May 2021 01:47), Max: 36.8 (13 May 2021 17:18)  T(F): 98.2 (14 May 2021 01:47), Max: 98.3 (13 May 2021 17:18)  HR: 88 (14 May 2021 01:47) (80 - 88)  BP: 140/85 (14 May 2021 01:47) (140/85 - 195/67)  BP(mean): --  RR: 18 (14 May 2021 01:47) (16 - 18)  SpO2: 97% (14 May 2021 01:47) (97% - 100%)    PHYSICAL EXAM:  General: Awake and alert.  No acute distress.  Head: Normocephalic, atraumatic.    Eyes: PERRL.  EOMI.  No scleral icterus.  No conjunctival pallor.  Mouth: Moist MM.  No oropharyngeal exudates.    Neck: Supple.  Full range of motion.  No JVD.  No LAD.  No thyromegaly.  Trachea midline.    Heart: RRR.  Normal S1 and S2.  No murmurs, rubs, or gallops.  No LE edema b/l.   Lungs: Nonlabored breathing.  Good inspiratory effort.  CTAB.  No wheezes, crackles, or rhonchi.    Abdomen: BS+, soft, NT/ND.  No hepatomegaly.   Skin: Warm and dry.  No rashes.  Extremities: No cyanosis.  2+ peripheral pulses b/l.  Musculoskeletal: No joint deformities.  No spinal or paraspinal tenderness.  Neuro: A&Ox3.  CN II-XII intact.  5/5 motor strength in UE and LE b/l.  Tactile sensation intact in UE and LE b/l.  Cerebellar function intact as assessed by finger-to-nose test. Vital Signs Last 24 Hrs  T(C): 36.8 (14 May 2021 01:47), Max: 36.8 (13 May 2021 17:18)  T(F): 98.2 (14 May 2021 01:47), Max: 98.3 (13 May 2021 17:18)  HR: 88 (14 May 2021 01:47) (80 - 88)  BP: 140/85 (14 May 2021 01:47) (140/85 - 195/67)  BP(mean): --  RR: 18 (14 May 2021 01:47) (16 - 18)  SpO2: 97% (14 May 2021 01:47) (97% - 100%)    PHYSICAL EXAM:  General: Elderly female lying in bed. Currently sedated but arousable to tactile stimuli.  Head: Normocephalic, atraumatic.    Eyes: PERRL.  No scleral icterus.  No conjunctival pallor.  Mouth: Minimal oral secretions.  No droop.   Neck: No JVD.  No LAD.  No thyromegaly.  Trachea midline.    Heart: RRR.  Normal S1 and S2.  No murmurs, rubs, or gallops.  No LE edema b/l.   Lungs: Nonlabored breathing.  CTAB.  No wheezes, crackles, or rhonchi.    Abdomen: BS+, soft, nondistended, unable to assess for tenderness given pt currently sedated.   Skin: Warm and dry.  No rashes.  Extremities: No cyanosis.  2+ peripheral pulses b/l.  Musculoskeletal: Muscle wasting.  Partially contracted LE b/l at the knees and hip.   Neuro: Limited given pt's level of sedation.  No facial asymmetry.  Sensation to pain intact in UE and LE b/l.

## 2021-05-14 ENCOUNTER — TRANSCRIPTION ENCOUNTER (OUTPATIENT)
Age: 81
End: 2021-05-14

## 2021-05-14 DIAGNOSIS — I10 ESSENTIAL (PRIMARY) HYPERTENSION: ICD-10-CM

## 2021-05-14 DIAGNOSIS — H40.9 UNSPECIFIED GLAUCOMA: ICD-10-CM

## 2021-05-14 DIAGNOSIS — E11.65 TYPE 2 DIABETES MELLITUS WITH HYPERGLYCEMIA: ICD-10-CM

## 2021-05-14 DIAGNOSIS — D64.9 ANEMIA, UNSPECIFIED: ICD-10-CM

## 2021-05-14 DIAGNOSIS — F03.91 UNSPECIFIED DEMENTIA WITH BEHAVIORAL DISTURBANCE: ICD-10-CM

## 2021-05-14 DIAGNOSIS — Z29.9 ENCOUNTER FOR PROPHYLACTIC MEASURES, UNSPECIFIED: ICD-10-CM

## 2021-05-14 PROBLEM — F03.90 UNSPECIFIED DEMENTIA WITHOUT BEHAVIORAL DISTURBANCE: Chronic | Status: ACTIVE | Noted: 2021-04-26

## 2021-05-14 LAB
ALBUMIN SERPL ELPH-MCNC: 3.6 G/DL — SIGNIFICANT CHANGE UP (ref 3.3–5)
ALP SERPL-CCNC: 92 U/L — SIGNIFICANT CHANGE UP (ref 40–120)
ALT FLD-CCNC: 24 U/L — SIGNIFICANT CHANGE UP (ref 4–33)
ANION GAP SERPL CALC-SCNC: 10 MMOL/L — SIGNIFICANT CHANGE UP (ref 7–14)
AST SERPL-CCNC: 32 U/L — SIGNIFICANT CHANGE UP (ref 4–32)
BASOPHILS # BLD AUTO: 0.01 K/UL — SIGNIFICANT CHANGE UP (ref 0–0.2)
BASOPHILS NFR BLD AUTO: 0.2 % — SIGNIFICANT CHANGE UP (ref 0–2)
BILIRUB SERPL-MCNC: <0.2 MG/DL — SIGNIFICANT CHANGE UP (ref 0.2–1.2)
BUN SERPL-MCNC: 18 MG/DL — SIGNIFICANT CHANGE UP (ref 7–23)
CALCIUM SERPL-MCNC: 9.2 MG/DL — SIGNIFICANT CHANGE UP (ref 8.4–10.5)
CHLORIDE SERPL-SCNC: 104 MMOL/L — SIGNIFICANT CHANGE UP (ref 98–107)
CO2 SERPL-SCNC: 25 MMOL/L — SIGNIFICANT CHANGE UP (ref 22–31)
COVID-19 SPIKE DOMAIN AB INTERP: NEGATIVE — SIGNIFICANT CHANGE UP
COVID-19 SPIKE DOMAIN ANTIBODY RESULT: 0.4 U/ML — SIGNIFICANT CHANGE UP
CREAT SERPL-MCNC: 0.98 MG/DL — SIGNIFICANT CHANGE UP (ref 0.5–1.3)
CULTURE RESULTS: SIGNIFICANT CHANGE UP
EOSINOPHIL # BLD AUTO: 0.12 K/UL — SIGNIFICANT CHANGE UP (ref 0–0.5)
EOSINOPHIL NFR BLD AUTO: 2.5 % — SIGNIFICANT CHANGE UP (ref 0–6)
GLUCOSE BLDC GLUCOMTR-MCNC: 101 MG/DL — HIGH (ref 70–99)
GLUCOSE BLDC GLUCOMTR-MCNC: 136 MG/DL — HIGH (ref 70–99)
GLUCOSE BLDC GLUCOMTR-MCNC: 152 MG/DL — HIGH (ref 70–99)
GLUCOSE BLDC GLUCOMTR-MCNC: 165 MG/DL — HIGH (ref 70–99)
GLUCOSE BLDC GLUCOMTR-MCNC: 69 MG/DL — LOW (ref 70–99)
GLUCOSE BLDC GLUCOMTR-MCNC: 72 MG/DL — SIGNIFICANT CHANGE UP (ref 70–99)
GLUCOSE SERPL-MCNC: 142 MG/DL — HIGH (ref 70–99)
HCT VFR BLD CALC: 29.8 % — LOW (ref 34.5–45)
HGB BLD-MCNC: 9.2 G/DL — LOW (ref 11.5–15.5)
IANC: 2.94 K/UL — SIGNIFICANT CHANGE UP (ref 1.5–8.5)
IMM GRANULOCYTES NFR BLD AUTO: 0.2 % — SIGNIFICANT CHANGE UP (ref 0–1.5)
LACTATE SERPL-SCNC: 1.3 MMOL/L — SIGNIFICANT CHANGE UP (ref 0.5–2)
LYMPHOCYTES # BLD AUTO: 1.38 K/UL — SIGNIFICANT CHANGE UP (ref 1–3.3)
LYMPHOCYTES # BLD AUTO: 28.6 % — SIGNIFICANT CHANGE UP (ref 13–44)
MAGNESIUM SERPL-MCNC: 1.7 MG/DL — SIGNIFICANT CHANGE UP (ref 1.6–2.6)
MCHC RBC-ENTMCNC: 22.8 PG — LOW (ref 27–34)
MCHC RBC-ENTMCNC: 30.9 GM/DL — LOW (ref 32–36)
MCV RBC AUTO: 73.9 FL — LOW (ref 80–100)
MONOCYTES # BLD AUTO: 0.36 K/UL — SIGNIFICANT CHANGE UP (ref 0–0.9)
MONOCYTES NFR BLD AUTO: 7.5 % — SIGNIFICANT CHANGE UP (ref 2–14)
NEUTROPHILS # BLD AUTO: 2.94 K/UL — SIGNIFICANT CHANGE UP (ref 1.8–7.4)
NEUTROPHILS NFR BLD AUTO: 61 % — SIGNIFICANT CHANGE UP (ref 43–77)
NRBC # BLD: 0 /100 WBCS — SIGNIFICANT CHANGE UP
NRBC # FLD: 0 K/UL — SIGNIFICANT CHANGE UP
PHOSPHATE SERPL-MCNC: 2.9 MG/DL — SIGNIFICANT CHANGE UP (ref 2.5–4.5)
PLATELET # BLD AUTO: 224 K/UL — SIGNIFICANT CHANGE UP (ref 150–400)
POTASSIUM SERPL-MCNC: 3.5 MMOL/L — SIGNIFICANT CHANGE UP (ref 3.5–5.3)
POTASSIUM SERPL-SCNC: 3.5 MMOL/L — SIGNIFICANT CHANGE UP (ref 3.5–5.3)
PROT SERPL-MCNC: 6.2 G/DL — SIGNIFICANT CHANGE UP (ref 6–8.3)
RBC # BLD: 4.03 M/UL — SIGNIFICANT CHANGE UP (ref 3.8–5.2)
RBC # FLD: 15.1 % — HIGH (ref 10.3–14.5)
SARS-COV-2 IGG+IGM SERPL QL IA: 0.4 U/ML — SIGNIFICANT CHANGE UP
SARS-COV-2 IGG+IGM SERPL QL IA: NEGATIVE — SIGNIFICANT CHANGE UP
SARS-COV-2 RNA SPEC QL NAA+PROBE: SIGNIFICANT CHANGE UP
SODIUM SERPL-SCNC: 139 MMOL/L — SIGNIFICANT CHANGE UP (ref 135–145)
SPECIMEN SOURCE: SIGNIFICANT CHANGE UP
WBC # BLD: 4.82 K/UL — SIGNIFICANT CHANGE UP (ref 3.8–10.5)
WBC # FLD AUTO: 4.82 K/UL — SIGNIFICANT CHANGE UP (ref 3.8–10.5)

## 2021-05-14 PROCEDURE — 99223 1ST HOSP IP/OBS HIGH 75: CPT

## 2021-05-14 RX ORDER — DORZOLAMIDE HYDROCHLORIDE 20 MG/ML
1 SOLUTION/ DROPS OPHTHALMIC THREE TIMES A DAY
Refills: 0 | Status: DISCONTINUED | OUTPATIENT
Start: 2021-05-14 | End: 2021-05-26

## 2021-05-14 RX ORDER — OLANZAPINE 15 MG/1
2.5 TABLET, FILM COATED ORAL
Refills: 0 | Status: DISCONTINUED | OUTPATIENT
Start: 2021-05-14 | End: 2021-05-26

## 2021-05-14 RX ORDER — ACETAMINOPHEN 500 MG
1 TABLET ORAL
Qty: 0 | Refills: 0 | DISCHARGE

## 2021-05-14 RX ORDER — ATORVASTATIN CALCIUM 80 MG/1
1 TABLET, FILM COATED ORAL
Qty: 30 | Refills: 0

## 2021-05-14 RX ORDER — DORZOLAMIDE HYDROCHLORIDE 20 MG/ML
1 SOLUTION/ DROPS OPHTHALMIC
Qty: 0 | Refills: 0 | DISCHARGE

## 2021-05-14 RX ORDER — HEPARIN SODIUM 5000 [USP'U]/ML
5000 INJECTION INTRAVENOUS; SUBCUTANEOUS EVERY 8 HOURS
Refills: 0 | Status: DISCONTINUED | OUTPATIENT
Start: 2021-05-14 | End: 2021-05-20

## 2021-05-14 RX ORDER — INSULIN LISPRO 100/ML
VIAL (ML) SUBCUTANEOUS EVERY 6 HOURS
Refills: 0 | Status: DISCONTINUED | OUTPATIENT
Start: 2021-05-14 | End: 2021-05-20

## 2021-05-14 RX ORDER — LATANOPROST 0.05 MG/ML
1 SOLUTION/ DROPS OPHTHALMIC; TOPICAL AT BEDTIME
Refills: 0 | Status: DISCONTINUED | OUTPATIENT
Start: 2021-05-14 | End: 2021-05-26

## 2021-05-14 RX ORDER — INSULIN GLARGINE 100 [IU]/ML
6 INJECTION, SOLUTION SUBCUTANEOUS ONCE
Refills: 0 | Status: COMPLETED | OUTPATIENT
Start: 2021-05-14 | End: 2021-05-14

## 2021-05-14 RX ORDER — LATANOPROST 0.05 MG/ML
1 SOLUTION/ DROPS OPHTHALMIC; TOPICAL
Qty: 0 | Refills: 0 | DISCHARGE

## 2021-05-14 RX ORDER — OLANZAPINE 15 MG/1
5 TABLET, FILM COATED ORAL AT BEDTIME
Refills: 0 | Status: DISCONTINUED | OUTPATIENT
Start: 2021-05-14 | End: 2021-05-26

## 2021-05-14 RX ADMIN — LATANOPROST 1 DROP(S): 0.05 SOLUTION/ DROPS OPHTHALMIC; TOPICAL at 22:35

## 2021-05-14 RX ADMIN — HEPARIN SODIUM 5000 UNIT(S): 5000 INJECTION INTRAVENOUS; SUBCUTANEOUS at 15:51

## 2021-05-14 RX ADMIN — DORZOLAMIDE HYDROCHLORIDE 1 DROP(S): 20 SOLUTION/ DROPS OPHTHALMIC at 14:49

## 2021-05-14 RX ADMIN — DORZOLAMIDE HYDROCHLORIDE 1 DROP(S): 20 SOLUTION/ DROPS OPHTHALMIC at 22:17

## 2021-05-14 RX ADMIN — INSULIN GLARGINE 6 UNIT(S): 100 INJECTION, SOLUTION SUBCUTANEOUS at 04:03

## 2021-05-14 RX ADMIN — OLANZAPINE 5 MILLIGRAM(S): 15 TABLET, FILM COATED ORAL at 22:17

## 2021-05-14 RX ADMIN — HEPARIN SODIUM 5000 UNIT(S): 5000 INJECTION INTRAVENOUS; SUBCUTANEOUS at 22:17

## 2021-05-14 NOTE — SWALLOW BEDSIDE ASSESSMENT ADULT - SWALLOW EVAL: DIAGNOSIS
Patient consumed trials of puree, solids and thin liquids presenting with mild oral dysphagia.  Oral phase characterized by adequate acceptance, adequate anterior bolus containment, increased time for mastication and manipulation but functional, mild delayed anterior to posterior transport and adequate oral cavity clearance. Pharyngeal Phase characterized by initiation of the pharyngeal swallow and hyolaryngeal elevation and excursion noted upon palpation.  No clinically overt signs or symptoms of aspiration across trials of puree, solids and thin liquids.

## 2021-05-14 NOTE — SWALLOW BEDSIDE ASSESSMENT ADULT - COMMENTS
As per H&P: 79 yo woman with history of advanced dementia (at baseline, requires assistance with all ADLs), DM2 (on insulin), and legal blindness with glaucoma and retinopathy presents from [facility] due to agitation with combative, aggressive behavior.    CXR: Clear lungs.    Patient seen by SLP at [facility] 4/2021 with recommendation of regular solids and thin liquids, please refer to report for full details.      SLP received patient in chair in hallway, arousable to an awake and alert state, eyes open, answering some simple questions and needs max cues to follow directions, which is consistent with baseline diagnosis of advanced dementia.  No physical indicators of pain present.

## 2021-05-14 NOTE — BH CONSULTATION LIAISON ASSESSMENT NOTE - SUMMARY
79 yo woman with history of advanced dementia (at baseline, requires assistance with all ADLs), HTN, DM2 (on insulin), and legal blindness with glaucoma and retinopathy presents from [facility] due to agitation with combative, aggressive behavior. Psych consulted for said agitation. Treated in ED with  various medications, sedated on exam. Likely requires medication optimization and then can be returned to NH.

## 2021-05-14 NOTE — PHYSICAL THERAPY INITIAL EVALUATION ADULT - PLANNED THERAPY INTERVENTIONS, PT EVAL
Patient left positioned for safety, in NAD, +chair alarm intact; [facility] aware./bed mobility training/gait training/strengthening/transfer training

## 2021-05-14 NOTE — BH CONSULTATION LIAISON ASSESSMENT NOTE - NSBHCONSULTRECOMMENDOTHER_PSY_A_CORE FT
Please start Zyprexa 2.5mg AM and 5mg HS  Also start Zyprexa 2.5mg PO Q 6 PRN agitation; can give IM if refuses PO version for severe agitation  Avoid benzodiazepines   ES to prevent falls/impulsivity  R/O any metabolic/infectious etiologies of AMS  Monitor QTc; keep Mg above 2 and K above 4  Will continue to follow  Likely dispo is back to NH after weekend stabilization

## 2021-05-14 NOTE — BH CONSULTATION LIAISON ASSESSMENT NOTE - NSICDXPASTMEDICALHX_GEN_ALL_CORE_FT
PAST MEDICAL HISTORY:  Dementia     Glaucoma     HTN (hypertension)     Legal blindness     Type 2 diabetes mellitus

## 2021-05-14 NOTE — DISCHARGE NOTE PROVIDER - HOSPITAL COURSE
81 yo woman with history of advanced dementia (at baseline, requires assistance with all ADLs), HTN, DM2 (on insulin), and legal blindness with glaucoma and retinopathy presents from [facility] due to agitation with combative, aggressive behavior. Pt had been at [facility] for the past week, and on Thursday, became agitated, refusing to go to her room and swatting at staff members. EMS had reported that according to [place] staff, pt has been refusing her medications and they don't know the last time she took any of her medications."       Hospital Course:     79 yo woman with history of advanced dementia (at baseline, requires assistance with all ADLs), HTN, DM2 (on insulin), and legal blindness with glaucoma and retinopathy presents from [facility] due to agitation with combative, aggressive behavior.     Hospital Course:  Dementia with behavioral disturbance  - Psychiatry followed -c/w Zyprexa 2.5mg AM and 5mg HS + Depakote sprinkles 250mg PO TID standing,  - Trazodone held  - UA negative for nitrite and leuk esterase. CXR with clear lungs  - COVID-19 PCR negative. Pt afebrile with no leukocytosis  - CTH on 5/3/21 with no acute pathology noted, MRI head on 4/27/21 with no evidence of an acute infarct    Anemia  - Hgb 9.6 on admission, near baseline. No S/S of active bleed  - iron studies- wnl, folate/ vitamin B12 -wnl  - Hgb on day of discharge ___    HTN   - Holding norvasc. BP remained stable     Type 2 diabetes mellitus with hyperglycemia  - Received ISS, followed BG 4x per day  - Resume atorvastatin once not refusing PO_____    Glaucoma  - c/w ophthalmic drops    Dispo- On ___ this case was reviewed with  ____, the patient is medically stable and optimized for discharge. All medications were reviewed and prescriptions were sent to mutually agreed upon pharmacy.     79 yo woman with history of advanced dementia (at baseline, requires assistance with all ADLs), HTN, DM2 (on insulin), and legal blindness with glaucoma and retinopathy presents from [facility] due to agitation with combative, aggressive behavior.     Hospital Course:  Dementia with behavioral disturbance  - Psychiatry followed -c/w Zyprexa 2.5mg AM and 5mg HS + Depakote sprinkles 250mg PO TID standing,  - Trazodone held  - UA negative for nitrite and leuk esterase. CXR with clear lungs  - COVID-19 PCR negative. Pt afebrile with no leukocytosis  - CTH on 5/3/21 with no acute pathology noted, MRI head on 4/27/21 with no evidence of an acute infarct    Anemia  - Hgb 9.6 on admission, near baseline. No S/S of active bleed  - iron studies- wnl, folate/ vitamin B12 -wnl  - Hgb on day of discharge ___    HTN   - Holding norvasc. BP remained stable     Type 2 diabetes mellitus with hyperglycemia  - Received ISS, followed BG 4x per day  - Resume atorvastatin once not refusing PO    Glaucoma  - c/w ophthalmic drops    Dispo- On ___ this case was reviewed with  ____, the patient is medically stable and optimized for discharge. All medications were reviewed and prescriptions were sent to mutually agreed upon pharmacy.     79 yo woman with history of advanced dementia (at baseline, requires assistance with all ADLs), HTN, DM2 (on insulin), and legal blindness with glaucoma and retinopathy presents from [facility] due to agitation with combative, aggressive behavior.     Hospital Course:  Dementia with behavioral disturbance  - Psychiatry followed -c/w Zyprexa 2.5mg AM and 5mg HS + Depakote sprinkles 250mg PO TID standing,  - Trazodone held  - UA negative for nitrite and leuk esterase. CXR with clear lungs  - COVID-19 PCR negative. Pt afebrile with no leukocytosis  - CTH on 5/3/21 with no acute pathology noted, MRI head on 4/27/21 with no evidence of an acute infarct    Anemia  - Hgb 9.6 on admission, near baseline. No S/S of active bleed  - iron studies- wnl, folate/ vitamin B12 -wnl  - Hgb on day of discharge 9.2    HTN   - Holding norvasc. BP remained stable     Type 2 diabetes mellitus with hyperglycemia  - Received ISS, followed BG 4x per day  - Resume atorvastatin once not refusing PO    Glaucoma  - c/w ophthalmic drops    Dispo- On 5/24/2021 this case was reviewed with Dr. Bagley the patient is medically stable and optimized for discharge. All medications were reviewed and prescriptions were sent to mutually agreed upon pharmacy.     80F h/o dementia (Baseline, requires assistance with all ADLs, nonambulatory), IDDM2 and legal blindness with L eye glaucoma presents from [facility] due to agitation with combative, aggressive behavior    Dementia with behavioral disturbance  - Psychiatry consulted started Zyprexa 2.5mg AM and 5mg HS  - Zyprexa 2.5mg PO Q 6 PRN agitation; Can give IM if refuses PO version   - Avoid benzodiazepines. ES to prevent falls/impulsivity  - Pt on Trazodone at NH. Will hold for now  - UA negative for nitrite and leuk esterase. CXR with clear lungs  - COVID-19 PCR negative. Pt afebrile with no leukocytosis  - CTH on 5/3/21 with no acute pathology noted, MRI head on 4/27/21 with no evidence of an acute infarct    Anemia  - Hgb 9.6 on admission near baseline Hgb. No S/S of active bleed  - Monitor H/H and transfuse for Hgb < 7  - Iron studies WNL; B12/Folate WNL    HTN   - BPs elevated on admission likely 2/2 agitation  - Resumed home Norvasc at 2.5mg PO daily     DM2 on [place]   - A1C 8.9% in April  - Previously on Lantus 6U qHS and low-dose ISS qAC and qhs at NH  - Currently on ISS only and Lantus on hold    Dispo - Return to NH as recommended by Dr. Bagley 80F h/o dementia (Baseline, requires assistance with all ADLs, nonambulatory), IDDM2 and legal blindness with L eye glaucoma presents from [facility] due to agitation with combative, aggressive behavior    Dementia with behavioral disturbance  - Psychiatry consulted started Zyprexa 2.5mg AM and 5mg HS  - Zyprexa 2.5mg PO Q 6 PRN agitation; Can give IM if refuses PO version   - Avoid benzodiazepines. ES to prevent falls/impulsivity  - Pt on Trazodone at NH. Held inpatient   - UA negative for nitrite and leuk esterase. CXR with clear lungs  - COVID-19 PCR negative. Pt afebrile with no leukocytosis  - CTH on 5/3/21 with no acute pathology noted, MRI head on 4/27/21 with no evidence of an acute infarct    Anemia  - Hgb 9.6 on admission near baseline Hgb. No S/S of active bleed  - Monitor H/H and transfuse for Hgb < 7  - Iron studies WNL; B12/Folate WNL    HTN   - BPs elevated on admission likely 2/2 agitation  - Resumed home Norvasc at 2.5mg PO daily     DM2 on [place]   - A1C 8.9% in April  - Previously on Lantus 6U qHS and low-dose ISS qAC and qhs at NH  - Currently on ISS only and Lantus on hold    Dispo - Return to NH as recommended by Dr. Bagley. On 5/26/2021, discussed with Dr. Bagley , patient is medically cleared and optimized for discharge today. All medications were reviewed with attending.   ACP spoke with pt's son, Austin [phone] and updated him on discharge plan. All questions answered. 80F h/o dementia (Baseline, requires assistance with all ADLs, nonambulatory), IDDM2 and legal blindness with L eye glaucoma presents from [facility] due to agitation with combative, aggressive behavior    Dementia with behavioral disturbance  - Psychiatry consulted started Zyprexa 2.5mg AM and 5mg HS  - Continue Depakote sprinkles 250mg in Am and 500mg in PM standing, give with food (to minimize day time sedation)  - Zyprexa 2.5mg PO Q 6 PRN agitation while inpatient   - Avoid benzodiazepines. ES to prevent falls/impulsivity  - Pt on Trazodone at NH, discontinued   - UA negative for nitrite and leuk esterase. CXR with clear lungs  - COVID-19 PCR negative. Pt afebrile with no leukocytosis  - CTH on 5/3/21 with no acute pathology noted, MRI head on 4/27/21 with no evidence of an acute infarct    Anemia  - Hgb 9.6 on admission near baseline Hgb. No S/S of active bleed  - Monitor H/H and transfuse for Hgb < 7  - Iron studies WNL; B12/Folate WNL    HTN   - BPs elevated on admission likely 2/2 agitation  - Resumed home Norvasc at 2.5mg PO daily     DM2 on [place]   - A1C 8.9% in April  - Previously on Lantus 6U qHS and low-dose ISS qAC and qhs at NH  - Currently on ISS only and Lantus on hold    Dispo - Return to NH as recommended by Dr. Bagley. On 5/26/2021, discussed with Dr. Bagley , patient is medically cleared and optimized for discharge today. All medications were reviewed with attending.   ACP spoke with pt's son, Austin [phone] and updated him on discharge plan. All questions answered.

## 2021-05-14 NOTE — PATIENT PROFILE ADULT - DO YOU LACK THE NECESSARY SUPPORT TO HELP YOU COPE WITH LIFE CHALLENGES?
Lab Results   Component Value Date    HGBA1C 4 8 10/11/2019       No results for input(s): POCGLU in the last 72 hours      Blood Sugar Average: Last 72 hrs:     · Continue Levemir  · Will order insulin sliding scale yes

## 2021-05-14 NOTE — BH CONSULTATION LIAISON ASSESSMENT NOTE - CURRENT MEDICATION
MEDICATIONS  (STANDING):  dextrose 40% Gel 15 Gram(s) Oral once  dextrose 5%. 1000 milliLiter(s) (50 mL/Hr) IV Continuous <Continuous>  dextrose 5%. 1000 milliLiter(s) (100 mL/Hr) IV Continuous <Continuous>  dextrose 50% Injectable 25 Gram(s) IV Push once  dextrose 50% Injectable 12.5 Gram(s) IV Push once  dextrose 50% Injectable 25 Gram(s) IV Push once  dorzolamide 2% Ophthalmic Solution 1 Drop(s) Both EYES three times a day  glucagon  Injectable 1 milliGRAM(s) IntraMuscular once  heparin   Injectable 5000 Unit(s) SubCutaneous every 8 hours  insulin lispro (ADMELOG) corrective regimen sliding scale   SubCutaneous every 6 hours  latanoprost 0.005% Ophthalmic Solution 1 Drop(s) Both EYES at bedtime    MEDICATIONS  (PRN):

## 2021-05-14 NOTE — PHYSICAL THERAPY INITIAL EVALUATION ADULT - PERTINENT HX OF CURRENT PROBLEM, REHAB EVAL
80 year old woman with history of advanced dementia (at baseline, requires assistance with all ADLs), HTN, DM2 (on insulin), and legal blindness with glaucoma and retinopathy presents from [facility] due to agitation with combative, aggressive behavior.

## 2021-05-14 NOTE — DISCHARGE NOTE PROVIDER - NSFOLLOWUPCLINICS_GEN_ALL_ED_FT
[facility] Psychiatry  Psychiatry  [address] [place] [address]  Phone: [phone]  Fax:

## 2021-05-14 NOTE — DISCHARGE NOTE PROVIDER - NSDCCPCAREPLAN_GEN_ALL_CORE_FT
PRINCIPAL DISCHARGE DIAGNOSIS  Diagnosis: Agitation  Assessment and Plan of Treatment: You have been seen by the Psychiatry Team during your hospital stay.      SECONDARY DISCHARGE DIAGNOSES  Diagnosis: Dementia with behavioral disturbance  Assessment and Plan of Treatment: Please continue your medications as prescribed and allow help with daily acitivities of living. Maintain a safe environment and make movements in a careful manner to prevent falls. Ensure that you are eating and drinking adequately and maintaining a healthy sleep cycle. If you are in need of assistance with medication adjustment you can follow-up with your outpatient provider or refer to the [facility] clinic by calling [phone].    Diagnosis: Type 2 diabetes mellitus with hyperglycemia, with long-term current use of insulin  Assessment and Plan of Treatment: Continue consistent carbohydrate diet.  Monitor blood glucose levels throughout the day before meals and at bedtime.  Record blood sugars and bring to outpatient providers appointment in order to be reviewed by your doctor for management modifications.  Be aware of diabetes management symptoms including feeling cool and clammy may be related to low glucose levels.  Feeling hot and dry may indicate high glucose levels.  If  you feel these symptoms, check your blood sugar.  Make regular podiatry appointments in order to have feet checked for wounds and toe nails cut by a doctor to prevent infections.      Diagnosis: Glaucoma  Assessment and Plan of Treatment: Continue eyedrops as prescribed.     PRINCIPAL DISCHARGE DIAGNOSIS  Diagnosis: Dementia with behavioral disturbance  Assessment and Plan of Treatment: Please continue your medications as prescribed and allow help with daily acitivities of living. Maintain a safe environment and make movements in a careful manner to prevent falls. Ensure that you are eating and drinking adequately and maintaining a healthy sleep cycle. If you are in need of assistance with medication adjustment you can follow-up with your outpatient provider or refer to the [facility] clinic by calling [phone].      SECONDARY DISCHARGE DIAGNOSES  Diagnosis: Type 2 diabetes mellitus with hyperglycemia, with long-term current use of insulin  Assessment and Plan of Treatment: Continue your medication regimen and a consistent carbohydrate diet (Meaning eating the same amount of carbohydrates at the same time each day). Monitor blood glucose levels throughout the day before meals and at bedtime. Record blood sugars and bring to outpatient providers appointment in order to be reviewed by your doctor for management modifications. If your sugars are more than 400 or less than 70 you should contact your PCP immediately. Monitor for signs/symptoms of low blood glucose, such as, dizziness, altered mental status, or cool/clammy skin. In addition, monitor for signs/symptoms of high blood glucose, such as, feeling hot, dry, fatigued, or with increased thirst/urination. Make regular podiatry appointments in order to have feet checked for wounds and uncontrolled toe nail growth to prevent infections, as well as, appointments with an ophthalmologist to monitor your vision.      Diagnosis: Glaucoma  Assessment and Plan of Treatment: Continue eye drops.    Diagnosis: HTN (hypertension)  Assessment and Plan of Treatment: Your blood pressure medicaton was held given stable Blood pressure. Monitor for any visual changes, headaches or dizziness.  Monitor blood pressure regularly.  Follow up with your PCP for further management for high blood pressure.      Diagnosis: Anemia  Assessment and Plan of Treatment: Follow-up with your outpatient provider if further treatment is warranted. Monitor for signs/symptoms indicating worsening of disease, such as, easy bleeding/bruising, pale skin, fatigue, dizziness, increased heart rate, or chest pain.       PRINCIPAL DISCHARGE DIAGNOSIS  Diagnosis: Dementia with behavioral disturbance  Assessment and Plan of Treatment: Please continue your medications as prescribed and allow help with daily acitivities of living. Maintain a safe environment and make movements in a careful manner to prevent falls. Ensure that you are eating and drinking adequately and maintaining a healthy sleep cycle.   If you are in need of assistance with medication adjustment you can follow-up with your outpatient provider or refer to the [facility] clinic by calling [phone].      SECONDARY DISCHARGE DIAGNOSES  Diagnosis: Anemia  Assessment and Plan of Treatment: Follow-up with your outpatient provider if further treatment is warranted. Monitor for signs/symptoms indicating worsening of disease, such as, easy bleeding/bruising, pale skin, fatigue, dizziness, increased heart rate, or chest pain.      Diagnosis: Type 2 diabetes mellitus with hyperglycemia, with long-term current use of insulin  Assessment and Plan of Treatment: Your HgA1C during hospitalization was noted to be _____ (Provide such information to your primary care).  You are no longer on Lantus. Please continue to monitor your sugar levels to avoid high blood glucose.   Monitor for signs/symptoms of low blood glucose, such as, dizziness, altered mental status, or cool/clammy skin. In addition, monitor for signs/symptoms of high blood glucose, such as, feeling hot, dry, fatigued, or with increased thirst/urination.   Make regular podiatry appointments in order to have feet checked for wounds and uncontrolled toe nail growth to prevent infections, as well as, appointments with an ophthalmologist to monitor your vision.       Diagnosis: Glaucoma  Assessment and Plan of Treatment: Continue eye drops.    Diagnosis: HTN (hypertension)  Assessment and Plan of Treatment: Continue blood pressure medication regimen as directed. Monitor for any visual changes, headaches or dizziness.  Monitor blood pressure regularly.  Follow up with your primary care provider for further management for high blood pressure.        PRINCIPAL DISCHARGE DIAGNOSIS  Diagnosis: Dementia with behavioral disturbance  Assessment and Plan of Treatment: Please continue your medications as prescribed and allow help with daily acitivities of living. Maintain a safe environment and make movements in a careful manner to prevent falls. Ensure that you are eating and drinking adequately and maintaining a healthy sleep cycle.   If you are in need of assistance with medication adjustment you can follow-up with your outpatient provider or refer to the [facility] clinic by calling [phone].      SECONDARY DISCHARGE DIAGNOSES  Diagnosis: Anemia  Assessment and Plan of Treatment: Follow-up with your outpatient provider if further treatment is warranted. Monitor for signs/symptoms indicating worsening of disease, such as, easy bleeding/bruising, pale skin, fatigue, dizziness, increased heart rate, or chest pain.      Diagnosis: Type 2 diabetes mellitus with hyperglycemia, with long-term current use of insulin  Assessment and Plan of Treatment: Your HgA1C during hospitalization was noted to be 8.2 (Provide such information to your primary care).  You are no longer on Lantus. Please continue to monitor your sugar levels to avoid high blood glucose.   Monitor for signs/symptoms of low blood glucose, such as, dizziness, altered mental status, or cool/clammy skin. In addition, monitor for signs/symptoms of high blood glucose, such as, feeling hot, dry, fatigued, or with increased thirst/urination.   Make regular podiatry appointments in order to have feet checked for wounds and uncontrolled toe nail growth to prevent infections, as well as, appointments with an ophthalmologist to monitor your vision.       Diagnosis: Glaucoma  Assessment and Plan of Treatment: Continue eye drops.    Diagnosis: HTN (hypertension)  Assessment and Plan of Treatment: Continue blood pressure medication regimen as directed. Monitor for any visual changes, headaches or dizziness.  Monitor blood pressure regularly.  Follow up with your primary care provider for further management for high blood pressure.        PRINCIPAL DISCHARGE DIAGNOSIS  Diagnosis: Dementia with behavioral disturbance  Assessment and Plan of Treatment: Please continue your medications as prescribed: Continue Zyprexa 2.5mg in AM and 5mg at bedtime, Continue Depakote sprinkles 250mg in Am and 500mg in PM standing, give with food (to minimize day time sedation). Allow help with daily acitivities of living. Maintain a safe environment and make movements in a careful manner to prevent falls. Ensure that you are eating and drinking adequately and maintaining a healthy sleep cycle.   If you are in need of assistance with medication adjustment you can follow-up with your outpatient provider or refer to the [facility] clinic by calling [phone].      SECONDARY DISCHARGE DIAGNOSES  Diagnosis: Anemia  Assessment and Plan of Treatment: Follow-up with your outpatient provider if further treatment is warranted. Monitor for signs/symptoms indicating worsening of disease, such as, easy bleeding/bruising, pale skin, fatigue, dizziness, increased heart rate, or chest pain.      Diagnosis: Type 2 diabetes mellitus with hyperglycemia, with long-term current use of insulin  Assessment and Plan of Treatment: Your HgA1C during hospitalization was noted to be 8.2 (Provide such information to your primary care).  You are no longer on Lantus. Please continue to monitor your sugar levels to avoid high blood glucose.   Monitor for signs/symptoms of low blood glucose, such as, dizziness, altered mental status, or cool/clammy skin. In addition, monitor for signs/symptoms of high blood glucose, such as, feeling hot, dry, fatigued, or with increased thirst/urination.   Make regular podiatry appointments in order to have feet checked for wounds and uncontrolled toe nail growth to prevent infections, as well as, appointments with an ophthalmologist to monitor your vision.       Diagnosis: Glaucoma  Assessment and Plan of Treatment: Continue eye drops.    Diagnosis: HTN (hypertension)  Assessment and Plan of Treatment: Continue blood pressure medication regimen as directed. Monitor for any visual changes, headaches or dizziness.  Monitor blood pressure regularly.  Follow up with your primary care provider for further management for high blood pressure.

## 2021-05-14 NOTE — DISCHARGE NOTE PROVIDER - NSDCFUADDAPPT_GEN_ALL_CORE_FT
If you are in need of a general medicine physician and post-discharge medical follow-up for further care/recommendations you may contact the [facility] for an appointment [phone]

## 2021-05-14 NOTE — BH CONSULTATION LIAISON ASSESSMENT NOTE - HPI (INCLUDE ILLNESS QUALITY, SEVERITY, DURATION, TIMING, CONTEXT, MODIFYING FACTORS, ASSOCIATED SIGNS AND SYMPTOMS)
81 yo woman with history of advanced dementia (at baseline, requires assistance with all ADLs), HTN, DM2 (on insulin), and legal blindness with glaucoma and retinopathy presents from [facility] due to agitation with combative, aggressive behavior. Psych consulted for said agitation.     Chart reviewed. Per initial H&P: "Pt had been at [facility] for the past week, and on Thursday, became agitated, refusing to go to her room and swatting at staff members. EMS had reported that according to [facility] staff, pt has been refusing her medications and they don't know the last time she took any of her medications." In the ED, pt A&Ox1 to self and again agitated, continuously trying to get out of bed, and therefore sedated with IV Ativan 1 mg x1. Pt currently sedated but arousable to tactile stimuli and protecting airway."    On 7S floor, she is in chair in hallway, oriented to self only, drowsy/sedated, unable to participate meaningfully in interview. Exam rather limited but not currently combative/agitated and no focal neuro deficits appreciated.

## 2021-05-14 NOTE — PROGRESS NOTE ADULT - SUBJECTIVE AND OBJECTIVE BOX
CHIEF COMPLAINT:    SUBJECTIVE:     REVIEW OF SYSTEMS:    CONSTITUTIONAL: (  )  weakness,  (  ) fevers or chills  EYES/ENT: (  )visual changes;     NECK: (  ) pain or stiffness  RESPIRATORY:   (  )cough, wheezing, hemoptysis;  (  ) shortness of breath  CARDIOVASCULAR:  (  )chest pain or palpitations  GASTROINTESTINAL:   (  )abdominal or epigastric pain.  (  ) nausea, vomiting, or hematemesis;   (   ) diarrhea or constipation.   GENITOURINARY:   (    ) dysuria, frequency or hematuria  NEUROLOGICAL:  (   ) numbness or weakness   All other review of systems is negative unless indicated above    Vital Signs Last 24 Hrs  T(C): 36.4 (14 May 2021 04:44), Max: 36.8 (13 May 2021 17:18)  T(F): 97.6 (14 May 2021 04:44), Max: 98.3 (13 May 2021 17:18)  HR: 82 (14 May 2021 04:44) (80 - 88)  BP: 160/60 (14 May 2021 04:44) (140/85 - 195/67)  BP(mean): --  RR: 18 (14 May 2021 04:44) (16 - 18)  SpO2: 100% (14 May 2021 04:44) (97% - 100%)    I&O's Summary      CAPILLARY BLOOD GLUCOSE      POCT Blood Glucose.: 165 mg/dL (14 May 2021 07:46)  POCT Blood Glucose.: 136 mg/dL (14 May 2021 04:03)  POCT Blood Glucose.: 299 mg/dL (13 May 2021 23:46)  POCT Blood Glucose.: 251 mg/dL (13 May 2021 21:13)  POCT Blood Glucose.: 254 mg/dL (13 May 2021 17:21)      PHYSICAL EXAM:    Constitutional:  (   ) NAD,   (   )awake and alert  HEENT: PERR, EOMI,    Neck: Soft and supple, No LAD, No JVD  Respiratory:  (    Breath sounds are clear bilaterally,    (   ) wheezing, rales or rhonchi  Cardiovascular:     (   )S1 and S2, regular rate and rhythm, no Murmurs, gallops or rubs  Gastrointestinal:  (   )Bowel Sounds present, soft,   (  )nontender, nondistended,    Extremities:    (  ) peripheral edema  Vascular: 2+ peripheral pulses  Neurological:    (    )A/O x 3,   (  ) focal deficits  Musculoskeletal:    (   )  normal strength b/l upper  (     ) normal  lower extremities  Skin: No rashes    MEDICATIONS:  MEDICATIONS  (STANDING):  dextrose 40% Gel 15 Gram(s) Oral once  dextrose 5%. 1000 milliLiter(s) (50 mL/Hr) IV Continuous <Continuous>  dextrose 5%. 1000 milliLiter(s) (100 mL/Hr) IV Continuous <Continuous>  dextrose 50% Injectable 25 Gram(s) IV Push once  dextrose 50% Injectable 12.5 Gram(s) IV Push once  dextrose 50% Injectable 25 Gram(s) IV Push once  dorzolamide 2% Ophthalmic Solution 1 Drop(s) Both EYES three times a day  glucagon  Injectable 1 milliGRAM(s) IntraMuscular once  heparin   Injectable 5000 Unit(s) SubCutaneous every 8 hours  insulin lispro (ADMELOG) corrective regimen sliding scale   SubCutaneous every 6 hours  latanoprost 0.005% Ophthalmic Solution 1 Drop(s) Both EYES at bedtime      LABS: All Labs Reviewed:                        9.2    4.82  )-----------( 224      ( 14 May 2021 06:43 )             29.8     05-14    139  |  104  |  18  ----------------------------<  142<H>  3.5   |  25  |  0.98    Ca    9.2      14 May 2021 06:43  Phos  2.9     05-14  Mg     1.7     05-14    TPro  6.2  /  Alb  3.6  /  TBili  <0.2  /  DBili  x   /  AST  32  /  ALT  24  /  AlkPhos  92  05-14          Blood Culture:   Urine Culture      RADIOLOGY/EKG:    ASSESSMENT AND PLAN:    DVT PPX:    ADVANCED DIRECTIVE:    DISPOSITION: CHIEF COMPLAINT:  81 yo woman with history of advanced dementia (at baseline, requires assistance with all ADLs), HTN, DM2 (on insulin), and legal blindness with glaucoma and retinopathy presents from [facility] due to agitation with combative, aggressive behavior. Pt had been at [facility] for the past week, and on Thursday, became agitated, refusing to go to her room and swatting at staff members. EMS had reported that according to [facility] staff, pt has been refusing her medications and they don't know the last time she took any of her medications."     In the ED, pt A&Ox1 to self and again agitated, continuously trying to get out of bed, and therefore sedated with IV Ativan 1 mg x1. Pt currently sedated but arousable to tactile stimuli and protecting airway.  T 98-98.3, HR 80-88, -195/57-84, RR 16-18, O2 sats 100% RA.    SUBJECTIVE:     REVIEW OF SYSTEMS:    CONSTITUTIONAL: (  )  weakness,  (  ) fevers or chills  EYES/ENT: (  )visual changes;     NECK: (  ) pain or stiffness  RESPIRATORY:   (  )cough, wheezing, hemoptysis;  (  ) shortness of breath  CARDIOVASCULAR:  (  )chest pain or palpitations  GASTROINTESTINAL:   (  )abdominal or epigastric pain.  (  ) nausea, vomiting, or hematemesis;   (   ) diarrhea or constipation.   GENITOURINARY:   (    ) dysuria, frequency or hematuria  NEUROLOGICAL:  (   ) numbness or weakness   All other review of systems is negative unless indicated above    Vital Signs Last 24 Hrs  T(C): 36.4 (14 May 2021 04:44), Max: 36.8 (13 May 2021 17:18)  T(F): 97.6 (14 May 2021 04:44), Max: 98.3 (13 May 2021 17:18)  HR: 82 (14 May 2021 04:44) (80 - 88)  BP: 160/60 (14 May 2021 04:44) (140/85 - 195/67)  BP(mean): --  RR: 18 (14 May 2021 04:44) (16 - 18)  SpO2: 100% (14 May 2021 04:44) (97% - 100%)    I&O's Summary      CAPILLARY BLOOD GLUCOSE      POCT Blood Glucose.: 165 mg/dL (14 May 2021 07:46)  POCT Blood Glucose.: 136 mg/dL (14 May 2021 04:03)  POCT Blood Glucose.: 299 mg/dL (13 May 2021 23:46)  POCT Blood Glucose.: 251 mg/dL (13 May 2021 21:13)  POCT Blood Glucose.: 254 mg/dL (13 May 2021 17:21)      PHYSICAL EXAM:    Constitutional:  ( x  ) NAD,   (   )awake and alert  HEENT: PERR, EOMI,    Neck: Soft and supple, No LAD, No JVD  Respiratory:  (   decrease Breath sounds    Cardiovascular:     (   x)S1 and S2, regular rate and rhythm, no Murmurs, gallops or rubs  Gastrointestinal:  ( x  )Bowel Sounds present, soft,   (  )nontender, nondistended,    Extremities:    (  ) peripheral edema  Vascular: 2+ peripheral pulses  Neurological:    (  x  )A/O x ?   (  ) focal deficits  Musculoskeletal:    (   )  normal strength b/l upper  (     ) normal  lower extremities  Skin: No rashes    MEDICATIONS:  MEDICATIONS  (STANDING):  dextrose 40% Gel 15 Gram(s) Oral once  dextrose 5%. 1000 milliLiter(s) (50 mL/Hr) IV Continuous <Continuous>  dextrose 5%. 1000 milliLiter(s) (100 mL/Hr) IV Continuous <Continuous>  dextrose 50% Injectable 25 Gram(s) IV Push once  dextrose 50% Injectable 12.5 Gram(s) IV Push once  dextrose 50% Injectable 25 Gram(s) IV Push once  dorzolamide 2% Ophthalmic Solution 1 Drop(s) Both EYES three times a day  glucagon  Injectable 1 milliGRAM(s) IntraMuscular once  heparin   Injectable 5000 Unit(s) SubCutaneous every 8 hours  insulin lispro (ADMELOG) corrective regimen sliding scale   SubCutaneous every 6 hours  latanoprost 0.005% Ophthalmic Solution 1 Drop(s) Both EYES at bedtime      LABS: All Labs Reviewed:                        9.2    4.82  )-----------( 224      ( 14 May 2021 06:43 )             29.8     05-14    139  |  104  |  18  ----------------------------<  142<H>  3.5   |  25  |  0.98    Ca    9.2      14 May 2021 06:43  Phos  2.9     05-14  Mg     1.7     05-14    TPro  6.2  /  Alb  3.6  /  TBili  <0.2  /  DBili  x   /  AST  32  /  ALT  24  /  AlkPhos  92  05-14          Blood Culture:   Urine Culture      RADIOLOGY/EKG:    ASSESSMENT AND PLAN:  81 yo woman with history of advanced dementia (at baseline, requires assistance with all ADLs), DM2 (on insulin), and legal blindness with glaucoma and retinopathy presents from [facility] due to agitation with combative, aggressive behavior.     Problem/Plan - 1:  ·  Problem: Dementia with behavioral disturbance.  Plan: Pt here with agitation, noted to be combative and aggressive, refusing to go to her room or take her medications and swatting at NH staff. Likely in setting of progressing advanced dementia.  - Psychiatry consult to be called in AM for medication management  - Pt currently sedated s/p IV Ativan 1 mg x1 in ED. Keep NPO for now. Will perform dysphagia screen once pt awake and alert. If pt remains NPO, will consider starting IVF with D5.   - Pt on Trazodone at NH. Will hold for now.  - S&S eval and PT consult once pt more awake and alert  - No need for constant observation at this time, will reassess need for constant observation if pt becomes agitated again  - EKG ordered to check QTc. F/u EKG. C/w IV Ativan 0.5 mg PRN for agitation for now, given that as per NH staff, Haldol has been ineffective.   - UA negative for nitrite and leuk esterase. CXR with clear lungs. COVID-19 PCR negative. Pt afebrile with no leukocytosis. No evidence of underlying infection as cause for agitation.   - CTH on 5/3/21 with no acute pathology noted, MRI head on 4/27/21 with no evidence of an acute infarct.-  -She has been seen by psych in the facility started on Ativan 0.5 then increase to 1 mg without effect was also received multiple Haldol injection in the past week due to her combative situation    Problem/Plan - 2:  ·  Problem: Anemia.  Plan: Hgb 9.6 on admission, near baseline Hgb. No S/S of active bleed.  - Monitor H/H and transfuse for Hgb < 7  - Check iron studies, folate, vitamin B12, and retic count.     Problem/Plan - 3:  ·  Problem: HTN (hypertension).  Plan: BPs elevated on admission, likely 2/2 agitation. Pt on amlodipine at NH.  - Hold amlodipine for now. Can resume if pt able to take PO meds and if BP tolerates.     Problem/Plan - 4:  ·  Problem: Type 2 diabetes mellitus with hyperglycemia, with long-term current use of insulin.  Plan: Pt on Lantus 6u qhs and low-dose ISS qAC and qhs at NH. Pt hyperglycemic on admission with serum glucose 271 and FS as high as 299.  - Lantus 6u ordered overnight  - Pt currently NPO pending dysphagia screen. If pt remains NPO, will hold bedtime Lantus unless pt started on IVF with D5.   - Start low-dose ISS and FS checks q6hrs while NPO  - C/w atorvastatin once pt able to take PO meds.     Problem/Plan - 5:  ·  Problem: Glaucoma.  Plan: - C/w ophthalmic drops.     Problem/Plan - 6:  Problem: Need for prophylactic measure. Plan: - DVT ppx: HSQ  - Diet: NPO pending dysphagia screen. If pt passes dysphagia screen, will start chopped, thin liquid diet (PO diet at NH). S&S eval once pt more awake and alert.  -5/14/2021 waiting for speech and swallow patient was eating food in the facility but with forced  - Pt FULL CODE.    DVT PPX:    ADVANCED DIRECTIVE:    DISPOSITION:

## 2021-05-14 NOTE — DISCHARGE NOTE PROVIDER - DETAILS OF MALNUTRITION DIAGNOSIS/DIAGNOSES
This patient has been assessed with a concern for Malnutrition and was treated during this hospitalization for the following Nutrition diagnosis/diagnoses:     -  05/20/2021: Severe protein-calorie malnutrition   -  05/20/2021: Underweight (BMI < 19)

## 2021-05-14 NOTE — DISCHARGE NOTE PROVIDER - NSDCMRMEDTOKEN_GEN_ALL_CORE_FT
acetaminophen 325 mg oral tablet: 1 tab(s) orally every 6 hours, As needed, Mild Pain (1 - 3)  amLODIPine 5 mg oral tablet: 1 tab(s) orally once a day  Ativan 0.5 mg oral tablet: 1 tab(s) orally 2 times a day, As Needed  atorvastatin 20 mg oral tablet: 1 tab(s) orally once a day (at bedtime)  dorzolamide 2% ophthalmic solution: 1 drop(s) to each affected eye 3 times a day  gabapentin 100 mg oral capsule: 1 cap(s) orally 3 times a day    insulin glargine 100 units/mL subcutaneous solution: 6 unit(s) subcutaneous once a day (at bedtime)   insulin lispro: 3 times a day (before meals):  1 Unit(s) if Glucose 151 - 200  2 Unit(s) if Glucose 201 - 250  3 Unit(s) if Glucose 251 - 300  4 Unit(s) if Glucose 301 - 350  5 Unit(s) if Glucose 351 - 400  6 Unit(s) if Glucose GREATER THAN 400  insulin lispro: once a day (at bedtime):  0 Unit(s) if Glucose 0 - 250  1 Unit(s) if Glucose 251 - 300  2 Unit(s) if Glucose 301 - 350  3 Unit(s) if Glucose 351 - 400  4 Unit(s) if Glucose GREATER THAN 400  latanoprost 0.005% ophthalmic solution: 1 drop(s) to each affected eye once a day (in the evening)    traZODone 50 mg oral tablet: 0.5 tab(s) orally 2 times a day   acetaminophen 325 mg oral tablet: 1 tab(s) orally every 6 hours, As needed, Mild Pain (1 - 3)  amLODIPine 5 mg oral tablet: 1 tab(s) orally once a day  atorvastatin 20 mg oral tablet: 1 tab(s) orally once a day (at bedtime)  divalproex sodium 125 mg oral delayed release capsule: 2 cap(s) orally at 0600  divalproex sodium 125 mg oral delayed release capsule: 4 cap(s) orally at 1800  dorzolamide 2% ophthalmic solution: 1 drop(s) to each affected eye 3 times a day  gabapentin 100 mg oral capsule: 1 cap(s) orally 3 times a day    insulin glargine 100 units/mL subcutaneous solution: 6 unit(s) subcutaneous once a day (at bedtime)   latanoprost 0.005% ophthalmic solution: 1 drop(s) to each affected eye once a day (in the evening)     acetaminophen 325 mg oral tablet: 1 tab(s) orally every 6 hours, As needed, Mild Pain (1 - 3)  amLODIPine 2.5 mg oral tablet: 1 tab(s) orally once a day  atorvastatin 20 mg oral tablet: 1 tab(s) orally once a day (at bedtime)  divalproex sodium 125 mg oral delayed release capsule: 2 cap(s) orally at 0600  divalproex sodium 125 mg oral delayed release capsule: 4 cap(s) orally at 1800  dorzolamide 2% ophthalmic solution: 1 drop(s) to each affected eye 3 times a day  insulin lispro 100 units/mL injectable solution: Insulin corrective sliding scale: three times per day before meals:  1 Unit(s) if Glucose 151 - 200  2 Unit(s) if Glucose 201 - 250  3 Unit(s) if Glucose 251 - 300  4 Unit(s) if Glucose 301 - 350  5 Unit(s) if Glucose 351 - 400  6 Unit(s) if Glucose Greater Than 400  insulin lispro 100 units/mL injectable solution: Insulin corrective sliding scale, once per day at bedtime:  0 Unit(s) if Glucose 0 - 250  1 Unit(s) if Glucose 251 - 300  2 Unit(s) if Glucose 301 - 350  3 Unit(s) if Glucose 351 - 400  4 Unit(s) if Glucose Greater Than 400  latanoprost 0.005% ophthalmic solution: 1 drop(s) to each affected eye once a day (in the evening)    OLANZapine 2.5 mg oral tablet: 1 tab(s) orally once a day, at 7:00am  OLANZapine 5 mg oral tablet: 1 tab(s) orally once a day (at bedtime)

## 2021-05-14 NOTE — DISCHARGE NOTE PROVIDER - CARE PROVIDER_API CALL
Jani Bagley)  Medicine  [id number] [place] [address]  Phone: [phone]  Fax: [phone]  Follow Up Time:

## 2021-05-15 LAB
GLUCOSE BLDC GLUCOMTR-MCNC: 134 MG/DL — HIGH (ref 70–99)
GLUCOSE BLDC GLUCOMTR-MCNC: 156 MG/DL — HIGH (ref 70–99)
GLUCOSE BLDC GLUCOMTR-MCNC: 170 MG/DL — HIGH (ref 70–99)
GLUCOSE BLDC GLUCOMTR-MCNC: 241 MG/DL — HIGH (ref 70–99)
GLUCOSE BLDC GLUCOMTR-MCNC: 89 MG/DL — SIGNIFICANT CHANGE UP (ref 70–99)
GLUCOSE BLDC GLUCOMTR-MCNC: 90 MG/DL — SIGNIFICANT CHANGE UP (ref 70–99)

## 2021-05-15 PROCEDURE — 93010 ELECTROCARDIOGRAM REPORT: CPT

## 2021-05-15 RX ORDER — OLANZAPINE 15 MG/1
2.5 TABLET, FILM COATED ORAL ONCE
Refills: 0 | Status: COMPLETED | OUTPATIENT
Start: 2021-05-15 | End: 2021-05-15

## 2021-05-15 RX ORDER — OLANZAPINE 15 MG/1
2.5 TABLET, FILM COATED ORAL EVERY 6 HOURS
Refills: 0 | Status: DISCONTINUED | OUTPATIENT
Start: 2021-05-15 | End: 2021-05-17

## 2021-05-15 RX ADMIN — DORZOLAMIDE HYDROCHLORIDE 1 DROP(S): 20 SOLUTION/ DROPS OPHTHALMIC at 06:51

## 2021-05-15 RX ADMIN — OLANZAPINE 5 MILLIGRAM(S): 15 TABLET, FILM COATED ORAL at 22:54

## 2021-05-15 RX ADMIN — LATANOPROST 1 DROP(S): 0.05 SOLUTION/ DROPS OPHTHALMIC; TOPICAL at 22:54

## 2021-05-15 RX ADMIN — DORZOLAMIDE HYDROCHLORIDE 1 DROP(S): 20 SOLUTION/ DROPS OPHTHALMIC at 22:54

## 2021-05-15 RX ADMIN — OLANZAPINE 2.5 MILLIGRAM(S): 15 TABLET, FILM COATED ORAL at 06:00

## 2021-05-15 RX ADMIN — HEPARIN SODIUM 5000 UNIT(S): 5000 INJECTION INTRAVENOUS; SUBCUTANEOUS at 06:54

## 2021-05-15 RX ADMIN — Medication 1: at 17:22

## 2021-05-15 RX ADMIN — Medication 2: at 23:14

## 2021-05-15 RX ADMIN — HEPARIN SODIUM 5000 UNIT(S): 5000 INJECTION INTRAVENOUS; SUBCUTANEOUS at 22:54

## 2021-05-15 RX ADMIN — HEPARIN SODIUM 5000 UNIT(S): 5000 INJECTION INTRAVENOUS; SUBCUTANEOUS at 15:26

## 2021-05-15 NOTE — PROGRESS NOTE ADULT - SUBJECTIVE AND OBJECTIVE BOX
CHIEF COMPLAINT:  patient was seen earlier today in the hallway in a recliner without agitation she respond to verbal stimuli  Earlier today she was noted to be agitated and  unable to redirection  SUBJECTIVE:     REVIEW OF SYSTEMS:    CONSTITUTIONAL: ( x )  weakness,  (  ) fevers or chills  EYES/ENT: (  )visual changes;     NECK: (  ) pain or stiffness  RESPIRATORY:   (  )cough, wheezing, hemoptysis;  (  ) shortness of breath  CARDIOVASCULAR:  (  )chest pain or palpitations  GASTROINTESTINAL:   (  )abdominal or epigastric pain.  (  ) nausea, vomiting, or hematemesis;   (   ) diarrhea or constipation.   GENITOURINARY:   (    ) dysuria, frequency or hematuria  NEUROLOGICAL:  (   ) numbness or weakness   All other review of systems is negative unless indicated above    Vital Signs Last 24 Hrs  T(C): 36.3 (15 May 2021 18:35), Max: 36.7 (15 May 2021 06:50)  T(F): 97.4 (15 May 2021 18:35), Max: 98 (15 May 2021 06:50)  HR: 81 (15 May 2021 18:35) (75 - 89)  BP: 140/51 (15 May 2021 18:35) (126/74 - 145/64)  BP(mean): --  RR: 18 (15 May 2021 18:35) (17 - 18)  SpO2: 100% (15 May 2021 18:35) (97% - 100%)    I&O's Summary      CAPILLARY BLOOD GLUCOSE      POCT Blood Glucose.: 170 mg/dL (15 May 2021 16:40)  POCT Blood Glucose.: 156 mg/dL (15 May 2021 11:40)  POCT Blood Glucose.: 134 mg/dL (15 May 2021 07:30)  POCT Blood Glucose.: 90 mg/dL (15 May 2021 06:56)  POCT Blood Glucose.: 89 mg/dL (15 May 2021 00:41)      PHYSICAL EXAM:    Constitutional:  ( x  ) NAD,   (   )awake and alert  HEENT: PERR, EOMI,    Neck: Soft and supple, No LAD, No JVD  Respiratory:  (  x  Breath sounds are clear bilaterally,    (   ) wheezing, rales or rhonchi  Cardiovascular:     ( x  )S1 and S2, regular rate and rhythm, no Murmurs, gallops or rubs  Gastrointestinal:  ( x  )Bowel Sounds present, soft,   (  )nontender, nondistended,    Extremities:    (  ) peripheral edema  Vascular: 2+ peripheral pulses  Neurological:    ( x   )A/O x1,   (  ) focal deficits  Musculoskeletal:    (   )  normal strength b/l upper  (     ) normal  lower extremities  Skin: No rashes    MEDICATIONS:  MEDICATIONS  (STANDING):  dextrose 40% Gel 15 Gram(s) Oral once  dextrose 5%. 1000 milliLiter(s) (50 mL/Hr) IV Continuous <Continuous>  dextrose 5%. 1000 milliLiter(s) (100 mL/Hr) IV Continuous <Continuous>  dextrose 50% Injectable 25 Gram(s) IV Push once  dextrose 50% Injectable 12.5 Gram(s) IV Push once  dextrose 50% Injectable 25 Gram(s) IV Push once  dorzolamide 2% Ophthalmic Solution 1 Drop(s) Both EYES three times a day  glucagon  Injectable 1 milliGRAM(s) IntraMuscular once  heparin   Injectable 5000 Unit(s) SubCutaneous every 8 hours  insulin lispro (ADMELOG) corrective regimen sliding scale   SubCutaneous every 6 hours  latanoprost 0.005% Ophthalmic Solution 1 Drop(s) Both EYES at bedtime  OLANZapine 2.5 milliGRAM(s) Oral <User Schedule>  OLANZapine 5 milliGRAM(s) Oral at bedtime      LABS: All Labs Reviewed:                        9.2    4.82  )-----------( 224      ( 14 May 2021 06:43 )             29.8     05-14    139  |  104  |  18  ----------------------------<  142<H>  3.5   |  25  |  0.98    Ca    9.2      14 May 2021 06:43  Phos  2.9     05-14  Mg     1.7     05-14    TPro  6.2  /  Alb  3.6  /  TBili  <0.2  /  DBili  x   /  AST  32  /  ALT  24  /  AlkPhos  92  05-14          Blood Culture: 05-13 @ 21:20  Organism --  Gram Stain Blood -- Gram Stain --  Specimen Source .Urine Clean Catch (Midstream)  Culture-Blood --      Urine Culture      RADIOLOGY/EKG:    ASSESSMENT AND PLAN:  79 yo woman with history of advanced dementia (at baseline, requires assistance with all ADLs), DM2 (on insulin), and legal blindness with glaucoma and retinopathy presents from [facility] due to agitation with combative, aggressive behavior.     Problem/Plan - 1:  ·  Problem: Dementia with behavioral disturbance.  Plan: Pt here with agitation, noted to be combative and aggressive, refusing to go to her room or take her medications and swatting at NH staff. Likely in setting of progressing advanced dementia.  - Psychiatry consult to be called in AM for medication management  - Pt currently sedated s/p IV Ativan 1 mg x1 in ED. Keep NPO for now. Will perform dysphagia screen once pt awake and alert. If pt remains NPO, will consider starting IVF with D5.   - Pt on Trazodone at NH. Will hold for now.  - S&S eval and PT consult once pt more awake and alert  - No need for constant observation at this time, will reassess need for constant observation if pt becomes agitated again  - EKG ordered to check QTc. F/u EKG. C/w IV Ativan 0.5 mg PRN for agitation for now, given that as per NH staff, Haldol has been ineffective.   - UA negative for nitrite and leuk esterase. CXR with clear lungs. COVID-19 PCR negative. Pt afebrile with no leukocytosis. No evidence of underlying infection as cause for agitation.   - CTH on 5/3/21 with no acute pathology noted, MRI head on 4/27/21 with no evidence of an acute infarct.-  -She has been seen by psych in the facility started on Ativan 0.5 then increase to 1 mg without effect was also received multiple Haldol injection in the past week due to her combative situation  -psychiatrics follow-up appreciated patient has started on Zyprexa 5 mg and 2.5    Problem/Plan - 2:  ·  Problem: Anemia.  Plan: Hgb 9.6 on admission, near baseline Hgb. No S/S of active bleed.  - Monitor H/H and transfuse for Hgb < 7  - Check iron studies, folate, vitamin B12, and retic count.     Problem/Plan - 3:  ·  Problem: HTN (hypertension).  Plan: BPs elevated on admission, likely 2/2 agitation. Pt on amlodipine at NH.  - Hold amlodipine for now. Can resume if pt able to take PO meds and if BP tolerates.     Problem/Plan - 4:  ·  Problem: Type 2 diabetes mellitus with hyperglycemia, with long-term current use of insulin.  Plan: Pt on Lantus 6u qhs and low-dose ISS qAC and qhs at NH. Pt hyperglycemic on admission with serum glucose 271 and FS as high as 299.  - Lantus 6u ordered overnight  - Pt currently NPO pending dysphagia screen. If pt remains NPO, will hold bedtime Lantus unless pt started on IVF with D5.   - Start low-dose ISS and FS checks q6hrs while NPO  - C/w atorvastatin once pt able to take PO meds.     Problem/Plan - 5:  ·  Problem: Glaucoma.  Plan: - C/w ophthalmic drops.     Problem/Plan - 6:  Problem: Need for prophylactic measure. Plan: - DVT ppx: HSQ  - Diet: NPO pending dysphagia screen. If pt passes dysphagia screen, will start chopped, thin liquid diet (PO diet at NH). S&S eval once pt more awake and alert.  -5/14/2021 waiting for speech and swallow patient was eating food in the facility but with forced  - Pt FULL CODE.    DVT PPX:    ADVANCED DIRECTIVE:    DISPOSITION:

## 2021-05-16 LAB
GLUCOSE BLDC GLUCOMTR-MCNC: 108 MG/DL — HIGH (ref 70–99)
GLUCOSE BLDC GLUCOMTR-MCNC: 169 MG/DL — HIGH (ref 70–99)
GLUCOSE BLDC GLUCOMTR-MCNC: 93 MG/DL — SIGNIFICANT CHANGE UP (ref 70–99)

## 2021-05-16 RX ADMIN — Medication 1: at 17:19

## 2021-05-16 RX ADMIN — DORZOLAMIDE HYDROCHLORIDE 1 DROP(S): 20 SOLUTION/ DROPS OPHTHALMIC at 14:07

## 2021-05-16 RX ADMIN — HEPARIN SODIUM 5000 UNIT(S): 5000 INJECTION INTRAVENOUS; SUBCUTANEOUS at 14:08

## 2021-05-16 RX ADMIN — OLANZAPINE 2.5 MILLIGRAM(S): 15 TABLET, FILM COATED ORAL at 05:54

## 2021-05-16 RX ADMIN — DORZOLAMIDE HYDROCHLORIDE 1 DROP(S): 20 SOLUTION/ DROPS OPHTHALMIC at 05:54

## 2021-05-16 RX ADMIN — HEPARIN SODIUM 5000 UNIT(S): 5000 INJECTION INTRAVENOUS; SUBCUTANEOUS at 05:54

## 2021-05-16 NOTE — PROGRESS NOTE ADULT - SUBJECTIVE AND OBJECTIVE BOX
CHIEF COMPLAINT: no acute event overnight  patient was seen earlier today in the hallway in a recliner without agitation she respond to verbal stimuli  Earlier today she was noted to be agitated and  unable to redirection    SUBJECTIVE:     REVIEW OF SYSTEMS: awake confusion at baseline    CONSTITUTIONAL: (  )  weakness,  (  ) fevers or chills  EYES/ENT: (  )visual changes;     NECK: (  ) pain or stiffness  RESPIRATORY:   (  )cough, wheezing, hemoptysis;  (  ) shortness of breath  CARDIOVASCULAR:  (  )chest pain or palpitations  GASTROINTESTINAL:   (  )abdominal or epigastric pain.  (  ) nausea, vomiting, or hematemesis;   (   ) diarrhea or constipation.   GENITOURINARY:   (    ) dysuria, frequency or hematuria  NEUROLOGICAL:  (   ) numbness or weakness   All other review of systems is negative unless indicated above    Vital Signs Last 24 Hrs  T(C): 36.6 (16 May 2021 13:02), Max: 36.6 (15 May 2021 22:53)  T(F): 97.8 (16 May 2021 13:02), Max: 97.8 (15 May 2021 22:53)  HR: 78 (16 May 2021 13:02) (78 - 80)  BP: 141/62 (16 May 2021 13:02) (141/62 - 157/71)  BP(mean): --  RR: 18 (16 May 2021 13:02) (18 - 18)  SpO2: 100% (16 May 2021 13:02) (100% - 100%)    I&O's Summary      CAPILLARY BLOOD GLUCOSE      POCT Blood Glucose.: 169 mg/dL (16 May 2021 16:47)  POCT Blood Glucose.: 108 mg/dL (16 May 2021 11:19)  POCT Blood Glucose.: 93 mg/dL (16 May 2021 05:53)  POCT Blood Glucose.: 241 mg/dL (15 May 2021 23:05)      PHYSICAL EXAM:  Constitutional:  ( x  ) NAD,   (   )awake and alert  HEENT: PERR, EOMI,    Neck: Soft and supple, No LAD, No JVD  Respiratory:  (  x  Breath sounds are clear bilaterally,    (   ) wheezing, rales or rhonchi  Cardiovascular:     ( x  )S1 and S2, regular rate and rhythm, no Murmurs, gallops or rubs  Gastrointestinal:  ( x  )Bowel Sounds present, soft,   (  )nontender, nondistended,    Extremities:    (  ) peripheral edema       MEDICATIONS:  MEDICATIONS  (STANDING):  dextrose 40% Gel 15 Gram(s) Oral once  dextrose 5%. 1000 milliLiter(s) (50 mL/Hr) IV Continuous <Continuous>  dextrose 5%. 1000 milliLiter(s) (100 mL/Hr) IV Continuous <Continuous>  dextrose 50% Injectable 25 Gram(s) IV Push once  dextrose 50% Injectable 12.5 Gram(s) IV Push once  dextrose 50% Injectable 25 Gram(s) IV Push once  dorzolamide 2% Ophthalmic Solution 1 Drop(s) Both EYES three times a day  glucagon  Injectable 1 milliGRAM(s) IntraMuscular once  heparin   Injectable 5000 Unit(s) SubCutaneous every 8 hours  insulin lispro (ADMELOG) corrective regimen sliding scale   SubCutaneous every 6 hours  latanoprost 0.005% Ophthalmic Solution 1 Drop(s) Both EYES at bedtime  OLANZapine 2.5 milliGRAM(s) Oral <User Schedule>  OLANZapine 5 milliGRAM(s) Oral at bedtime      LABS: All Labs Reviewed:                Blood Culture: 05-13 @ 21:20  Organism --  Gram Stain Blood -- Gram Stain --  Specimen Source .Urine Clean Catch (Midstream)  Culture-Blood --      Urine Culture      RADIOLOGY/EKG:    ASSESSMENT AND PLAN:  79 yo woman with history of advanced dementia (at baseline, requires assistance with all ADLs), DM2 (on insulin), and legal blindness with glaucoma and retinopathy presents from [facility] [place] due to agitation with combative, aggressive behavior.     Problem/Plan - 1:  ·  Problem: Dementia with behavioral disturbance.  Plan: Pt here with agitation, noted to be combative and aggressive, refusing to go to her room or take her medications and swatting at NH staff. Likely in setting of progressing advanced dementia.  - Psychiatry consult to be called in AM for medication management  - Pt currently sedated s/p IV Ativan 1 mg x1 in ED. Keep NPO for now. Will perform dysphagia screen once pt awake and alert. If pt remains NPO, will consider starting IVF with D5.   - Pt on Trazodone at NH. Will hold for now.  - S&S eval and PT consult once pt more awake and alert  - No need for constant observation at this time, will reassess need for constant observation if pt becomes agitated again  - EKG ordered to check QTc. F/u EKG. C/w IV Ativan 0.5 mg PRN for agitation for now, given that as per NH staff, Haldol has been ineffective.   - UA negative for nitrite and leuk esterase. CXR with clear lungs. COVID-19 PCR negative. Pt afebrile with no leukocytosis. No evidence of underlying infection as cause for agitation.   - CTH on 5/3/21 with no acute pathology noted, MRI head on 4/27/21 with no evidence of an acute infarct.-  -She has been seen by psych in the facility started on Ativan 0.5 then increase to 1 mg without effect was also received multiple Haldol injection in the past week due to her combative situation  -psychiatrics follow-up appreciated patient has started on Zyprexa 5 mg and 2.5    Problem/Plan - 2:  ·  Problem: Anemia.  Plan: Hgb 9.6 on admission, near baseline Hgb. No S/S of active bleed.  - Monitor H/H and transfuse for Hgb < 7  - Check iron studies, folate, vitamin B12, and retic count.     Problem/Plan - 3:  ·  Problem: HTN (hypertension).  Plan: BPs elevated on admission, likely 2/2 agitation. Pt on amlodipine at NH.  - Hold amlodipine for now. Can resume if pt able to take PO meds and if BP tolerates.     Problem/Plan - 4:  ·  Problem: Type 2 diabetes mellitus with hyperglycemia, with long-term current use of insulin.  Plan: Pt on Lantus 6u qhs and low-dose ISS qAC and qhs at NH. Pt hyperglycemic on admission with serum glucose 271 and FS as high as 299.  - Lantus 6u ordered overnight  - Pt currently NPO pending dysphagia screen. If pt remains NPO, will hold bedtime Lantus unless pt started on IVF with D5.   - Start low-dose ISS and FS checks q6hrs while NPO  - C/w atorvastatin once pt able to take PO meds.     Problem/Plan - 5:  ·  Problem: Glaucoma.  Plan: - C/w ophthalmic drops.     Problem/Plan - 6:  Problem: Need for prophylactic measure. Plan: - DVT ppx: HSQ  - Diet: NPO pending dysphagia screen. If pt passes dysphagia screen, will start chopped, thin liquid diet (PO diet at NH). S&S eval once pt more awake and alert.  -5/14/2021 waiting for speech and swallow patient was eating food in the facility but with forced  - Pt FULL CODE.    DVT PPX:    ADVANCED DIRECTIVE:    DISPOSITION:patient condition improving with transfer back to nursing home when she may remain stable continue Zyprexa 2.5 and 5 mg twice daily

## 2021-05-17 LAB
ANION GAP SERPL CALC-SCNC: 9 MMOL/L — SIGNIFICANT CHANGE UP (ref 7–14)
BUN SERPL-MCNC: 22 MG/DL — SIGNIFICANT CHANGE UP (ref 7–23)
CALCIUM SERPL-MCNC: 9.3 MG/DL — SIGNIFICANT CHANGE UP (ref 8.4–10.5)
CHLORIDE SERPL-SCNC: 108 MMOL/L — HIGH (ref 98–107)
CO2 SERPL-SCNC: 28 MMOL/L — SIGNIFICANT CHANGE UP (ref 22–31)
CREAT SERPL-MCNC: 0.94 MG/DL — SIGNIFICANT CHANGE UP (ref 0.5–1.3)
GLUCOSE BLDC GLUCOMTR-MCNC: 150 MG/DL — HIGH (ref 70–99)
GLUCOSE BLDC GLUCOMTR-MCNC: 168 MG/DL — HIGH (ref 70–99)
GLUCOSE BLDC GLUCOMTR-MCNC: 181 MG/DL — HIGH (ref 70–99)
GLUCOSE BLDC GLUCOMTR-MCNC: 187 MG/DL — HIGH (ref 70–99)
GLUCOSE BLDC GLUCOMTR-MCNC: 236 MG/DL — HIGH (ref 70–99)
GLUCOSE SERPL-MCNC: 182 MG/DL — HIGH (ref 70–99)
HCT VFR BLD CALC: 30.3 % — LOW (ref 34.5–45)
HGB BLD-MCNC: 9.4 G/DL — LOW (ref 11.5–15.5)
MAGNESIUM SERPL-MCNC: 1.7 MG/DL — SIGNIFICANT CHANGE UP (ref 1.6–2.6)
MCHC RBC-ENTMCNC: 22.8 PG — LOW (ref 27–34)
MCHC RBC-ENTMCNC: 31 GM/DL — LOW (ref 32–36)
MCV RBC AUTO: 73.5 FL — LOW (ref 80–100)
NRBC # BLD: 0 /100 WBCS — SIGNIFICANT CHANGE UP
NRBC # FLD: 0 K/UL — SIGNIFICANT CHANGE UP
PHOSPHATE SERPL-MCNC: 2.9 MG/DL — SIGNIFICANT CHANGE UP (ref 2.5–4.5)
PLATELET # BLD AUTO: 228 K/UL — SIGNIFICANT CHANGE UP (ref 150–400)
POTASSIUM SERPL-MCNC: 4 MMOL/L — SIGNIFICANT CHANGE UP (ref 3.5–5.3)
POTASSIUM SERPL-SCNC: 4 MMOL/L — SIGNIFICANT CHANGE UP (ref 3.5–5.3)
RBC # BLD: 4.12 M/UL — SIGNIFICANT CHANGE UP (ref 3.8–5.2)
RBC # FLD: 15.2 % — HIGH (ref 10.3–14.5)
SARS-COV-2 RNA SPEC QL NAA+PROBE: SIGNIFICANT CHANGE UP
SODIUM SERPL-SCNC: 145 MMOL/L — SIGNIFICANT CHANGE UP (ref 135–145)
WBC # BLD: 4.74 K/UL — SIGNIFICANT CHANGE UP (ref 3.8–10.5)
WBC # FLD AUTO: 4.74 K/UL — SIGNIFICANT CHANGE UP (ref 3.8–10.5)

## 2021-05-17 PROCEDURE — 99233 SBSQ HOSP IP/OBS HIGH 50: CPT

## 2021-05-17 RX ORDER — OLANZAPINE 15 MG/1
2.5 TABLET, FILM COATED ORAL ONCE
Refills: 0 | Status: COMPLETED | OUTPATIENT
Start: 2021-05-17 | End: 2021-05-17

## 2021-05-17 RX ADMIN — HEPARIN SODIUM 5000 UNIT(S): 5000 INJECTION INTRAVENOUS; SUBCUTANEOUS at 06:54

## 2021-05-17 RX ADMIN — Medication 2: at 17:19

## 2021-05-17 RX ADMIN — OLANZAPINE 2.5 MILLIGRAM(S): 15 TABLET, FILM COATED ORAL at 11:36

## 2021-05-17 RX ADMIN — OLANZAPINE 2.5 MILLIGRAM(S): 15 TABLET, FILM COATED ORAL at 06:51

## 2021-05-17 NOTE — PROGRESS NOTE ADULT - SUBJECTIVE AND OBJECTIVE BOX
CHIEF COMPLAINT:    SUBJECTIVE:     REVIEW OF SYSTEMS:    CONSTITUTIONAL: (  )  weakness,  (  ) fevers or chills  EYES/ENT: (  )visual changes;     NECK: (  ) pain or stiffness  RESPIRATORY:   (  )cough, wheezing, hemoptysis;  (  ) shortness of breath  CARDIOVASCULAR:  (  )chest pain or palpitations  GASTROINTESTINAL:   (  )abdominal or epigastric pain.  (  ) nausea, vomiting, or hematemesis;   (   ) diarrhea or constipation.   GENITOURINARY:   (    ) dysuria, frequency or hematuria  NEUROLOGICAL:  (   ) numbness or weakness   All other review of systems is negative unless indicated above    Vital Signs Last 24 Hrs  T(C): 36.4 (17 May 2021 11:41), Max: 36.4 (17 May 2021 05:17)  T(F): 97.6 (17 May 2021 11:41), Max: 97.6 (17 May 2021 11:41)  HR: 73 (17 May 2021 11:41) (73 - 77)  BP: 148/68 (17 May 2021 11:41) (147/65 - 148/68)  BP(mean): --  RR: 17 (17 May 2021 11:41) (17 - 18)  SpO2: 100% (17 May 2021 11:41) (100% - 100%)    I&O's Summary      CAPILLARY BLOOD GLUCOSE      POCT Blood Glucose.: 236 mg/dL (17 May 2021 17:14)  POCT Blood Glucose.: 150 mg/dL (17 May 2021 11:55)  POCT Blood Glucose.: 181 mg/dL (17 May 2021 06:49)  POCT Blood Glucose.: 187 mg/dL (17 May 2021 00:14)      PHYSICAL EXAM:    Constitutional:  (   ) NAD,   (   )awake and alert  HEENT: PERR, EOMI,    Neck: Soft and supple, No LAD, No JVD  Respiratory:  (    Breath sounds are clear bilaterally,    (   ) wheezing, rales or rhonchi  Cardiovascular:     (   )S1 and S2, regular rate and rhythm, no Murmurs, gallops or rubs  Gastrointestinal:  (   )Bowel Sounds present, soft,   (  )nontender, nondistended,    Extremities:    (  ) peripheral edema  Vascular: 2+ peripheral pulses  Neurological:    (    )A/O x 3,   (  ) focal deficits  Musculoskeletal:    (   )  normal strength b/l upper  (     ) normal  lower extremities  Skin: No rashes    MEDICATIONS:  MEDICATIONS  (STANDING):  dextrose 40% Gel 15 Gram(s) Oral once  dextrose 5%. 1000 milliLiter(s) (50 mL/Hr) IV Continuous <Continuous>  dextrose 5%. 1000 milliLiter(s) (100 mL/Hr) IV Continuous <Continuous>  dextrose 50% Injectable 25 Gram(s) IV Push once  dextrose 50% Injectable 12.5 Gram(s) IV Push once  dextrose 50% Injectable 25 Gram(s) IV Push once  dorzolamide 2% Ophthalmic Solution 1 Drop(s) Both EYES three times a day  glucagon  Injectable 1 milliGRAM(s) IntraMuscular once  heparin   Injectable 5000 Unit(s) SubCutaneous every 8 hours  insulin lispro (ADMELOG) corrective regimen sliding scale   SubCutaneous every 6 hours  latanoprost 0.005% Ophthalmic Solution 1 Drop(s) Both EYES at bedtime  OLANZapine 2.5 milliGRAM(s) Oral <User Schedule>  OLANZapine 5 milliGRAM(s) Oral at bedtime      LABS: All Labs Reviewed:                        9.4    4.74  )-----------( 228      ( 17 May 2021 07:24 )             30.3     05-17    145  |  108<H>  |  22  ----------------------------<  182<H>  4.0   |  28  |  0.94    Ca    9.3      17 May 2021 07:24  Phos  2.9     05-17  Mg     1.7     05-17            Blood Culture: 05-13 @ 21:20  Organism --  Gram Stain Blood -- Gram Stain --  Specimen Source .Urine Clean Catch (Midstream)  Culture-Blood --      Urine Culture      RADIOLOGY/EKG:    ASSESSMENT AND PLAN:    DVT PPX:    ADVANCED DIRECTIVE:    DISPOSITION: CHIEF COMPLAINT: Patient condition better still  on enhace  supervision psychiatrics follow-up appreciated    SUBJECTIVE:     REVIEW OF SYSTEMS: awake verbal without agitation    CONSTITUTIONAL: (  )  weakness,  (  ) fevers or chills  EYES/ENT: (  )visual changes;     NECK: (  ) pain or stiffness  RESPIRATORY:   (  )cough, wheezing, hemoptysis;  (  ) shortness of breath  CARDIOVASCULAR:  (  )chest pain or palpitations  GASTROINTESTINAL:   (  )abdominal or epigastric pain.  (  ) nausea, vomiting, or hematemesis;   (   ) diarrhea or constipation.   GENITOURINARY:   (    ) dysuria, frequency or hematuria  NEUROLOGICAL:  (   ) numbness or weakness   All other review of systems is negative unless indicated above    Vital Signs Last 24 Hrs  T(C): 36.4 (17 May 2021 11:41), Max: 36.4 (17 May 2021 05:17)  T(F): 97.6 (17 May 2021 11:41), Max: 97.6 (17 May 2021 11:41)  HR: 73 (17 May 2021 11:41) (73 - 77)  BP: 148/68 (17 May 2021 11:41) (147/65 - 148/68)  BP(mean): --  RR: 17 (17 May 2021 11:41) (17 - 18)  SpO2: 100% (17 May 2021 11:41) (100% - 100%)    I&O's Summary      CAPILLARY BLOOD GLUCOSE      POCT Blood Glucose.: 236 mg/dL (17 May 2021 17:14)  POCT Blood Glucose.: 150 mg/dL (17 May 2021 11:55)  POCT Blood Glucose.: 181 mg/dL (17 May 2021 06:49)  POCT Blood Glucose.: 187 mg/dL (17 May 2021 00:14)      PHYSICAL EXAM:    Constitutional:  ( x  ) NAD,   (   )awake and alert  HEENT: PERR, EOMI,    Neck: Soft and supple, No LAD, No JVD  Respiratory:  ( x   Breath sounds are clear bilaterally,    (   ) wheezing, rales or rhonchi  Cardiovascular:     (  x )S1 and S2, regular rate and rhythm, no Murmurs, gallops or rubs  Gastrointestinal:  ( x  )Bowel Sounds present, soft,   (  )nontender, nondistended,     extremity no edema  Vascular: 2+ peripheral pulses  Neurological:    (    )A/O x 3,   (  ) focal deficits  Musculoskeletal:    (   )  normal strength b/l upper  (     ) normal  lower extremities  Skin: No rashes    MEDICATIONS:  MEDICATIONS  (STANDING):  dextrose 40% Gel 15 Gram(s) Oral once  dextrose 5%. 1000 milliLiter(s) (50 mL/Hr) IV Continuous <Continuous>  dextrose 5%. 1000 milliLiter(s) (100 mL/Hr) IV Continuous <Continuous>  dextrose 50% Injectable 25 Gram(s) IV Push once  dextrose 50% Injectable 12.5 Gram(s) IV Push once  dextrose 50% Injectable 25 Gram(s) IV Push once  dorzolamide 2% Ophthalmic Solution 1 Drop(s) Both EYES three times a day  glucagon  Injectable 1 milliGRAM(s) IntraMuscular once  heparin   Injectable 5000 Unit(s) SubCutaneous every 8 hours  insulin lispro (ADMELOG) corrective regimen sliding scale   SubCutaneous every 6 hours  latanoprost 0.005% Ophthalmic Solution 1 Drop(s) Both EYES at bedtime  OLANZapine 2.5 milliGRAM(s) Oral <User Schedule>  OLANZapine 5 milliGRAM(s) Oral at bedtime      LABS: All Labs Reviewed:                        9.4    4.74  )-----------( 228      ( 17 May 2021 07:24 )             30.3     05-17    145  |  108<H>  |  22  ----------------------------<  182<H>  4.0   |  28  |  0.94    Ca    9.3      17 May 2021 07:24  Phos  2.9     05-17  Mg     1.7     05-17            Blood Culture: 05-13 @ 21:20  Organism --  Gram Stain Blood -- Gram Stain --  Specimen Source .Urine Clean Catch (Midstream)  Culture-Blood --      Urine Culture      RADIOLOGY/EKG:    ASSESSMENT AND PLAN:  patient condition remained stable discharge planning as per psychiatric recommendation continue Zyprexa twice daily with adjust the meds in nursing home based on her arousable situation  DVT PPX:    ADVANCED DIRECTIVE:    DISPOSITION:

## 2021-05-17 NOTE — BH CONSULTATION LIAISON PROGRESS NOTE - NSBHCONSULTRECOMMENDOTHER_PSY_A_CORE FT
Continue Zyprexa 2.5mg AM and 5mg HS  Start Depakote 250mg PO TID standing  Also start Zyprexa 2.5mg PO Q 6 PRN agitation; can give IM if refuses PO version for severe agitation  Avoid benzodiazepines   ES to prevent falls/impulsivity  R/O any metabolic/infectious etiologies of AMS  Monitor QTc; keep Mg above 2 and K above 4  Will continue to follow  Likely dispo is back to NH after stabilization

## 2021-05-18 ENCOUNTER — TRANSCRIPTION ENCOUNTER (OUTPATIENT)
Age: 81
End: 2021-05-18

## 2021-05-18 LAB
GLUCOSE BLDC GLUCOMTR-MCNC: 153 MG/DL — HIGH (ref 70–99)
GLUCOSE BLDC GLUCOMTR-MCNC: 184 MG/DL — HIGH (ref 70–99)
GLUCOSE BLDC GLUCOMTR-MCNC: 191 MG/DL — HIGH (ref 70–99)
GLUCOSE BLDC GLUCOMTR-MCNC: 211 MG/DL — HIGH (ref 70–99)
GLUCOSE BLDC GLUCOMTR-MCNC: 84 MG/DL — SIGNIFICANT CHANGE UP (ref 70–99)

## 2021-05-18 PROCEDURE — 99233 SBSQ HOSP IP/OBS HIGH 50: CPT

## 2021-05-18 RX ORDER — OLANZAPINE 15 MG/1
2.5 TABLET, FILM COATED ORAL ONCE
Refills: 0 | Status: COMPLETED | OUTPATIENT
Start: 2021-05-18 | End: 2021-05-18

## 2021-05-18 RX ORDER — OLANZAPINE 15 MG/1
2.5 TABLET, FILM COATED ORAL EVERY 6 HOURS
Refills: 0 | Status: DISCONTINUED | OUTPATIENT
Start: 2021-05-18 | End: 2021-05-25

## 2021-05-18 RX ORDER — DIVALPROEX SODIUM 500 MG/1
250 TABLET, DELAYED RELEASE ORAL THREE TIMES A DAY
Refills: 0 | Status: DISCONTINUED | OUTPATIENT
Start: 2021-05-18 | End: 2021-05-21

## 2021-05-18 RX ORDER — OLANZAPINE 15 MG/1
5 TABLET, FILM COATED ORAL ONCE
Refills: 0 | Status: COMPLETED | OUTPATIENT
Start: 2021-05-18 | End: 2021-05-18

## 2021-05-18 RX ADMIN — Medication 1: at 09:28

## 2021-05-18 RX ADMIN — OLANZAPINE 2.5 MILLIGRAM(S): 15 TABLET, FILM COATED ORAL at 04:48

## 2021-05-18 RX ADMIN — HEPARIN SODIUM 5000 UNIT(S): 5000 INJECTION INTRAVENOUS; SUBCUTANEOUS at 22:30

## 2021-05-18 RX ADMIN — OLANZAPINE 5 MILLIGRAM(S): 15 TABLET, FILM COATED ORAL at 02:00

## 2021-05-18 RX ADMIN — LATANOPROST 1 DROP(S): 0.05 SOLUTION/ DROPS OPHTHALMIC; TOPICAL at 22:30

## 2021-05-18 RX ADMIN — Medication 1: at 12:00

## 2021-05-18 RX ADMIN — OLANZAPINE 2.5 MILLIGRAM(S): 15 TABLET, FILM COATED ORAL at 14:54

## 2021-05-18 RX ADMIN — DORZOLAMIDE HYDROCHLORIDE 1 DROP(S): 20 SOLUTION/ DROPS OPHTHALMIC at 22:32

## 2021-05-18 RX ADMIN — Medication 2: at 17:29

## 2021-05-18 NOTE — PROGRESS NOTE ADULT - SUBJECTIVE AND OBJECTIVE BOX
CHIEF COMPLAINT:  was agittated last night got sandy was calm in AM when she was seen    Has received Zyprexa PRNs, refusing standing PO  meds. Very agitated per RN, standing on bed, etc. On exam now is laying in bed, distractible, oriented to self only, says she needs "repairs in her house, on the roof." Very disorganized.  SUBJECTIVE:     REVIEW OF SYSTEMS:    CONSTITUTIONAL: (  )  weakness,  (  ) fevers or chills  EYES/ENT: (  )visual changes;     NECK: (  ) pain or stiffness  RESPIRATORY:   (  )cough, wheezing, hemoptysis;  (  ) shortness of breath  CARDIOVASCULAR:  (  )chest pain or palpitations  GASTROINTESTINAL:   (  )abdominal or epigastric pain.  (  ) nausea, vomiting, or hematemesis;   (   ) diarrhea or constipation.   GENITOURINARY:   (    ) dysuria, frequency or hematuria  NEUROLOGICAL:  (   ) numbness or weakness   All other review of systems is negative unless indicated above    Vital Signs Last 24 Hrs  T(C): 36.3 (18 May 2021 17:37), Max: 36.3 (18 May 2021 17:37)  T(F): 97.4 (18 May 2021 17:37), Max: 97.4 (18 May 2021 17:37)  HR: 81 (18 May 2021 17:37) (81 - 81)  BP: 108/63 (18 May 2021 18:20) (108/63 - 110/41)  BP(mean): --  RR: 17 (18 May 2021 17:37) (17 - 17)  SpO2: 100% (18 May 2021 17:37) (100% - 100%)    I&O's Summary      CAPILLARY BLOOD GLUCOSE      POCT Blood Glucose.: 211 mg/dL (18 May 2021 16:53)  POCT Blood Glucose.: 153 mg/dL (18 May 2021 11:24)  POCT Blood Glucose.: 184 mg/dL (18 May 2021 09:14)  POCT Blood Glucose.: 191 mg/dL (18 May 2021 07:42)  POCT Blood Glucose.: 168 mg/dL (17 May 2021 23:06)      PHYSICAL EXAM:    Constitutional:  On exam now is laying in bed, distractible, oriented to self only, says she needs "repairs in her house, on the roof." Very disorganized.  HEENT: PERR, EOMI,    Neck: Soft and supple, No LAD, No JVD  Respiratory:  ( decreas   Breath sounds    Cardiovascular:     (  x )S1 and S2, regular rate and rhythm, no Murmurs, gallops or rubs  Gastrointestinal:  (  x )Bowel Sounds present, soft,   (  )nontender, nondistended,    Extremities:    (  ) peripheral edema  Vascular: 2+ peripheral pulses  Neurological:    (    )A/O x 3,   (  ) focal deficits  Musculoskeletal:    (   )  normal strength b/l upper  (     ) normal  lower extremities  Skin: No rashes    MEDICATIONS:  MEDICATIONS  (STANDING):  dextrose 40% Gel 15 Gram(s) Oral once  dextrose 5%. 1000 milliLiter(s) (50 mL/Hr) IV Continuous <Continuous>  dextrose 5%. 1000 milliLiter(s) (100 mL/Hr) IV Continuous <Continuous>  dextrose 50% Injectable 25 Gram(s) IV Push once  dextrose 50% Injectable 12.5 Gram(s) IV Push once  dextrose 50% Injectable 25 Gram(s) IV Push once  diVALproex Sprinkle 250 milliGRAM(s) Oral three times a day  dorzolamide 2% Ophthalmic Solution 1 Drop(s) Both EYES three times a day  glucagon  Injectable 1 milliGRAM(s) IntraMuscular once  heparin   Injectable 5000 Unit(s) SubCutaneous every 8 hours  insulin lispro (ADMELOG) corrective regimen sliding scale   SubCutaneous every 6 hours  latanoprost 0.005% Ophthalmic Solution 1 Drop(s) Both EYES at bedtime  OLANZapine 2.5 milliGRAM(s) Oral <User Schedule>  OLANZapine 5 milliGRAM(s) Oral at bedtime      LABS: All Labs Reviewed:                        9.4    4.74  )-----------( 228      ( 17 May 2021 07:24 )             30.3     05-17    145  |  108<H>  |  22  ----------------------------<  182<H>  4.0   |  28  |  0.94    Ca    9.3      17 May 2021 07:24  Phos  2.9     05-17  Mg     1.7     05-17            Blood Culture:   Urine Culture      RADIOLOGY/EKG:    ASSESSMENT AND PLAN:  and legal blindness with glaucoma and retinopathy presents from [facility] due to agitation with combative, aggressive behavior.     Problem/Plan - 1:  ·  Problem: Dementia with behavioral disturbance.  Plan: Pt here with agitation, noted to be combative and aggressive, refusing to go to her room or take her medications and swatting at NH staff. Likely in setting of progressing advanced dementia.  - Psychiatry consult to be called in AM for medication management  - Pt currently sedated s/p IV Ativan 1 mg x1 in ED. Keep NPO for now. Will perform dysphagia screen once pt awake and alert. If pt remains NPO, will consider starting IVF with D5.   - Pt on Trazodone at NH. Will hold for now.  - S&S eval and PT consult once pt more awake and alert  - No need for constant observation at this time, will reassess need for constant observation if pt becomes agitated again  - EKG ordered to check QTc. F/u EKG. C/w IV Ativan 0.5 mg PRN for agitation for now, given that as per NH staff, Haldol has been ineffective.   - UA negative for nitrite and leuk esterase. CXR with clear lungs. COVID-19 PCR negative. Pt afebrile with no leukocytosis. No evidence of underlying infection as cause for agitation.   - CTH on 5/3/21 with no acute pathology noted, MRI head on 4/27/21 with no evidence of an acute infarct.-  -She has been seen by psych in the facility started on Ativan 0.5 then increase to 1 mg without effect was also received multiple Haldol injection in the past week due to her combative situation  -psychiatrics follow-up appreciated patient has started on Zyprexa 5 mg and 2.5   -5/18/2021 still she required  prn  dose due to agitation and also she is trying to climb from the bed    Problem/Plan - 2:  ·  Problem: Anemia.  Plan: Hgb 9.6 on admission, near baseline Hgb. No S/S of active bleed.  - Monitor H/H and transfuse for Hgb < 7  - Check iron studies, folate, vitamin B12, and retic count.     Problem/Plan - 3:  ·  Problem: HTN (hypertension).  Plan: BPs elevated on admission, likely 2/2 agitation. Pt on amlodipine at NH.  - Hold amlodipine for now. Can resume if pt able to take PO meds and if BP tolerates.     Problem/Plan - 4:  ·  Problem: Type 2 diabetes mellitus with hyperglycemia, with long-term current use of insulin.  Plan: Pt on Lantus 6u qhs and low-dose ISS qAC and qhs at NH. Pt hyperglycemic on admission with serum glucose 271 and FS as high as 299.  - Lantus 6u ordered overnight       Problem/Plan - 5:  ·  Problem: Glaucoma.  Plan: - C/w ophthalmic drops.     Problem/Plan - 6:  Problem: Need for prophylactic measure. Plan: - DVT ppx: HSQ  - Diet: NPO pending dysphagia screen. If pt passes dysphagia screen, will start chopped, thin liquid diet (PO diet at NH). S&S eval once pt more awake and alert.  -5/14/2021 waiting for speech and swallow patient was eating food in the facility but with forced  - Pt FULL CODE.    DVT PPX:    ADVANCED DIRECTIVE:    DISPOSITION: to nursing home after psychiatric clear patient to be discharged

## 2021-05-19 LAB
ANION GAP SERPL CALC-SCNC: 12 MMOL/L — SIGNIFICANT CHANGE UP (ref 7–14)
BLD GP AB SCN SERPL QL: NEGATIVE — SIGNIFICANT CHANGE UP
BUN SERPL-MCNC: 20 MG/DL — SIGNIFICANT CHANGE UP (ref 7–23)
CALCIUM SERPL-MCNC: 9.4 MG/DL — SIGNIFICANT CHANGE UP (ref 8.4–10.5)
CHLORIDE SERPL-SCNC: 107 MMOL/L — SIGNIFICANT CHANGE UP (ref 98–107)
CO2 SERPL-SCNC: 25 MMOL/L — SIGNIFICANT CHANGE UP (ref 22–31)
CREAT SERPL-MCNC: 1.04 MG/DL — SIGNIFICANT CHANGE UP (ref 0.5–1.3)
GLUCOSE BLDC GLUCOMTR-MCNC: 101 MG/DL — HIGH (ref 70–99)
GLUCOSE BLDC GLUCOMTR-MCNC: 111 MG/DL — HIGH (ref 70–99)
GLUCOSE BLDC GLUCOMTR-MCNC: 116 MG/DL — HIGH (ref 70–99)
GLUCOSE BLDC GLUCOMTR-MCNC: 120 MG/DL — HIGH (ref 70–99)
GLUCOSE BLDC GLUCOMTR-MCNC: 128 MG/DL — HIGH (ref 70–99)
GLUCOSE BLDC GLUCOMTR-MCNC: 136 MG/DL — HIGH (ref 70–99)
GLUCOSE BLDC GLUCOMTR-MCNC: 137 MG/DL — HIGH (ref 70–99)
GLUCOSE BLDC GLUCOMTR-MCNC: 138 MG/DL — HIGH (ref 70–99)
GLUCOSE SERPL-MCNC: 136 MG/DL — HIGH (ref 70–99)
HCT VFR BLD CALC: 31.6 % — LOW (ref 34.5–45)
HGB BLD-MCNC: 9.9 G/DL — LOW (ref 11.5–15.5)
MAGNESIUM SERPL-MCNC: 1.8 MG/DL — SIGNIFICANT CHANGE UP (ref 1.6–2.6)
MCHC RBC-ENTMCNC: 22.9 PG — LOW (ref 27–34)
MCHC RBC-ENTMCNC: 31.3 GM/DL — LOW (ref 32–36)
MCV RBC AUTO: 73 FL — LOW (ref 80–100)
NRBC # BLD: 0 /100 WBCS — SIGNIFICANT CHANGE UP
NRBC # FLD: 0 K/UL — SIGNIFICANT CHANGE UP
PHOSPHATE SERPL-MCNC: 3.5 MG/DL — SIGNIFICANT CHANGE UP (ref 2.5–4.5)
PLATELET # BLD AUTO: 232 K/UL — SIGNIFICANT CHANGE UP (ref 150–400)
POTASSIUM SERPL-MCNC: 4 MMOL/L — SIGNIFICANT CHANGE UP (ref 3.5–5.3)
POTASSIUM SERPL-SCNC: 4 MMOL/L — SIGNIFICANT CHANGE UP (ref 3.5–5.3)
RBC # BLD: 4.33 M/UL — SIGNIFICANT CHANGE UP (ref 3.8–5.2)
RBC # FLD: 15.4 % — HIGH (ref 10.3–14.5)
RH IG SCN BLD-IMP: POSITIVE — SIGNIFICANT CHANGE UP
SODIUM SERPL-SCNC: 144 MMOL/L — SIGNIFICANT CHANGE UP (ref 135–145)
WBC # BLD: 4.52 K/UL — SIGNIFICANT CHANGE UP (ref 3.8–10.5)
WBC # FLD AUTO: 4.52 K/UL — SIGNIFICANT CHANGE UP (ref 3.8–10.5)

## 2021-05-19 PROCEDURE — 99233 SBSQ HOSP IP/OBS HIGH 50: CPT

## 2021-05-19 RX ADMIN — DORZOLAMIDE HYDROCHLORIDE 1 DROP(S): 20 SOLUTION/ DROPS OPHTHALMIC at 14:47

## 2021-05-19 RX ADMIN — DIVALPROEX SODIUM 250 MILLIGRAM(S): 500 TABLET, DELAYED RELEASE ORAL at 14:04

## 2021-05-19 RX ADMIN — DIVALPROEX SODIUM 250 MILLIGRAM(S): 500 TABLET, DELAYED RELEASE ORAL at 22:09

## 2021-05-19 RX ADMIN — DORZOLAMIDE HYDROCHLORIDE 1 DROP(S): 20 SOLUTION/ DROPS OPHTHALMIC at 22:08

## 2021-05-19 RX ADMIN — OLANZAPINE 5 MILLIGRAM(S): 15 TABLET, FILM COATED ORAL at 22:06

## 2021-05-19 RX ADMIN — HEPARIN SODIUM 5000 UNIT(S): 5000 INJECTION INTRAVENOUS; SUBCUTANEOUS at 14:02

## 2021-05-19 RX ADMIN — LATANOPROST 1 DROP(S): 0.05 SOLUTION/ DROPS OPHTHALMIC; TOPICAL at 22:07

## 2021-05-19 RX ADMIN — HEPARIN SODIUM 5000 UNIT(S): 5000 INJECTION INTRAVENOUS; SUBCUTANEOUS at 05:24

## 2021-05-19 RX ADMIN — HEPARIN SODIUM 5000 UNIT(S): 5000 INJECTION INTRAVENOUS; SUBCUTANEOUS at 22:06

## 2021-05-19 RX ADMIN — DORZOLAMIDE HYDROCHLORIDE 1 DROP(S): 20 SOLUTION/ DROPS OPHTHALMIC at 05:24

## 2021-05-19 NOTE — PROVIDER CONTACT NOTE (OTHER) - SITUATION
Patient refuses nighttime PO medications
Pt hallucinating, climbing oob, not redirectable, will either hurt herself hitting the head board, feet and or legs getting stuck in between the side railing, pt blind, kicking and swinging arms fists
Pt refused evening 10pm  po medication/zyprexa 5mg

## 2021-05-19 NOTE — PROVIDER CONTACT NOTE (OTHER) - ASSESSMENT
pt climbing oob, progressively becoming more aggressive, combative, spitting, biting, climbing oob
safe in enhanced room
Pt appears to be in no acute distress

## 2021-05-19 NOTE — PROVIDER CONTACT NOTE (OTHER) - BACKGROUND
sent from NH because of aggressive combative behavior, history of dementia
dementia
Patient admitted for restlessness and agitation

## 2021-05-19 NOTE — PROVIDER CONTACT NOTE (OTHER) - ACTION/TREATMENT ORDERED:
additional IM ordered.
given, pt moved to enhanced room, frequent rounding, safety maintained at all times
Okay to hold vishal time medications  Continue to monitor patient

## 2021-05-19 NOTE — PROGRESS NOTE ADULT - SUBJECTIVE AND OBJECTIVE BOX
CHIEF COMPLAINT: patient was seen earlier with agitation    SUBJECTIVE:     REVIEW OF SYSTEMS: awake confused at   baseline    CONSTITUTIONAL: (  )  weakness,  (  ) fevers or chills  EYES/ENT: (  )visual changes;     NECK: (  ) pain or stiffness  RESPIRATORY:   (  )cough, wheezing, hemoptysis;  (  ) shortness of breath  CARDIOVASCULAR:  (  )chest pain or palpitations  GASTROINTESTINAL:   (  )abdominal or epigastric pain.  (  ) nausea, vomiting, or hematemesis;   (   ) diarrhea or constipation.   GENITOURINARY:   (    ) dysuria, frequency or hematuria  NEUROLOGICAL:  (   ) numbness or weakness   All other review of systems is negative unless indicated above    Vital Signs Last 24 Hrs  T(C): 36.9 (19 May 2021 22:04), Max: 36.9 (19 May 2021 22:04)  T(F): 98.5 (19 May 2021 22:04), Max: 98.5 (19 May 2021 22:04)  HR: 83 (19 May 2021 22:04) (73 - 83)  BP: 125/60 (19 May 2021 22:04) (114/- - 125/60)  BP(mean): --  RR: 17 (19 May 2021 22:04) (15 - 17)  SpO2: 100% (19 May 2021 22:04) (98% - 100%)    I&O's Summary      CAPILLARY BLOOD GLUCOSE      POCT Blood Glucose.: 120 mg/dL (19 May 2021 21:18)  POCT Blood Glucose.: 138 mg/dL (19 May 2021 16:35)  POCT Blood Glucose.: 111 mg/dL (19 May 2021 13:55)  POCT Blood Glucose.: 101 mg/dL (19 May 2021 11:05)  POCT Blood Glucose.: 136 mg/dL (19 May 2021 07:43)  POCT Blood Glucose.: 137 mg/dL (19 May 2021 06:15)  POCT Blood Glucose.: 116 mg/dL (19 May 2021 00:33)      PHYSICAL EXAM:    Constitutional:  ( x  ) NAD,   (   )awake and alert  HEENT: PERR, EOMI,    Neck: Soft and supple, No LAD, No JVD  Respiratory:  (  x  Breath sounds are clear bilaterally,    (   ) wheezing, rales or rhonchi  Cardiovascular:     ( x  )S1 and S2, regular rate and rhythm, no Murmurs, gallops or rubs  Gastrointestinal:  ( x  )Bowel Sounds present, soft,   (  )nontender, nondistended,    Extremities:    (  ) peripheral edema  Vascular: 2+ peripheral pulses  Neurological:    (    )A/O x 3,   (  ) focal deficits  Musculoskeletal:    (   )  normal strength b/l upper  (     ) normal  lower extremities  Skin: No rashes    MEDICATIONS:  MEDICATIONS  (STANDING):  dextrose 40% Gel 15 Gram(s) Oral once  dextrose 5%. 1000 milliLiter(s) (50 mL/Hr) IV Continuous <Continuous>  dextrose 5%. 1000 milliLiter(s) (100 mL/Hr) IV Continuous <Continuous>  dextrose 50% Injectable 25 Gram(s) IV Push once  dextrose 50% Injectable 12.5 Gram(s) IV Push once  dextrose 50% Injectable 25 Gram(s) IV Push once  diVALproex Sprinkle 250 milliGRAM(s) Oral three times a day  dorzolamide 2% Ophthalmic Solution 1 Drop(s) Both EYES three times a day  glucagon  Injectable 1 milliGRAM(s) IntraMuscular once  heparin   Injectable 5000 Unit(s) SubCutaneous every 8 hours  insulin lispro (ADMELOG) corrective regimen sliding scale   SubCutaneous every 6 hours  latanoprost 0.005% Ophthalmic Solution 1 Drop(s) Both EYES at bedtime  OLANZapine 2.5 milliGRAM(s) Oral <User Schedule>  OLANZapine 5 milliGRAM(s) Oral at bedtime      LABS: All Labs Reviewed:                        9.9    4.52  )-----------( 232      ( 19 May 2021 07:17 )             31.6     05-19    144  |  107  |  20  ----------------------------<  136<H>  4.0   |  25  |  1.04    Ca    9.4      19 May 2021 07:17  Phos  3.5     05-19  Mg     1.8     05-19            Blood Culture:   Urine Culture      RADIOLOGY/EKG:    ASSESSMENT AND PLAN:  Problem/Plan - 1:  ·  Problem: Dementia with behavioral disturbance.  Plan: Pt here with agitation, noted to be combative and aggressive, refusing to go to her room or take her medications and swatting at NH staff. Likely in setting of progressing advanced dementia.  - Psychiatry consult to be called in AM for medication management  - Pt currently sedated s/p IV Ativan 1 mg x1 in ED. Keep NPO for now. Will perform dysphagia screen once pt awake and alert. If pt remains NPO, will consider starting IVF with D5.   - Pt on Trazodone at NH. Will hold for now.  - S&S eval and PT consult once pt more awake and alert  - No need for constant observation at this time, will reassess need for constant observation if pt becomes agitated again  - EKG ordered to check QTc. F/u EKG. C/w IV Ativan 0.5 mg PRN for agitation for now, given that as per NH staff, Haldol has been ineffective.   - UA negative for nitrite and leuk esterase. CXR with clear lungs. COVID-19 PCR negative. Pt afebrile with no leukocytosis. No evidence of underlying infection as cause for agitation.   - CTH on 5/3/21 with no acute pathology noted, MRI head on 4/27/21 with no evidence of an acute infarct.-  -She has been seen by psych in the facility started on Ativan 0.5 then increase to 1 mg without effect was also received multiple Haldol injection in the past week due to her combative situation  -psychiatrics follow-up appreciated patient has started on Zyprexa 5 mg and 2.5   -5/18/2021 still she required  prn  dose due to agitation and also she is trying to climb from the bed  -9/19/2021 continue Zyprexa twice a day and valproic acid 250  three  times a day    Problem/Plan - 2:  ·  Problem: Anemia.  Plan: Hgb 9.6 on admission, near baseline Hgb. No S/S of active bleed.  - Monitor H/H and transfuse for Hgb < 7  - Check iron studies, folate, vitamin B12, and retic count.     Problem/Plan - 3:  ·  Problem: HTN (hypertension).  Plan: BPs elevated on admission, likely 2/2 agitation. Pt on amlodipine at NH.  - Hold amlodipine for now. Can resume if pt able to take PO meds and if BP tolerates.     Problem/Plan - 4:  ·  Problem: Type 2 diabetes mellitus with hyperglycemia, with long-term current use of insulin.  Plan: Pt on Lantus 6u qhs and low-dose ISS qAC and qhs at NH. Pt hyperglycemic on admission with serum glucose 271 and FS as high as 299.  - Lantus 6u ordered overnight       Problem/Plan - 5:  ·  Problem: Glaucoma.  Plan: - C/w ophthalmic drops.     Problem/Plan - 6:  Problem: Need for prophylactic measure. Plan: - DVT ppx: HSQ  - Diet: NPO pending dysphagia screen. If pt passes dysphagia screen, will start chopped, thin liquid diet (PO diet at NH). S&S eval once pt more awake and alert.  -5/14/2021 waiting for speech and swallow patient was eating food in the facility but with forced  - Pt FULL CODE.  DVT PPX:    ADVANCED DIRECTIVE:    DISPOSITION: discharge back to nursing home  after psychiatrist clearance

## 2021-05-20 LAB
ANION GAP SERPL CALC-SCNC: 14 MMOL/L — SIGNIFICANT CHANGE UP (ref 7–14)
BASOPHILS # BLD AUTO: 0.02 K/UL — SIGNIFICANT CHANGE UP (ref 0–0.2)
BASOPHILS NFR BLD AUTO: 0.3 % — SIGNIFICANT CHANGE UP (ref 0–2)
BUN SERPL-MCNC: 31 MG/DL — HIGH (ref 7–23)
CALCIUM SERPL-MCNC: 9.6 MG/DL — SIGNIFICANT CHANGE UP (ref 8.4–10.5)
CHLORIDE SERPL-SCNC: 106 MMOL/L — SIGNIFICANT CHANGE UP (ref 98–107)
CO2 SERPL-SCNC: 26 MMOL/L — SIGNIFICANT CHANGE UP (ref 22–31)
CREAT SERPL-MCNC: 1.36 MG/DL — HIGH (ref 0.5–1.3)
EOSINOPHIL # BLD AUTO: 0.03 K/UL — SIGNIFICANT CHANGE UP (ref 0–0.5)
EOSINOPHIL NFR BLD AUTO: 0.5 % — SIGNIFICANT CHANGE UP (ref 0–6)
GLUCOSE BLDC GLUCOMTR-MCNC: 126 MG/DL — HIGH (ref 70–99)
GLUCOSE BLDC GLUCOMTR-MCNC: 141 MG/DL — HIGH (ref 70–99)
GLUCOSE BLDC GLUCOMTR-MCNC: 145 MG/DL — HIGH (ref 70–99)
GLUCOSE BLDC GLUCOMTR-MCNC: 158 MG/DL — HIGH (ref 70–99)
GLUCOSE SERPL-MCNC: 119 MG/DL — HIGH (ref 70–99)
HCT VFR BLD CALC: 32.3 % — LOW (ref 34.5–45)
HGB BLD-MCNC: 10 G/DL — LOW (ref 11.5–15.5)
IANC: 4.01 K/UL — SIGNIFICANT CHANGE UP (ref 1.5–8.5)
IMM GRANULOCYTES NFR BLD AUTO: 0.3 % — SIGNIFICANT CHANGE UP (ref 0–1.5)
LYMPHOCYTES # BLD AUTO: 2.04 K/UL — SIGNIFICANT CHANGE UP (ref 1–3.3)
LYMPHOCYTES # BLD AUTO: 31.1 % — SIGNIFICANT CHANGE UP (ref 13–44)
MAGNESIUM SERPL-MCNC: 1.8 MG/DL — SIGNIFICANT CHANGE UP (ref 1.6–2.6)
MCHC RBC-ENTMCNC: 22.7 PG — LOW (ref 27–34)
MCHC RBC-ENTMCNC: 31 GM/DL — LOW (ref 32–36)
MCV RBC AUTO: 73.4 FL — LOW (ref 80–100)
MONOCYTES # BLD AUTO: 0.44 K/UL — SIGNIFICANT CHANGE UP (ref 0–0.9)
MONOCYTES NFR BLD AUTO: 6.7 % — SIGNIFICANT CHANGE UP (ref 2–14)
NEUTROPHILS # BLD AUTO: 4.01 K/UL — SIGNIFICANT CHANGE UP (ref 1.8–7.4)
NEUTROPHILS NFR BLD AUTO: 61.1 % — SIGNIFICANT CHANGE UP (ref 43–77)
NRBC # BLD: 0 /100 WBCS — SIGNIFICANT CHANGE UP
NRBC # FLD: 0 K/UL — SIGNIFICANT CHANGE UP
PHOSPHATE SERPL-MCNC: 3.8 MG/DL — SIGNIFICANT CHANGE UP (ref 2.5–4.5)
PLATELET # BLD AUTO: 236 K/UL — SIGNIFICANT CHANGE UP (ref 150–400)
POTASSIUM SERPL-MCNC: 4 MMOL/L — SIGNIFICANT CHANGE UP (ref 3.5–5.3)
POTASSIUM SERPL-SCNC: 4 MMOL/L — SIGNIFICANT CHANGE UP (ref 3.5–5.3)
RBC # BLD: 4.4 M/UL — SIGNIFICANT CHANGE UP (ref 3.8–5.2)
RBC # FLD: 15.3 % — HIGH (ref 10.3–14.5)
SODIUM SERPL-SCNC: 146 MMOL/L — HIGH (ref 135–145)
WBC # BLD: 6.56 K/UL — SIGNIFICANT CHANGE UP (ref 3.8–10.5)
WBC # FLD AUTO: 6.56 K/UL — SIGNIFICANT CHANGE UP (ref 3.8–10.5)

## 2021-05-20 PROCEDURE — 99233 SBSQ HOSP IP/OBS HIGH 50: CPT

## 2021-05-20 RX ORDER — INSULIN LISPRO 100/ML
VIAL (ML) SUBCUTANEOUS AT BEDTIME
Refills: 0 | Status: DISCONTINUED | OUTPATIENT
Start: 2021-05-20 | End: 2021-05-26

## 2021-05-20 RX ORDER — INSULIN LISPRO 100/ML
VIAL (ML) SUBCUTANEOUS
Refills: 0 | Status: DISCONTINUED | OUTPATIENT
Start: 2021-05-20 | End: 2021-05-26

## 2021-05-20 RX ORDER — OLANZAPINE 15 MG/1
2.5 TABLET, FILM COATED ORAL ONCE
Refills: 0 | Status: COMPLETED | OUTPATIENT
Start: 2021-05-20 | End: 2021-05-20

## 2021-05-20 RX ORDER — HEPARIN SODIUM 5000 [USP'U]/ML
5000 INJECTION INTRAVENOUS; SUBCUTANEOUS EVERY 12 HOURS
Refills: 0 | Status: DISCONTINUED | OUTPATIENT
Start: 2021-05-20 | End: 2021-05-26

## 2021-05-20 RX ADMIN — OLANZAPINE 2.5 MILLIGRAM(S): 15 TABLET, FILM COATED ORAL at 23:52

## 2021-05-20 RX ADMIN — HEPARIN SODIUM 5000 UNIT(S): 5000 INJECTION INTRAVENOUS; SUBCUTANEOUS at 06:13

## 2021-05-20 RX ADMIN — DIVALPROEX SODIUM 250 MILLIGRAM(S): 500 TABLET, DELAYED RELEASE ORAL at 06:10

## 2021-05-20 RX ADMIN — Medication 1: at 17:32

## 2021-05-20 RX ADMIN — HEPARIN SODIUM 5000 UNIT(S): 5000 INJECTION INTRAVENOUS; SUBCUTANEOUS at 13:02

## 2021-05-20 RX ADMIN — DORZOLAMIDE HYDROCHLORIDE 1 DROP(S): 20 SOLUTION/ DROPS OPHTHALMIC at 13:02

## 2021-05-20 RX ADMIN — OLANZAPINE 5 MILLIGRAM(S): 15 TABLET, FILM COATED ORAL at 21:36

## 2021-05-20 RX ADMIN — OLANZAPINE 2.5 MILLIGRAM(S): 15 TABLET, FILM COATED ORAL at 02:47

## 2021-05-20 RX ADMIN — DIVALPROEX SODIUM 250 MILLIGRAM(S): 500 TABLET, DELAYED RELEASE ORAL at 13:02

## 2021-05-20 RX ADMIN — DORZOLAMIDE HYDROCHLORIDE 1 DROP(S): 20 SOLUTION/ DROPS OPHTHALMIC at 21:34

## 2021-05-20 RX ADMIN — LATANOPROST 1 DROP(S): 0.05 SOLUTION/ DROPS OPHTHALMIC; TOPICAL at 21:34

## 2021-05-20 RX ADMIN — DORZOLAMIDE HYDROCHLORIDE 1 DROP(S): 20 SOLUTION/ DROPS OPHTHALMIC at 06:12

## 2021-05-20 RX ADMIN — DIVALPROEX SODIUM 250 MILLIGRAM(S): 500 TABLET, DELAYED RELEASE ORAL at 21:35

## 2021-05-20 RX ADMIN — OLANZAPINE 2.5 MILLIGRAM(S): 15 TABLET, FILM COATED ORAL at 06:11

## 2021-05-20 RX ADMIN — OLANZAPINE 2.5 MILLIGRAM(S): 15 TABLET, FILM COATED ORAL at 07:07

## 2021-05-20 NOTE — PROGRESS NOTE ADULT - SUBJECTIVE AND OBJECTIVE BOX
CHIEF COMPLAINT:    SUBJECTIVE:     REVIEW OF SYSTEMS:    CONSTITUTIONAL: (  )  weakness,  (  ) fevers or chills  EYES/ENT: (  )visual changes;     NECK: (  ) pain or stiffness  RESPIRATORY:   (  )cough, wheezing, hemoptysis;  (  ) shortness of breath  CARDIOVASCULAR:  (  )chest pain or palpitations  GASTROINTESTINAL:   (  )abdominal or epigastric pain.  (  ) nausea, vomiting, or hematemesis;   (   ) diarrhea or constipation.   GENITOURINARY:   (    ) dysuria, frequency or hematuria  NEUROLOGICAL:  (   ) numbness or weakness   All other review of systems is negative unless indicated above    Vital Signs Last 24 Hrs  T(C): 36.3 (20 May 2021 05:27), Max: 36.9 (19 May 2021 22:04)  T(F): 97.4 (20 May 2021 05:27), Max: 98.5 (19 May 2021 22:04)  HR: 88 (20 May 2021 05:27) (81 - 88)  BP: 116/73 (20 May 2021 05:27) (114/- - 125/60)  BP(mean): --  RR: 18 (20 May 2021 05:27) (15 - 18)  SpO2: 100% (20 May 2021 05:27) (99% - 100%)    I&O's Summary      CAPILLARY BLOOD GLUCOSE      POCT Blood Glucose.: 126 mg/dL (20 May 2021 06:19)  POCT Blood Glucose.: 128 mg/dL (19 May 2021 23:25)  POCT Blood Glucose.: 120 mg/dL (19 May 2021 21:18)  POCT Blood Glucose.: 138 mg/dL (19 May 2021 16:35)  POCT Blood Glucose.: 111 mg/dL (19 May 2021 13:55)  POCT Blood Glucose.: 101 mg/dL (19 May 2021 11:05)      PHYSICAL EXAM:    Constitutional:  (   ) NAD,   (   )awake and alert  HEENT: PERR, EOMI,    Neck: Soft and supple, No LAD, No JVD  Respiratory:  (    Breath sounds are clear bilaterally,    (   ) wheezing, rales or rhonchi  Cardiovascular:     (   )S1 and S2, regular rate and rhythm, no Murmurs, gallops or rubs  Gastrointestinal:  (   )Bowel Sounds present, soft,   (  )nontender, nondistended,    Extremities:    (  ) peripheral edema  Vascular: 2+ peripheral pulses  Neurological:    (    )A/O x 3,   (  ) focal deficits  Musculoskeletal:    (   )  normal strength b/l upper  (     ) normal  lower extremities  Skin: No rashes    MEDICATIONS:  MEDICATIONS  (STANDING):  dextrose 40% Gel 15 Gram(s) Oral once  dextrose 5%. 1000 milliLiter(s) (50 mL/Hr) IV Continuous <Continuous>  dextrose 5%. 1000 milliLiter(s) (100 mL/Hr) IV Continuous <Continuous>  dextrose 50% Injectable 25 Gram(s) IV Push once  dextrose 50% Injectable 12.5 Gram(s) IV Push once  dextrose 50% Injectable 25 Gram(s) IV Push once  diVALproex Sprinkle 250 milliGRAM(s) Oral three times a day  dorzolamide 2% Ophthalmic Solution 1 Drop(s) Both EYES three times a day  glucagon  Injectable 1 milliGRAM(s) IntraMuscular once  heparin   Injectable 5000 Unit(s) SubCutaneous every 8 hours  insulin lispro (ADMELOG) corrective regimen sliding scale   SubCutaneous every 6 hours  latanoprost 0.005% Ophthalmic Solution 1 Drop(s) Both EYES at bedtime  OLANZapine 2.5 milliGRAM(s) Oral <User Schedule>  OLANZapine 5 milliGRAM(s) Oral at bedtime      LABS: All Labs Reviewed:                        10.0   6.56  )-----------( 236      ( 20 May 2021 06:48 )             32.3     05-20    146<H>  |  106  |  31<H>  ----------------------------<  119<H>  4.0   |  26  |  1.36<H>    Ca    9.6      20 May 2021 06:48  Phos  3.8     05-20  Mg     1.8     05-20            Blood Culture:   Urine Culture      RADIOLOGY/EKG:    ASSESSMENT AND PLAN:    DVT PPX:    ADVANCED DIRECTIVE:    DISPOSITION: CHIEF COMPLAINT: no significant change in patient condition is still require extra Zyprexa due to agitation    SUBJECTIVE:     REVIEW OF SYSTEMS: awake and verbal but confused    CONSTITUTIONAL: (  )  weakness,  (  ) fevers or chills  EYES/ENT: (  )visual changes;     NECK: (  ) pain or stiffness  RESPIRATORY:   (  )cough, wheezing, hemoptysis;  (  ) shortness of breath  CARDIOVASCULAR:  (  )chest pain or palpitations  GASTROINTESTINAL:   (  )abdominal or epigastric pain.  (  ) nausea, vomiting, or hematemesis;   (   ) diarrhea or constipation.   GENITOURINARY:   (    ) dysuria, frequency or hematuria  NEUROLOGICAL:  (   ) numbness or weakness   All other review of systems is negative unless indicated above    Vital Signs Last 24 Hrs  T(C): 36.3 (20 May 2021 05:27), Max: 36.9 (19 May 2021 22:04)  T(F): 97.4 (20 May 2021 05:27), Max: 98.5 (19 May 2021 22:04)  HR: 88 (20 May 2021 05:27) (81 - 88)  BP: 116/73 (20 May 2021 05:27) (114/- - 125/60)  BP(mean): --  RR: 18 (20 May 2021 05:27) (15 - 18)  SpO2: 100% (20 May 2021 05:27) (99% - 100%)    I&O's Summary      CAPILLARY BLOOD GLUCOSE      POCT Blood Glucose.: 126 mg/dL (20 May 2021 06:19)  POCT Blood Glucose.: 128 mg/dL (19 May 2021 23:25)  POCT Blood Glucose.: 120 mg/dL (19 May 2021 21:18)  POCT Blood Glucose.: 138 mg/dL (19 May 2021 16:35)  POCT Blood Glucose.: 111 mg/dL (19 May 2021 13:55)  POCT Blood Glucose.: 101 mg/dL (19 May 2021 11:05)      PHYSICAL EXAM:    Constitutional:  ( x  ) NAD,   (   )awake and alert  HEENT: PERR, EOMI,    Neck: Soft and supple, No LAD, No JVD  Respiratory:  (  decrease  Breath sounds    Cardiovascular:     ( x  )S1 and S2, regular rate and rhythm, no Murmurs, gallops or rubs  Gastrointestinal:  (   )Bowel Sounds present, soft,   (  )nontender, nondistended,    Extremities:    (  ) peripheral edema  Vascular: 2+ peripheral pulses  Neurological:    (    )A/O x 3,   (  ) focal deficits  Musculoskeletal:    (   )  normal strength b/l upper  (     ) normal  lower extremities  Skin: No rashes    MEDICATIONS:  MEDICATIONS  (STANDING):  dextrose 40% Gel 15 Gram(s) Oral once  dextrose 5%. 1000 milliLiter(s) (50 mL/Hr) IV Continuous <Continuous>  dextrose 5%. 1000 milliLiter(s) (100 mL/Hr) IV Continuous <Continuous>  dextrose 50% Injectable 25 Gram(s) IV Push once  dextrose 50% Injectable 12.5 Gram(s) IV Push once  dextrose 50% Injectable 25 Gram(s) IV Push once  diVALproex Sprinkle 250 milliGRAM(s) Oral three times a day  dorzolamide 2% Ophthalmic Solution 1 Drop(s) Both EYES three times a day  glucagon  Injectable 1 milliGRAM(s) IntraMuscular once  heparin   Injectable 5000 Unit(s) SubCutaneous every 8 hours  insulin lispro (ADMELOG) corrective regimen sliding scale   SubCutaneous every 6 hours  latanoprost 0.005% Ophthalmic Solution 1 Drop(s) Both EYES at bedtime  OLANZapine 2.5 milliGRAM(s) Oral <User Schedule>  OLANZapine 5 milliGRAM(s) Oral at bedtime      LABS: All Labs Reviewed:                        10.0   6.56  )-----------( 236      ( 20 May 2021 06:48 )             32.3     05-20    146<H>  |  106  |  31<H>  ----------------------------<  119<H>  4.0   |  26  |  1.36<H>    Ca    9.6      20 May 2021 06:48  Phos  3.8     05-20  Mg     1.8     05-20            Blood Culture:   Urine Culture      RADIOLOGY/EKG:    ASSESSMENT AND PLAN:   patient condition none stable to be discharged back to the facility as per psychiatrist evaluation on  5/19/2021 continue monitor patient  DVT PPX:    ADVANCED DIRECTIVE:    DISPOSITION:

## 2021-05-20 NOTE — DIETITIAN NUTRITION RISK NOTIFICATION - TREATMENT: THE FOLLOWING DIET HAS BEEN RECOMMENDED
Diet, Dysphagia 2 Mechanical Soft-Thin Liquids:   Consistent Carbohydrate {No Snacks} (CSTCHO) (05-14-21 @ 18:03) [Active]

## 2021-05-20 NOTE — DIETITIAN INITIAL EVALUATION ADULT. - ORAL INTAKE PTA/DIET HISTORY
As per NH records, pt was ordered for chopped solids, thin liquids, No added salt, No concentrated sweets, Mighty Shake 2x daily.   5/4 recorded wt: 108 pounds

## 2021-05-20 NOTE — DIETITIAN INITIAL EVALUATION ADULT. - PROBLEM SELECTOR PLAN 1
Pt here with agitation, noted to be combative and aggressive, refusing to go to her room or take her medications and swatting at NH staff. Likely in setting of progressing advanced dementia.  - Psychiatry consult to be called in AM for medication management  - Pt currently sedated s/p IV Ativan 1 mg x1 in ED. Keep NPO for now. Will perform dysphagia screen once pt awake and alert. If pt remains NPO, will consider starting IVF with D5.   - Pt on Trazodone at NH. Will hold for now.  - S&S eval and PT consult once pt more awake and alert  - No need for constant observation at this time, will reassess need for constant observation if pt becomes agitated again  - EKG ordered to check QTc. F/u EKG. C/w IV Ativan 0.5 mg PRN for agitation for now, given that as per NH staff, Haldol has been ineffective.   - UA negative for nitrite and leuk esterase. CXR with clear lungs. COVID-19 PCR negative. Pt afebrile with no leukocytosis. No evidence of underlying infection as cause for agitation.   - CTH on 5/3/21 with no acute pathology noted, MRI head on 4/27/21 with no evidence of an acute infarct

## 2021-05-20 NOTE — DIETITIAN INITIAL EVALUATION ADULT. - PROBLEM SELECTOR PLAN 4
Pt on Lantus 6u qhs and low-dose ISS qAC and qhs at NH. Pt hyperglycemic on admission with serum glucose 271 and FS as high as 299.  - Lantus 6u ordered overnight  - Pt currently NPO pending dysphagia screen. If pt remains NPO, will hold bedtime Lantus unless pt started on IVF with D5.   - Start low-dose ISS and FS checks q6hrs while NPO  - C/w atorvastatin once pt able to take PO meds

## 2021-05-20 NOTE — DIETITIAN INITIAL EVALUATION ADULT. - PROBLEM SELECTOR PLAN 3
BPs elevated on admission, likely 2/2 agitation. Pt on amlodipine at NH.  - Hold amlodipine for now. Can resume if pt able to take PO meds and if BP tolerates.

## 2021-05-20 NOTE — DIETITIAN INITIAL EVALUATION ADULT. - ADD RECOMMEND
1. Encourage PO intake and honor food preferences as able. Provide feeding assistance. 2. Updated weight as able.

## 2021-05-20 NOTE — DIETITIAN INITIAL EVALUATION ADULT. - OTHER INFO
PO intake minimal per RN. Pt ate a little today for breakfast with male PCA, but refused to be fed by anyone else. RN Denies any GI issues (nausea/vomiting/diarrhea/constipation.) 5/14 swallow assessment recommending mechanical soft, thin liquids. Pt with suspected wt loss since NH weight as dosing wt is 40.3 kg (88 pounds).

## 2021-05-20 NOTE — DIETITIAN INITIAL EVALUATION ADULT. - PROBLEM SELECTOR PLAN 6
- DVT ppx: HSQ  - Diet: NPO pending dysphagia screen. If pt passes dysphagia screen, will start chopped, thin liquid diet (PO diet at NH). S&S eval once pt more awake and alert.  - Pt FULL CODE

## 2021-05-21 LAB
ANION GAP SERPL CALC-SCNC: 13 MMOL/L — SIGNIFICANT CHANGE UP (ref 7–14)
BUN SERPL-MCNC: 28 MG/DL — HIGH (ref 7–23)
CALCIUM SERPL-MCNC: 9.4 MG/DL — SIGNIFICANT CHANGE UP (ref 8.4–10.5)
CHLORIDE SERPL-SCNC: 109 MMOL/L — HIGH (ref 98–107)
CO2 SERPL-SCNC: 26 MMOL/L — SIGNIFICANT CHANGE UP (ref 22–31)
CREAT SERPL-MCNC: 0.97 MG/DL — SIGNIFICANT CHANGE UP (ref 0.5–1.3)
GLUCOSE BLDC GLUCOMTR-MCNC: 121 MG/DL — HIGH (ref 70–99)
GLUCOSE BLDC GLUCOMTR-MCNC: 142 MG/DL — HIGH (ref 70–99)
GLUCOSE BLDC GLUCOMTR-MCNC: 252 MG/DL — HIGH (ref 70–99)
GLUCOSE BLDC GLUCOMTR-MCNC: 96 MG/DL — SIGNIFICANT CHANGE UP (ref 70–99)
GLUCOSE SERPL-MCNC: 157 MG/DL — HIGH (ref 70–99)
HCT VFR BLD CALC: 30.9 % — LOW (ref 34.5–45)
HGB BLD-MCNC: 9.7 G/DL — LOW (ref 11.5–15.5)
MAGNESIUM SERPL-MCNC: 2 MG/DL — SIGNIFICANT CHANGE UP (ref 1.6–2.6)
MCHC RBC-ENTMCNC: 22.8 PG — LOW (ref 27–34)
MCHC RBC-ENTMCNC: 31.4 GM/DL — LOW (ref 32–36)
MCV RBC AUTO: 72.5 FL — LOW (ref 80–100)
NRBC # BLD: 0 /100 WBCS — SIGNIFICANT CHANGE UP
NRBC # FLD: 0 K/UL — SIGNIFICANT CHANGE UP
PHOSPHATE SERPL-MCNC: 3.3 MG/DL — SIGNIFICANT CHANGE UP (ref 2.5–4.5)
PLATELET # BLD AUTO: 213 K/UL — SIGNIFICANT CHANGE UP (ref 150–400)
POTASSIUM SERPL-MCNC: 4 MMOL/L — SIGNIFICANT CHANGE UP (ref 3.5–5.3)
POTASSIUM SERPL-SCNC: 4 MMOL/L — SIGNIFICANT CHANGE UP (ref 3.5–5.3)
RBC # BLD: 4.26 M/UL — SIGNIFICANT CHANGE UP (ref 3.8–5.2)
RBC # FLD: 15.3 % — HIGH (ref 10.3–14.5)
SODIUM SERPL-SCNC: 148 MMOL/L — HIGH (ref 135–145)
WBC # BLD: 4.7 K/UL — SIGNIFICANT CHANGE UP (ref 3.8–10.5)
WBC # FLD AUTO: 4.7 K/UL — SIGNIFICANT CHANGE UP (ref 3.8–10.5)

## 2021-05-21 PROCEDURE — 99233 SBSQ HOSP IP/OBS HIGH 50: CPT

## 2021-05-21 RX ORDER — DIVALPROEX SODIUM 500 MG/1
500 TABLET, DELAYED RELEASE ORAL
Refills: 0 | Status: DISCONTINUED | OUTPATIENT
Start: 2021-05-21 | End: 2021-05-26

## 2021-05-21 RX ORDER — DIVALPROEX SODIUM 500 MG/1
250 TABLET, DELAYED RELEASE ORAL ONCE
Refills: 0 | Status: COMPLETED | OUTPATIENT
Start: 2021-05-21 | End: 2021-05-21

## 2021-05-21 RX ORDER — DIVALPROEX SODIUM 500 MG/1
250 TABLET, DELAYED RELEASE ORAL
Refills: 0 | Status: DISCONTINUED | OUTPATIENT
Start: 2021-05-21 | End: 2021-05-26

## 2021-05-21 RX ADMIN — DIVALPROEX SODIUM 250 MILLIGRAM(S): 500 TABLET, DELAYED RELEASE ORAL at 06:46

## 2021-05-21 RX ADMIN — DIVALPROEX SODIUM 250 MILLIGRAM(S): 500 TABLET, DELAYED RELEASE ORAL at 17:59

## 2021-05-21 RX ADMIN — DORZOLAMIDE HYDROCHLORIDE 1 DROP(S): 20 SOLUTION/ DROPS OPHTHALMIC at 06:37

## 2021-05-21 RX ADMIN — DORZOLAMIDE HYDROCHLORIDE 1 DROP(S): 20 SOLUTION/ DROPS OPHTHALMIC at 21:20

## 2021-05-21 RX ADMIN — DIVALPROEX SODIUM 250 MILLIGRAM(S): 500 TABLET, DELAYED RELEASE ORAL at 13:14

## 2021-05-21 RX ADMIN — OLANZAPINE 2.5 MILLIGRAM(S): 15 TABLET, FILM COATED ORAL at 20:28

## 2021-05-21 RX ADMIN — DORZOLAMIDE HYDROCHLORIDE 1 DROP(S): 20 SOLUTION/ DROPS OPHTHALMIC at 13:13

## 2021-05-21 RX ADMIN — LATANOPROST 1 DROP(S): 0.05 SOLUTION/ DROPS OPHTHALMIC; TOPICAL at 21:20

## 2021-05-21 RX ADMIN — Medication 1: at 21:21

## 2021-05-21 RX ADMIN — HEPARIN SODIUM 5000 UNIT(S): 5000 INJECTION INTRAVENOUS; SUBCUTANEOUS at 11:46

## 2021-05-21 RX ADMIN — OLANZAPINE 2.5 MILLIGRAM(S): 15 TABLET, FILM COATED ORAL at 06:36

## 2021-05-21 NOTE — PROGRESS NOTE ADULT - SUBJECTIVE AND OBJECTIVE BOX
CHIEF COMPLAINT:    SUBJECTIVE:     REVIEW OF SYSTEMS:    CONSTITUTIONAL: (  )  weakness,  (  ) fevers or chills  EYES/ENT: (  )visual changes;     NECK: (  ) pain or stiffness  RESPIRATORY:   (  )cough, wheezing, hemoptysis;  (  ) shortness of breath  CARDIOVASCULAR:  (  )chest pain or palpitations  GASTROINTESTINAL:   (  )abdominal or epigastric pain.  (  ) nausea, vomiting, or hematemesis;   (   ) diarrhea or constipation.   GENITOURINARY:   (    ) dysuria, frequency or hematuria  NEUROLOGICAL:  (   ) numbness or weakness   All other review of systems is negative unless indicated above    Vital Signs Last 24 Hrs  T(C): 36.7 (21 May 2021 06:32), Max: 36.9 (20 May 2021 22:28)  T(F): 98 (21 May 2021 06:32), Max: 98.5 (20 May 2021 22:28)  HR: 67 (21 May 2021 06:32) (67 - 82)  BP: 136/60 (21 May 2021 06:32) (125/58 - 137/60)  BP(mean): --  RR: 17 (21 May 2021 06:32) (17 - 18)  SpO2: 100% (21 May 2021 06:32) (100% - 100%)    I&O's Summary      CAPILLARY BLOOD GLUCOSE      POCT Blood Glucose.: 142 mg/dL (21 May 2021 07:32)  POCT Blood Glucose.: 145 mg/dL (20 May 2021 21:46)  POCT Blood Glucose.: 158 mg/dL (20 May 2021 16:55)  POCT Blood Glucose.: 141 mg/dL (20 May 2021 11:25)      PHYSICAL EXAM:    Constitutional:  (   ) NAD,   (   )awake and alert  HEENT: PERR, EOMI,    Neck: Soft and supple, No LAD, No JVD  Respiratory:  (    Breath sounds are clear bilaterally,    (   ) wheezing, rales or rhonchi  Cardiovascular:     (   )S1 and S2, regular rate and rhythm, no Murmurs, gallops or rubs  Gastrointestinal:  (   )Bowel Sounds present, soft,   (  )nontender, nondistended,    Extremities:    (  ) peripheral edema  Vascular: 2+ peripheral pulses  Neurological:    (    )A/O x 3,   (  ) focal deficits  Musculoskeletal:    (   )  normal strength b/l upper  (     ) normal  lower extremities  Skin: No rashes    MEDICATIONS:  MEDICATIONS  (STANDING):  dextrose 40% Gel 15 Gram(s) Oral once  dextrose 5%. 1000 milliLiter(s) (50 mL/Hr) IV Continuous <Continuous>  dextrose 5%. 1000 milliLiter(s) (100 mL/Hr) IV Continuous <Continuous>  dextrose 50% Injectable 25 Gram(s) IV Push once  dextrose 50% Injectable 12.5 Gram(s) IV Push once  dextrose 50% Injectable 25 Gram(s) IV Push once  diVALproex Sprinkle 250 milliGRAM(s) Oral three times a day  dorzolamide 2% Ophthalmic Solution 1 Drop(s) Both EYES three times a day  glucagon  Injectable 1 milliGRAM(s) IntraMuscular once  heparin   Injectable 5000 Unit(s) SubCutaneous every 12 hours  insulin lispro (ADMELOG) corrective regimen sliding scale   SubCutaneous three times a day before meals  insulin lispro (ADMELOG) corrective regimen sliding scale   SubCutaneous at bedtime  latanoprost 0.005% Ophthalmic Solution 1 Drop(s) Both EYES at bedtime  OLANZapine 2.5 milliGRAM(s) Oral <User Schedule>  OLANZapine 5 milliGRAM(s) Oral at bedtime      LABS: All Labs Reviewed:                        9.7    4.70  )-----------( 213      ( 21 May 2021 06:59 )             30.9     05-21    148<H>  |  109<H>  |  28<H>  ----------------------------<  157<H>  4.0   |  26  |  0.97    Ca    9.4      21 May 2021 06:59  Phos  3.3     05-21  Mg     2.0     05-21            Blood Culture:   Urine Culture      RADIOLOGY/EKG:    ASSESSMENT AND PLAN:    DVT PPX:    ADVANCED DIRECTIVE:    DISPOSITION: CHIEF COMPLAINT: Reported patient was acting up last night around 12 midnight at present time she is laying in the bed without any agitation breakfast was not consume a discussed with PCA to feed her after she wake up    SUBJECTIVE:     REVIEW OF SYSTEMS:    CONSTITUTIONAL: (  )  weakness,  (  ) fevers or chills  EYES/ENT: (  )visual changes;     NECK: (  ) pain or stiffness  RESPIRATORY:   (  )cough, wheezing, hemoptysis;  (  ) shortness of breath  CARDIOVASCULAR:  (  )chest pain or palpitations  GASTROINTESTINAL:   (  )abdominal or epigastric pain.  (  ) nausea, vomiting, or hematemesis;   (   ) diarrhea or constipation.   GENITOURINARY:   (    ) dysuria, frequency or hematuria  NEUROLOGICAL:  (   ) numbness or weakness   All other review of systems is negative unless indicated above    Vital Signs Last 24 Hrs  T(C): 36.7 (21 May 2021 06:32), Max: 36.9 (20 May 2021 22:28)  T(F): 98 (21 May 2021 06:32), Max: 98.5 (20 May 2021 22:28)  HR: 67 (21 May 2021 06:32) (67 - 82)  BP: 136/60 (21 May 2021 06:32) (125/58 - 137/60)  BP(mean): --  RR: 17 (21 May 2021 06:32) (17 - 18)  SpO2: 100% (21 May 2021 06:32) (100% - 100%)    I&O's Summary      CAPILLARY BLOOD GLUCOSE      POCT Blood Glucose.: 142 mg/dL (21 May 2021 07:32)  POCT Blood Glucose.: 145 mg/dL (20 May 2021 21:46)  POCT Blood Glucose.: 158 mg/dL (20 May 2021 16:55)  POCT Blood Glucose.: 141 mg/dL (20 May 2021 11:25)      PHYSICAL EXAM:    Constitutional:  (    x) NAD,   (   )awake and alert  HEENT: PERR, EOMI,    Neck: Soft and supple, No LAD, No JVD  Respiratory:  ( x  Breath sounds are clear bilaterally,    (   ) wheezing, rales or rhonchi  Cardiovascular:     ( x  )S1 and S2, regular rate and rhythm, no Murmurs, gallops or rubs  Gastrointestinal:  (  x )Bowel Sounds present, soft,   (  )nontender, nondistended,    Extremities:    (  ) peripheral edema  Vascular: 2+ peripheral pulses  Neurological:    (    )A/O x 3,   (  ) focal deficits  Musculoskeletal:    (   )  normal strength b/l upper  (     ) normal  lower extremities  Skin: No rashes    MEDICATIONS:  MEDICATIONS  (STANDING):  dextrose 40% Gel 15 Gram(s) Oral once  dextrose 5%. 1000 milliLiter(s) (50 mL/Hr) IV Continuous <Continuous>  dextrose 5%. 1000 milliLiter(s) (100 mL/Hr) IV Continuous <Continuous>  dextrose 50% Injectable 25 Gram(s) IV Push once  dextrose 50% Injectable 12.5 Gram(s) IV Push once  dextrose 50% Injectable 25 Gram(s) IV Push once  diVALproex Sprinkle 250 milliGRAM(s) Oral three times a day  dorzolamide 2% Ophthalmic Solution 1 Drop(s) Both EYES three times a day  glucagon  Injectable 1 milliGRAM(s) IntraMuscular once  heparin   Injectable 5000 Unit(s) SubCutaneous every 12 hours  insulin lispro (ADMELOG) corrective regimen sliding scale   SubCutaneous three times a day before meals  insulin lispro (ADMELOG) corrective regimen sliding scale   SubCutaneous at bedtime  latanoprost 0.005% Ophthalmic Solution 1 Drop(s) Both EYES at bedtime  OLANZapine 2.5 milliGRAM(s) Oral <User Schedule>  OLANZapine 5 milliGRAM(s) Oral at bedtime      LABS: All Labs Reviewed:                        9.7    4.70  )-----------( 213      ( 21 May 2021 06:59 )             30.9     05-21    148<H>  |  109<H>  |  28<H>  ----------------------------<  157<H>  4.0   |  26  |  0.97    Ca    9.4      21 May 2021 06:59  Phos  3.3     05-21  Mg     2.0     05-21            Blood Culture:   Urine Culture      RADIOLOGY/EKG:    ASSESSMENT AND PLAN:  Patient on Zyprexa 2.5 mg and 5 mg and valproic acid 253 times a day waiting for psychiatry to see her for further recommendation and management  DVT PPX:    ADVANCED DIRECTIVE:    DISPOSITION:

## 2021-05-22 LAB
ANION GAP SERPL CALC-SCNC: 12 MMOL/L — SIGNIFICANT CHANGE UP (ref 7–14)
BUN SERPL-MCNC: 28 MG/DL — HIGH (ref 7–23)
CALCIUM SERPL-MCNC: 9.6 MG/DL — SIGNIFICANT CHANGE UP (ref 8.4–10.5)
CHLORIDE SERPL-SCNC: 109 MMOL/L — HIGH (ref 98–107)
CO2 SERPL-SCNC: 26 MMOL/L — SIGNIFICANT CHANGE UP (ref 22–31)
CREAT SERPL-MCNC: 0.99 MG/DL — SIGNIFICANT CHANGE UP (ref 0.5–1.3)
GLUCOSE BLDC GLUCOMTR-MCNC: 131 MG/DL — HIGH (ref 70–99)
GLUCOSE BLDC GLUCOMTR-MCNC: 146 MG/DL — HIGH (ref 70–99)
GLUCOSE BLDC GLUCOMTR-MCNC: 170 MG/DL — HIGH (ref 70–99)
GLUCOSE BLDC GLUCOMTR-MCNC: 180 MG/DL — HIGH (ref 70–99)
GLUCOSE SERPL-MCNC: 159 MG/DL — HIGH (ref 70–99)
HCT VFR BLD CALC: 34.1 % — LOW (ref 34.5–45)
HGB BLD-MCNC: 10.1 G/DL — LOW (ref 11.5–15.5)
MAGNESIUM SERPL-MCNC: 2.1 MG/DL — SIGNIFICANT CHANGE UP (ref 1.6–2.6)
MCHC RBC-ENTMCNC: 22.5 PG — LOW (ref 27–34)
MCHC RBC-ENTMCNC: 29.6 GM/DL — LOW (ref 32–36)
MCV RBC AUTO: 75.9 FL — LOW (ref 80–100)
NRBC # BLD: 0 /100 WBCS — SIGNIFICANT CHANGE UP
NRBC # FLD: 0 K/UL — SIGNIFICANT CHANGE UP
PHOSPHATE SERPL-MCNC: 3.1 MG/DL — SIGNIFICANT CHANGE UP (ref 2.5–4.5)
PLATELET # BLD AUTO: 215 K/UL — SIGNIFICANT CHANGE UP (ref 150–400)
POTASSIUM SERPL-MCNC: 4.7 MMOL/L — SIGNIFICANT CHANGE UP (ref 3.5–5.3)
POTASSIUM SERPL-SCNC: 4.7 MMOL/L — SIGNIFICANT CHANGE UP (ref 3.5–5.3)
RBC # BLD: 4.49 M/UL — SIGNIFICANT CHANGE UP (ref 3.8–5.2)
RBC # FLD: 15.6 % — HIGH (ref 10.3–14.5)
SODIUM SERPL-SCNC: 147 MMOL/L — HIGH (ref 135–145)
WBC # BLD: 5 K/UL — SIGNIFICANT CHANGE UP (ref 3.8–10.5)
WBC # FLD AUTO: 5 K/UL — SIGNIFICANT CHANGE UP (ref 3.8–10.5)

## 2021-05-22 RX ADMIN — OLANZAPINE 5 MILLIGRAM(S): 15 TABLET, FILM COATED ORAL at 22:12

## 2021-05-22 RX ADMIN — DORZOLAMIDE HYDROCHLORIDE 1 DROP(S): 20 SOLUTION/ DROPS OPHTHALMIC at 13:24

## 2021-05-22 RX ADMIN — Medication 1: at 08:08

## 2021-05-22 RX ADMIN — HEPARIN SODIUM 5000 UNIT(S): 5000 INJECTION INTRAVENOUS; SUBCUTANEOUS at 00:13

## 2021-05-22 RX ADMIN — HEPARIN SODIUM 5000 UNIT(S): 5000 INJECTION INTRAVENOUS; SUBCUTANEOUS at 12:21

## 2021-05-22 RX ADMIN — DIVALPROEX SODIUM 250 MILLIGRAM(S): 500 TABLET, DELAYED RELEASE ORAL at 05:30

## 2021-05-22 RX ADMIN — Medication 1: at 12:21

## 2021-05-22 RX ADMIN — OLANZAPINE 2.5 MILLIGRAM(S): 15 TABLET, FILM COATED ORAL at 08:11

## 2021-05-22 RX ADMIN — OLANZAPINE 2.5 MILLIGRAM(S): 15 TABLET, FILM COATED ORAL at 04:27

## 2021-05-22 RX ADMIN — DORZOLAMIDE HYDROCHLORIDE 1 DROP(S): 20 SOLUTION/ DROPS OPHTHALMIC at 05:29

## 2021-05-22 RX ADMIN — DIVALPROEX SODIUM 500 MILLIGRAM(S): 500 TABLET, DELAYED RELEASE ORAL at 17:47

## 2021-05-22 RX ADMIN — DORZOLAMIDE HYDROCHLORIDE 1 DROP(S): 20 SOLUTION/ DROPS OPHTHALMIC at 22:10

## 2021-05-22 RX ADMIN — LATANOPROST 1 DROP(S): 0.05 SOLUTION/ DROPS OPHTHALMIC; TOPICAL at 22:10

## 2021-05-22 NOTE — PROGRESS NOTE ADULT - SUBJECTIVE AND OBJECTIVE BOX
CHIEF COMPLAINT: patient condition seems to be improving and more stable psychiatrically is follow-up on 5/21/2021 appreciated and meds time was adjusted    SUBJECTIVE:     REVIEW OF SYSTEMS: minimally verbal confused    CONSTITUTIONAL: (  )  weakness,  (  ) fevers or chills  EYES/ENT: (  )visual changes;     NECK: (  ) pain or stiffness  RESPIRATORY:   (  )cough, wheezing, hemoptysis;  (  ) shortness of breath  CARDIOVASCULAR:  (  )chest pain or palpitations  GASTROINTESTINAL:   (  )abdominal or epigastric pain.  (  ) nausea, vomiting, or hematemesis;   (   ) diarrhea or constipation.   GENITOURINARY:   (    ) dysuria, frequency or hematuria  NEUROLOGICAL:  (   ) numbness or weakness   All other review of systems is negative unless indicated above    Vital Signs Last 24 Hrs  T(C): 36.7 (22 May 2021 22:02), Max: 37 (22 May 2021 17:42)  T(F): 98 (22 May 2021 22:02), Max: 98.6 (22 May 2021 17:42)  HR: 77 (22 May 2021 22:02) (68 - 78)  BP: 124/57 (22 May 2021 22:02) (107/52 - 135/78)  BP(mean): --  RR: 17 (22 May 2021 22:02) (17 - 19)  SpO2: 100% (22 May 2021 22:02) (96% - 100%)    I&O's Summary      CAPILLARY BLOOD GLUCOSE      POCT Blood Glucose.: 146 mg/dL (22 May 2021 22:06)  POCT Blood Glucose.: 131 mg/dL (22 May 2021 16:40)  POCT Blood Glucose.: 170 mg/dL (22 May 2021 11:36)  POCT Blood Glucose.: 180 mg/dL (22 May 2021 07:54)      PHYSICAL EXAM:    Constitutional:  ( x  ) NAD,   (   )awake and alert  HEENT: PERR, EOMI,    Neck: Soft and supple, No LAD, No JVD  Respiratory:  (  x  Breath sounds are clear bilaterally,    (   ) wheezing, rales or rhonchi  Cardiovascular:     (  x )S1 and S2, regular rate and rhythm, no Murmurs, gallops or rubs  Gastrointestinal:  ( x  )Bowel Sounds present, soft,   (  )nontender, nondistended,    Extremities:    (  ) peripheral edema  Vascular: 2+ peripheral pulses  Neurological:    (    )A/O x 3,   (  ) focal deficits  Musculoskeletal:    (   )  normal strength b/l upper  (     ) normal  lower extremities  Skin: No rashes    MEDICATIONS:  MEDICATIONS  (STANDING):  dextrose 40% Gel 15 Gram(s) Oral once  dextrose 5%. 1000 milliLiter(s) (50 mL/Hr) IV Continuous <Continuous>  dextrose 5%. 1000 milliLiter(s) (100 mL/Hr) IV Continuous <Continuous>  dextrose 50% Injectable 25 Gram(s) IV Push once  dextrose 50% Injectable 12.5 Gram(s) IV Push once  dextrose 50% Injectable 25 Gram(s) IV Push once  diVALproex Sprinkle 250 milliGRAM(s) Oral <User Schedule>  diVALproex Sprinkle 500 milliGRAM(s) Oral <User Schedule>  dorzolamide 2% Ophthalmic Solution 1 Drop(s) Both EYES three times a day  glucagon  Injectable 1 milliGRAM(s) IntraMuscular once  heparin   Injectable 5000 Unit(s) SubCutaneous every 12 hours  insulin lispro (ADMELOG) corrective regimen sliding scale   SubCutaneous three times a day before meals  insulin lispro (ADMELOG) corrective regimen sliding scale   SubCutaneous at bedtime  latanoprost 0.005% Ophthalmic Solution 1 Drop(s) Both EYES at bedtime  OLANZapine 2.5 milliGRAM(s) Oral <User Schedule>  OLANZapine 5 milliGRAM(s) Oral at bedtime      LABS: All Labs Reviewed:                        10.1   5.00  )-----------( 215      ( 22 May 2021 06:31 )             34.1     05-22    147<H>  |  109<H>  |  28<H>  ----------------------------<  159<H>  4.7   |  26  |  0.99    Ca    9.6      22 May 2021 06:31  Phos  3.1     05-22  Mg     2.1     05-22            Blood Culture:   Urine Culture      RADIOLOGY/EKG:    ASSESSMENT AND PLAN:  continue above meds Zyprexa 2.5 and 5 mg twice daily and valproic acid to 25 0  the morning and 500 at night   DVT PPX:    ADVANCED DIRECTIVE:    DISPOSITION:

## 2021-05-23 LAB
ANION GAP SERPL CALC-SCNC: 15 MMOL/L — HIGH (ref 7–14)
BUN SERPL-MCNC: 28 MG/DL — HIGH (ref 7–23)
CALCIUM SERPL-MCNC: 9.6 MG/DL — SIGNIFICANT CHANGE UP (ref 8.4–10.5)
CHLORIDE SERPL-SCNC: 111 MMOL/L — HIGH (ref 98–107)
CO2 SERPL-SCNC: 24 MMOL/L — SIGNIFICANT CHANGE UP (ref 22–31)
CREAT SERPL-MCNC: 1.04 MG/DL — SIGNIFICANT CHANGE UP (ref 0.5–1.3)
GLUCOSE BLDC GLUCOMTR-MCNC: 128 MG/DL — HIGH (ref 70–99)
GLUCOSE BLDC GLUCOMTR-MCNC: 144 MG/DL — HIGH (ref 70–99)
GLUCOSE BLDC GLUCOMTR-MCNC: 169 MG/DL — HIGH (ref 70–99)
GLUCOSE BLDC GLUCOMTR-MCNC: 193 MG/DL — HIGH (ref 70–99)
GLUCOSE SERPL-MCNC: 153 MG/DL — HIGH (ref 70–99)
HCT VFR BLD CALC: 33.6 % — LOW (ref 34.5–45)
HGB BLD-MCNC: 10.1 G/DL — LOW (ref 11.5–15.5)
MAGNESIUM SERPL-MCNC: 2 MG/DL — SIGNIFICANT CHANGE UP (ref 1.6–2.6)
MCHC RBC-ENTMCNC: 22.9 PG — LOW (ref 27–34)
MCHC RBC-ENTMCNC: 30.1 GM/DL — LOW (ref 32–36)
MCV RBC AUTO: 76.2 FL — LOW (ref 80–100)
NRBC # BLD: 0 /100 WBCS — SIGNIFICANT CHANGE UP
NRBC # FLD: 0 K/UL — SIGNIFICANT CHANGE UP
PHOSPHATE SERPL-MCNC: 3.2 MG/DL — SIGNIFICANT CHANGE UP (ref 2.5–4.5)
PLATELET # BLD AUTO: 227 K/UL — SIGNIFICANT CHANGE UP (ref 150–400)
POTASSIUM SERPL-MCNC: 4.1 MMOL/L — SIGNIFICANT CHANGE UP (ref 3.5–5.3)
POTASSIUM SERPL-SCNC: 4.1 MMOL/L — SIGNIFICANT CHANGE UP (ref 3.5–5.3)
RBC # BLD: 4.41 M/UL — SIGNIFICANT CHANGE UP (ref 3.8–5.2)
RBC # FLD: 15.5 % — HIGH (ref 10.3–14.5)
SODIUM SERPL-SCNC: 150 MMOL/L — HIGH (ref 135–145)
WBC # BLD: 3.72 K/UL — LOW (ref 3.8–10.5)
WBC # FLD AUTO: 3.72 K/UL — LOW (ref 3.8–10.5)

## 2021-05-23 RX ADMIN — HEPARIN SODIUM 5000 UNIT(S): 5000 INJECTION INTRAVENOUS; SUBCUTANEOUS at 13:15

## 2021-05-23 RX ADMIN — OLANZAPINE 5 MILLIGRAM(S): 15 TABLET, FILM COATED ORAL at 21:41

## 2021-05-23 RX ADMIN — Medication 1: at 17:01

## 2021-05-23 RX ADMIN — DIVALPROEX SODIUM 500 MILLIGRAM(S): 500 TABLET, DELAYED RELEASE ORAL at 18:19

## 2021-05-23 RX ADMIN — OLANZAPINE 2.5 MILLIGRAM(S): 15 TABLET, FILM COATED ORAL at 06:14

## 2021-05-23 RX ADMIN — LATANOPROST 1 DROP(S): 0.05 SOLUTION/ DROPS OPHTHALMIC; TOPICAL at 21:40

## 2021-05-23 RX ADMIN — DIVALPROEX SODIUM 250 MILLIGRAM(S): 500 TABLET, DELAYED RELEASE ORAL at 06:13

## 2021-05-23 RX ADMIN — OLANZAPINE 2.5 MILLIGRAM(S): 15 TABLET, FILM COATED ORAL at 18:15

## 2021-05-23 RX ADMIN — Medication 1: at 12:07

## 2021-05-23 RX ADMIN — DORZOLAMIDE HYDROCHLORIDE 1 DROP(S): 20 SOLUTION/ DROPS OPHTHALMIC at 06:12

## 2021-05-23 RX ADMIN — DORZOLAMIDE HYDROCHLORIDE 1 DROP(S): 20 SOLUTION/ DROPS OPHTHALMIC at 21:41

## 2021-05-23 RX ADMIN — DORZOLAMIDE HYDROCHLORIDE 1 DROP(S): 20 SOLUTION/ DROPS OPHTHALMIC at 13:16

## 2021-05-23 RX ADMIN — HEPARIN SODIUM 5000 UNIT(S): 5000 INJECTION INTRAVENOUS; SUBCUTANEOUS at 23:32

## 2021-05-23 NOTE — PROGRESS NOTE ADULT - SUBJECTIVE AND OBJECTIVE BOX
CHIEF COMPLAINT:    SUBJECTIVE:     REVIEW OF SYSTEMS:    CONSTITUTIONAL: (  )  weakness,  (  ) fevers or chills  EYES/ENT: (  )visual changes;     NECK: (  ) pain or stiffness  RESPIRATORY:   (  )cough, wheezing, hemoptysis;  (  ) shortness of breath  CARDIOVASCULAR:  (  )chest pain or palpitations  GASTROINTESTINAL:   (  )abdominal or epigastric pain.  (  ) nausea, vomiting, or hematemesis;   (   ) diarrhea or constipation.   GENITOURINARY:   (    ) dysuria, frequency or hematuria  NEUROLOGICAL:  (   ) numbness or weakness   All other review of systems is negative unless indicated above    Vital Signs Last 24 Hrs  T(C): 37.2 (23 May 2021 06:10), Max: 37.2 (23 May 2021 06:10)  T(F): 99 (23 May 2021 06:10), Max: 99 (23 May 2021 06:10)  HR: 78 (23 May 2021 06:10) (68 - 78)  BP: 142/68 (23 May 2021 06:10) (107/52 - 142/68)  BP(mean): --  RR: 17 (23 May 2021 06:10) (17 - 19)  SpO2: 99% (23 May 2021 06:10) (96% - 100%)    I&O's Summary      CAPILLARY BLOOD GLUCOSE      POCT Blood Glucose.: 144 mg/dL (23 May 2021 07:54)  POCT Blood Glucose.: 146 mg/dL (22 May 2021 22:06)  POCT Blood Glucose.: 131 mg/dL (22 May 2021 16:40)  POCT Blood Glucose.: 170 mg/dL (22 May 2021 11:36)      PHYSICAL EXAM:    Constitutional:  (   ) NAD,   (   )awake and alert  HEENT: PERR, EOMI,    Neck: Soft and supple, No LAD, No JVD  Respiratory:  (    Breath sounds are clear bilaterally,    (   ) wheezing, rales or rhonchi  Cardiovascular:     (   )S1 and S2, regular rate and rhythm, no Murmurs, gallops or rubs  Gastrointestinal:  (   )Bowel Sounds present, soft,   (  )nontender, nondistended,    Extremities:    (  ) peripheral edema  Vascular: 2+ peripheral pulses  Neurological:    (    )A/O x 3,   (  ) focal deficits  Musculoskeletal:    (   )  normal strength b/l upper  (     ) normal  lower extremities  Skin: No rashes    MEDICATIONS:  MEDICATIONS  (STANDING):  dextrose 40% Gel 15 Gram(s) Oral once  dextrose 5%. 1000 milliLiter(s) (50 mL/Hr) IV Continuous <Continuous>  dextrose 5%. 1000 milliLiter(s) (100 mL/Hr) IV Continuous <Continuous>  dextrose 50% Injectable 25 Gram(s) IV Push once  dextrose 50% Injectable 12.5 Gram(s) IV Push once  dextrose 50% Injectable 25 Gram(s) IV Push once  diVALproex Sprinkle 250 milliGRAM(s) Oral <User Schedule>  diVALproex Sprinkle 500 milliGRAM(s) Oral <User Schedule>  dorzolamide 2% Ophthalmic Solution 1 Drop(s) Both EYES three times a day  glucagon  Injectable 1 milliGRAM(s) IntraMuscular once  heparin   Injectable 5000 Unit(s) SubCutaneous every 12 hours  insulin lispro (ADMELOG) corrective regimen sliding scale   SubCutaneous three times a day before meals  insulin lispro (ADMELOG) corrective regimen sliding scale   SubCutaneous at bedtime  latanoprost 0.005% Ophthalmic Solution 1 Drop(s) Both EYES at bedtime  OLANZapine 2.5 milliGRAM(s) Oral <User Schedule>  OLANZapine 5 milliGRAM(s) Oral at bedtime      LABS: All Labs Reviewed:                        10.1   3.72  )-----------( 227      ( 23 May 2021 07:09 )             33.6     05-23    150<H>  |  111<H>  |  28<H>  ----------------------------<  153<H>  4.1   |  24  |  1.04    Ca    9.6      23 May 2021 07:09  Phos  3.2     05-23  Mg     2.0     05-23            Blood Culture:   Urine Culture      RADIOLOGY/EKG:    ASSESSMENT AND PLAN:    DVT PPX:    ADVANCED DIRECTIVE:    DISPOSITION: CHIEF COMPLAINT:  patient condition seems to be improving and more stable psychiatrically is follow-up on 5/21/2021 appreciated and meds time was adjusted  SUBJECTIVE:     REVIEW OF SYSTEMS:  awake minimally verbal confused she was fed with the PACS  assistant    CONSTITUTIONAL: (  )  weakness,  (  ) fevers or chills  EYES/ENT: (  )visual changes;     NECK: (  ) pain or stiffness  RESPIRATORY:   (  )cough, wheezing, hemoptysis;  (  ) shortness of breath  CARDIOVASCULAR:  (  )chest pain or palpitations  GASTROINTESTINAL:   (  )abdominal or epigastric pain.  (  ) nausea, vomiting, or hematemesis;   (   ) diarrhea or constipation.   GENITOURINARY:   (    ) dysuria, frequency or hematuria  NEUROLOGICAL:  (   ) numbness or weakness   All other review of systems is negative unless indicated above    Vital Signs Last 24 Hrs  T(C): 37.2 (23 May 2021 06:10), Max: 37.2 (23 May 2021 06:10)  T(F): 99 (23 May 2021 06:10), Max: 99 (23 May 2021 06:10)  HR: 78 (23 May 2021 06:10) (68 - 78)  BP: 142/68 (23 May 2021 06:10) (107/52 - 142/68)  BP(mean): --  RR: 17 (23 May 2021 06:10) (17 - 19)  SpO2: 99% (23 May 2021 06:10) (96% - 100%)    I&O's Summary      CAPILLARY BLOOD GLUCOSE      POCT Blood Glucose.: 144 mg/dL (23 May 2021 07:54)  POCT Blood Glucose.: 146 mg/dL (22 May 2021 22:06)  POCT Blood Glucose.: 131 mg/dL (22 May 2021 16:40)  POCT Blood Glucose.: 170 mg/dL (22 May 2021 11:36)      PHYSICAL EXAM:    Constitutional:  (  x ) NAD,   (   )awake and alert  HEENT: PERR, EOMI,    Neck: Soft and supple, No LAD, No JVD  Respiratory:  (   decrease Breath sounds   Cardiovascular:     (  x )S1 and S2, regular rate and rhythm, no Murmurs, gallops or rubs  Gastrointestinal:  (  x )Bowel Sounds present, soft,   (  )nontender, nondistended,    Extremities:    (  ) peripheral edema  Vascular: 2+ peripheral pulses  Neurological:    (  x  )A/O x  0,   (  ) focal deficits  Musculoskeletal:    ( x  )  normal strength b/l upper  (     ) normal  lower extremities  Skin: No rashes    MEDICATIONS:  MEDICATIONS  (STANDING):  dextrose 40% Gel 15 Gram(s) Oral once  dextrose 5%. 1000 milliLiter(s) (50 mL/Hr) IV Continuous <Continuous>  dextrose 5%. 1000 milliLiter(s) (100 mL/Hr) IV Continuous <Continuous>  dextrose 50% Injectable 25 Gram(s) IV Push once  dextrose 50% Injectable 12.5 Gram(s) IV Push once  dextrose 50% Injectable 25 Gram(s) IV Push once  diVALproex Sprinkle 250 milliGRAM(s) Oral <User Schedule>  diVALproex Sprinkle 500 milliGRAM(s) Oral <User Schedule>  dorzolamide 2% Ophthalmic Solution 1 Drop(s) Both EYES three times a day  glucagon  Injectable 1 milliGRAM(s) IntraMuscular once  heparin   Injectable 5000 Unit(s) SubCutaneous every 12 hours  insulin lispro (ADMELOG) corrective regimen sliding scale   SubCutaneous three times a day before meals  insulin lispro (ADMELOG) corrective regimen sliding scale   SubCutaneous at bedtime  latanoprost 0.005% Ophthalmic Solution 1 Drop(s) Both EYES at bedtime  OLANZapine 2.5 milliGRAM(s) Oral <User Schedule>  OLANZapine 5 milliGRAM(s) Oral at bedtime      LABS: All Labs Reviewed:                        10.1   3.72  )-----------( 227      ( 23 May 2021 07:09 )             33.6     05-23    150<H>  |  111<H>  |  28<H>  ----------------------------<  153<H>  4.1   |  24  |  1.04    Ca    9.6      23 May 2021 07:09  Phos  3.2     05-23  Mg     2.0     05-23            Blood Culture:   Urine Culture      RADIOLOGY/EKG:    ASSESSMENT AND PLAN:  patient condition improving and valproic acid 500 mg at night and Zyprexa 5 mg at night also she is on valproic acid 250 mg in the morning and Zyprexa 2.5 mg regarding her hypernatremia she was encouraged the PCA to able to feed her continue IV fluid as per renal  DVT PPX:    ADVANCED DIRECTIVE:    DISPOSITION: discharge planning after psychiatrics follow-up

## 2021-05-24 LAB
ANION GAP SERPL CALC-SCNC: 13 MMOL/L — SIGNIFICANT CHANGE UP (ref 7–14)
BUN SERPL-MCNC: 26 MG/DL — HIGH (ref 7–23)
CALCIUM SERPL-MCNC: 9.5 MG/DL — SIGNIFICANT CHANGE UP (ref 8.4–10.5)
CHLORIDE SERPL-SCNC: 108 MMOL/L — HIGH (ref 98–107)
CO2 SERPL-SCNC: 25 MMOL/L — SIGNIFICANT CHANGE UP (ref 22–31)
CREAT SERPL-MCNC: 0.97 MG/DL — SIGNIFICANT CHANGE UP (ref 0.5–1.3)
GLUCOSE BLDC GLUCOMTR-MCNC: 141 MG/DL — HIGH (ref 70–99)
GLUCOSE BLDC GLUCOMTR-MCNC: 165 MG/DL — HIGH (ref 70–99)
GLUCOSE BLDC GLUCOMTR-MCNC: 222 MG/DL — HIGH (ref 70–99)
GLUCOSE BLDC GLUCOMTR-MCNC: 87 MG/DL — SIGNIFICANT CHANGE UP (ref 70–99)
GLUCOSE SERPL-MCNC: 181 MG/DL — HIGH (ref 70–99)
HCT VFR BLD CALC: 29.8 % — LOW (ref 34.5–45)
HGB BLD-MCNC: 9.2 G/DL — LOW (ref 11.5–15.5)
MAGNESIUM SERPL-MCNC: 1.9 MG/DL — SIGNIFICANT CHANGE UP (ref 1.6–2.6)
MCHC RBC-ENTMCNC: 23 PG — LOW (ref 27–34)
MCHC RBC-ENTMCNC: 30.9 GM/DL — LOW (ref 32–36)
MCV RBC AUTO: 74.5 FL — LOW (ref 80–100)
NRBC # BLD: 0 /100 WBCS — SIGNIFICANT CHANGE UP
NRBC # FLD: 0 K/UL — SIGNIFICANT CHANGE UP
PHOSPHATE SERPL-MCNC: 3.1 MG/DL — SIGNIFICANT CHANGE UP (ref 2.5–4.5)
PLATELET # BLD AUTO: 180 K/UL — SIGNIFICANT CHANGE UP (ref 150–400)
POTASSIUM SERPL-MCNC: 3.6 MMOL/L — SIGNIFICANT CHANGE UP (ref 3.5–5.3)
POTASSIUM SERPL-SCNC: 3.6 MMOL/L — SIGNIFICANT CHANGE UP (ref 3.5–5.3)
RBC # BLD: 4 M/UL — SIGNIFICANT CHANGE UP (ref 3.8–5.2)
RBC # FLD: 15.1 % — HIGH (ref 10.3–14.5)
SARS-COV-2 RNA SPEC QL NAA+PROBE: SIGNIFICANT CHANGE UP
SODIUM SERPL-SCNC: 146 MMOL/L — HIGH (ref 135–145)
WBC # BLD: 4.17 K/UL — SIGNIFICANT CHANGE UP (ref 3.8–10.5)
WBC # FLD AUTO: 4.17 K/UL — SIGNIFICANT CHANGE UP (ref 3.8–10.5)

## 2021-05-24 PROCEDURE — 99233 SBSQ HOSP IP/OBS HIGH 50: CPT

## 2021-05-24 RX ORDER — TRAZODONE HCL 50 MG
0.5 TABLET ORAL
Qty: 0 | Refills: 0 | DISCHARGE

## 2021-05-24 RX ORDER — INSULIN LISPRO 100/ML
0 VIAL (ML) SUBCUTANEOUS
Qty: 0 | Refills: 0 | DISCHARGE

## 2021-05-24 RX ORDER — DIVALPROEX SODIUM 500 MG/1
2 TABLET, DELAYED RELEASE ORAL
Qty: 0 | Refills: 0 | DISCHARGE
Start: 2021-05-24

## 2021-05-24 RX ORDER — SODIUM CHLORIDE 9 MG/ML
1000 INJECTION, SOLUTION INTRAVENOUS
Refills: 0 | Status: DISCONTINUED | OUTPATIENT
Start: 2021-05-24 | End: 2021-05-24

## 2021-05-24 RX ORDER — DIVALPROEX SODIUM 500 MG/1
4 TABLET, DELAYED RELEASE ORAL
Qty: 0 | Refills: 0 | DISCHARGE
Start: 2021-05-24

## 2021-05-24 RX ADMIN — HEPARIN SODIUM 5000 UNIT(S): 5000 INJECTION INTRAVENOUS; SUBCUTANEOUS at 13:42

## 2021-05-24 RX ADMIN — OLANZAPINE 2.5 MILLIGRAM(S): 15 TABLET, FILM COATED ORAL at 01:08

## 2021-05-24 RX ADMIN — SODIUM CHLORIDE 75 MILLILITER(S): 9 INJECTION, SOLUTION INTRAVENOUS at 07:49

## 2021-05-24 RX ADMIN — LATANOPROST 1 DROP(S): 0.05 SOLUTION/ DROPS OPHTHALMIC; TOPICAL at 21:30

## 2021-05-24 RX ADMIN — Medication 1: at 07:47

## 2021-05-24 RX ADMIN — DIVALPROEX SODIUM 500 MILLIGRAM(S): 500 TABLET, DELAYED RELEASE ORAL at 17:06

## 2021-05-24 RX ADMIN — DORZOLAMIDE HYDROCHLORIDE 1 DROP(S): 20 SOLUTION/ DROPS OPHTHALMIC at 05:17

## 2021-05-24 RX ADMIN — DIVALPROEX SODIUM 250 MILLIGRAM(S): 500 TABLET, DELAYED RELEASE ORAL at 05:18

## 2021-05-24 RX ADMIN — DORZOLAMIDE HYDROCHLORIDE 1 DROP(S): 20 SOLUTION/ DROPS OPHTHALMIC at 13:42

## 2021-05-24 RX ADMIN — OLANZAPINE 5 MILLIGRAM(S): 15 TABLET, FILM COATED ORAL at 21:29

## 2021-05-24 RX ADMIN — DORZOLAMIDE HYDROCHLORIDE 1 DROP(S): 20 SOLUTION/ DROPS OPHTHALMIC at 21:30

## 2021-05-24 RX ADMIN — OLANZAPINE 2.5 MILLIGRAM(S): 15 TABLET, FILM COATED ORAL at 07:01

## 2021-05-24 NOTE — BH CONSULTATION LIAISON PROGRESS NOTE - NSCDBILL_PSY_[id number]_CORE
[id number] - Inpatient, high complexity
[id number] - Inpatient, high complexity
[id number] - Inpatient, high complexity
[id number] - Inpatient, high complexity
[id number] - Inpatient, high complexity
[id number] - Inpatient, high complexity

## 2021-05-24 NOTE — PROGRESS NOTE ADULT - SUBJECTIVE AND OBJECTIVE BOX
CHIEF COMPLAINT:    SUBJECTIVE:     REVIEW OF SYSTEMS:    CONSTITUTIONAL: (  )  weakness,  (  ) fevers or chills  EYES/ENT: (  )visual changes;     NECK: (  ) pain or stiffness  RESPIRATORY:   (  )cough, wheezing, hemoptysis;  (  ) shortness of breath  CARDIOVASCULAR:  (  )chest pain or palpitations  GASTROINTESTINAL:   (  )abdominal or epigastric pain.  (  ) nausea, vomiting, or hematemesis;   (   ) diarrhea or constipation.   GENITOURINARY:   (    ) dysuria, frequency or hematuria  NEUROLOGICAL:  (   ) numbness or weakness   All other review of systems is negative unless indicated above    Vital Signs Last 24 Hrs  T(C): 36.4 (24 May 2021 05:23), Max: 36.6 (23 May 2021 14:36)  T(F): 97.5 (24 May 2021 05:23), Max: 97.8 (23 May 2021 14:36)  HR: 77 (24 May 2021 05:23) (75 - 77)  BP: 145/54 (24 May 2021 05:23) (128/67 - 145/54)  BP(mean): --  RR: 17 (24 May 2021 05:23) (17 - 18)  SpO2: 100% (24 May 2021 05:23) (100% - 100%)    I&O's Summary      CAPILLARY BLOOD GLUCOSE      POCT Blood Glucose.: 128 mg/dL (23 May 2021 22:37)  POCT Blood Glucose.: 193 mg/dL (23 May 2021 16:48)  POCT Blood Glucose.: 169 mg/dL (23 May 2021 12:00)  POCT Blood Glucose.: 144 mg/dL (23 May 2021 07:54)      PHYSICAL EXAM:    Constitutional:  (   ) NAD,   (   )awake and alert  HEENT: PERR, EOMI,    Neck: Soft and supple, No LAD, No JVD  Respiratory:  (    Breath sounds are clear bilaterally,    (   ) wheezing, rales or rhonchi  Cardiovascular:     (   )S1 and S2, regular rate and rhythm, no Murmurs, gallops or rubs  Gastrointestinal:  (   )Bowel Sounds present, soft,   (  )nontender, nondistended,    Extremities:    (  ) peripheral edema  Vascular: 2+ peripheral pulses  Neurological:    (    )A/O x 3,   (  ) focal deficits  Musculoskeletal:    (   )  normal strength b/l upper  (     ) normal  lower extremities  Skin: No rashes    MEDICATIONS:  MEDICATIONS  (STANDING):  dextrose 40% Gel 15 Gram(s) Oral once  dextrose 5% + sodium chloride 0.45%. 1000 milliLiter(s) (75 mL/Hr) IV Continuous <Continuous>  dextrose 5%. 1000 milliLiter(s) (50 mL/Hr) IV Continuous <Continuous>  dextrose 5%. 1000 milliLiter(s) (100 mL/Hr) IV Continuous <Continuous>  dextrose 50% Injectable 25 Gram(s) IV Push once  dextrose 50% Injectable 12.5 Gram(s) IV Push once  dextrose 50% Injectable 25 Gram(s) IV Push once  diVALproex Sprinkle 250 milliGRAM(s) Oral <User Schedule>  diVALproex Sprinkle 500 milliGRAM(s) Oral <User Schedule>  dorzolamide 2% Ophthalmic Solution 1 Drop(s) Both EYES three times a day  glucagon  Injectable 1 milliGRAM(s) IntraMuscular once  heparin   Injectable 5000 Unit(s) SubCutaneous every 12 hours  insulin lispro (ADMELOG) corrective regimen sliding scale   SubCutaneous three times a day before meals  insulin lispro (ADMELOG) corrective regimen sliding scale   SubCutaneous at bedtime  latanoprost 0.005% Ophthalmic Solution 1 Drop(s) Both EYES at bedtime  OLANZapine 2.5 milliGRAM(s) Oral <User Schedule>  OLANZapine 5 milliGRAM(s) Oral at bedtime      LABS: All Labs Reviewed:                        9.2    4.17  )-----------( 180      ( 24 May 2021 06:40 )             29.8     05-23    150<H>  |  111<H>  |  28<H>  ----------------------------<  153<H>  4.1   |  24  |  1.04    Ca    9.6      23 May 2021 07:09  Phos  3.2     05-23  Mg     2.0     05-23            Blood Culture:   Urine Culture      RADIOLOGY/EKG:    ASSESSMENT AND PLAN:    DVT PPX:    ADVANCED DIRECTIVE:    DISPOSITION: CHIEF COMPLAINT: patient laying in the bed without agitation reported only last night 1 or twice she was trying to get out of bed.  Still with poor appetite she needs to be fed      SUBJECTIVE:     REVIEW OF SYSTEMS:    CONSTITUTIONAL: ( x )  weakness,  (  ) fevers or chills  EYES/ENT: (  )visual changes;     NECK: (  ) pain or stiffness  RESPIRATORY:   (  )cough, wheezing, hemoptysis;  (  ) shortness of breath  CARDIOVASCULAR:  (  )chest pain or palpitations  GASTROINTESTINAL:   (  )abdominal or epigastric pain.  (  ) nausea, vomiting, or hematemesis;   (   ) diarrhea or constipation.   GENITOURINARY:   (    ) dysuria, frequency or hematuria  NEUROLOGICAL:  (   ) numbness or weakness   All other review of systems is negative unless indicated above    Vital Signs Last 24 Hrs  T(C): 36.4 (24 May 2021 05:23), Max: 36.6 (23 May 2021 14:36)  T(F): 97.5 (24 May 2021 05:23), Max: 97.8 (23 May 2021 14:36)  HR: 77 (24 May 2021 05:23) (75 - 77)  BP: 145/54 (24 May 2021 05:23) (128/67 - 145/54)  BP(mean): --  RR: 17 (24 May 2021 05:23) (17 - 18)  SpO2: 100% (24 May 2021 05:23) (100% - 100%)    I&O's Summary      CAPILLARY BLOOD GLUCOSE      POCT Blood Glucose.: 128 mg/dL (23 May 2021 22:37)  POCT Blood Glucose.: 193 mg/dL (23 May 2021 16:48)  POCT Blood Glucose.: 169 mg/dL (23 May 2021 12:00)  POCT Blood Glucose.: 144 mg/dL (23 May 2021 07:54)      PHYSICAL EXAM:    Constitutional:  ( x  ) NAD,   (   )awake and alert  HEENT: PERR, EOMI,    Neck: Soft and supple, No LAD, No JVD  Respiratory:  (  decrease  Breath sounds xpoor effor   Cardiovascular:     ( x  )S1 and S2, regular rate and rhythm, no Murmurs, gallops or rubs  Gastrointestinal:  ( x  )Bowel Sounds present, soft,   (  )nontender, nondistended,    Extremities:    (  ) peripheral edema  Vascular: 2+ peripheral pulses  Neurological:    ( x   )A/O x  0,   (  ) focal deficits  Musculoskeletal:    (   )  normal strength b/l upper  (     ) normal  lower extremities  Skin: No rashes    MEDICATIONS:  MEDICATIONS  (STANDING):  dextrose 40% Gel 15 Gram(s) Oral once  dextrose 5% + sodium chloride 0.45%. 1000 milliLiter(s) (75 mL/Hr) IV Continuous <Continuous>  dextrose 5%. 1000 milliLiter(s) (50 mL/Hr) IV Continuous <Continuous>  dextrose 5%. 1000 milliLiter(s) (100 mL/Hr) IV Continuous <Continuous>  dextrose 50% Injectable 25 Gram(s) IV Push once  dextrose 50% Injectable 12.5 Gram(s) IV Push once  dextrose 50% Injectable 25 Gram(s) IV Push once  diVALproex Sprinkle 250 milliGRAM(s) Oral <User Schedule>  diVALproex Sprinkle 500 milliGRAM(s) Oral <User Schedule>  dorzolamide 2% Ophthalmic Solution 1 Drop(s) Both EYES three times a day  glucagon  Injectable 1 milliGRAM(s) IntraMuscular once  heparin   Injectable 5000 Unit(s) SubCutaneous every 12 hours  insulin lispro (ADMELOG) corrective regimen sliding scale   SubCutaneous three times a day before meals  insulin lispro (ADMELOG) corrective regimen sliding scale   SubCutaneous at bedtime  latanoprost 0.005% Ophthalmic Solution 1 Drop(s) Both EYES at bedtime  OLANZapine 2.5 milliGRAM(s) Oral <User Schedule>  OLANZapine 5 milliGRAM(s) Oral at bedtime      LABS: All Labs Reviewed:                        9.2    4.17  )-----------( 180      ( 24 May 2021 06:40 )             29.8     05-23    150<H>  |  111<H>  |  28<H>  ----------------------------<  153<H>  4.1   |  24  |  1.04    Ca    9.6      23 May 2021 07:09  Phos  3.2     05-23  Mg     2.0     05-23            Blood Culture:   Urine Culture      RADIOLOGY/EKG:    ASSESSMENT AND PLAN:  patient condition remained stable still hypernatremic and IV fluid encourage oral food discussed with the team she can be discharged after psychiatrist follow-up will continue Zyprexa 2.5 and 5 mg of valproic acid 250 and 500 mg  DVT PPX:    ADVANCED DIRECTIVE:    DISPOSITION:

## 2021-05-25 LAB
ALBUMIN SERPL ELPH-MCNC: 3.9 G/DL — SIGNIFICANT CHANGE UP (ref 3.3–5)
ALP SERPL-CCNC: 77 U/L — SIGNIFICANT CHANGE UP (ref 40–120)
ALT FLD-CCNC: 16 U/L — SIGNIFICANT CHANGE UP (ref 4–33)
ANION GAP SERPL CALC-SCNC: 14 MMOL/L — SIGNIFICANT CHANGE UP (ref 7–14)
AST SERPL-CCNC: 35 U/L — HIGH (ref 4–32)
BILIRUB DIRECT SERPL-MCNC: <0.2 MG/DL — SIGNIFICANT CHANGE UP (ref 0–0.2)
BILIRUB INDIRECT FLD-MCNC: SIGNIFICANT CHANGE UP MG/DL (ref 0–1)
BILIRUB SERPL-MCNC: <0.2 MG/DL — SIGNIFICANT CHANGE UP (ref 0.2–1.2)
BUN SERPL-MCNC: 20 MG/DL — SIGNIFICANT CHANGE UP (ref 7–23)
CALCIUM SERPL-MCNC: 9.8 MG/DL — SIGNIFICANT CHANGE UP (ref 8.4–10.5)
CHLORIDE SERPL-SCNC: 107 MMOL/L — SIGNIFICANT CHANGE UP (ref 98–107)
CO2 SERPL-SCNC: 24 MMOL/L — SIGNIFICANT CHANGE UP (ref 22–31)
CREAT SERPL-MCNC: 1.08 MG/DL — SIGNIFICANT CHANGE UP (ref 0.5–1.3)
GLUCOSE BLDC GLUCOMTR-MCNC: 165 MG/DL — HIGH (ref 70–99)
GLUCOSE BLDC GLUCOMTR-MCNC: 178 MG/DL — HIGH (ref 70–99)
GLUCOSE BLDC GLUCOMTR-MCNC: 191 MG/DL — HIGH (ref 70–99)
GLUCOSE BLDC GLUCOMTR-MCNC: 230 MG/DL — HIGH (ref 70–99)
GLUCOSE SERPL-MCNC: 160 MG/DL — HIGH (ref 70–99)
HCT VFR BLD CALC: 34.7 % — SIGNIFICANT CHANGE UP (ref 34.5–45)
HGB BLD-MCNC: 10.4 G/DL — LOW (ref 11.5–15.5)
MCHC RBC-ENTMCNC: 22.6 PG — LOW (ref 27–34)
MCHC RBC-ENTMCNC: 30 GM/DL — LOW (ref 32–36)
MCV RBC AUTO: 75.3 FL — LOW (ref 80–100)
NRBC # BLD: 0 /100 WBCS — SIGNIFICANT CHANGE UP
NRBC # FLD: 0 K/UL — SIGNIFICANT CHANGE UP
PLATELET # BLD AUTO: 203 K/UL — SIGNIFICANT CHANGE UP (ref 150–400)
POTASSIUM SERPL-MCNC: 4.1 MMOL/L — SIGNIFICANT CHANGE UP (ref 3.5–5.3)
POTASSIUM SERPL-SCNC: 4.1 MMOL/L — SIGNIFICANT CHANGE UP (ref 3.5–5.3)
PROT SERPL-MCNC: 6.9 G/DL — SIGNIFICANT CHANGE UP (ref 6–8.3)
RBC # BLD: 4.61 M/UL — SIGNIFICANT CHANGE UP (ref 3.8–5.2)
RBC # FLD: 15.5 % — HIGH (ref 10.3–14.5)
SODIUM SERPL-SCNC: 145 MMOL/L — SIGNIFICANT CHANGE UP (ref 135–145)
WBC # BLD: 5.44 K/UL — SIGNIFICANT CHANGE UP (ref 3.8–10.5)
WBC # FLD AUTO: 5.44 K/UL — SIGNIFICANT CHANGE UP (ref 3.8–10.5)

## 2021-05-25 PROCEDURE — 99233 SBSQ HOSP IP/OBS HIGH 50: CPT

## 2021-05-25 RX ORDER — AMLODIPINE BESYLATE 2.5 MG/1
2.5 TABLET ORAL DAILY
Refills: 0 | Status: DISCONTINUED | OUTPATIENT
Start: 2021-05-25 | End: 2021-05-26

## 2021-05-25 RX ADMIN — DIVALPROEX SODIUM 500 MILLIGRAM(S): 500 TABLET, DELAYED RELEASE ORAL at 17:20

## 2021-05-25 RX ADMIN — HEPARIN SODIUM 5000 UNIT(S): 5000 INJECTION INTRAVENOUS; SUBCUTANEOUS at 00:08

## 2021-05-25 RX ADMIN — DORZOLAMIDE HYDROCHLORIDE 1 DROP(S): 20 SOLUTION/ DROPS OPHTHALMIC at 13:21

## 2021-05-25 RX ADMIN — OLANZAPINE 5 MILLIGRAM(S): 15 TABLET, FILM COATED ORAL at 21:48

## 2021-05-25 RX ADMIN — HEPARIN SODIUM 5000 UNIT(S): 5000 INJECTION INTRAVENOUS; SUBCUTANEOUS at 21:48

## 2021-05-25 RX ADMIN — DIVALPROEX SODIUM 250 MILLIGRAM(S): 500 TABLET, DELAYED RELEASE ORAL at 05:17

## 2021-05-25 RX ADMIN — OLANZAPINE 2.5 MILLIGRAM(S): 15 TABLET, FILM COATED ORAL at 07:04

## 2021-05-25 RX ADMIN — Medication 1: at 08:00

## 2021-05-25 RX ADMIN — Medication 1: at 12:00

## 2021-05-25 RX ADMIN — HEPARIN SODIUM 5000 UNIT(S): 5000 INJECTION INTRAVENOUS; SUBCUTANEOUS at 13:21

## 2021-05-25 RX ADMIN — DORZOLAMIDE HYDROCHLORIDE 1 DROP(S): 20 SOLUTION/ DROPS OPHTHALMIC at 21:50

## 2021-05-25 RX ADMIN — DORZOLAMIDE HYDROCHLORIDE 1 DROP(S): 20 SOLUTION/ DROPS OPHTHALMIC at 05:17

## 2021-05-25 RX ADMIN — Medication 1: at 18:16

## 2021-05-25 RX ADMIN — LATANOPROST 1 DROP(S): 0.05 SOLUTION/ DROPS OPHTHALMIC; TOPICAL at 21:48

## 2021-05-25 NOTE — BH CONSULTATION LIAISON PROGRESS NOTE - NSBHCONSFOLLOWNEEDS_PSY_ALL_CORE
Needs further psych safety assessment prior to discharge
Needs further psych safety assessment prior to discharge
No psychiatric contraindications to discharge
No psychiatric contraindications to discharge
Needs further psych safety assessment prior to discharge
Needs further psych safety assessment prior to discharge
No psychiatric contraindications to discharge

## 2021-05-25 NOTE — BH CONSULTATION LIAISON PROGRESS NOTE - MSE OPTIONS
Unstructured MSE
Structured MSE
Unstructured MSE

## 2021-05-25 NOTE — BH CONSULTATION LIAISON PROGRESS NOTE - NSBHCHARTREVIEWVS_PSY_A_CORE FT
Vital Signs Last 24 Hrs  T(C): 36.4 (17 May 2021 11:41), Max: 36.4 (17 May 2021 05:17)  T(F): 97.6 (17 May 2021 11:41), Max: 97.6 (17 May 2021 11:41)  HR: 73 (17 May 2021 11:41) (73 - 77)  BP: 148/68 (17 May 2021 11:41) (147/65 - 148/68)  BP(mean): --  RR: 17 (17 May 2021 11:41) (17 - 18)  SpO2: 100% (17 May 2021 11:41) (100% - 100%)
Vital Signs Last 24 Hrs  T(C): 36.3 (20 May 2021 11:03), Max: 36.9 (19 May 2021 22:04)  T(F): 97.4 (20 May 2021 11:03), Max: 98.5 (19 May 2021 22:04)  HR: 82 (20 May 2021 11:03) (82 - 88)  BP: 125/58 (20 May 2021 11:03) (116/73 - 125/60)  BP(mean): --  RR: 18 (20 May 2021 11:03) (17 - 18)  SpO2: 100% (20 May 2021 11:03) (100% - 100%)
Vital Signs Last 24 Hrs  T(C): 36.6 (21 May 2021 12:30), Max: 36.9 (20 May 2021 22:28)  T(F): 97.8 (21 May 2021 12:30), Max: 98.5 (20 May 2021 22:28)  HR: 66 (21 May 2021 12:30) (66 - 77)  BP: 114/62 (21 May 2021 12:30) (114/62 - 137/60)  BP(mean): --  RR: 16 (21 May 2021 12:30) (16 - 17)  SpO2: 100% (21 May 2021 12:30) (100% - 100%)
Vital Signs Last 24 Hrs  T(C): 36.5 (17 May 2021 20:41), Max: 36.5 (17 May 2021 20:41)  T(F): 97.7 (17 May 2021 20:41), Max: 97.7 (17 May 2021 20:41)  HR: 81 (17 May 2021 20:41) (73 - 81)  BP: 151/71 (17 May 2021 20:41) (148/68 - 151/71)  BP(mean): --  RR: 17 (17 May 2021 20:41) (17 - 17)  SpO2: 100% (17 May 2021 20:41) (100% - 100%)
Vital Signs Last 24 Hrs  T(C): 36.5 (24 May 2021 12:36), Max: 36.6 (23 May 2021 14:36)  T(F): 97.7 (24 May 2021 12:36), Max: 97.8 (23 May 2021 14:36)  HR: 68 (24 May 2021 12:36) (68 - 77)  BP: 124/76 (24 May 2021 12:36) (124/76 - 145/54)  BP(mean): --  RR: 17 (24 May 2021 12:36) (17 - 18)  SpO2: 100% (24 May 2021 12:36) (100% - 100%)
Vital Signs Last 24 Hrs  T(C): 36.6 (25 May 2021 12:10), Max: 36.6 (24 May 2021 21:30)  T(F): 97.8 (25 May 2021 12:10), Max: 97.8 (24 May 2021 21:30)  HR: 86 (25 May 2021 12:10) (77 - 86)  BP: 139/67 (25 May 2021 12:10) (130/65 - 148/63)  BP(mean): --  RR: 18 (25 May 2021 12:10) (16 - 18)  SpO2: 100% (25 May 2021 12:10) (99% - 100%)
Vital Signs Last 24 Hrs  T(C): 36.4 (19 May 2021 05:20), Max: 36.4 (19 May 2021 05:20)  T(F): 97.5 (19 May 2021 05:20), Max: 97.5 (19 May 2021 05:20)  HR: 73 (19 May 2021 05:20) (71 - 81)  BP: 114/- (19 May 2021 05:20) (105/48 - 114/-)  BP(mean): --  RR: 16 (19 May 2021 05:20) (16 - 18)  SpO2: 98% (19 May 2021 05:20) (98% - 100%)

## 2021-05-25 NOTE — PROGRESS NOTE ADULT - SUBJECTIVE AND OBJECTIVE BOX
CHIEF COMPLAINT:    SUBJECTIVE:     REVIEW OF SYSTEMS:    CONSTITUTIONAL: (  )  weakness,  (  ) fevers or chills  EYES/ENT: (  )visual changes;     NECK: (  ) pain or stiffness  RESPIRATORY:   (  )cough, wheezing, hemoptysis;  (  ) shortness of breath  CARDIOVASCULAR:  (  )chest pain or palpitations  GASTROINTESTINAL:   (  )abdominal or epigastric pain.  (  ) nausea, vomiting, or hematemesis;   (   ) diarrhea or constipation.   GENITOURINARY:   (    ) dysuria, frequency or hematuria  NEUROLOGICAL:  (   ) numbness or weakness   All other review of systems is negative unless indicated above    Vital Signs Last 24 Hrs  T(C): 36.6 (25 May 2021 05:15), Max: 36.6 (24 May 2021 21:30)  T(F): 97.8 (25 May 2021 05:15), Max: 97.8 (24 May 2021 21:30)  HR: 84 (25 May 2021 05:15) (68 - 84)  BP: 148/63 (25 May 2021 05:15) (124/76 - 148/63)  BP(mean): --  RR: 16 (25 May 2021 05:15) (16 - 17)  SpO2: 100% (25 May 2021 05:15) (99% - 100%)    I&O's Summary      CAPILLARY BLOOD GLUCOSE      POCT Blood Glucose.: 165 mg/dL (25 May 2021 07:23)  POCT Blood Glucose.: 222 mg/dL (24 May 2021 21:30)  POCT Blood Glucose.: 141 mg/dL (24 May 2021 16:46)  POCT Blood Glucose.: 87 mg/dL (24 May 2021 11:53)      PHYSICAL EXAM:    Constitutional:  (   ) NAD,   (   )awake and alert  HEENT: PERR, EOMI,    Neck: Soft and supple, No LAD, No JVD  Respiratory:  (    Breath sounds are clear bilaterally,    (   ) wheezing, rales or rhonchi  Cardiovascular:     (   )S1 and S2, regular rate and rhythm, no Murmurs, gallops or rubs  Gastrointestinal:  (   )Bowel Sounds present, soft,   (  )nontender, nondistended,    Extremities:    (  ) peripheral edema  Vascular: 2+ peripheral pulses  Neurological:    (    )A/O x 3,   (  ) focal deficits  Musculoskeletal:    (   )  normal strength b/l upper  (     ) normal  lower extremities  Skin: No rashes    MEDICATIONS:  MEDICATIONS  (STANDING):  amLODIPine   Tablet 2.5 milliGRAM(s) Oral daily  dextrose 40% Gel 15 Gram(s) Oral once  dextrose 5%. 1000 milliLiter(s) (50 mL/Hr) IV Continuous <Continuous>  dextrose 5%. 1000 milliLiter(s) (100 mL/Hr) IV Continuous <Continuous>  dextrose 50% Injectable 25 Gram(s) IV Push once  dextrose 50% Injectable 12.5 Gram(s) IV Push once  dextrose 50% Injectable 25 Gram(s) IV Push once  diVALproex Sprinkle 250 milliGRAM(s) Oral <User Schedule>  diVALproex Sprinkle 500 milliGRAM(s) Oral <User Schedule>  dorzolamide 2% Ophthalmic Solution 1 Drop(s) Both EYES three times a day  glucagon  Injectable 1 milliGRAM(s) IntraMuscular once  heparin   Injectable 5000 Unit(s) SubCutaneous every 12 hours  insulin lispro (ADMELOG) corrective regimen sliding scale   SubCutaneous three times a day before meals  insulin lispro (ADMELOG) corrective regimen sliding scale   SubCutaneous at bedtime  latanoprost 0.005% Ophthalmic Solution 1 Drop(s) Both EYES at bedtime  OLANZapine 2.5 milliGRAM(s) Oral <User Schedule>  OLANZapine 5 milliGRAM(s) Oral at bedtime      LABS: All Labs Reviewed:                        10.4   5.44  )-----------( 203      ( 25 May 2021 06:49 )             34.7     05-25    145  |  107  |  20  ----------------------------<  160<H>  4.1   |  24  |  1.08    Ca    9.8      25 May 2021 06:49  Phos  3.1     05-24  Mg     1.9     05-24            Blood Culture:   Urine Culture      RADIOLOGY/EKG:    ASSESSMENT AND PLAN:    DVT PPX:    ADVANCED DIRECTIVE:    DISPOSITION: CHIEF COMPLAINT:patient name the bed without agitation    SUBJECTIVE:     REVIEW OF SYSTEMS:    CONSTITUTIONAL: ( x )  weakness,  (  ) fevers or chills  EYES/ENT: (  )visual changes;     NECK: (  ) pain or stiffness  RESPIRATORY:   (  )cough, wheezing, hemoptysis;  (  ) shortness of breath  CARDIOVASCULAR:  (  )chest pain or palpitations  GASTROINTESTINAL:   (  )abdominal or epigastric pain.  (  ) nausea, vomiting, or hematemesis;   (   ) diarrhea or constipation.   GENITOURINARY:   (    ) dysuria, frequency or hematuria  NEUROLOGICAL:  (   ) numbness or weakness   All other review of systems is negative unless indicated above    Vital Signs Last 24 Hrs  T(C): 36.6 (25 May 2021 05:15), Max: 36.6 (24 May 2021 21:30)  T(F): 97.8 (25 May 2021 05:15), Max: 97.8 (24 May 2021 21:30)  HR: 84 (25 May 2021 05:15) (68 - 84)  BP: 148/63 (25 May 2021 05:15) (124/76 - 148/63)  BP(mean): --  RR: 16 (25 May 2021 05:15) (16 - 17)  SpO2: 100% (25 May 2021 05:15) (99% - 100%)    I&O's Summary      CAPILLARY BLOOD GLUCOSE      POCT Blood Glucose.: 165 mg/dL (25 May 2021 07:23)  POCT Blood Glucose.: 222 mg/dL (24 May 2021 21:30)  POCT Blood Glucose.: 141 mg/dL (24 May 2021 16:46)  POCT Blood Glucose.: 87 mg/dL (24 May 2021 11:53)      PHYSICAL EXAM:    Constitutional:  ( x  ) NAD,   (   )awake and alert  HEENT: PERR, EOMI,    Neck: Soft and supple, No LAD, No JVD  Respiratory:  (  x  Breath sounds are clear bilaterally,    (   ) wheezing, rales or rhonchi  Cardiovascular:     (  x )S1 and S2, regular rate and rhythm, no Murmurs, gallops or rubs  Gastrointestinal:  (   )Bowel Sounds present, soft,   (  )nontender, nondistended,    Extremities:    (  ) pxeripheral edema  Vascular: 2+ peripheral pulses  Neurological:    (    )A/O x 3,   (  ) focal deficits  Musculoskeletal:    (   )  normal strength b/l upper  (     ) normal  lower extremities  Skin: No rashes    MEDICATIONS:  MEDICATIONS  (STANDING):  amLODIPine   Tablet 2.5 milliGRAM(s) Oral daily  dextrose 40% Gel 15 Gram(s) Oral once  dextrose 5%. 1000 milliLiter(s) (50 mL/Hr) IV Continuous <Continuous>  dextrose 5%. 1000 milliLiter(s) (100 mL/Hr) IV Continuous <Continuous>  dextrose 50% Injectable 25 Gram(s) IV Push once  dextrose 50% Injectable 12.5 Gram(s) IV Push once  dextrose 50% Injectable 25 Gram(s) IV Push once  diVALproex Sprinkle 250 milliGRAM(s) Oral <User Schedule>  diVALproex Sprinkle 500 milliGRAM(s) Oral <User Schedule>  dorzolamide 2% Ophthalmic Solution 1 Drop(s) Both EYES three times a day  glucagon  Injectable 1 milliGRAM(s) IntraMuscular once  heparin   Injectable 5000 Unit(s) SubCutaneous every 12 hours  insulin lispro (ADMELOG) corrective regimen sliding scale   SubCutaneous three times a day before meals  insulin lispro (ADMELOG) corrective regimen sliding scale   SubCutaneous at bedtime  latanoprost 0.005% Ophthalmic Solution 1 Drop(s) Both EYES at bedtime  OLANZapine 2.5 milliGRAM(s) Oral <User Schedule>  OLANZapine 5 milliGRAM(s) Oral at bedtime      LABS: All Labs Reviewed:                        10.4   5.44  )-----------( 203      ( 25 May 2021 06:49 )             34.7     05-25    145  |  107  |  20  ----------------------------<  160<H>  4.1   |  24  |  1.08    Ca    9.8      25 May 2021 06:49  Phos  3.1     05-24  Mg     1.9     05-24            Blood Culture:   Urine Culture      RADIOLOGY/EKG:    ASSESSMENT AND PLAN:  the patient condition remained stable except poor oral intake continue IV hydration discharge planning to nursing home/rehab discussed with discharge team and medical team in detail  DVT PPX:    ADVANCED DIRECTIVE:    DISPOSITION:

## 2021-05-25 NOTE — BH CONSULTATION LIAISON PROGRESS NOTE - NSBHASSESSMENTFT_PSY_ALL_CORE
81 yo woman with history of advanced dementia (at baseline, requires assistance with all ADLs), HTN, DM2 (on insulin), and legal blindness with glaucoma and retinopathy presents from [facility] due to agitation with combative, aggressive behavior. Psych consulted for said agitation. Treated in ED with  various medications, sedated on exam. Likely requires medication optimization and then can be returned to NH.    Plan:   Continue Zyprexa 2.5mg AM and 5mg HS  Start Depakote sprinkles 250mg PO TID standing, give with food as discussed with RN as above  Continue Zyprexa 2.5mg PO Q 6 PRN agitation; can give IM if refuses PO version for severe agitation  Avoid benzodiazepines   ES to prevent falls/impulsivity  R/O any metabolic/infectious etiologies of AMS  Monitor QTc; keep Mg above 2 and K above 4  Will continue to follow  Likely dispo is back to NH after stabilization, though [facility] stabilization is also an option.  
79 yo woman with history of advanced dementia (at baseline, requires assistance with all ADLs), HTN, DM2 (on insulin), and legal blindness with glaucoma and retinopathy presents from [facility] due to agitation with combative, aggressive behavior. Psych consulted for said agitation. Treated in ED with  various medications, sedated on exam. Likely requires medication optimization and then can be returned to NH.    Plan:   Continue Zyprexa 2.5mg AM and 5mg HS  Continue Depakote sprinkles 250mg in Am and 500mg in PM standing, give with food (to minimize day time sedation)  Continue Zyprexa 2.5mg PO Q 6 PRN agitation; can give IM if refuses PO version for severe agitation  Check LFTs/NH3/MALORIE tomorrow AM before AM Depakote dose.   Avoid benzodiazepines   ES to prevent falls/impulsivity  R/O any metabolic/infectious etiologies of AMS  Monitor QTc; keep Mg above 2 and K above 4  Likely dispo is back to NH after stabilization  Will follow  
79 yo woman with history of advanced dementia (at baseline, requires assistance with all ADLs), HTN, DM2 (on insulin), and legal blindness with glaucoma and retinopathy presents from [facility] due to agitation with combative, aggressive behavior. Psych consulted for said agitation. Treated in ED with  various medications, sedated on exam. Likely requires medication optimization and then can be returned to NH.    Plan:   Continue Zyprexa 2.5mg AM and 5mg HS  Recommend changing Depakote sprinkles from 250mg PO TID to 250mg in Am and 500mg in PM standing, give with food (to minimize day time sedation)  Continue Zyprexa 2.5mg PO Q 6 PRN agitation; can give IM if refuses PO version for severe agitation  Avoid benzodiazepines   ES to prevent falls/impulsivity  R/O any metabolic/infectious etiologies of AMS  Monitor QTc; keep Mg above 2 and K above 4  Will continue to follow  Likely dispo is back to NH after stabilization, though [facility] stabilization is also an option.  
81 yo woman with history of advanced dementia (at baseline, requires assistance with all ADLs), HTN, DM2 (on insulin), and legal blindness with glaucoma and retinopathy presents from [facility] due to agitation with combative, aggressive behavior. Psych consulted for said agitation. Treated in ED with  various medications, sedated on exam.     Pt is tolerating Standing Zyprexa well and did not exhibit any breakthrough agitation.  Pt able to engage well, no acute behavioural concerns form Psychiatry perspective.     Plan:   Continue Zyprexa 2.5mg AM and 5mg HS  Continue Depakote sprinkles 250mg in Am and 500mg in PM standing, give with food (to minimize day time sedation)  Continue Zyprexa 2.5mg PO Q 6 PRN agitation; can give IM if refuses PO version for severe agitation  Avoid benzodiazepines   ES to prevent falls/impulsivity  Monitor QTc; keep Mg above 2 and K above 4  Dispo to NH when medically stable  
81 yo woman with history of advanced dementia (at baseline, requires assistance with all ADLs), HTN, DM2 (on insulin), and legal blindness with glaucoma and retinopathy presents from [facility] due to agitation with combative, aggressive behavior. Psych consulted for said agitation. Treated in ED with  various medications, sedated on exam. Likely requires medication optimization and then can be returned to NH.    Plan:   Continue Zyprexa 2.5mg AM and 5mg HS  C/W Depakote sprinkles 250mg PO TID standing, give with food as you are  Continue Zyprexa 2.5mg PO Q 6 PRN agitation; can give IM if refuses PO version for severe agitation  Avoid benzodiazepines   ES to prevent falls/impulsivity  R/O any metabolic/infectious etiologies of AMS  Monitor QTc; keep Mg above 2 and K above 4  Will continue to follow  Likely dispo is back to NH after stabilization, though [facility] stabilization is also an option.  
79 yo woman with history of advanced dementia (at baseline, requires assistance with all ADLs), HTN, DM2 (on insulin), and legal blindness with glaucoma and retinopathy presents from [facility] due to agitation with combative, aggressive behavior. Psych consulted for said agitation. Treated in ED with  various medications, sedated on exam. Likely requires medication optimization and then can be returned to NH.
79 yo woman with history of advanced dementia (at baseline, requires assistance with all ADLs), HTN, DM2 (on insulin), and legal blindness with glaucoma and retinopathy presents from [facility] due to agitation with combative, aggressive behavior. Psych consulted for said agitation. Treated in ED with  various medications, sedated on exam. Likely requires medication optimization and then can be returned to NH.    Plan:   Continue Zyprexa 2.5mg AM and 5mg HS  Start Depakote sprinkles 250mg PO TID standing  Continue Zyprexa 2.5mg PO Q 6 PRN agitation; can give IM if refuses PO version for severe agitation  Avoid benzodiazepines   ES to prevent falls/impulsivity  R/O any metabolic/infectious etiologies of AMS  Monitor QTc; keep Mg above 2 and K above 4  Will continue to follow  Likely dispo is back to NH after stabilization, though [facility] stabilization is also an option.

## 2021-05-25 NOTE — BH CONSULTATION LIAISON PROGRESS NOTE - NSICDXBHPRIMARYDX_PSY_ALL_CORE
Dementia   F03.90  

## 2021-05-25 NOTE — BH CONSULTATION LIAISON PROGRESS NOTE - NSBHINDICATION_PSY_ALL_CORE
impulsivity
impulsivity/fall risk
impulsivity/fall risk
impulsivity
impulsivity
impulsivity/fall risk
impulsivity, fall risk

## 2021-05-25 NOTE — BH CONSULTATION LIAISON PROGRESS NOTE - NSBHFUPINTERVALHXFT_PSY_A_CORE
Chart reviewed. Discussed in AM rounds. Starting to get meds PO via food. Eats well. very disorganized on exam, stimulus-bound, picking at delirium blanket, disoriented, and somewhat irritable. Exam limited by dementia. 
Chart reviewed. Discussed in AM rounds. Has been described as calmer on increased dose of medications, and eating intermittently. Today she appears sedated in AM, laying in bed and responds to verbal/tactile stimuli but falls asleep quickly. No focal neuro deficits noted.     Pt is tolerating Standing Zyprexa well and did not exhibit any breakthrough agitation.  Pt able to engage well, no acute behavioural concerns form Psychiatry perspective.   Pt will benefit from Rehab when medically stable for discharge
Chart reviewed. Discussed in AM rounds. Has received Zyprexa PRNs, refusing standing PO  meds. Very agitated per RN, standing on bed, etc. On exam now is laying in bed, distractible, oriented to self only, says she needs "repairs in her house, on the roof." Very disorganized. Exam limited by her poor insight/negativism. No focal deficits appreciated. 
Chart reviewed. Discussed in AM rounds. Has been described as calmer on increased dose of medications, and eating intermittently. Today she appears sedated in AM, laying in bed and responds to verbal/tactile stimuli but falls asleep quickly. No focal neuro deficits noted. Had received Zyprexa PRN this AM for some breakthrough agitation. Medical team trying to get patient to NH
Chart reviewed. Discussed in AM rounds. Agitated overnight, receiving PRNs. Has sleep-wake cycle as she is asleep this AM, unarousable, no meaningful exam possible. RN agreed to time med administration with feeds as that was not done yesterday. 
Chart reviewed. Discussed in AM rounds. Poor baseline, dementia, agitated at times. Refusing some Zyprexa oral doses. On exam is impulsive, disorganized, stimulus-bound. Oriented to hospital and self but not much else. Exam limited as such. 
Chart reviewed. Discussed in morning rounds and corina input appreciated.   Pt has started getting meds PO via food sprinkling. Pt is accepting meals well, and  observed to be less frequently agitated today, less picking at delirium blanket.     Pt seen bedside, sleeping comfortably, minimally responsive to verbal cue but wakes up/irritable on touch.   Declined to participate in interview. Exam limited by dementia.

## 2021-05-25 NOTE — BH CONSULTATION LIAISON PROGRESS NOTE - CURRENT MEDICATION
MEDICATIONS  (STANDING):  amLODIPine   Tablet 2.5 milliGRAM(s) Oral daily  dextrose 40% Gel 15 Gram(s) Oral once  dextrose 5%. 1000 milliLiter(s) (100 mL/Hr) IV Continuous <Continuous>  dextrose 5%. 1000 milliLiter(s) (50 mL/Hr) IV Continuous <Continuous>  dextrose 50% Injectable 25 Gram(s) IV Push once  dextrose 50% Injectable 12.5 Gram(s) IV Push once  dextrose 50% Injectable 25 Gram(s) IV Push once  diVALproex Sprinkle 250 milliGRAM(s) Oral <User Schedule>  diVALproex Sprinkle 500 milliGRAM(s) Oral <User Schedule>  dorzolamide 2% Ophthalmic Solution 1 Drop(s) Both EYES three times a day  glucagon  Injectable 1 milliGRAM(s) IntraMuscular once  heparin   Injectable 5000 Unit(s) SubCutaneous every 12 hours  insulin lispro (ADMELOG) corrective regimen sliding scale   SubCutaneous three times a day before meals  insulin lispro (ADMELOG) corrective regimen sliding scale   SubCutaneous at bedtime  latanoprost 0.005% Ophthalmic Solution 1 Drop(s) Both EYES at bedtime  OLANZapine 2.5 milliGRAM(s) Oral <User Schedule>  OLANZapine 5 milliGRAM(s) Oral at bedtime    MEDICATIONS  (PRN):  
MEDICATIONS  (STANDING):  dextrose 40% Gel 15 Gram(s) Oral once  dextrose 5%. 1000 milliLiter(s) (50 mL/Hr) IV Continuous <Continuous>  dextrose 5%. 1000 milliLiter(s) (100 mL/Hr) IV Continuous <Continuous>  dextrose 50% Injectable 25 Gram(s) IV Push once  dextrose 50% Injectable 12.5 Gram(s) IV Push once  dextrose 50% Injectable 25 Gram(s) IV Push once  diVALproex Sprinkle 250 milliGRAM(s) Oral three times a day  dorzolamide 2% Ophthalmic Solution 1 Drop(s) Both EYES three times a day  glucagon  Injectable 1 milliGRAM(s) IntraMuscular once  heparin   Injectable 5000 Unit(s) SubCutaneous every 8 hours  insulin lispro (ADMELOG) corrective regimen sliding scale   SubCutaneous every 6 hours  latanoprost 0.005% Ophthalmic Solution 1 Drop(s) Both EYES at bedtime  OLANZapine 2.5 milliGRAM(s) Oral <User Schedule>  OLANZapine 5 milliGRAM(s) Oral at bedtime    MEDICATIONS  (PRN):  OLANZapine 2.5 milliGRAM(s) Oral every 6 hours PRN agitation  OLANZapine Injectable 2.5 milliGRAM(s) IntraMuscular every 6 hours PRN agitation  
MEDICATIONS  (STANDING):  dextrose 40% Gel 15 Gram(s) Oral once  dextrose 5%. 1000 milliLiter(s) (50 mL/Hr) IV Continuous <Continuous>  dextrose 5%. 1000 milliLiter(s) (100 mL/Hr) IV Continuous <Continuous>  dextrose 50% Injectable 25 Gram(s) IV Push once  dextrose 50% Injectable 12.5 Gram(s) IV Push once  dextrose 50% Injectable 25 Gram(s) IV Push once  diVALproex Sprinkle 250 milliGRAM(s) Oral three times a day  dorzolamide 2% Ophthalmic Solution 1 Drop(s) Both EYES three times a day  glucagon  Injectable 1 milliGRAM(s) IntraMuscular once  heparin   Injectable 5000 Unit(s) SubCutaneous every 12 hours  insulin lispro (ADMELOG) corrective regimen sliding scale   SubCutaneous three times a day before meals  insulin lispro (ADMELOG) corrective regimen sliding scale   SubCutaneous at bedtime  latanoprost 0.005% Ophthalmic Solution 1 Drop(s) Both EYES at bedtime  OLANZapine 2.5 milliGRAM(s) Oral <User Schedule>  OLANZapine 5 milliGRAM(s) Oral at bedtime    MEDICATIONS  (PRN):  OLANZapine 2.5 milliGRAM(s) Oral every 6 hours PRN agitation  OLANZapine Injectable 2.5 milliGRAM(s) IntraMuscular every 6 hours PRN agitation  
MEDICATIONS  (STANDING):  dextrose 40% Gel 15 Gram(s) Oral once  dextrose 5%. 1000 milliLiter(s) (50 mL/Hr) IV Continuous <Continuous>  dextrose 5%. 1000 milliLiter(s) (100 mL/Hr) IV Continuous <Continuous>  dextrose 50% Injectable 25 Gram(s) IV Push once  dextrose 50% Injectable 12.5 Gram(s) IV Push once  dextrose 50% Injectable 25 Gram(s) IV Push once  dorzolamide 2% Ophthalmic Solution 1 Drop(s) Both EYES three times a day  glucagon  Injectable 1 milliGRAM(s) IntraMuscular once  heparin   Injectable 5000 Unit(s) SubCutaneous every 8 hours  insulin lispro (ADMELOG) corrective regimen sliding scale   SubCutaneous every 6 hours  latanoprost 0.005% Ophthalmic Solution 1 Drop(s) Both EYES at bedtime  OLANZapine 2.5 milliGRAM(s) Oral <User Schedule>  OLANZapine 5 milliGRAM(s) Oral at bedtime    MEDICATIONS  (PRN):  
MEDICATIONS  (STANDING):  dextrose 40% Gel 15 Gram(s) Oral once  dextrose 5% + sodium chloride 0.45%. 1000 milliLiter(s) (75 mL/Hr) IV Continuous <Continuous>  dextrose 5%. 1000 milliLiter(s) (50 mL/Hr) IV Continuous <Continuous>  dextrose 5%. 1000 milliLiter(s) (100 mL/Hr) IV Continuous <Continuous>  dextrose 50% Injectable 25 Gram(s) IV Push once  dextrose 50% Injectable 12.5 Gram(s) IV Push once  dextrose 50% Injectable 25 Gram(s) IV Push once  diVALproex Sprinkle 250 milliGRAM(s) Oral <User Schedule>  diVALproex Sprinkle 500 milliGRAM(s) Oral <User Schedule>  dorzolamide 2% Ophthalmic Solution 1 Drop(s) Both EYES three times a day  glucagon  Injectable 1 milliGRAM(s) IntraMuscular once  heparin   Injectable 5000 Unit(s) SubCutaneous every 12 hours  insulin lispro (ADMELOG) corrective regimen sliding scale   SubCutaneous three times a day before meals  insulin lispro (ADMELOG) corrective regimen sliding scale   SubCutaneous at bedtime  latanoprost 0.005% Ophthalmic Solution 1 Drop(s) Both EYES at bedtime  OLANZapine 2.5 milliGRAM(s) Oral <User Schedule>  OLANZapine 5 milliGRAM(s) Oral at bedtime    MEDICATIONS  (PRN):  OLANZapine 2.5 milliGRAM(s) Oral every 6 hours PRN agitation  OLANZapine Injectable 2.5 milliGRAM(s) IntraMuscular every 6 hours PRN agitation  
MEDICATIONS  (STANDING):  dextrose 40% Gel 15 Gram(s) Oral once  dextrose 5%. 1000 milliLiter(s) (50 mL/Hr) IV Continuous <Continuous>  dextrose 5%. 1000 milliLiter(s) (100 mL/Hr) IV Continuous <Continuous>  dextrose 50% Injectable 25 Gram(s) IV Push once  dextrose 50% Injectable 12.5 Gram(s) IV Push once  dextrose 50% Injectable 25 Gram(s) IV Push once  diVALproex Sprinkle 250 milliGRAM(s) Oral three times a day  dorzolamide 2% Ophthalmic Solution 1 Drop(s) Both EYES three times a day  glucagon  Injectable 1 milliGRAM(s) IntraMuscular once  heparin   Injectable 5000 Unit(s) SubCutaneous every 8 hours  insulin lispro (ADMELOG) corrective regimen sliding scale   SubCutaneous every 6 hours  latanoprost 0.005% Ophthalmic Solution 1 Drop(s) Both EYES at bedtime  OLANZapine 5 milliGRAM(s) Oral at bedtime  OLANZapine 2.5 milliGRAM(s) Oral <User Schedule>    MEDICATIONS  (PRN):  OLANZapine 2.5 milliGRAM(s) Oral every 6 hours PRN agitation  OLANZapine Injectable 2.5 milliGRAM(s) IntraMuscular every 6 hours PRN agitation  
MEDICATIONS  (STANDING):  dextrose 40% Gel 15 Gram(s) Oral once  dextrose 5%. 1000 milliLiter(s) (50 mL/Hr) IV Continuous <Continuous>  dextrose 5%. 1000 milliLiter(s) (100 mL/Hr) IV Continuous <Continuous>  dextrose 50% Injectable 25 Gram(s) IV Push once  dextrose 50% Injectable 12.5 Gram(s) IV Push once  dextrose 50% Injectable 25 Gram(s) IV Push once  diVALproex Sprinkle 250 milliGRAM(s) Oral three times a day  dorzolamide 2% Ophthalmic Solution 1 Drop(s) Both EYES three times a day  glucagon  Injectable 1 milliGRAM(s) IntraMuscular once  heparin   Injectable 5000 Unit(s) SubCutaneous every 12 hours  insulin lispro (ADMELOG) corrective regimen sliding scale   SubCutaneous three times a day before meals  insulin lispro (ADMELOG) corrective regimen sliding scale   SubCutaneous at bedtime  latanoprost 0.005% Ophthalmic Solution 1 Drop(s) Both EYES at bedtime  OLANZapine 2.5 milliGRAM(s) Oral <User Schedule>  OLANZapine 5 milliGRAM(s) Oral at bedtime    MEDICATIONS  (PRN):  OLANZapine 2.5 milliGRAM(s) Oral every 6 hours PRN agitation  OLANZapine Injectable 2.5 milliGRAM(s) IntraMuscular every 6 hours PRN agitation

## 2021-05-25 NOTE — BH CONSULTATION LIAISON PROGRESS NOTE - MSE UNSTRUCTURED FT
Today she appears sedated in AM, laying in bed and responds to verbal/tactile stimuli but falls asleep quickly. No focal neuro deficits noted.
 Poor baseline, dementia, agitated at times. Refusing some Zyprexa oral doses. On exam is impulsive, disorganized, stimulus-bound. Oriented to hospital and self but not much else. Exam limited as such. 
Today she appears sedated in AM, laying in bed and responds to verbal/tactile stimuli but falls asleep quickly. No focal neuro deficits noted.
On exam now is laying in bed, distractible, oriented to self only, says she needs "repairs in her house, on the roof." Very disorganized. Exam limited by her poor insight/negativism. No focal deficits appreciated. 
very disorganized on exam, stimulus-bound, picking at delirium blanket, disoriented, and somewhat irritable. Exam limited by dementia. 
Sleeping, unable to be examined. See HPI

## 2021-05-25 NOTE — BH CONSULTATION LIAISON PROGRESS NOTE - NSBHPTASSESSDT_PSY_A_CORE
21-May-2021 12:46
24-May-2021 13:07
18-May-2021 11:13
20-May-2021 16:19
17-May-2021 13:07
19-May-2021 12:10
25-May-2021 14:41

## 2021-05-26 VITALS
TEMPERATURE: 97 F | OXYGEN SATURATION: 96 % | DIASTOLIC BLOOD PRESSURE: 59 MMHG | RESPIRATION RATE: 20 BRPM | HEART RATE: 62 BPM | SYSTOLIC BLOOD PRESSURE: 114 MMHG

## 2021-05-26 LAB
A1C WITH ESTIMATED AVERAGE GLUCOSE RESULT: 8.2 % — HIGH (ref 4–5.6)
AMMONIA BLD-MCNC: 27 UMOL/L — SIGNIFICANT CHANGE UP (ref 11–55)
ANION GAP SERPL CALC-SCNC: 12 MMOL/L — SIGNIFICANT CHANGE UP (ref 7–14)
BUN SERPL-MCNC: 22 MG/DL — SIGNIFICANT CHANGE UP (ref 7–23)
CALCIUM SERPL-MCNC: 9.7 MG/DL — SIGNIFICANT CHANGE UP (ref 8.4–10.5)
CHLORIDE SERPL-SCNC: 103 MMOL/L — SIGNIFICANT CHANGE UP (ref 98–107)
CO2 SERPL-SCNC: 26 MMOL/L — SIGNIFICANT CHANGE UP (ref 22–31)
CREAT SERPL-MCNC: 0.93 MG/DL — SIGNIFICANT CHANGE UP (ref 0.5–1.3)
ESTIMATED AVERAGE GLUCOSE: 189 MG/DL — HIGH (ref 68–114)
GLUCOSE BLDC GLUCOMTR-MCNC: 152 MG/DL — HIGH (ref 70–99)
GLUCOSE BLDC GLUCOMTR-MCNC: 155 MG/DL — HIGH (ref 70–99)
GLUCOSE SERPL-MCNC: 163 MG/DL — HIGH (ref 70–99)
HCT VFR BLD CALC: 32.4 % — LOW (ref 34.5–45)
HGB BLD-MCNC: 9.9 G/DL — LOW (ref 11.5–15.5)
MAGNESIUM SERPL-MCNC: 1.8 MG/DL — SIGNIFICANT CHANGE UP (ref 1.6–2.6)
MCHC RBC-ENTMCNC: 22.8 PG — LOW (ref 27–34)
MCHC RBC-ENTMCNC: 30.6 GM/DL — LOW (ref 32–36)
MCV RBC AUTO: 74.5 FL — LOW (ref 80–100)
NRBC # BLD: 0 /100 WBCS — SIGNIFICANT CHANGE UP
NRBC # FLD: 0 K/UL — SIGNIFICANT CHANGE UP
PHOSPHATE SERPL-MCNC: 2.8 MG/DL — SIGNIFICANT CHANGE UP (ref 2.5–4.5)
PLATELET # BLD AUTO: 186 K/UL — SIGNIFICANT CHANGE UP (ref 150–400)
POTASSIUM SERPL-MCNC: 4 MMOL/L — SIGNIFICANT CHANGE UP (ref 3.5–5.3)
POTASSIUM SERPL-SCNC: 4 MMOL/L — SIGNIFICANT CHANGE UP (ref 3.5–5.3)
RBC # BLD: 4.35 M/UL — SIGNIFICANT CHANGE UP (ref 3.8–5.2)
RBC # FLD: 15.2 % — HIGH (ref 10.3–14.5)
SODIUM SERPL-SCNC: 141 MMOL/L — SIGNIFICANT CHANGE UP (ref 135–145)
VALPROATE SERPL-MCNC: 79.9 UG/ML — SIGNIFICANT CHANGE UP (ref 50–100)
WBC # BLD: 4.83 K/UL — SIGNIFICANT CHANGE UP (ref 3.8–10.5)
WBC # FLD AUTO: 4.83 K/UL — SIGNIFICANT CHANGE UP (ref 3.8–10.5)

## 2021-05-26 RX ORDER — OLANZAPINE 15 MG/1
1 TABLET, FILM COATED ORAL
Qty: 0 | Refills: 0 | DISCHARGE
Start: 2021-05-26

## 2021-05-26 RX ORDER — OLANZAPINE 15 MG/1
1.25 TABLET, FILM COATED ORAL ONCE
Refills: 0 | Status: COMPLETED | OUTPATIENT
Start: 2021-05-26 | End: 2021-05-26

## 2021-05-26 RX ORDER — INSULIN LISPRO 100/ML
1 VIAL (ML) SUBCUTANEOUS
Qty: 0 | Refills: 0 | DISCHARGE
Start: 2021-05-26

## 2021-05-26 RX ORDER — AMLODIPINE BESYLATE 2.5 MG/1
1 TABLET ORAL
Qty: 0 | Refills: 0 | DISCHARGE
Start: 2021-05-26

## 2021-05-26 RX ORDER — AMLODIPINE BESYLATE 2.5 MG/1
1 TABLET ORAL
Qty: 0 | Refills: 0 | DISCHARGE

## 2021-05-26 RX ORDER — INSULIN GLARGINE 100 [IU]/ML
6 INJECTION, SOLUTION SUBCUTANEOUS
Qty: 1 | Refills: 0

## 2021-05-26 RX ORDER — GABAPENTIN 400 MG/1
1 CAPSULE ORAL
Qty: 90 | Refills: 0

## 2021-05-26 RX ADMIN — DIVALPROEX SODIUM 250 MILLIGRAM(S): 500 TABLET, DELAYED RELEASE ORAL at 05:19

## 2021-05-26 RX ADMIN — DORZOLAMIDE HYDROCHLORIDE 1 DROP(S): 20 SOLUTION/ DROPS OPHTHALMIC at 05:20

## 2021-05-26 RX ADMIN — AMLODIPINE BESYLATE 2.5 MILLIGRAM(S): 2.5 TABLET ORAL at 05:19

## 2021-05-26 RX ADMIN — OLANZAPINE 2.5 MILLIGRAM(S): 15 TABLET, FILM COATED ORAL at 05:21

## 2021-05-26 RX ADMIN — HEPARIN SODIUM 5000 UNIT(S): 5000 INJECTION INTRAVENOUS; SUBCUTANEOUS at 11:36

## 2021-05-26 NOTE — PROGRESS NOTE ADULT - SUBJECTIVE AND OBJECTIVE BOX
CHIEF COMPLAINT:    SUBJECTIVE:     REVIEW OF SYSTEMS:    CONSTITUTIONAL: (  )  weakness,  (  ) fevers or chills  EYES/ENT: (  )visual changes;     NECK: (  ) pain or stiffness  RESPIRATORY:   (  )cough, wheezing, hemoptysis;  (  ) shortness of breath  CARDIOVASCULAR:  (  )chest pain or palpitations  GASTROINTESTINAL:   (  )abdominal or epigastric pain.  (  ) nausea, vomiting, or hematemesis;   (   ) diarrhea or constipation.   GENITOURINARY:   (    ) dysuria, frequency or hematuria  NEUROLOGICAL:  (   ) numbness or weakness   All other review of systems is negative unless indicated above    Vital Signs Last 24 Hrs  T(C): 36.3 (26 May 2021 05:00), Max: 36.6 (25 May 2021 12:10)  T(F): 97.4 (26 May 2021 05:00), Max: 97.8 (25 May 2021 12:10)  HR: 96 (26 May 2021 05:00) (86 - 96)  BP: 154/63 (26 May 2021 05:00) (105/82 - 154/63)  BP(mean): --  RR: 18 (26 May 2021 05:00) (18 - 18)  SpO2: 100% (26 May 2021 05:00) (100% - 100%)    I&O's Summary      CAPILLARY BLOOD GLUCOSE      POCT Blood Glucose.: 155 mg/dL (26 May 2021 07:35)  POCT Blood Glucose.: 230 mg/dL (25 May 2021 21:45)  POCT Blood Glucose.: 178 mg/dL (25 May 2021 17:26)  POCT Blood Glucose.: 191 mg/dL (25 May 2021 11:05)      PHYSICAL EXAM:    Constitutional:  (   ) NAD,   (   )awake and alert  HEENT: PERR, EOMI,    Neck: Soft and supple, No LAD, No JVD  Respiratory:  (    Breath sounds are clear bilaterally,    (   ) wheezing, rales or rhonchi  Cardiovascular:     (   )S1 and S2, regular rate and rhythm, no Murmurs, gallops or rubs  Gastrointestinal:  (   )Bowel Sounds present, soft,   (  )nontender, nondistended,    Extremities:    (  ) peripheral edema  Vascular: 2+ peripheral pulses  Neurological:    (    )A/O x 3,   (  ) focal deficits  Musculoskeletal:    (   )  normal strength b/l upper  (     ) normal  lower extremities  Skin: No rashes    MEDICATIONS:  MEDICATIONS  (STANDING):  amLODIPine   Tablet 2.5 milliGRAM(s) Oral daily  dextrose 40% Gel 15 Gram(s) Oral once  dextrose 5%. 1000 milliLiter(s) (50 mL/Hr) IV Continuous <Continuous>  dextrose 5%. 1000 milliLiter(s) (100 mL/Hr) IV Continuous <Continuous>  dextrose 50% Injectable 25 Gram(s) IV Push once  dextrose 50% Injectable 12.5 Gram(s) IV Push once  dextrose 50% Injectable 25 Gram(s) IV Push once  diVALproex Sprinkle 250 milliGRAM(s) Oral <User Schedule>  diVALproex Sprinkle 500 milliGRAM(s) Oral <User Schedule>  dorzolamide 2% Ophthalmic Solution 1 Drop(s) Both EYES three times a day  glucagon  Injectable 1 milliGRAM(s) IntraMuscular once  heparin   Injectable 5000 Unit(s) SubCutaneous every 12 hours  insulin lispro (ADMELOG) corrective regimen sliding scale   SubCutaneous three times a day before meals  insulin lispro (ADMELOG) corrective regimen sliding scale   SubCutaneous at bedtime  latanoprost 0.005% Ophthalmic Solution 1 Drop(s) Both EYES at bedtime  OLANZapine 2.5 milliGRAM(s) Oral <User Schedule>  OLANZapine 5 milliGRAM(s) Oral at bedtime      LABS: All Labs Reviewed:                        9.9    4.83  )-----------( 186      ( 26 May 2021 07:07 )             32.4     05-26    141  |  103  |  22  ----------------------------<  163<H>  4.0   |  26  |  0.93    Ca    9.7      26 May 2021 07:10  Phos  2.8     05-26  Mg     1.8     05-26    TPro  6.9  /  Alb  3.9  /  TBili  <0.2  /  DBili  <0.2  /  AST  35<H>  /  ALT  16  /  AlkPhos  77  05-25          Blood Culture:   Urine Culture      RADIOLOGY/EKG:    ASSESSMENT AND PLAN:    DVT PPX:    ADVANCED DIRECTIVE:    DISPOSITION:

## 2021-05-26 NOTE — CHART NOTE - NSCHARTNOTEFT_GEN_A_CORE
Agitated received prn Zyprexa 2.5 IM x 1. Notified by RN pt agitated again, additional 2.5 mg Zyprexa x1.
Discussed with Dr. Bagley, patient is medically cleared and optimized for discharge today. All medications were reviewed with attending. ACP spoke with pt's son- Austin [phone], d/c plan reviewed and all questions answered.
pt refusing PO bedtime Zyprexa. Notified by RN now trying to climb out of bed. WIll give IM zyprexa 5 mg x 1
Continued agitation overnight, attempting to climb out of bed, taking off clothes unable to be redirected s/p Zyprexa IM 5 mg x 1 around 2 AM will order additional 2.5 mg Zyprexa IM.
RN notified that patient is extremely agitated and non-directable. Patient seen at bedside. Patient is yelling, nonsensical things and thrashing arms. She is also attempting to walk. Patient also wont accept PO Zyprexa currently. Will order Zyprexa IM per psych recommendation and will hold AM PO dose. Will continue to monitor.
Writer spoke with attending Dr. Bagley. Writer informed patient is medically stable for discharge and should be sent back to NH. Social work made aware to assist in coordinating.

## 2021-05-26 NOTE — PROGRESS NOTE ADULT - PROVIDER SPECIALTY LIST ADULT
Internal Medicine

## 2021-05-26 NOTE — PROGRESS NOTE ADULT - NSICDXPILOT_GEN_ALL_CORE
[place]

## 2021-05-26 NOTE — PROGRESS NOTE ADULT - REASON FOR ADMISSION
Agitation, aggression

## 2021-05-26 NOTE — PROGRESS NOTE ADULT - NUTRITIONAL ASSESSMENT
This patient has been assessed with a concern for Malnutrition and has been determined to have a diagnosis/diagnoses of Severe protein-calorie malnutrition and Underweight (BMI < 19).    This patient is being managed with:   Diet Dysphagia 2 Mechanical Soft-Thin Liquids-  Consistent Carbohydrate {No Snacks} (CSTCHO)  Low Sodium  Entered: May 23 2021  8:08AM    
This patient has been assessed with a concern for Malnutrition and has been determined to have a diagnosis/diagnoses of Severe protein-calorie malnutrition and Underweight (BMI < 19).    This patient is being managed with:   Diet Dysphagia 2 Mechanical Soft-Thin Liquids-  Consistent Carbohydrate {No Snacks} (CSTCHO)  Low Sodium  Entered: May 23 2021  8:08AM    
This patient has been assessed with a concern for Malnutrition and has been determined to have a diagnosis/diagnoses of Severe protein-calorie malnutrition and Underweight (BMI < 19).    This patient is being managed with:   Diet Dysphagia 2 Mechanical Soft-Thin Liquids-  Consistent Carbohydrate {No Snacks} (CSTCHO)  Entered: May 14 2021  6:02PM    
This patient has been assessed with a concern for Malnutrition and has been determined to have a diagnosis/diagnoses of Severe protein-calorie malnutrition and Underweight (BMI < 19).    This patient is being managed with:   Diet Dysphagia 2 Mechanical Soft-Thin Liquids-  Consistent Carbohydrate {No Snacks} (CSTCHO)  Low Sodium  Entered: May 23 2021  8:08AM    
This patient has been assessed with a concern for Malnutrition and has been determined to have a diagnosis/diagnoses of Severe protein-calorie malnutrition and Underweight (BMI < 19).    This patient is being managed with:   Diet Dysphagia 2 Mechanical Soft-Thin Liquids-  Consistent Carbohydrate {No Snacks} (CSTCHO)  Entered: May 14 2021  6:02PM    
This patient has been assessed with a concern for Malnutrition and has been determined to have a diagnosis/diagnoses of Severe protein-calorie malnutrition and Underweight (BMI < 19).    This patient is being managed with:   Diet Dysphagia 2 Mechanical Soft-Thin Liquids-  Consistent Carbohydrate {No Snacks} (CSTCHO)  Low Sodium  Entered: May 23 2021  8:08AM

## 2021-06-09 ENCOUNTER — EMERGENCY (EMERGENCY)
Facility: HOSPITAL | Age: 81
LOS: 1 days | Discharge: HOME | End: 2021-06-09
Attending: EMERGENCY MEDICINE
Payer: MEDICARE

## 2021-06-09 VITALS
RESPIRATION RATE: 18 BRPM | SYSTOLIC BLOOD PRESSURE: 160 MMHG | TEMPERATURE: 98 F | HEART RATE: 95 BPM | DIASTOLIC BLOOD PRESSURE: 72 MMHG | OXYGEN SATURATION: 99 %

## 2021-06-09 VITALS — WEIGHT: 119.93 LBS | HEIGHT: 63 IN

## 2021-06-09 DIAGNOSIS — Z98.891 HISTORY OF UTERINE SCAR FROM PREVIOUS SURGERY: Chronic | ICD-10-CM

## 2021-06-09 PROBLEM — H54.8 LEGAL BLINDNESS, AS DEFINED IN USA: Chronic | Status: ACTIVE | Noted: 2021-05-14

## 2021-06-09 PROBLEM — I10 ESSENTIAL (PRIMARY) HYPERTENSION: Chronic | Status: ACTIVE | Noted: 2021-05-14

## 2021-06-09 PROBLEM — E11.9 TYPE 2 DIABETES MELLITUS WITHOUT COMPLICATIONS: Chronic | Status: ACTIVE | Noted: 2021-05-14

## 2021-06-09 PROBLEM — H40.9 UNSPECIFIED GLAUCOMA: Chronic | Status: ACTIVE | Noted: 2019-06-04

## 2021-06-09 PROCEDURE — 96372 THER/PROPH/DIAG INJ SC/IM: CPT

## 2021-06-09 PROCEDURE — 70450 CT HEAD/BRAIN W/O DYE: CPT | Mod: 26,MA

## 2021-06-09 PROCEDURE — 70450 CT HEAD/BRAIN W/O DYE: CPT

## 2021-06-09 PROCEDURE — 99284 EMERGENCY DEPT VISIT MOD MDM: CPT

## 2021-06-09 PROCEDURE — 99284 EMERGENCY DEPT VISIT MOD MDM: CPT | Mod: 25

## 2021-06-09 RX ORDER — OLANZAPINE 15 MG/1
2.5 TABLET, FILM COATED ORAL ONCE
Refills: 0 | Status: COMPLETED | OUTPATIENT
Start: 2021-06-09 | End: 2021-06-09

## 2021-06-09 RX ADMIN — OLANZAPINE 2.5 MILLIGRAM(S): 15 TABLET, FILM COATED ORAL at 13:27

## 2021-06-09 RX ADMIN — OLANZAPINE 2.5 MILLIGRAM(S): 15 TABLET, FILM COATED ORAL at 10:40

## 2021-06-09 NOTE — ED ADULT NURSE NOTE - OBJECTIVE STATEMENT
assumed care of pt @ this time. received pt alert, but lethargic, as per ems, pt is at her baseline. pt not answering questions at this time, poor historian. pt unable to follow commands. as per ems, pt had unwitnessed fall with hematoma to forehead. no other obvious deformities or injuries noted. no blood thinners as per ED MD.

## 2021-06-09 NOTE — ED ADULT NURSE NOTE - INTERVENTIONS DEFINITIONS
[place] to call system/Call bell, personal items and telephone within reach/Non-slip footwear when patient is off stretcher/Stretcher in lowest position, wheels locked, appropriate side rails in place

## 2021-06-09 NOTE — ED PROVIDER NOTE - OBJECTIVE STATEMENT
80y female with PMHx of HTN, advanced dementia, legal blindness, DM, and glaucoma presents to ED s/p fall. Pt comes from [facility], had an unwitnessed fall, facility denies blood thinners. Pt is baseline dementia with aggressive behavior.

## 2021-06-09 NOTE — CHART NOTE - NSCHARTNOTEFT_GEN_A_CORE
PRINCE Note: SW called and asked to set up transport back to her NH. Pt is medically cleared. Called [facility] [phone], spoke with Rossy. Aware pt is returning. Faxed head CT results for her chart. Fax: [phone]. [facility] ambulance requested, p/u time pending

## 2021-06-09 NOTE — ED ADULT NURSE NOTE - CHIEF COMPLAINT QUOTE
pt from [facility], unwitnessed fall with hematoma to forehead. as per EMS pt baseline mental status, no use of anticoagulant therapy. Dr. Don called to bedside

## 2021-06-09 NOTE — ED ADULT NURSE NOTE - CADM POA PRESS ULCER
Anticoagulation Summary  As of 3/18/2019    INR goal:   2.0-3.0   TTR:   100.0 % (3 d)   INR used for dosin.1 (3/18/2019)   Warfarin maintenance plan:   5 mg (5 mg x 1) every day   Weekly warfarin total:   35 mg   Plan last modified:   Eva Casillas, PharmD (3/15/2019)   Next INR check:   3/21/2019   Target end date:   Indefinite    Indications    Pulmonary embolism (HCC) [I26.99]  Pulmonary embolism (HCC) (Resolved) [I26.99]             Anticoagulation Episode Summary     INR check location:       Preferred lab:       Send INR reminders to:       Comments:         Anticoagulation Care Providers     Provider Role Specialty Phone number    Deb Ponce P.A.-C. Referring Family Medicine [phone]    [facility] Responsible  [phone]    Hermila Mobley, LuizD Responsible          Anticoagulation Patient Findings      HPI:  Jose Quintanilla seen in clinic today for follow up on anticoagulation therapy in the presence of PE hx. Denies any changes to current medical/health status since last appointment. Denies any medication or diet changes. No current symptoms of bleeding or thrombosis reported.    A/P:   INR therapeutic. He is transitioning from Xarelto to warfarin due to cost. He took Xarelto this AM. Will have patient STOP Xarelto and take warfarin as outline on calendar. BP recorded in vitals.    Follow up appointment in 3 days.    Next Appointment:  at 2:30 pm.     Amara WHITMAN                     No

## 2021-06-09 NOTE — ED PROVIDER NOTE - NSFOLLOWUPINSTRUCTIONS_ED_ALL_ED_FT
CT negative  can follow up with primary doctor and continue regular medications  Return to ER for new or worsening symptoms

## 2021-06-09 NOTE — ED PROVIDER NOTE - PATIENT PORTAL LINK FT
You can access the FollowMyHealth Patient Portal offered by [facility] by registering at the following website: [email or web address]. By joining [facility]’s FollowMyHealth portal, you will also be able to view your health information using other applications (apps) compatible with our system.

## 2021-06-09 NOTE — ED ADULT TRIAGE NOTE - CHIEF COMPLAINT QUOTE
pt from [facility], unwitnessed fall with hematoma to forehead. as per EMS pt baseline mental status, no use of anticoagulant therapy. Dr. Don called to bedside

## 2021-07-29 NOTE — ED ADULT NURSE NOTE - NSIMPLEMENTINTERV_GEN_ALL_ED
Procedure:  INSERTION OF FIDUCIAL MARKER (TRANSRECTAL ULTRASOUND GUIDANCE), SPACEOAR (N/A Anus)    Relevant Problems   ANESTHESIA (within normal limits)      CARDIO   (+) Essential hypertension      /RENAL   (+) Prostate cancer (HCC)      MUSCULOSKELETAL   (+) Arthritis      PULMONARY   (+) Smoking      Other   (+) Cigarette nicotine dependence without complication        Physical Exam    Airway    Mallampati score: II  TM Distance: >3 FB  Neck ROM: full     Dental       Cardiovascular      Pulmonary      Other Findings  Several missing teeth and broken teeth      Anesthesia Plan  ASA Score- 2     Anesthesia Type- IV sedation with anesthesia with ASA Monitors  Additional Monitors:   Airway Plan:           Plan Factors-Exercise tolerance (METS): >4 METS  Chart reviewed  Existing labs reviewed  Patient summary reviewed  Patient is a current smoker  Patient smoked on day of surgery  Induction-     Postoperative Plan-     Informed Consent- Anesthetic plan and risks discussed with patient  I personally reviewed this patient with the CRNA  Discussed and agreed on the Anesthesia Plan with the CRNA  Georgette Ormond
Implemented All Fall Risk Interventions:  Miller to call system. Call bell, personal items and telephone within reach. Instruct patient to call for assistance. Room bathroom lighting operational. Non-slip footwear when patient is off stretcher. Physically safe environment: no spills, clutter or unnecessary equipment. Stretcher in lowest position, wheels locked, appropriate side rails in place. Provide visual cue, wrist band, yellow gown, etc. Monitor gait and stability. Monitor for mental status changes and reorient to person, place, and time. Review medications for side effects contributing to fall risk. Reinforce activity limits and safety measures with patient and family.

## 2022-01-03 NOTE — ED ADULT TRIAGE NOTE - CHIEF COMPLAINT QUOTE
pt pressed medical alert botton due to abnormal v/s obtained at home by patient legally blind
Unknown

## 2022-02-07 NOTE — DISCHARGE NOTE ADULT - HAS THE PATIENT RECEIVED THE INFLUENZA VACCINE DURING THIS VISIT
Outpatient Physical Therapy  DAILY TREATMENT     [facility] Outpatient Physical Therapy  [address]  [place] [address]  Phone:  [phone]  Fax:  [phone]    Date: 02/07/2022    Patient: Deepa Mathew  YOB: 1999  MRN: [id number]     Time Calculation    Start time: 1430  Stop time: 1530 Time Calculation (min): 60 minutes         Chief Complaint: Ankle Problem    Visit #: 2    SUBJECTIVE:   Patient seen for follow up for R Achilles Patient stated that her pain feels worse in morning. She has been working on the exercises and states that the cone touch exercise has been the hardest. When she is done doing the exercises she states that she feels her ankle feel more tender.       OBJECTIVE:  Current objective measures:           Therapeutic Exercises (CPT 32783):     2. SLS with airex , 15 secs x 2 with shoes, 15 secs x 5 without shoes     3. Eccentric step down , 2x10     4. Slant board calf stretch , 2 min     5. Upright bike , 5 min     6. True Stetch, knee over toes       Therapeutic Exercise Summary:       Therapeutic Treatments and Modalities:     1. Manual Therapy (CPT 61696), IASTM on achilles/gastroc, banded calf raises     2. E Stim Unattended (CPT 94041), IFC and heat to R achilles, x 10 min, hypersensitive towards pads etc.     Time-based treatments/modalities:    Physical Therapy Timed Treatment Charges  Manual therapy minutes (CPT 90984): 15 minutes  Therapeutic exercise minutes (CPT 35017): 30 minutes  ASSESSMENT:   Response to treatment: Patient worked on weightbearing and balancing to work on stabilization and strengthening of the ankle. Patient felt some burning with balancing on the airex pad and had fatigue towards the end of the session. Patient also finished with her school days so her ankle could also be fatigue due to walking throughout the day. Will work on strengthening and loading the ankle.     PLAN/RECOMMENDATIONS:   Plan  for treatment: therapy treatment to continue next visit.  Planned interventions for next visit: continue with current treatment.      [x] As the licensed therapist supervising this student, I was present during the entire treatment session directing the care and reviewing the assessment plan.  I reviewed all documentation prior to signing.       No

## 2022-05-03 NOTE — DISCHARGE NOTE PROVIDER - NS AS DC PROVIDER CONTACT Y/N MULTI
This is a historical note converted from Tashia. Formatting and pictures may have been removed.  Please reference Tashia for original formatting and attached multimedia. Chief Complaint  5 month follow up Right hip hemiarthroplasty from a fall sx 3/11/17 gl. 6/9/17. Here for eval and xrays doing good c/o minimal pain.  History of Present Illness  3/11/2017:?Right hip hemiarthroplasty  ?   He returns today. He is at home. ?He is ambulate with a walker. ?He complains of?bilateral knee pain.? The pain in his knees are localized globally. ?It is worse with weightbearing. ?It somewhat better with rest.? He is?using some over-the-counter medicines for it.? We tried a injections last time on both knees. ?He had about?2 days of relief. ?He thinks the left lens worse than the right  ?  ?   His hip is doing well. ?He has no pain  Review of Systems  Parkinsons disease, + smoker [1]  Physical Exam  Vitals & Measurements  BP:?134/77? HT:?175?cm? HT:?175?cm? HT:?175?cm? WT:?90.5?kg? WT:?90.5?kg? WT:?90.5?kg? BMI:?29.55?  Gen: WN, WD, NAD  Card/Res: NL breathing, +distal pulses  Abdomen: ND  Standing exam  stance: normal alignment, no significant leg-length discrepancy  gait:?Antalgic limp  ?   Knee examination  - General comments: unremarkable appearance  ?   - Tenderness: Global  ?   Knee??????????RIGHT??????????LEFT  Skin: ??????????Intact ??????????Intact  ROM:??????????5-110??????????  Effusion:??????+??????????????? +  MJL TTP:??????+?????????????? +  LJL TTP:????????+?????????????? +  ?   Valgus stress: Neg ???????????????Neg  Varus stress: Neg ???????????????Neg  Posterior drawer: Neg ??????????Neg  ?   N-V ????????????????????intact??????????intact  Hip:?????????????????????????nml?????????? nml  ?   Lower extremity edema:Negative [2]  Assessment/Plan  1.?Bilateral primary osteoarthritis of knee  I given prescription for a brace to give him?extra support.? I will see him back in March 2018 with radiographs  of?bilateral hips and bilateral knees  Ordered:  Clinic Follow up, *Est. 02/09/18 3:00:00 CST, Order for future visit, Bilateral primary osteoarthritis of knee  Closed fracture of right hip with routine healing, [facility], 08/09/17 9:41:00 CDT, Bilateral primary osteoarthritis of knee  Office/Outpatient Visit Level 2 Established [id number] PC, Bilateral primary osteoarthritis of knee  Closed fracture of right hip with routine healing, [facility], 08/09/17 9:40:00 CDT  ?  Closed fracture of right hip with routine healing  Doing well status post above  Ordered:  Clinic Follow up, *Est. 02/09/18 3:00:00 CST, Order for future visit, Bilateral primary osteoarthritis of knee  Closed fracture of right hip with routine healing, [facility]  Office/Outpatient Visit Level 2 Established [id number] PC, Bilateral primary osteoarthritis of knee  Closed fracture of right hip with routine healing, [facility], 08/09/17 9:40:00 CDT  XR Hip Right 2 Views, Routine, 08/09/17 9:20:00 CDT, Fracture, None, Patient Bed, Patient Has IV?, Patient on Oxygen?, Rad Type, Closed fracture of right hip with routine healing, Not Scheduled, 08/09/17 9:20:00 CDT  ?   Problem List/Past Medical History  Arthritis  Bilateral primary osteoarthritis of knee  GERD (gastroesophageal reflux disease)  Parkinson disease  Tobacco user  UTI (urinary tract infection)  Historical  No historical problems  Procedure/Surgical History  Forest Arthroplasty Hip (Right) (03/11/2017), Replacement of Right Hip Joint, Femoral Surface with Synthetic Substitute, Cemented, Open Approach (03/11/2017), Partial hip replacement (07/31/2012), HERNIA REPAIR.  Medications  aspirin 81 mg oral Delayed Release (EC) tablet, 81 mg, 1 tab(s), Oral, Daily  Bacmin oral tablet, 1 tab(s), Oral, Daily  FUROSEMIDE 40 MG TABLET, 40 mg, 1 tab(s), Oral, Daily  metoprolol tartrate 50 mg oral tab, 50 mg, 1 tab(s), Oral, BID  oxycodone 5 mg  oral tablet, 5 mg, 1 tab(s), Oral, q4hr, PRN  POLYETHYLENE GLYCOL 3350 POWD  Protonix 40 mg ORAL enteric coated tablet, 40 mg, 1 tab(s), Oral, Daily  Vitamin C 500 mg oral tablet, extended release  Allergies  codeine  Social History  Alcohol  Never  Substance Abuse  Never  Tobacco  Current every day smoker  Diagnostic Results  Hip radiographs 8/9/2017: 3 views of the right hip show implant in appropriate position.     [1]?Office Visit Note; Zeyad Mariano JR., MD 06/07/2017 09:32 CDT  [2]?Office Visit Note; Zeyad Mariano JR., MD 06/07/2017 09:32 CDT   Yes

## 2022-06-28 NOTE — H&P ADULT - MOTOR
intact pt states she has a right sided pneumothorax, c/o SOB, having treatments for breast cancer  A&Ox3, resp wnl, starting treatments for breast cancer, IV to LAC

## 2022-08-12 NOTE — DISCHARGE NOTE PROVIDER - NSDCCPGOAL_GEN_ALL_CORE_FT
ROM Davila/Nursing/Patient/Family Patient/Family To get better and follow your care plan as instructed.

## 2022-11-18 NOTE — ED PROVIDER NOTE - NS ED MD DISPO ADMITTING SERVICE
Have not received a return call from Lynne regarding re-scheduling her appt with Dr Bazzi Man following her breast biopsy on 12/14/22  Attempted calling her again today, no answer, left her a detailed message to return call to office 
MED

## 2023-01-01 NOTE — ED ADULT NURSE NOTE - SOCIAL CONCERNS
"   [facility]   Intensive Care Unit Daily Note    Name: Cale \"Will\" Sea Breen   Parents: Halley and Cristobal Breen  YOB: 2021    History of Present Illness   Cale was born , at 27w2d, small for gestational age with birthweight 14.1 oz (400 g). He was born due to concerning fetal heart tracing following pregnancy complicated by severe growth restriction.    Patient Active Problem List   Diagnosis    Premature infant of 27 weeks gestation    Respiratory failure of     Feeding problem of      affected by IUGR    ELBW (extremely low birth weight) infant    SGA (small for gestational age)    Thrombocytopenia (H)    Direct hyperbilirubinemia    Thrombus of aorta (H)    Adrenal insufficiency (H)    Hypoglycemia    Anemia of prematurity    Metabolic bone disease of prematurity    Necrotizing enteritis of     JASMYNE (acute kidney injury) (H)    Infection    Nonspecific elevation of levels of transaminase     BPD (bronchopulmonary dysplasia)    Duplicated left renal collecting system    Right Caliectasis determined by ultrasound of kidney      Interval History    No acute events. Lovenox dose increased due to subtherapeutic Xa.     Appropriate I/O, ~ at fluid goal with adequate UO and stool.     /48   Pulse 147   Temp 99  F (37.2  C) (Axillary)   Resp 52   Ht 0.555 m (1' 9.85\")   Wt 6.47 kg (14 lb 4.2 oz)   HC 38.5 cm (15.16\")   SpO2 96%   BMI 21.01 kg/m     Vitals:    23 1400 23 0630 23 1830   Weight: 6.31 kg (13 lb 14.6 oz) 6.39 kg (14 lb 1.4 oz) 6.47 kg (14 lb 4.2 oz)   Weight change:    Appropriate I/O, ~ at fluid goal with adequate UO and stool.       Assessment & Plan    Overall Status:    7 month old  ELBW male infant born SGA at 27w2d PMA, who is now 61w5d PMA with BPD.   See Problem List Overview for complete list of diagnoses.     This patient, whose weight is > 5000 grams (6.47 kg),  is no longer " critically ill.  He still requires supplemental oxygen, gavage feeds, wound vac to abdomen, and CR monitoring, due to complications of prematurity.     Daily plan on 2023 :  - Restart KCal supplement  - Provide stress-dose steroids if clinical decompensation.  - See below for details of overall ongoing plan by system, PE, and daily communications.    Tentative schedule for consideration of changes as tolerated:  Monday - lorazepam wean  Tuesday - consolidate feeds  Wed - respiratory weans   Thurs - morphine wean - parents willing to consider more freq wean following review with PACCT on 23.  Fri - wound vac change day  Saturday - feed increase/weight adjust, possibly consolidate feeds  - no changes, DAY OF REST   ------      Vascular Access:  None  DL Internal jugular placed by IR on , removed     FEN/GI:    SGA, NEC s/p ex-lap (Maximo ) with obstructed inguinal hernias, hx abdominal compartment syndrome, feeding intolerance, osteopenia of prematurity (improving), rickets, direct hyperbilirubinemia.  No malnutrition per per most recent RD assessment.   Ongoing suboptimal  linear growth and remains <3%ile.     Continue:  - Consider trials of MBM and purees in near future  - TF goal ~120 mL/kg/day (restricted due to lung disease)  - G tube feeds : Nestle extensive HA (20 kcal/oz) at full feeds.          Consolidation of enteral feeds  to run over 2 hours   - Anal dilations: Dilate BID 8AM/PM if <10g spontaneous stool (per 12 hour shift)  - qFri wound vac changes bedside  - weekly review on GI rounds with Dr. Mcwilliams  - input from OT for daily oral feeding and RD for nutrition management.     - Supplements: NaCl (2), KCl (2) and PVS + Fe  - Labs: lytes qMTh , q2 wk ALP,   qM Bili, ALT, AST, GGT,   - Meds: Cyproheptadine (transitioned from erythromycin  per GI recs due to elevated transaminases).   Glycerin BID PRN, Simethicone q6. Prune Juice daily. Adjust bowel  regimen as needed, goal 1 stool per day  Lab Results   Component Value Date    ALKPHOS 428 2023    ALKPHOS 440 2023     2023     2023    POTASSIUM 3.7 2023    POTASSIUM 4.3 2023    CHLORIDE 88 (L) 2023    CHLORIDE 93 (L) 2023       Respiratory:   Severe BPD.  H/o Budesonide therapy until 8/23.  CXRs over time have shown a right sided sherri diaphragm that may suggest eventration, can consider ultrasound in the coming weeks, particularly if intolerance of further weaning.      Current support: 1/2 lpm LFNC OTW, last weaned 8/23.  Continue:  - input from Pulmonary consultation team, appreciate recommendations   - slow respiratory weans as tolerated ~qWed  - qMonday CBG    - Chlorothiazide 40 mg/kg/day  - Fursosemide 1 mg/kg daily as of 7/25  - routine CR monitoring.     Cardiovascular:   Currently with good BP and perfusion. No murmur.   H/o PDA medically treated.   H/o cardiorespiratory failure in May domo-op requiring significant resuscitation. Trivial tricuspid valve regurgitation.    Last echo 8/3- wnl. Est RVSP 33-37 mmHg plus right atrial pressure.  - next echo ~9/3, consider sooner if stalls in respiratory weans given slightly higher RVSP on echo 8/3  - Continue routine CR monitoring.     ID:   No current infection concerns.  No identified infectious causes of recent transaminitis. May be viral but tested negative for CMV and enterovirus.  MRSA negative.     Hematology:   Anemia (multiple transfusions, last on 6/5 and h/o thrombocytopenia (resolved)  - continue MVI for iron supplementation, per RD recs.   - Monitor Hemoglobin q2 weeks.  Hemoglobin   Date Value Ref Range Status   2023 13.0 10.5 - 14.0 g/dL Final   2023 12.5 10.5 - 14.0 g/dL Final   2023 11.3 10.5 - 14.0 g/dL Final     Platelet Count   Date Value Ref Range Status   2023 409 150 - 450 10e3/uL Final   2023 409 150 - 450 10e3/uL Final     Ferritin   Date Value  Ref Range Status   2023 50 6 - 111 ng/mL Final       > Coagulopathy/Thrombosis:  Coagulopathy while clinically ill/domo-operative. Extensive thrombosis through the IVC and proximal common iliac veins, progressive from 7/17->7/24. Discussed with Heme team and started Lovenox 7/24. US stable on 7/31 (no clot progression).  - continue Enoxaparin - increased 8/15 for subtherapeutic Xa level, now back in therapeutic range.   - Anti-Xa level weekly qMon or after adjustments, with goal 0.5-1; titrate dose per chart in 7/24 heme/onc note  - next clot US ~8/31 (~1 month from last given stability of clot)  - Discuss with hematology regarding discharge preparation      CNS/Pain/Development:   No IVH. Mild enlargement of ventricles and subarachnoid spaces  - Weekly OFC measurements   - MRI when clinically stable -- discussed w/ parents, they would prefer not to do MRI prior to discharge -- would consider outpatient if new clinical concern.     PACCT consulted  - Morphine 0.8 mg q4h enteral (weaned 8/24)  - Clonidine q6h (s/p dexmedetomidine 8/13)  - Lorazepam 0.2 mg q12 hours enteral (weaned 8/28)  - Gabapentin enteral   - Melatonin at bedtime prn  - APAP PRN    Renal:   H/o JASMYNE, mild right caliectasis, duplex left collecting system, medical renal disease.  Currently with good UO, Cr wnl, BP acceptable  - qMonday creatinine while on enoxaparin  - Repeat ESPERANZA PTD  Creatinine   Date Value Ref Range Status   2023 0.29 0.16 - 0.39 mg/dL Final   2023 0.26 0.16 - 0.39 mg/dL Final      BP Readings from Last 3 Encounters:   08/30/23 101/48       Endocrinology:   Adrenal insufficiency   7/24 AM and 8/10 ACTH stim test suggests on-going adrenal insufficiency. Discussed with endocrinology team  - plan to repeat ACTH stim test likely in 1 month- ~9/10. No scheduled hydrocortisone in the meantime.  - Provide stress-dose steroids if clinical decompensation.    Musculoskeletal:   Hx signs of rickets, healing proximal right  femur fracture on 3/10 X-ray. Suspicion for left ulna fracture.   [facility] consulted in April due to parental concerns following identification of fractures.   - Gentle handling.   - OT consulted    Ophthalmology:    Last ROP exam  : Zone 3, stage 0, regressed bilaterally. Mature.  - ROP exam next 3-6 months from previous (Sept-Nov)    Psychosocial:   Appreciate input from SW team.   - PMAD screening: plan for routine screening for parents at 6 months if infant remains hospitalized.     HCM and Discharge planning:   > [place]3 except SCID+  on first screen. Unable to evaluate SCID due to transfusion hx.. Consider f/u NBS 90 days after last PRBCs transfusion to eval SCID results again (at earliest mid September given last transfusion at present , pending future transfusions)  > CCHD screen- fulfilled with Echocardiogram  - Hearing screen PTD - arranging for audiology appointment   - Carseat trial to be done just PTD  - OT input.  - Continue standard NICU cares and family education plan.  - NICU Neurodevelopment Follow-up Clinic.    Immunizations   Up to date  - Plan for Synagis administration during RSV season (<29 wk GA).  Immunization History   Administered Date(s) Administered    DTAP-IPV/HIB (PENTACEL) 2023, 2023, 2023    Hepatitis B (Peds <19Y) 2023, 2023, 2023    Pneumo Conj 13-V (2010&after) 2023, 2023, 2023      Medications   Current Facility-Administered Medications   Medication    acetaminophen (TYLENOL) solution 96 mg    Or    acetaminophen (TYLENOL) Suppository 90 mg    Breast Milk label for barcode scanning 1 Bottle    chlorothiazide (DIURIL) suspension 125 mg    cloNIDine 20 mcg/mL (CATAPRES) PO suspension 5 mcg    cyproheptadine syrup 0.2 mg    enoxaparin ANTICOAGULANT (LOVENOX) injection PEDS/NICU 7.8 mg    furosemide (LASIX) solution 6 mg    gabapentin (NEURONTIN) solution 33 mg    glycerin  (PEDI-LAX) Suppository 0.25 suppository    LORazepam (ATIVAN) 2 MG/ML (HIGH CONC) oral solution 0.2 mg    LORazepam (ATIVAN) 2 MG/ML (HIGH CONC) oral solution 0.2 mg    melatonin liquid 0.5 mg    morphine solution 0.8 mg    morphine solution 0.8 mg    naloxone (NARCAN) injection 0.064 mg    pediatric multivitamin w/iron (POLY-VI-SOL w/IRON) solution 0.5 mL    prune juice juice 5 mL    saline nasal (AYR SALINE) topical gel    simethicone (MYLICON) suspension 40 mg    sodium chloride (OCEAN) 0.65 % nasal spray 1 spray    sodium chloride ORAL solution 3.25 mEq    sucrose (SWEET-EASE) solution 0.2-2 mL    tetracaine (PONTOCAINE) 0.5 % ophthalmic solution 1 drop      Physical Exam     GENERAL: NAD, male infant. Overall appearance c/w CGA.  Alert and interactive.   HEENT: Normal facies with no significant edema. Anterior fontanelle soft/open/flat. Nasal canula in place.  RESPIRATORY: Chest CTA with equal breath sounds, no retractions.   CV: RRR, no murmur, strong/sym pulses in UE/LE, good perfusion.   ABDOMEN: soft, +BS, no HSM. Wound-vac in place.   CNS: Tone appropriate for GA. AFOF. MAEE. Mild brachycephaly.      Communications   Parents:   Name Home Phone Work Phone Mobile Phone Relationship Lgl Grd   KING NEVAREZ [phone] Father    EMERITA NEVAREZ [phone] Mother       Family lives in [place]. Had a previous 26 week IUGR son that passed away at [facility] at DOL 3.   Lui updated on rounds.     Care Conferences:   Care conference 3/15 with KR  Care conference with GI, surgery, NICU 4/26. Care conference on 4/26 with surgery, GI, PACCT, nursing, x3 neos (ME, MP, CG), SW and parents. Discussed timing of feeding advancement and extubation attempt. Discussed priority is to assess fortifier tolerance in the next week, and continue to maximize fluid balance in preparation for potential extubation attempt with methylpred (instead of DART d/t Donay) at 46-47 weeks  gestation. If unable to fortify to 26 kcal/oz with sHMF will need to find another solution for Ca/Phos intake. Will trial EES to assess if motility agent is helpful. Will plan for 1 week course and discontinue if no improvement noted. PACCT to continue to maximize medications when we can fit around advancement in nutrition/extubation.     5/16: multi-disciplinary care conference with tera (Jovan), peds pulm staff (Dr. Harvey), SW, Nurse Manager, PACCT NP and primary nurse to discuss with parents their concerns about pulmonary status, potential need for tracheostomy and anticipated course, potential need for and sequence of G-tube placement and hernia repair. Parents have expressed a wish for a second opinion from a Pediatric Gastroenterologist, which we will pursue.    5/19: Magdalene Aldana and Andrew informed parents about the results of the contrast study of the PICC and our plans to perform a RCA    5/24: Dr. Aldana informed parents of the results of the RCA - that extravasation of PICC was most likely the cause of intraabdominal and retroperitoneal fluid collection on 5/16.     8/1: conference w both parents, Tera (JOSÉ) PA, (Halley), Surgery (Maximo), Pulm (Godfrey in person, Shari via phone), PACCT (Sharri), OT (Mary), bedside nurse (Naomi), core nurses in person (Rylee and phone (Megan), Pulm medical student nurse manager (Mary). Discussed ongoing advances in care with daily/weekly schedule as tolerated with focus on respiratory goal to get to low flow nasal cannula and currently no indication/recommendation for trachesotomy with discussion of what could change that (respiratory set back, need for ore O2, poor CO2 levels, poor growth, unable to participate in cares/developmental therapies), surgical plans (wound vac to remain in place over the next several months, no abd reconstructive surgery unless indicated months, up to ~6mos, from now), pain/sedation waening plan, indications for removal of central line, and possible  transition to private room before discharge. Overall, discussed a discharge timeline for home going in the next 1-3 months.    PCPs:   Infant PCP: Physician No Ref-Primary - parents still considering.  Maternal OB PCP: Coleen Wagner  MFM: [facility] (Brea Farr, Dawit iKlgore)  Delivering Provider: Dr. Jean  Maternal providers last updated 2023.    Health Care Team:  Patient discussed with the care team.   A/P, imaging studies, laboratory data, medications and family situation reviewed.    Julia Rivera MD     None

## 2023-01-18 NOTE — SWALLOW BEDSIDE ASSESSMENT ADULT - SWALLOW EVAL: RECOMMENDED DIET
1. Mechanical Soft solids and Thin liquids Vermilion Border Text: The closure involved the vermilion border.

## 2023-04-13 NOTE — ED ADULT NURSE REASSESSMENT NOTE - NEURO GAIT
Alert Team Note:    Faxed ED provider notes to RB as requested by RB.   
ambulatory
with one assist, ambulatory, steady gait./requires assistance

## 2023-10-13 NOTE — ED ADULT TRIAGE NOTE - ARRIVAL FROM
Render Post-Care Instructions In Note?: no Detail Level: Detailed Show Applicator Variable?: Yes Consent: The patient's consent was obtained including but not limited to risks of crusting, scabbing, blistering, scarring, darker or lighter pigmentary change, recurrence, incomplete removal and infection. Duration Of Freeze Thaw-Cycle (Seconds): 0 Post-Care Instructions: I reviewed with the patient in detail post-care instructions. Patient is to wear sunprotection, and avoid picking at any of the treated lesions. Pt may apply Vaseline to crusted or scabbing areas. Home

## 2024-01-25 NOTE — PATIENT PROFILE ADULT. - NS TRANSFER PATIENT BELONGINGS
Clothing/Cell Phone/PDA (specify) Photo Preface (Leave Blank If You Do Not Want): Photographs were obtained today Detail Level: Zone

## 2024-05-20 NOTE — DIETITIAN INITIAL EVALUATION ADULT. - SIGNS/SYMPTOMS
ADRIAN/CM Discharge Plan  Informed patient is ready for discharge. [facility] accepted, received insurance auth, and can take pt today. Patient’s discharge destination is Rehabilitation[facility]Skilled [facility]. Patient to be picked up by [facility] at 2:45pm.  Patient/interested person has been counseled for post hospitalization care.  Initial implementation of the patient’s discharge plan has been arranged, including any devices/equipment needed for discharge. Discharge plan communicated to MD, RN, BUCK, JOSE JUAN, Receiving Facility/Agency and pts [facility] (Ph: [phone]).    RN report needed to [facility] supervisor at PH: [phone].     After Visit Summary - Transition Report Information  Receiving Agency/Facility: [facility]   Receiving Agency/Facility phone number: [phone]  Receiving Agency/Facility fax number: [phone]  Receiving Agency/Facility address: [address] [place] [address]  Receiving Agency/Facility city/state: [place], [place]  Receiving Agency/Facility Type: Skilled Nursing Facility   [FreeTextEntry1] : poct glucose and a1c were carried out today given diabetes diagnosis blood will be drawn in the office today  as evidenced by severe muscle wasting and fat loss

## 2024-09-10 NOTE — PATIENT PROFILE ADULT. - FUNCTIONAL SCREEN CURRENT LEVEL: COMMUNICATION, MLM
[Time Spent: ___ minutes] : I have spent [unfilled] minutes of time on the encounter which excludes teaching and separately reported services. (0) understands/communicates without difficulty
